# Patient Record
Sex: MALE | Race: WHITE | NOT HISPANIC OR LATINO | Employment: OTHER | URBAN - METROPOLITAN AREA
[De-identification: names, ages, dates, MRNs, and addresses within clinical notes are randomized per-mention and may not be internally consistent; named-entity substitution may affect disease eponyms.]

---

## 2017-03-01 RX ORDER — GLIMEPIRIDE 2 MG/1
2 TABLET ORAL
Qty: 90 TABLET | Refills: 2 | Status: SHIPPED | OUTPATIENT
Start: 2017-03-01 | End: 2017-12-14 | Stop reason: SDUPTHER

## 2017-03-16 DIAGNOSIS — E78.5 HYPERLIPIDEMIA, UNSPECIFIED HYPERLIPIDEMIA TYPE: ICD-10-CM

## 2017-03-16 DIAGNOSIS — Z00.00 HEALTH CARE MAINTENANCE: ICD-10-CM

## 2017-03-16 DIAGNOSIS — E13.8 DIABETES MELLITUS OF OTHER TYPE WITH COMPLICATION, UNSPECIFIED LONG TERM INSULIN USE STATUS: Primary | ICD-10-CM

## 2017-03-22 ENCOUNTER — RESULTS ENCOUNTER (OUTPATIENT)
Dept: FAMILY MEDICINE CLINIC | Facility: CLINIC | Age: 82
End: 2017-03-22

## 2017-03-22 DIAGNOSIS — E13.8 DIABETES MELLITUS OF OTHER TYPE WITH COMPLICATION, UNSPECIFIED LONG TERM INSULIN USE STATUS: ICD-10-CM

## 2017-03-22 DIAGNOSIS — Z00.00 HEALTH CARE MAINTENANCE: ICD-10-CM

## 2017-03-23 LAB
ALBUMIN SERPL-MCNC: 4.5 G/DL (ref 3.5–5.2)
ALBUMIN/CREAT UR: 10.3 MG/G CREAT (ref 0–30)
ALBUMIN/GLOB SERPL: 1.7 G/DL
ALP SERPL-CCNC: 66 U/L (ref 39–117)
ALT SERPL-CCNC: 11 U/L (ref 1–41)
AST SERPL-CCNC: 14 U/L (ref 1–40)
BASOPHILS # BLD AUTO: 0.03 10*3/MM3 (ref 0–0.2)
BASOPHILS NFR BLD AUTO: 0.6 % (ref 0–1.5)
BILIRUB SERPL-MCNC: 0.7 MG/DL (ref 0.1–1.2)
BUN SERPL-MCNC: 27 MG/DL (ref 8–23)
BUN/CREAT SERPL: 21.6 (ref 7–25)
CALCIUM SERPL-MCNC: 9.7 MG/DL (ref 8.6–10.5)
CHLORIDE SERPL-SCNC: 102 MMOL/L (ref 98–107)
CHOLEST SERPL-MCNC: 197 MG/DL (ref 0–200)
CO2 SERPL-SCNC: 27.3 MMOL/L (ref 22–29)
CREAT SERPL-MCNC: 1.25 MG/DL (ref 0.76–1.27)
CREAT UR-MCNC: 173.8 MG/DL
EOSINOPHIL # BLD AUTO: 0.09 10*3/MM3 (ref 0–0.7)
EOSINOPHIL NFR BLD AUTO: 1.7 % (ref 0.3–6.2)
ERYTHROCYTE [DISTWIDTH] IN BLOOD BY AUTOMATED COUNT: 15.9 % (ref 11.5–14.5)
GLOBULIN SER CALC-MCNC: 2.6 GM/DL
GLUCOSE SERPL-MCNC: 189 MG/DL (ref 65–99)
HBA1C MFR BLD: 7.5 % (ref 4.8–5.6)
HCT VFR BLD AUTO: 44.1 % (ref 40.4–52.2)
HDLC SERPL-MCNC: 47 MG/DL (ref 40–60)
HGB BLD-MCNC: 13.8 G/DL (ref 13.7–17.6)
IMM GRANULOCYTES # BLD: 0.02 10*3/MM3 (ref 0–0.03)
IMM GRANULOCYTES NFR BLD: 0.4 % (ref 0–0.5)
LDLC SERPL CALC-MCNC: 135 MG/DL (ref 0–100)
LDLC/HDLC SERPL: 2.87 {RATIO}
LYMPHOCYTES # BLD AUTO: 1.4 10*3/MM3 (ref 0.9–4.8)
LYMPHOCYTES NFR BLD AUTO: 26.8 % (ref 19.6–45.3)
MCH RBC QN AUTO: 31.5 PG (ref 27–32.7)
MCHC RBC AUTO-ENTMCNC: 31.3 G/DL (ref 32.6–36.4)
MCV RBC AUTO: 100.7 FL (ref 79.8–96.2)
MICROALBUMIN UR-MCNC: 17.9 UG/ML
MONOCYTES # BLD AUTO: 0.52 10*3/MM3 (ref 0.2–1.2)
MONOCYTES NFR BLD AUTO: 10 % (ref 5–12)
NEUTROPHILS # BLD AUTO: 3.16 10*3/MM3 (ref 1.9–8.1)
NEUTROPHILS NFR BLD AUTO: 60.5 % (ref 42.7–76)
PLATELET # BLD AUTO: 145 10*3/MM3 (ref 140–500)
POTASSIUM SERPL-SCNC: 5.1 MMOL/L (ref 3.5–5.2)
PROT SERPL-MCNC: 7.1 G/DL (ref 6–8.5)
RBC # BLD AUTO: 4.38 10*6/MM3 (ref 4.6–6)
SODIUM SERPL-SCNC: 144 MMOL/L (ref 136–145)
TRIGL SERPL-MCNC: 76 MG/DL (ref 0–150)
VLDLC SERPL CALC-MCNC: 15.2 MG/DL (ref 5–40)
WBC # BLD AUTO: 5.22 10*3/MM3 (ref 4.5–10.7)

## 2017-03-29 ENCOUNTER — OFFICE VISIT (OUTPATIENT)
Dept: FAMILY MEDICINE CLINIC | Facility: CLINIC | Age: 82
End: 2017-03-29

## 2017-03-29 VITALS
RESPIRATION RATE: 16 BRPM | HEART RATE: 70 BPM | DIASTOLIC BLOOD PRESSURE: 70 MMHG | TEMPERATURE: 98.2 F | OXYGEN SATURATION: 97 % | SYSTOLIC BLOOD PRESSURE: 142 MMHG | BODY MASS INDEX: 27.94 KG/M2 | HEIGHT: 71 IN | WEIGHT: 199.6 LBS

## 2017-03-29 DIAGNOSIS — I10 ESSENTIAL HYPERTENSION: ICD-10-CM

## 2017-03-29 DIAGNOSIS — IMO0001 UNCONTROLLED TYPE 2 DIABETES MELLITUS WITHOUT COMPLICATION, WITH LONG-TERM CURRENT USE OF INSULIN: ICD-10-CM

## 2017-03-29 DIAGNOSIS — E78.00 PURE HYPERCHOLESTEROLEMIA: Primary | ICD-10-CM

## 2017-03-29 DIAGNOSIS — M54.2 NECK PAIN: ICD-10-CM

## 2017-03-29 PROCEDURE — 99214 OFFICE O/P EST MOD 30 MIN: CPT | Performed by: INTERNAL MEDICINE

## 2017-03-29 NOTE — PROGRESS NOTES
"Subjective   Patient ID: Home Wynn is a 85 y.o. male presents with   Chief Complaint   Patient presents with   • Hyperlipidemia     f/u on lab results       HPI - this patient has a history of type 2 diabetes, hypertension, hypercholesterolemia and chronic neck pain.  I reviewed his old MRI of his cervical spine.  He has lots of arthritis.  He complains of no radiation down into his arms or weakness in his hands.  The pain is moderate in intensity and worse with range of motion primarily rotating to the right.  Described as a sharp soreness.        Assessment plan    Type 2 insulin-dependent diabetes mellitus continue Amaryl 2 mg he should not take this if he skips breakfast.  Also will increase his Lantus to 14 units daily.    Neck pain-Voltaren gel when necessary    Hypertension controlled with diet    A. fib he sees cardiology's on amiodarone and aspirin    Hyperlipidemia-controlled with Pravachol.        Allergies   Allergen Reactions   • Other        The following portions of the patient's history were reviewed and updated as appropriate: allergies, current medications, past family history, past medical history, past social history, past surgical history and problem list.      Review of Systems   Constitutional: Negative.    HENT: Negative.    Eyes: Negative.    Respiratory: Negative.    Cardiovascular: Negative.    Gastrointestinal: Negative.    Endocrine: Negative.    Genitourinary: Negative.    Musculoskeletal: Positive for neck pain.   Skin: Negative.    Allergic/Immunologic: Negative.    Neurological: Positive for numbness.   Hematological: Negative.    Psychiatric/Behavioral: Negative.        Objective     Vitals:    03/29/17 1053   BP: 142/70   Pulse: 70   Resp: 16   Temp: 98.2 °F (36.8 °C)   TempSrc: Oral   SpO2: 97%   Weight: 199 lb 9.6 oz (90.5 kg)   Height: 71\" (180.3 cm)         Physical Exam   Constitutional: He is oriented to person, place, and time. He appears well-developed and " well-nourished.   HENT:   Head: Normocephalic and atraumatic.   Eyes: EOM are normal. Pupils are equal, round, and reactive to light.   Cardiovascular: Normal rate, regular rhythm and normal heart sounds.    Pulmonary/Chest: Effort normal.   Neurological: He is alert and oriented to person, place, and time.   Psychiatric: He has a normal mood and affect. His behavior is normal.   Vitals reviewed.        Home was seen today for hyperlipidemia.    Diagnoses and all orders for this visit:    Pure hypercholesterolemia    Essential hypertension    Uncontrolled type 2 diabetes mellitus without complication, with long-term current use of insulin    Neck pain    Other orders  -     diclofenac (VOLTAREN) 1 % gel gel; Apply 4 g topically 4 (Four) Times a Day.        Call or return to clinic prn if these symptoms worsen or fail to improve as anticipated.

## 2017-05-02 ENCOUNTER — TELEPHONE (OUTPATIENT)
Dept: FAMILY MEDICINE CLINIC | Facility: CLINIC | Age: 82
End: 2017-05-02

## 2017-05-02 RX ORDER — PRAVASTATIN SODIUM 40 MG
40 TABLET ORAL 2 TIMES DAILY
Qty: 60 TABLET | Refills: 5 | Status: SHIPPED | OUTPATIENT
Start: 2017-05-02 | End: 2017-05-12 | Stop reason: SDUPTHER

## 2017-05-04 RX ORDER — AMIODARONE HYDROCHLORIDE 200 MG/1
TABLET ORAL
Qty: 90 TABLET | Refills: 0 | Status: SHIPPED | OUTPATIENT
Start: 2017-05-04 | End: 2017-07-11

## 2017-05-12 RX ORDER — PRAVASTATIN SODIUM 40 MG
40 TABLET ORAL DAILY
Qty: 90 TABLET | Refills: 1 | Status: SHIPPED | OUTPATIENT
Start: 2017-05-12 | End: 2018-09-21 | Stop reason: SDUPTHER

## 2017-06-05 ENCOUNTER — TELEPHONE (OUTPATIENT)
Dept: FAMILY MEDICINE CLINIC | Facility: CLINIC | Age: 82
End: 2017-06-05

## 2017-06-05 DIAGNOSIS — M54.2 NECK PAIN: Primary | ICD-10-CM

## 2017-06-05 NOTE — TELEPHONE ENCOUNTER
Pt called and said his neck problem is not getting better. Been going on a year now. Was hoping you would order some kind of MRI or xray. He just cant stand it any longer. thanks

## 2017-06-07 ENCOUNTER — OFFICE VISIT (OUTPATIENT)
Dept: FAMILY MEDICINE CLINIC | Facility: CLINIC | Age: 82
End: 2017-06-07

## 2017-06-07 VITALS
OXYGEN SATURATION: 95 % | SYSTOLIC BLOOD PRESSURE: 125 MMHG | HEIGHT: 71 IN | HEART RATE: 68 BPM | DIASTOLIC BLOOD PRESSURE: 80 MMHG | TEMPERATURE: 98.3 F | BODY MASS INDEX: 27.92 KG/M2 | WEIGHT: 199.4 LBS

## 2017-06-07 DIAGNOSIS — I10 ESSENTIAL HYPERTENSION: ICD-10-CM

## 2017-06-07 DIAGNOSIS — M54.2 NECK PAIN: Primary | ICD-10-CM

## 2017-06-07 PROCEDURE — 99213 OFFICE O/P EST LOW 20 MIN: CPT | Performed by: INTERNAL MEDICINE

## 2017-06-07 NOTE — PROGRESS NOTES
"Subjective   Patient ID: Home Wynn is a 85 y.o. male presents with   Chief Complaint   Patient presents with   • Neck Pain       HPI - This patient is continuing to complain about neck pain.  It's been going on for a year or so but it's not going away.  It's moderate and worse with range of motion he's been to PT and it just made it worse.  We tried some and diclofenac gel and Aleve and that did not help.  The pain does not seem to radiate and he has no symptoms suggestive of myelopathy.    Assessment plan    Neck pain, chronic-MRI of the cervical spine    Hypertension currently controlled without medicine.    Allergies   Allergen Reactions   • Other        The following portions of the patient's history were reviewed and updated as appropriate: allergies, current medications, past family history, past medical history, past social history, past surgical history and problem list.      Review of Systems   Constitutional: Negative.    Respiratory: Negative.    Cardiovascular: Negative.    Musculoskeletal: Positive for arthralgias, myalgias and neck stiffness.   Psychiatric/Behavioral: Negative.        Objective     Vitals:    06/07/17 0752   BP: 125/80   Pulse: 68   Temp: 98.3 °F (36.8 °C)   SpO2: 95%   Weight: 199 lb 6.4 oz (90.4 kg)   Height: 71\" (180.3 cm)         Physical Exam   Constitutional: He appears well-developed and well-nourished.   HENT:   Head: Normocephalic and atraumatic.   Musculoskeletal: Normal range of motion. He exhibits tenderness. He exhibits no deformity.   Tenderness over the spinous process of the cervical spine   Nursing note and vitals reviewed.        Home was seen today for neck pain.    Diagnoses and all orders for this visit:    Neck pain    Essential hypertension        Call or return to clinic prn if these symptoms worsen or fail to improve as anticipated.  "

## 2017-06-09 ENCOUNTER — TELEPHONE (OUTPATIENT)
Dept: FAMILY MEDICINE CLINIC | Facility: CLINIC | Age: 82
End: 2017-06-09

## 2017-06-15 ENCOUNTER — HOSPITAL ENCOUNTER (OUTPATIENT)
Dept: MRI IMAGING | Facility: HOSPITAL | Age: 82
Discharge: HOME | End: 2017-06-15
Admitting: INTERNAL MEDICINE

## 2017-06-15 DIAGNOSIS — M54.2 NECK PAIN: ICD-10-CM

## 2017-06-15 PROCEDURE — 72141 MRI NECK SPINE W/O DYE: CPT

## 2017-06-20 DIAGNOSIS — M54.2 NECK PAIN: Primary | ICD-10-CM

## 2017-06-26 DIAGNOSIS — E78.5 HYPERLIPIDEMIA, UNSPECIFIED HYPERLIPIDEMIA TYPE: ICD-10-CM

## 2017-06-26 DIAGNOSIS — I15.9 SECONDARY HYPERTENSION: ICD-10-CM

## 2017-06-26 DIAGNOSIS — E13.8 DIABETES MELLITUS OF OTHER TYPE WITH COMPLICATION, UNSPECIFIED LONG TERM INSULIN USE STATUS: Primary | ICD-10-CM

## 2017-07-11 ENCOUNTER — OFFICE VISIT (OUTPATIENT)
Dept: PAIN MEDICINE | Facility: CLINIC | Age: 82
End: 2017-07-11

## 2017-07-11 ENCOUNTER — OFFICE VISIT (OUTPATIENT)
Dept: FAMILY MEDICINE CLINIC | Facility: CLINIC | Age: 82
End: 2017-07-11

## 2017-07-11 VITALS
BODY MASS INDEX: 28.55 KG/M2 | TEMPERATURE: 98 F | HEIGHT: 71 IN | WEIGHT: 203.9 LBS | HEART RATE: 63 BPM | RESPIRATION RATE: 16 BRPM | SYSTOLIC BLOOD PRESSURE: 132 MMHG | OXYGEN SATURATION: 95 % | DIASTOLIC BLOOD PRESSURE: 74 MMHG

## 2017-07-11 VITALS
BODY MASS INDEX: 28.5 KG/M2 | SYSTOLIC BLOOD PRESSURE: 165 MMHG | DIASTOLIC BLOOD PRESSURE: 84 MMHG | RESPIRATION RATE: 16 BRPM | WEIGHT: 203.6 LBS | TEMPERATURE: 98 F | HEIGHT: 71 IN | OXYGEN SATURATION: 96 % | HEART RATE: 63 BPM

## 2017-07-11 DIAGNOSIS — L08.9 SKIN INFECTION: Primary | ICD-10-CM

## 2017-07-11 DIAGNOSIS — R60.9 SWELLING: ICD-10-CM

## 2017-07-11 DIAGNOSIS — M54.2 NECK PAIN: Primary | ICD-10-CM

## 2017-07-11 DIAGNOSIS — L03.115 CELLULITIS OF RIGHT LOWER EXTREMITY: ICD-10-CM

## 2017-07-11 DIAGNOSIS — L08.9 SKIN INFECTION: ICD-10-CM

## 2017-07-11 DIAGNOSIS — M48.02 CERVICAL SPINAL STENOSIS: ICD-10-CM

## 2017-07-11 LAB
POC AMPHETAMINES: NEGATIVE
POC BARBITURATES: NEGATIVE
POC BENZODIAZEPHINES: NEGATIVE
POC COCAINE: NEGATIVE
POC METHADONE: NEGATIVE
POC METHAMPHETAMINE SCREEN URINE: NEGATIVE
POC OPIATES: NEGATIVE
POC OXYCODONE: NEGATIVE
POC PHENCYCLIDINE: NEGATIVE
POC PROPOXYPHENE: NEGATIVE
POC THC: NEGATIVE
POC TRICYCLIC ANTIDEPRESSANTS: NEGATIVE

## 2017-07-11 PROCEDURE — 99213 OFFICE O/P EST LOW 20 MIN: CPT | Performed by: INTERNAL MEDICINE

## 2017-07-11 PROCEDURE — 99204 OFFICE O/P NEW MOD 45 MIN: CPT | Performed by: PAIN MEDICINE

## 2017-07-11 PROCEDURE — 80305 DRUG TEST PRSMV DIR OPT OBS: CPT | Performed by: PAIN MEDICINE

## 2017-07-11 RX ORDER — SULFAMETHOXAZOLE AND TRIMETHOPRIM 400; 80 MG/1; MG/1
1 TABLET ORAL 2 TIMES DAILY
Qty: 14 TABLET | Refills: 0 | Status: SHIPPED | OUTPATIENT
Start: 2017-07-11 | End: 2017-09-12 | Stop reason: ALTCHOICE

## 2017-07-11 RX ORDER — AMIODARONE HYDROCHLORIDE 200 MG/1
50 TABLET ORAL DAILY
COMMUNITY
End: 2017-09-22 | Stop reason: SDUPTHER

## 2017-07-11 NOTE — PATIENT INSTRUCTIONS
Review cervical MRI- diagnosis: Cervical spinal stenosis  Treated with cervical epidural steroid   To be done on a Wednesday as an outpatient procedure.   - given age will try steroid injection first before starting or trying any medication.   Has failed conservative therapy with PT/chrio/heat/ice/behavior modification.       Epidural Steroid Injection  An epidural steroid injection is given to relieve pain in your neck, back, or legs that is caused by the irritation or swelling of a nerve root. This procedure involves injecting a steroid and numbing medicine (anesthetic) into the epidural space. The epidural space is the space between the outer covering of your spinal cord and the bones that form your backbone (vertebra).   LET YOUR HEALTH CARE PROVIDER KNOW ABOUT:   · Any allergies you have.  · All medicines you are taking, including vitamins, herbs, eye drops, creams, and over-the-counter medicines such as aspirin.  · Previous problems you or members of your family have had with the use of anesthetics.  · Any blood disorders or blood clotting disorders you have.  · Previous surgeries you have had.  · Medical conditions you have.  RISKS AND COMPLICATIONS  Generally, this is a safe procedure. However, as with any procedure, complications can occur. Possible complications of epidural steroid injection include:  · Headache.  · Bleeding.  · Infection.  · Allergic reaction to the medicines.  · Damage to your nerves.  The response to this procedure depends on the underlying cause of the pain and its duration. People who have long-term (chronic) pain are less likely to benefit from epidural steroids than are those people whose pain comes on strong and suddenly.  BEFORE THE PROCEDURE   · Ask your health care provider about changing or stopping your regular medicines. You may be advised to stop taking blood-thinning medicines a few days before the procedure.  · You may be given medicines to reduce anxiety.  · Arrange for  someone to take you home after the procedure.  PROCEDURE   · You will remain awake during the procedure. You may receive medicine to make you relaxed.  · You will be asked to lie on your stomach.  · The injection site will be cleaned.  · The injection site will be numbed with a medicine (local anesthetic).  · A needle will be injected through your skin into the epidural space.  · Your health care provider will use an X-ray machine to ensure that the steroid is delivered closest to the affected nerve. You may have minimal discomfort at this time.  · Once the needle is in the right position, the local anesthetic and the steroid will be injected into the epidural space.  · The needle will then be removed and a bandage will be applied to the injection site.  AFTER THE PROCEDURE   · You may be monitored for a short time before you go home.  · You may feel weakness or numbness in your arm or leg, which disappears within hours.  · You may be allowed to eat, drink, and take your regular medicine.  · You may have soreness at the site of the injection.     This information is not intended to replace advice given to you by your health care provider. Make sure you discuss any questions you have with your health care provider.     Document Released: 03/26/2009 Document Revised: 08/20/2014 Document Reviewed: 06/06/2014  [employer] Patient Education ©2017 [employer]

## 2017-07-11 NOTE — PROGRESS NOTES
CHIEF COMPLAINT: Neck Pain    Home Wynn is a 85 y.o. male.   He was referred here by Dr. Cai. He presents to the office for evaluation and treatment of Neck Pain    HPI  Neck Pain  Started 9-10 months ago. He states it started and gradually got worse. He has been in physical therapy for it, and he can move it better but still having pain when he turns it from side to side. He has seen a chiropractor twice in the last 9 months and it didn't help. He has tried a tens unit which didn't help, and trying ice and heat doesn't help. He has not seen a neurosurgeon.    The patient states their pain is a 8 on a scale of 1-10.  The patient describes this pain as constant stabbing.  The pain is located in Neck. This painful problem is aggravated by movement and is alleviated by nothing. States not moving his head is the only thing that helps.     Also has complaints of right calf pain and tenderness after bug bite a few days ago. Warm to touch and swollen. Has not tried any medication or seen anyone for it.     Past pain medications:   Tylenol  Advil - no help  Diclofenac - no help    Current pain medications:   Tylenol prn- minimal help    Past therapies:  Physical Therapy: yes- better ROM but pain is the same  Chiropractor: yes - no improvement  Massage Therapy: no  TENS: yes  Neck or back surgery: no  Past pain management: no    Previous Injections: not in neck or back, steroid injection in knee  Effect of Injection (%): 100%  Length of Relief: 2 years +     PEG Assessment   What number best describes your pain on average in the past week? 8  What number best describes how, during the past week, pain has interfered with your enjoyment of life? 8  What number best describes how, during the past week, pain has interfered with your general activity? 8      Current Outpatient Prescriptions:   •  amiodarone (PACERONE) 50 MG, Take 50 mg by mouth Daily., Disp: , Rfl:   •  aspirin 325 MG tablet, Take by mouth daily., Disp: ,  Rfl:   •  glimepiride (AMARYL) 2 MG tablet, Take 1 tablet by mouth Every Morning Before Breakfast., Disp: 90 tablet, Rfl: 2  •  Insulin Glargine (LANTUS SOLOSTAR) 100 UNIT/ML injection pen, Inject 15 Units under the skin every night., Disp: 15 mL, Rfl: 5  •  Insulin Pen Needle 31G X 6 MM misc, 31 mm daily., Disp: 90 each, Rfl: 1  •  Multiple Vitamins-Minerals (PRESERVISION AREDS) capsule, Take by mouth., Disp: , Rfl:   •  OXYGEN-HELIUM IN, Oxygen; Patient Sig: Oxygen he uses 2 LPM at bedtime.; 0; 21-Mar-2014; Active, Disp: , Rfl:   •  pravastatin (PRAVACHOL) 40 MG tablet, Take 1 tablet by mouth Daily., Disp: 90 tablet, Rfl: 1  •  sulfamethoxazole-trimethoprim (BACTRIM) 400-80 MG tablet, Take 1 tablet by mouth 2 (Two) Times a Day., Disp: 14 tablet, Rfl: 0    REVIEW OF PERTINENT MEDICAL DATA  Chart reviewed and summarization of all medical records up to new patient visit performed.  Last note from PCP reviewed. Seen for neck pain. Neck MRI ordered. Failed PT.     IMAGING  6/05/17 MRI cervical spine:  IMPRESSION:  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 Mild to moderate canal stenosis is present at C6-7 and C3-4. Multilevel neuroforaminal compromise is noted. The appearance is similar to the examination of 08/11/2014. There is no evidence of marrow edema to suggest a cervical fracture. There is mild loss of vertical height without marrow edema involving the T3 vertebral body, new versus the prior MRI examination suggesting a mild compression fracture/compression deformity. Further evaluation could be performed with a cervical myelogram as indicated.    I personally reviewed the images of cervical MRI while patient was in the office.  Findings discussed with patient.      PFSH:  The following  "portions of the patient's history were reviewed and updated as appropriate: problem list, past medical history, past surgery history, social history, family history, medications, and allergies    Review of Systems   Constitutional: Positive for activity change and fatigue. Negative for appetite change, chills and fever.   Respiratory: Positive for apnea (wears 02 at night) and shortness of breath. Negative for cough and wheezing.    Cardiovascular: Positive for leg swelling (right calf pt states it swelled more yesterday after he got a bite from being outside). Negative for chest pain.   Gastrointestinal: Negative for constipation, diarrhea, nausea and vomiting.   Genitourinary: Positive for frequency.   Musculoskeletal: Positive for arthralgias and neck pain. Negative for back pain and neck stiffness.   Skin: Negative for rash and wound.   Allergic/Immunologic: Negative for immunocompromised state.   Neurological: Negative for dizziness, weakness, light-headedness, numbness and headaches.   Hematological: Does not bruise/bleed easily.   Psychiatric/Behavioral: Negative for agitation, confusion, hallucinations, sleep disturbance and suicidal ideas. The patient is not nervous/anxious.    All other systems reviewed and are negative.      Vitals:    07/11/17 1020   BP: 165/84   Pulse: 63   Resp: 16   Temp: 98 °F (36.7 °C)   SpO2: 96%   Weight: 203 lb 9.6 oz (92.4 kg)   Height: 71\" (180.3 cm)   PainSc:   8   PainLoc: Neck       Physical Exam   Constitutional: He appears well-developed and well-nourished. No distress.   HENT:   Head: Normocephalic and atraumatic.   Nose: Nose normal.   Mouth/Throat: Oropharynx is clear and moist.   Eyes: Conjunctivae and EOM are normal.   Neck: Normal range of motion. Neck supple.   Cardiovascular: Normal rate, regular rhythm and normal heart sounds.    Pulmonary/Chest: Effort normal and breath sounds normal. No stridor. No respiratory distress.   Abdominal: Soft. Bowel sounds are " normal. He exhibits no distension. There is no tenderness.   Musculoskeletal:        Cervical back: He exhibits decreased range of motion, tenderness and pain. He exhibits no deformity.        Right lower leg: He exhibits tenderness, swelling and edema.        Legs:  Neurological: He is alert. He has normal strength. No cranial nerve deficit or sensory deficit. Gait normal.   Skin: Skin is warm and dry. No rash noted. He is not diaphoretic.   Psychiatric: He has a normal mood and affect. His speech is normal and behavior is normal.   Nursing note and vitals reviewed.    Ortho Exam  Neurologic Exam     Mental Status   Speech: speech is normal     Cranial Nerves   Cranial nerves II through XII intact.     CN III, IV, VI   Extraocular motions are normal.     Motor Exam     Strength   Strength 5/5 throughout.     Gait, Coordination, and Reflexes     Gait  Gait: normal      Lab Results   Component Value Date    POCMETH Negative 07/11/2017    POCAMPHET Negative 07/11/2017    POCBARBITUR Negative 07/11/2017    POCBENZO Negative 07/11/2017    POCCOCAINE Negative 07/11/2017    POCMETHADO Negative 07/11/2017    POCOPIATES Negative 07/11/2017    POCOXYCODO Negative 07/11/2017    POCPHENCYC Negative 07/11/2017    POCPROPOXY Negative 07/11/2017    POCTHC Negative 07/11/2017    POCTRICYC Negative 07/11/2017       Comments: Reviewed POC today      Date of last MALICK reviewed : 07/17/17   Comments: Consistent     Assessment/Plan   Home was seen today for neck pain.    Diagnoses and all orders for this visit:    Neck pain  -     Case Request  -     Urine Drug Screen Confirmation  -     POC Urine Drug Screen, Triage    Cervical spinal stenosis  -     Case Request  -     Urine Drug Screen Confirmation  -     POC Urine Drug Screen, Triage    - Called PCP to discuss being seen for swelling and erythema over posterior aspect of right calf. Looks like cellulitis but it is not my specialty. PCP will see him this morning. Patient to go  see PCP after visit. Will leave treatment plan to PCP.     - Discussed with the patient regarding the etiology of their pain. Informed them that they would likely benefit from a C6/C7 Cervical epidural steroid injection.  The procedure was described in detail and the risks, benefits and alternatives were discussed with the patient (including but not limited to: bleeding, infection, nerve damage, worsening of pain, inability to perform injection, paralysis, seizures, and death) who agreed to proceed.   - given age will try injection first before medication.   - UDS done today, will check on next visit.   - Continue to work with PT/HEP to improve ROM.     Wt Readings from Last 3 Encounters:   07/13/17 200 lb 9.6 oz (91 kg)   07/11/17 203 lb 14.4 oz (92.5 kg)   07/11/17 203 lb 9.6 oz (92.4 kg)     Body mass index is 28.4 kg/(m^2). Patient counseled on the importance of weight loss to help with overall health and pain control. Patient instructed to attempt weight loss.   Plan: Calorie counting increase physical activity, reduce portion size, cut out extra servings and reduce fast food intake    Follow-up in 1 month.    Aura Jacobs MD  Pain Management

## 2017-07-11 NOTE — PROGRESS NOTES
"Subjective   Patient ID: Home Wynn is a 85 y.o. male presents with   Chief Complaint   Patient presents with   • cellulitis possible in right lower leg     since friday night, painful, swollen, has a fever in the leg       HPI - This patient presents today with right lower extremity pain and swelling since Friday night he thinks something may have been him in the garden.  He's had no fever but he's having pain in the right lower extremity.  I went ahead and send him to have a venous ultrasound of the leg done and it was negative for an acute DVT.    Assessment plan    Swelling and right lower extremity skin infection-Bactrim single strength 1 by mouth twice a day.  Patient's to elevate the leg and follow-up with me on thur.        Allergies   Allergen Reactions   • Other        The following portions of the patient's history were reviewed and updated as appropriate: allergies, current medications, past family history, past medical history, past social history, past surgical history and problem list.      Review of Systems   Constitutional: Negative.  Negative for fever.   Respiratory: Negative.    Cardiovascular: Positive for leg swelling.   Musculoskeletal: Positive for arthralgias.   Skin: Positive for rash.       Objective     Vitals:    07/11/17 1137   BP: 132/74   Pulse: 63   Resp: 16   Temp: 98 °F (36.7 °C)   TempSrc: Oral   SpO2: 95%   Weight: 203 lb 14.4 oz (92.5 kg)   Height: 71\" (180.3 cm)         Physical Exam   Constitutional: He appears well-developed and well-nourished.   HENT:   Head: Normocephalic and atraumatic.   Skin: No rash noted. There is erythema.   Mild right lower extremities swelling tenderness palpation no fluctuance no real heat or erythema   Psychiatric: He has a normal mood and affect. His behavior is normal.   Nursing note and vitals reviewed.       she is appreciated in Bactrim  Home was seen today for cellulitis possible in right lower leg.    Diagnoses and all orders for this " visit:    Skin infection    Swelling  -     Duplex Venous Lower Extremity - Right CAR; Future    Other orders  -     sulfamethoxazole-trimethoprim (BACTRIM) 400-80 MG tablet; Take 1 tablet by mouth 2 (Two) Times a Day.        Call or return to clinic prn if these symptoms worsen or fail to improve as anticipated.

## 2017-07-12 ENCOUNTER — OUTSIDE FACILITY SERVICE (OUTPATIENT)
Dept: PAIN MEDICINE | Facility: CLINIC | Age: 82
End: 2017-07-12

## 2017-07-12 ENCOUNTER — DOCUMENTATION (OUTPATIENT)
Dept: PAIN MEDICINE | Facility: CLINIC | Age: 82
End: 2017-07-12

## 2017-07-12 PROCEDURE — 62321 NJX INTERLAMINAR CRV/THRC: CPT | Performed by: PAIN MEDICINE

## 2017-07-12 NOTE — PROGRESS NOTES
Cervical Epidural Steroid Injection @ C6-C7    PREOPERATIVE DIAGNOSIS:   Cervical spinal stenosis, chronic neck pain  POSTOPERATIVE DIAGNOSIS:  Same as preop diagnosis    PROCEDURE:   Cervical Epidural Steroid Injection, Therapeutic Translaminar Injection, with epidurogram, at  C6-C7 level    PRE-PROCEDURE DISCUSSION WITH PATIENT:    Risks and complications were discussed with the patient prior to starting the procedure and informed consent was obtained.  We discussed various topics including but not limited to bleeding, infection, injury, paralysis, nerve injury, dural puncture, coma, death, worsening of clinical picture, lack of pain relief, and postprocedural soreness.    SURGEON:  Aura Jacobs MD     REASON FOR PROCEDURE:    Neck Pain  Started 9-10 months ago. He states it started and gradually got worse. He has been in physical therapy for it, and he can move it better but still having pain when he turns it from side to side. He has seen a chiropractor twice in the last 9 months and it didn't help. He has tried a tens unit which didn't help, and trying ice and heat doesn't help. The patient states their pain is a 8 on a scale of 1-10. The patient describes this pain as constant stabbing. The pain is located in Neck. This painful problem is aggravated by movement and is alleviated by nothing. States not moving his head is the only thing that helps.     SEDATION:  Versed 2mg IV fentanyl 50 mcg  ANESTHETIC:  Lidocaine 1%  STEROID:   Dexamethasone 8mg    DESCRIPTON OF PROCEDURE:    After obtaining informed consent, I.V. was started in the preop area.   The patient was taken to the operating room and placed in the prone position.  EKG, blood pressure, and pulse oximeter were monitored throughout, and sedation was provided as needed by the RN under my guidance. All pressure points were well padded.  The cervicothoracic spine area was prepped with Chloraprep and draped in a sterile fashion.  Under fluoroscopic  guidance, the aforementioned interlaminar space was identified. Skin and subcutaneous tissues were anesthetized with 1% lidocaine in the middle of the space. A 20-gauge Tuohy needle was introduced through the skin and advanced to this interlaminar space and into the epidural space under fluoroscopic guidance and verified with loss-of-resistance technique to air.  After confirming the position of the needle with the fluoroscope with all the views, and after aspiration was confirmed negative for blood and CSF, 1.5 mL of Omnipaque was injected.  After seeing appropriate epidurogram with lateral and PA views, a total of 3 cc solution was injected, consisting of 1cc of local anesthetic as above, with normal saline and injectable steroid as above.     ESTIMATED BLOOD LOSS:  <5 mL  SPECIMENS:  None    COMPLICATIONS:     No complications were noted.    TOLERANCE & DISCHARGE CONDITION:    The patient tolerated the procedure well.  The patient was transported to the recovery area without difficulties.  The patient was discharged to home under the care of family in stable and satisfactory condition.    PLAN OF CARE:  1. The patient was given our standard instruction sheet.  2. The patient will return to clinic for follow-up in approximately 4 weeks.  3. The patient will resume all medications as per the medication reconciliation sheet.

## 2017-07-13 ENCOUNTER — OFFICE VISIT (OUTPATIENT)
Dept: FAMILY MEDICINE CLINIC | Facility: CLINIC | Age: 82
End: 2017-07-13

## 2017-07-13 VITALS
HEIGHT: 71 IN | TEMPERATURE: 98 F | HEART RATE: 78 BPM | WEIGHT: 200.6 LBS | BODY MASS INDEX: 28.08 KG/M2 | RESPIRATION RATE: 16 BRPM | DIASTOLIC BLOOD PRESSURE: 80 MMHG | OXYGEN SATURATION: 95 % | SYSTOLIC BLOOD PRESSURE: 138 MMHG

## 2017-07-13 DIAGNOSIS — L08.9 SKIN INFECTION: Primary | ICD-10-CM

## 2017-07-13 DIAGNOSIS — R60.9 SWELLING: ICD-10-CM

## 2017-07-13 PROCEDURE — 99213 OFFICE O/P EST LOW 20 MIN: CPT | Performed by: INTERNAL MEDICINE

## 2017-07-13 NOTE — PROGRESS NOTES
"Subjective   Patient ID: Home Wynn is a 85 y.o. male presents with   Chief Complaint   Patient presents with   • Follow-up     on right leg swelling       HPI - This patient follows up for right leg swelling and pain.  I saw him the day before yesterday.  We did an ultrasound that showed no DVT.  I started him on Bactrim single strength he's much better.    Assessment plan    Skin infection and swelling.-Continue Bactrim single strength 1 by mouth twice a day for 5 more days.  If not fully resolved patient's to let me know.    Allergies   Allergen Reactions   • Other        The following portions of the patient's history were reviewed and updated as appropriate: allergies, current medications, past family history, past medical history, past social history, past surgical history and problem list.      Review of Systems   Constitutional: Negative.    Respiratory: Negative.    Cardiovascular: Positive for leg swelling.   Musculoskeletal:        Leg pain resolved       Objective     Vitals:    07/13/17 1247   BP: 138/80   Pulse: 78   Resp: 16   Temp: 98 °F (36.7 °C)   TempSrc: Oral   SpO2: 95%   Weight: 200 lb 9.6 oz (91 kg)   Height: 71\" (180.3 cm)         Physical Exam   Constitutional: He is oriented to person, place, and time. He appears well-developed and well-nourished.   Neurological: He is alert and oriented to person, place, and time.   Skin: Skin is warm and dry. No rash noted.   Chronic venostasis rash   Psychiatric: He has a normal mood and affect. His behavior is normal.   Nursing note and vitals reviewed.        Home was seen today for follow-up.    Diagnoses and all orders for this visit:    Skin infection    Swelling        Call or return to clinic prn if these symptoms worsen or fail to improve as anticipated.  "

## 2017-07-19 ENCOUNTER — RESULTS ENCOUNTER (OUTPATIENT)
Dept: FAMILY MEDICINE CLINIC | Facility: CLINIC | Age: 82
End: 2017-07-19

## 2017-07-19 ENCOUNTER — TELEPHONE (OUTPATIENT)
Dept: PAIN MEDICINE | Facility: CLINIC | Age: 82
End: 2017-07-19

## 2017-07-19 DIAGNOSIS — E13.8 DIABETES MELLITUS OF OTHER TYPE WITH COMPLICATION, UNSPECIFIED LONG TERM INSULIN USE STATUS: ICD-10-CM

## 2017-07-19 DIAGNOSIS — I15.9 SECONDARY HYPERTENSION: ICD-10-CM

## 2017-07-19 DIAGNOSIS — E78.5 HYPERLIPIDEMIA, UNSPECIFIED HYPERLIPIDEMIA TYPE: ICD-10-CM

## 2017-07-19 NOTE — TELEPHONE ENCOUNTER
Yes he would likely benefit from a 2nd epidural injection if he received such improvement with the first.  I recommend he wait at least 3 weeks in between injections to space out the steroids. He can repeat on August 2nd if he would like. I will place a case request so he can be scheduled. Please call him and let him know. Thank you.

## 2017-07-19 NOTE — TELEPHONE ENCOUNTER
"Pt came in to office to ask if he will get any more relief from MAURI C6-C7. He had it on 7-12-17. He states he got 50% relief and he had it a week ago. I encouraged him and said I will let you know. He said it still \"catches.\" Not sure if you can order another epidural. He will be going out of town until Sunday. Please advise.   "

## 2017-07-20 LAB
ALBUMIN SERPL-MCNC: 4.3 G/DL (ref 3.5–5.2)
ALBUMIN/CREAT UR: 8 MG/G CREAT (ref 0–30)
ALBUMIN/GLOB SERPL: 1.3 G/DL
ALP SERPL-CCNC: 69 U/L (ref 39–117)
ALT SERPL-CCNC: 9 U/L (ref 1–41)
AST SERPL-CCNC: 12 U/L (ref 1–40)
BASOPHILS # BLD AUTO: 0.02 10*3/MM3 (ref 0–0.2)
BASOPHILS NFR BLD AUTO: 0.4 % (ref 0–1.5)
BILIRUB SERPL-MCNC: 0.6 MG/DL (ref 0.1–1.2)
BUN SERPL-MCNC: 21 MG/DL (ref 8–23)
BUN/CREAT SERPL: 14.4 (ref 7–25)
CALCIUM SERPL-MCNC: 9.9 MG/DL (ref 8.6–10.5)
CHLORIDE SERPL-SCNC: 97 MMOL/L (ref 98–107)
CHOLEST SERPL-MCNC: 184 MG/DL (ref 0–200)
CO2 SERPL-SCNC: 27.1 MMOL/L (ref 22–29)
CREAT SERPL-MCNC: 1.46 MG/DL (ref 0.76–1.27)
CREAT UR-MCNC: 146.9 MG/DL
EOSINOPHIL # BLD AUTO: 0.13 10*3/MM3 (ref 0–0.7)
EOSINOPHIL NFR BLD AUTO: 2.5 % (ref 0.3–6.2)
ERYTHROCYTE [DISTWIDTH] IN BLOOD BY AUTOMATED COUNT: 15.3 % (ref 11.5–14.5)
GLOBULIN SER CALC-MCNC: 3.2 GM/DL
GLUCOSE SERPL-MCNC: 170 MG/DL (ref 65–99)
HBA1C MFR BLD: 7.46 % (ref 4.8–5.6)
HCT VFR BLD AUTO: 47.2 % (ref 40.4–52.2)
HDLC SERPL-MCNC: 46 MG/DL (ref 40–60)
HGB BLD-MCNC: 14.7 G/DL (ref 13.7–17.6)
IMM GRANULOCYTES # BLD: 0.03 10*3/MM3 (ref 0–0.03)
IMM GRANULOCYTES NFR BLD: 0.6 % (ref 0–0.5)
LDLC SERPL CALC-MCNC: 117 MG/DL (ref 0–100)
LDLC/HDLC SERPL: 2.55 {RATIO}
LYMPHOCYTES # BLD AUTO: 1.22 10*3/MM3 (ref 0.9–4.8)
LYMPHOCYTES NFR BLD AUTO: 23.4 % (ref 19.6–45.3)
MCH RBC QN AUTO: 31.5 PG (ref 27–32.7)
MCHC RBC AUTO-ENTMCNC: 31.1 G/DL (ref 32.6–36.4)
MCV RBC AUTO: 101.3 FL (ref 79.8–96.2)
MICROALBUMIN UR-MCNC: 11.8 UG/ML
MONOCYTES # BLD AUTO: 0.68 10*3/MM3 (ref 0.2–1.2)
MONOCYTES NFR BLD AUTO: 13.1 % (ref 5–12)
NEUTROPHILS # BLD AUTO: 3.13 10*3/MM3 (ref 1.9–8.1)
NEUTROPHILS NFR BLD AUTO: 60 % (ref 42.7–76)
PLATELET # BLD AUTO: 176 10*3/MM3 (ref 140–500)
POTASSIUM SERPL-SCNC: 5.4 MMOL/L (ref 3.5–5.2)
PROT SERPL-MCNC: 7.5 G/DL (ref 6–8.5)
RBC # BLD AUTO: 4.66 10*6/MM3 (ref 4.6–6)
SODIUM SERPL-SCNC: 138 MMOL/L (ref 136–145)
TRIGL SERPL-MCNC: 104 MG/DL (ref 0–150)
VLDLC SERPL CALC-MCNC: 20.8 MG/DL (ref 5–40)
WBC # BLD AUTO: 5.21 10*3/MM3 (ref 4.5–10.7)

## 2017-07-21 ENCOUNTER — TELEPHONE (OUTPATIENT)
Dept: PAIN MEDICINE | Facility: CLINIC | Age: 82
End: 2017-07-21

## 2017-07-24 ENCOUNTER — TELEPHONE (OUTPATIENT)
Dept: FAMILY MEDICINE CLINIC | Facility: CLINIC | Age: 82
End: 2017-07-24

## 2017-07-24 NOTE — TELEPHONE ENCOUNTER
What were his test results from Wednesday? He also said you told him he may not have to keep his appt this wednesday?

## 2017-07-24 NOTE — TELEPHONE ENCOUNTER
Please inform the patient that total cholesterol is good at 184 LDL or bad cholesterol is good at 117 kidney function is stable hemoglobin A1c is 7.4 which is just a little high.  Blood count indicates no anemia.  He is correct he does not need to come in for an appointment.

## 2017-08-02 ENCOUNTER — DOCUMENTATION (OUTPATIENT)
Dept: PAIN MEDICINE | Facility: CLINIC | Age: 82
End: 2017-08-02

## 2017-08-02 ENCOUNTER — OUTSIDE FACILITY SERVICE (OUTPATIENT)
Dept: PAIN MEDICINE | Facility: CLINIC | Age: 82
End: 2017-08-02

## 2017-08-02 PROCEDURE — 62321 NJX INTERLAMINAR CRV/THRC: CPT | Performed by: PAIN MEDICINE

## 2017-08-02 NOTE — PROGRESS NOTES
Cervical Epidural Steroid Injection @ C6-C7    PREOPERATIVE DIAGNOSIS:   Chronic neck pain  POSTOPERATIVE DIAGNOSIS:  Same as preop diagnosis    PROCEDURE:   Cervical Epidural Steroid Injection, Therapeutic Translaminar Injection, with epidurogram, at  C6-C7 level    PRE-PROCEDURE DISCUSSION WITH PATIENT:    Risks and complications were discussed with the patient prior to starting the procedure and informed consent was obtained.  We discussed various topics including but not limited to bleeding, infection, injury, paralysis, nerve injury, dural puncture, coma, death, worsening of clinical picture, lack of pain relief, and postprocedural soreness.    SURGEON:  Aura Jacobs MD     REASON FOR PROCEDURE:    Started 9-10 months ago. He states it started and gradually got worse. He has been in physical therapy for it, and he can move it better but still having pain when he turns it from side to side. He has seen a chiropractor twice in the last 9 months and it didn't help. He has tried a tens unit which didn't help, and trying ice and heat doesn't help. He has not seen a neurosurgeon. MAURI x 1 was helpful and presenting today to receive 2nd in series of 2.        SEDATION:  Versed 2mg IV Fentanyl 50 mcg   ANESTHETIC:  Marcaine 0.25%  STEROID:   Dexamethasone 8mg    DESCRIPTON OF PROCEDURE:    After obtaining informed consent, I.V. was started in the preop area.   The patient was taken to the operating room and placed in the prone position.  EKG, blood pressure, and pulse oximeter were monitored throughout, and sedation was provided as needed by the RN under my guidance. All pressure points were well padded.  The cervicothoracic spine area was prepped with Chloraprep and draped in a sterile fashion.  Under fluoroscopic guidance, the aforementioned interlaminar space was identified. Skin and subcutaneous tissues were anesthetized with 1% lidocaine in the middle of the space. A 22-gauge Tuohy needle was introduced through  the skin and advanced to this interlaminar space and into the epidural space under fluoroscopic guidance and verified with loss-of-resistance technique to air.  After confirming the position of the needle with the fluoroscope with all the views, and after aspiration was confirmed negative for blood and CSF, 1.5 mL of Omnipaque was injected.  After seeing appropriate epidurogram with lateral and PA views, a total of 3 cc solution was injected, consisting of 1cc of local anesthetic as above, with normal saline and injectable steroid as above.     ESTIMATED BLOOD LOSS:  <5 mL  SPECIMENS:  None    COMPLICATIONS:     No complications were noted., There was no indication of vascular uptake on live injection of contrast dye. and There was no indication of intrathecal uptake on live injection of contrast dye.    TOLERANCE & DISCHARGE CONDITION:    The patient tolerated the procedure well.  The patient was transported to the recovery area without difficulties.  The patient was discharged to home under the care of family in stable and satisfactory condition.    PLAN OF CARE:  1. The patient was given our standard instruction sheet.  2. The patient will RTC in 1 week.  3. The patient will resume all medications as per the medication reconciliation sheet.

## 2017-08-10 ENCOUNTER — OFFICE VISIT (OUTPATIENT)
Dept: PAIN MEDICINE | Facility: CLINIC | Age: 82
End: 2017-08-10

## 2017-08-10 VITALS
WEIGHT: 200.4 LBS | HEIGHT: 71 IN | HEART RATE: 63 BPM | RESPIRATION RATE: 16 BRPM | DIASTOLIC BLOOD PRESSURE: 84 MMHG | OXYGEN SATURATION: 98 % | SYSTOLIC BLOOD PRESSURE: 165 MMHG | TEMPERATURE: 98.4 F | BODY MASS INDEX: 28.06 KG/M2

## 2017-08-10 DIAGNOSIS — M54.2 NECK PAIN: Primary | ICD-10-CM

## 2017-08-10 PROCEDURE — 99213 OFFICE O/P EST LOW 20 MIN: CPT | Performed by: PAIN MEDICINE

## 2017-08-10 RX ORDER — LIDOCAINE 50 MG/G
1 PATCH TOPICAL EVERY 24 HOURS
Qty: 30 PATCH | Refills: 3 | Status: SHIPPED | OUTPATIENT
Start: 2017-08-10 | End: 2017-09-12 | Stop reason: ALTCHOICE

## 2017-08-10 NOTE — PROGRESS NOTES
"CHIEF COMPLAINT: Neck Pain    HPI  Home Wynn is a 85 y.o. male.  He is here to follow up for Neck Pain  .  Home Wynn is a 85 y.o. male  who presents to the office for follow-up.  He completed a MAURI C6-C7 on 7-12-17 and 8-02-17 performed for management of neck pain. Patient reports 50% relief from the procedure. Really sharp pain that brought him down to his knees is now resolved. Pain is now a dull, sore,ache pain.     Since last visit their pain has improved. He states the sharp pains are gone however the right side of his neck is very sore. He has not tried ice or tylenol. He got some \"salve\" from his PCP and states it is like rubbing water on it.     The patient states their pain is a 4 on a scale of 1-10.  The patient describes this pain as episodic dull and ache.  The pain is located in right trapezius and does notradiate. This painful problem is aggravated by looking to right and is alleviated by injection.    Since last visit cellulitis has resolved with antibiotics. Ultrasound for DVT was negative.     Past pain medications:   Tylenol  Advil - no help  Diclofenac - no help     Current pain medications:   Tylenol prn- minimal help     Past therapies:  Physical Therapy: yes- better ROM but pain is the same  Chiropractor: yes - no improvement  Massage Therapy: no  TENS: yes  Neck or back surgery: no  Past pain management: no    Previous Injection: MAURI X6/C7 x 2 - 7/12/17, 8/2/17  Effect of Injection (%): 50%  Length of Relief: ongoing     PEG Assessment   What number best describes your pain on average in the past week? 4  What number best describes how, during the past week, pain has interfered with your enjoyment of life? 7  What number best describes how, during the past week, pain has interfered with your general activity? 7      Current Outpatient Prescriptions:   •  amiodarone (PACERONE) 50 MG, Take 50 mg by mouth Daily., Disp: , Rfl:   •  aspirin 325 MG tablet, Take by mouth daily., Disp: , " Rfl:   •  glimepiride (AMARYL) 2 MG tablet, Take 1 tablet by mouth Every Morning Before Breakfast., Disp: 90 tablet, Rfl: 2  •  Insulin Glargine (LANTUS SOLOSTAR) 100 UNIT/ML injection pen, Inject 15 Units under the skin every night., Disp: 15 mL, Rfl: 5  •  Insulin Pen Needle 31G X 6 MM misc, 31 mm daily., Disp: 90 each, Rfl: 1  •  Multiple Vitamins-Minerals (PRESERVISION AREDS) capsule, Take by mouth., Disp: , Rfl:   •  OXYGEN-HELIUM IN, Oxygen; Patient Sig: Oxygen he uses 2 LPM at bedtime.; 0; 21-Mar-2014; Active, Disp: , Rfl:   •  pravastatin (PRAVACHOL) 40 MG tablet, Take 1 tablet by mouth Daily., Disp: 90 tablet, Rfl: 1  •  sulfamethoxazole-trimethoprim (BACTRIM) 400-80 MG tablet, Take 1 tablet by mouth 2 (Two) Times a Day., Disp: 14 tablet, Rfl: 0  •  lidocaine (LIDODERM) 5 %, Place 1 patch on the skin Daily. 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 Mild to moderate canal stenosis is present at C6-7 and C3-4. Multilevel neuroforaminal compromise is noted. The appearance is similar to the examination of 08/11/2014. There is no evidence of marrow edema to suggest a cervical fracture. There is mild loss of vertical height without marrow edema involving the T3 vertebral body, new versus the prior MRI examination suggesting a mild compression fracture/compression deformity. Further evaluation could be performed with a cervical myelogram as indicated.    PFSH:  The following portions of the patient's history were reviewed and updated as appropriate: problem list, past medical history, past surgery history, social history, family history, medications, and  "allergies    Review of Systems   Constitutional: Positive for fatigue. Negative for activity change, appetite change, chills and fever.   Respiratory: Positive for apnea (wears 02 at night) and shortness of breath. Negative for cough and wheezing.    Cardiovascular: Negative for chest pain and leg swelling.   Gastrointestinal: Negative for constipation, diarrhea, nausea and vomiting.   Genitourinary: Positive for frequency. Negative for difficulty urinating.   Musculoskeletal: Positive for arthralgias, neck pain and neck stiffness. Negative for back pain.   Skin: Negative for rash and wound.   Allergic/Immunologic: Negative for immunocompromised state.   Neurological: Negative for dizziness, weakness, light-headedness, numbness and headaches.   Hematological: Does not bruise/bleed easily.   Psychiatric/Behavioral: Negative for agitation, confusion, hallucinations, sleep disturbance and suicidal ideas. The patient is not nervous/anxious.    All other systems reviewed and are negative.      Vitals:    08/10/17 1034   BP: 165/84   Pulse: 63   Resp: 16   Temp: 98.4 °F (36.9 °C)   SpO2: 98%   Weight: 200 lb 6.4 oz (90.9 kg)   Height: 71\" (180.3 cm)   PainSc:   4   PainLoc: Neck       Physical Exam   Constitutional: He appears well-developed and well-nourished. No distress.   HENT:   Head: Normocephalic and atraumatic.   Nose: Nose normal.   Mouth/Throat: Oropharynx is clear and moist.   Eyes: Conjunctivae and EOM are normal.   Neck: Normal range of motion. Neck supple.   Pulmonary/Chest: Effort normal. No stridor. No respiratory distress.   Musculoskeletal:        Cervical back: He exhibits decreased range of motion, tenderness and pain. He exhibits no deformity.        Back:         Right lower leg: He exhibits no tenderness, no swelling and no edema.   Neurological: He is alert. He has normal strength. No cranial nerve deficit or sensory deficit. Gait normal.   Skin: Skin is warm and dry. No rash noted. He is not " diaphoretic.   Psychiatric: He has a normal mood and affect. His speech is normal and behavior is normal.   Nursing note and vitals reviewed.    Ortho Exam  Neurologic Exam     Mental Status   Speech: speech is normal     Cranial Nerves     CN III, IV, VI   Extraocular motions are normal.     Motor Exam     Strength   Strength 5/5 throughout.     Gait, Coordination, and Reflexes     Gait  Gait: normal      Lab Results   Component Value Date    POCMETH Negative 07/11/2017    POCAMPHET Negative 07/11/2017    POCBARBITUR Negative 07/11/2017    POCBENZO Negative 07/11/2017    POCCOCAINE Negative 07/11/2017    POCMETHADO Negative 07/11/2017    POCOPIATES Negative 07/11/2017    POCOXYCODO Negative 07/11/2017    POCPHENCYC Negative 07/11/2017    POCPROPOXY Negative 07/11/2017    POCTHC Negative 07/11/2017    POCTRICYC Negative 07/11/2017     Last UDS results reviewed: 08/10/17   Last UDS: 7/11/2017  Comments: Consistent     Date of last MALICK reviewed : 08/10/17   Comments: Consistent     Assessment/Plan   Home was seen today for neck pain.    Diagnoses and all orders for this visit:    Neck pain  -     lidocaine (LIDODERM) 5 %; Place 1 patch on the skin Daily. Remove & Discard patch within 12 hours or as directed by MD    Requested Prescriptions     Signed Prescriptions Disp Refills   • lidocaine (LIDODERM) 5 % 30 patch 3     Sig: Place 1 patch on the skin Daily. Remove & Discard patch within 12 hours or as directed by MD     - Cellulitis has resolved. Antibiotics completed.   - Good results from 2 MAURI - can repeat in future if pain returns or worsens.   - Will try conservative approach to neck soreness first.   - Apply heat to right shoulder.   - START lidocaine patch - place on right shoulder for 12 hours.   - Also try alternating both tylenol and ibuprofen as needed. Both to obtain over the counter.   - Apply TENS unit to right shoulder.   - If no improvement with conservative therapy, can try trigger point  injection in the future.   - New patient UDS was consistent.    - Continue to work with PT/HEP to improve ROM.     Wt Readings from Last 3 Encounters:   08/10/17 200 lb 6.4 oz (90.9 kg)   07/13/17 200 lb 9.6 oz (91 kg)   07/11/17 203 lb 14.4 oz (92.5 kg)     Body mass index is 27.95 kg/(m^2). Patient counseled on the importance of weight loss to help with overall health and pain control. Patient instructed to attempt weight loss.   Plan: Calorie counting  increase physical activity, reduce portion size, cut out extra servings and reduce fast food intake    Follow-up in 1 month.    Aura Jacobs MD  Pain Management

## 2017-08-10 NOTE — PATIENT INSTRUCTIONS
- Will try conservative approach to neck soreness first.   - Apply heat to right shoulder.   - START lidocaine patch - place on right shoulder for 12 hours.   - Also try alternating both tylenol and ibuprofen as needed. Both to obtain over the counter.   - Apply TENS unit to right shoulder.     - If no improvement with conservative therapy, can try trigger point injection in the future.

## 2017-08-28 ENCOUNTER — HOSPITAL ENCOUNTER (EMERGENCY)
Facility: HOSPITAL | Age: 82
Discharge: HOME | End: 2017-08-28
Attending: EMERGENCY MEDICINE | Admitting: EMERGENCY MEDICINE

## 2017-08-28 ENCOUNTER — APPOINTMENT (OUTPATIENT)
Dept: CARDIOLOGY | Facility: HOSPITAL | Age: 82
End: 2017-08-28
Attending: EMERGENCY MEDICINE

## 2017-08-28 VITALS
WEIGHT: 200 LBS | OXYGEN SATURATION: 97 % | HEART RATE: 92 BPM | BODY MASS INDEX: 28 KG/M2 | SYSTOLIC BLOOD PRESSURE: 154 MMHG | RESPIRATION RATE: 18 BRPM | HEIGHT: 71 IN | TEMPERATURE: 97.7 F | DIASTOLIC BLOOD PRESSURE: 94 MMHG

## 2017-08-28 DIAGNOSIS — I48.0 PAROXYSMAL A-FIB (HCC): ICD-10-CM

## 2017-08-28 DIAGNOSIS — R55 SYNCOPE, UNSPECIFIED SYNCOPE TYPE: Primary | ICD-10-CM

## 2017-08-28 LAB
ALBUMIN SERPL-MCNC: 4.4 G/DL (ref 3.5–5.2)
ALBUMIN/GLOB SERPL: 1.2 G/DL
ALP SERPL-CCNC: 76 U/L (ref 39–117)
ALT SERPL W P-5'-P-CCNC: 8 U/L (ref 1–41)
ANION GAP SERPL CALCULATED.3IONS-SCNC: 15.7 MMOL/L
AST SERPL-CCNC: 13 U/L (ref 1–40)
BASOPHILS # BLD AUTO: 0.01 10*3/MM3 (ref 0–0.2)
BASOPHILS NFR BLD AUTO: 0.1 % (ref 0–1.5)
BILIRUB SERPL-MCNC: 0.7 MG/DL (ref 0.1–1.2)
BUN BLD-MCNC: 23 MG/DL (ref 8–23)
BUN/CREAT SERPL: 15.5 (ref 7–25)
CALCIUM SPEC-SCNC: 10.3 MG/DL (ref 8.6–10.5)
CHLORIDE SERPL-SCNC: 98 MMOL/L (ref 98–107)
CO2 SERPL-SCNC: 26.3 MMOL/L (ref 22–29)
CREAT BLD-MCNC: 1.48 MG/DL (ref 0.76–1.27)
DEPRECATED RDW RBC AUTO: 53.5 FL (ref 37–54)
EOSINOPHIL # BLD AUTO: 0.05 10*3/MM3 (ref 0–0.7)
EOSINOPHIL NFR BLD AUTO: 0.7 % (ref 0.3–6.2)
ERYTHROCYTE [DISTWIDTH] IN BLOOD BY AUTOMATED COUNT: 15 % (ref 11.5–14.5)
GFR SERPL CREATININE-BSD FRML MDRD: 45 ML/MIN/1.73
GLOBULIN UR ELPH-MCNC: 3.6 GM/DL
GLUCOSE BLD-MCNC: 237 MG/DL (ref 65–99)
HCT VFR BLD AUTO: 42.9 % (ref 40.4–52.2)
HGB BLD-MCNC: 14.1 G/DL (ref 13.7–17.6)
HOLD SPECIMEN: NORMAL
HOLD SPECIMEN: NORMAL
IMM GRANULOCYTES # BLD: 0.06 10*3/MM3 (ref 0–0.03)
IMM GRANULOCYTES NFR BLD: 0.9 % (ref 0–0.5)
LYMPHOCYTES # BLD AUTO: 0.87 10*3/MM3 (ref 0.9–4.8)
LYMPHOCYTES NFR BLD AUTO: 12.4 % (ref 19.6–45.3)
MAGNESIUM SERPL-MCNC: 2.2 MG/DL (ref 1.6–2.4)
MCH RBC QN AUTO: 32 PG (ref 27–32.7)
MCHC RBC AUTO-ENTMCNC: 32.9 G/DL (ref 32.6–36.4)
MCV RBC AUTO: 97.5 FL (ref 79.8–96.2)
MONOCYTES # BLD AUTO: 0.62 10*3/MM3 (ref 0.2–1.2)
MONOCYTES NFR BLD AUTO: 8.8 % (ref 5–12)
NEUTROPHILS # BLD AUTO: 5.43 10*3/MM3 (ref 1.9–8.1)
NEUTROPHILS NFR BLD AUTO: 77.1 % (ref 42.7–76)
PLATELET # BLD AUTO: 145 10*3/MM3 (ref 140–500)
PMV BLD AUTO: 10.9 FL (ref 6–12)
POTASSIUM BLD-SCNC: 4.5 MMOL/L (ref 3.5–5.2)
PROT SERPL-MCNC: 8 G/DL (ref 6–8.5)
RBC # BLD AUTO: 4.4 10*6/MM3 (ref 4.6–6)
SODIUM BLD-SCNC: 140 MMOL/L (ref 136–145)
TROPONIN T SERPL-MCNC: <0.01 NG/ML (ref 0–0.03)
WBC NRBC COR # BLD: 7.04 10*3/MM3 (ref 4.5–10.7)
WHOLE BLOOD HOLD SPECIMEN: NORMAL
WHOLE BLOOD HOLD SPECIMEN: NORMAL

## 2017-08-28 PROCEDURE — 83735 ASSAY OF MAGNESIUM: CPT | Performed by: EMERGENCY MEDICINE

## 2017-08-28 PROCEDURE — 93010 ELECTROCARDIOGRAM REPORT: CPT | Performed by: INTERNAL MEDICINE

## 2017-08-28 PROCEDURE — 93225 XTRNL ECG REC<48 HRS REC: CPT

## 2017-08-28 PROCEDURE — 85025 COMPLETE CBC W/AUTO DIFF WBC: CPT | Performed by: EMERGENCY MEDICINE

## 2017-08-28 PROCEDURE — 84484 ASSAY OF TROPONIN QUANT: CPT | Performed by: EMERGENCY MEDICINE

## 2017-08-28 PROCEDURE — 93226 XTRNL ECG REC<48 HR SCAN A/R: CPT

## 2017-08-28 PROCEDURE — 80053 COMPREHEN METABOLIC PANEL: CPT | Performed by: EMERGENCY MEDICINE

## 2017-08-28 PROCEDURE — 99284 EMERGENCY DEPT VISIT MOD MDM: CPT

## 2017-08-28 PROCEDURE — 93005 ELECTROCARDIOGRAM TRACING: CPT

## 2017-08-28 RX ORDER — SODIUM CHLORIDE 0.9 % (FLUSH) 0.9 %
10 SYRINGE (ML) INJECTION AS NEEDED
Status: DISCONTINUED | OUTPATIENT
Start: 2017-08-28 | End: 2017-08-28 | Stop reason: HOSPADM

## 2017-08-29 ENCOUNTER — PATIENT OUTREACH (OUTPATIENT)
Dept: CASE MANAGEMENT | Facility: OTHER | Age: 82
End: 2017-08-29

## 2017-08-29 NOTE — OUTREACH NOTE
"Pt. States he is feeling \"fine\" appointment with cardiologist has been made. Contact info gvn., no other questions or concerns voiced at this time.  "

## 2017-08-30 RX ORDER — PEN NEEDLE, DIABETIC 31 G X1/4"
NEEDLE, DISPOSABLE MISCELLANEOUS
Qty: 100 EACH | Refills: 0 | Status: SHIPPED | OUTPATIENT
Start: 2017-08-30 | End: 2017-12-04 | Stop reason: SDUPTHER

## 2017-08-30 RX ORDER — INSULIN GLARGINE 100 [IU]/ML
INJECTION, SOLUTION SUBCUTANEOUS
Qty: 15 ML | Refills: 4 | Status: SHIPPED | OUTPATIENT
Start: 2017-08-30 | End: 2018-08-16 | Stop reason: SDUPTHER

## 2017-08-31 PROCEDURE — 93227 XTRNL ECG REC<48 HR R&I: CPT | Performed by: INTERNAL MEDICINE

## 2017-09-12 ENCOUNTER — OFFICE VISIT (OUTPATIENT)
Dept: CARDIOLOGY | Facility: CLINIC | Age: 82
End: 2017-09-12

## 2017-09-12 VITALS
HEIGHT: 71 IN | DIASTOLIC BLOOD PRESSURE: 88 MMHG | HEART RATE: 61 BPM | WEIGHT: 202 LBS | SYSTOLIC BLOOD PRESSURE: 176 MMHG | BODY MASS INDEX: 28.28 KG/M2

## 2017-09-12 DIAGNOSIS — R94.31 ABNORMAL ELECTROCARDIOGRAM: ICD-10-CM

## 2017-09-12 DIAGNOSIS — R55 SYNCOPE, UNSPECIFIED SYNCOPE TYPE: Primary | ICD-10-CM

## 2017-09-12 PROCEDURE — 99214 OFFICE O/P EST MOD 30 MIN: CPT | Performed by: INTERNAL MEDICINE

## 2017-09-12 PROCEDURE — 93000 ELECTROCARDIOGRAM COMPLETE: CPT | Performed by: INTERNAL MEDICINE

## 2017-09-19 NOTE — PROGRESS NOTES
Subjective:     Encounter Date:09/12/2017      Patient ID: Home Wynn is a 85 y.o. male.    Chief Complaint:  Atrial Fibrillation   Presents for follow-up visit. Symptoms include shortness of breath and syncope. Symptoms are negative for bradycardia, chest pain, dizziness, hypertension, hypotension, pacemaker problem, palpitations, tachycardia and weakness. The symptoms have been stable. Past medical history includes atrial fibrillation.     Syncope  85-year-old gentleman who presents today for evaluation.  Patient passed out about 3 weeks ago.  He doesn't really remember much about it.  He does say he's been having some feelings of something in the middle of his chest lasting for several minutes.  Patient denies edema palpitations but does have lightheadedness and increasing fatigue.  With that he was seen today for further evaluation      Review of Systems   Constitution: Positive for malaise/fatigue. Negative for weakness.   Cardiovascular: Positive for syncope. Negative for chest pain and palpitations.   Respiratory: Positive for shortness of breath.    Musculoskeletal: Positive for myalgias.   Neurological: Negative for dizziness.   All other systems reviewed and are negative.        ECG 12 Lead  Date/Time: 9/12/2017 4:06 PM  Performed by: JUVENAL MARTINEZ  Authorized by: JUVENAL MARTINEZ   Comparison: compared with previous ECG from 9/7/2016  Similar to previous ECG  Rhythm: atrial fibrillation  Clinical impression: abnormal ECG               Objective:     Physical Exam   Constitutional: He is oriented to person, place, and time. He appears well-developed.   HENT:   Head: Normocephalic.   Eyes: Conjunctivae are normal.   Neck: Normal range of motion.   Cardiovascular: Normal rate, regular rhythm and normal heart sounds.    Pulmonary/Chest: Breath sounds normal.   Abdominal: Soft. Bowel sounds are normal.   Musculoskeletal: Normal range of motion. He exhibits no edema.   Neurological: He is alert  and oriented to person, place, and time.   Skin: Skin is warm and dry.   Psychiatric: He has a normal mood and affect. His behavior is normal.   Vitals reviewed.      Lab Review:       Assessment:          Diagnosis Plan   1. Syncope, unspecified syncope type  Adult Transthoracic Echo Complete    Stress Test With Myocardial Perfusion One Day   2. Abnormal electrocardiogram   Stress Test With Myocardial Perfusion One Day          Plan:         1.  Syncope will work patient up with a echo as well as a nuclear perfusion study.  2. Recent holter 8/28/17 with pac's  3.  Will await workup.  If nothing found may need a long-term monitor for 14 days.

## 2017-09-22 NOTE — TELEPHONE ENCOUNTER
Dr. Tripathi in your last note it shows patient as taking Amiodarone 50 mg daily.  I called to confirm with patient what dose he is taking and he replies he takes 1/2 tablet of Amiodarone 200 mg.  Is this OK to refill as Amiodarone 200mg 1/2 tablet daily or does he need 100 mg and take half?  Please advise. / ELVIA

## 2017-09-26 RX ORDER — AMIODARONE HYDROCHLORIDE 200 MG/1
TABLET ORAL
Qty: 90 TABLET | Refills: 0 | Status: SHIPPED | OUTPATIENT
Start: 2017-09-26 | End: 2018-01-08

## 2017-10-03 ENCOUNTER — HOSPITAL ENCOUNTER (OUTPATIENT)
Dept: CARDIOLOGY | Facility: HOSPITAL | Age: 82
Discharge: HOME | End: 2017-10-03
Attending: INTERNAL MEDICINE | Admitting: INTERNAL MEDICINE

## 2017-10-03 ENCOUNTER — HOSPITAL ENCOUNTER (OUTPATIENT)
Dept: CARDIOLOGY | Facility: HOSPITAL | Age: 82
Discharge: HOME | End: 2017-10-03
Attending: INTERNAL MEDICINE

## 2017-10-03 VITALS
BODY MASS INDEX: 28.28 KG/M2 | OXYGEN SATURATION: 99 % | SYSTOLIC BLOOD PRESSURE: 130 MMHG | WEIGHT: 202 LBS | HEART RATE: 78 BPM | HEIGHT: 71 IN | DIASTOLIC BLOOD PRESSURE: 70 MMHG

## 2017-10-03 DIAGNOSIS — I42.8 OTHER CARDIOMYOPATHY (HCC): Primary | ICD-10-CM

## 2017-10-03 DIAGNOSIS — R55 SYNCOPE, UNSPECIFIED SYNCOPE TYPE: ICD-10-CM

## 2017-10-03 DIAGNOSIS — R94.31 ABNORMAL ELECTROCARDIOGRAM: ICD-10-CM

## 2017-10-03 LAB
ASCENDING AORTA: 3.3 CM
BH CV ECHO MEAS - ACS: 2 CM
BH CV ECHO MEAS - AI DEC SLOPE: 228.4 CM/SEC^2
BH CV ECHO MEAS - AI MAX PG: 71.2 MMHG
BH CV ECHO MEAS - AI MAX VEL: 422 CM/SEC
BH CV ECHO MEAS - AI P1/2T: 541.1 MSEC
BH CV ECHO MEAS - AO MAX PG (FULL): 6.3 MMHG
BH CV ECHO MEAS - AO MAX PG: 9.1 MMHG
BH CV ECHO MEAS - AO MEAN PG (FULL): 2.9 MMHG
BH CV ECHO MEAS - AO MEAN PG: 4.6 MMHG
BH CV ECHO MEAS - AO ROOT AREA (BSA CORRECTED): 1.9
BH CV ECHO MEAS - AO ROOT AREA: 12.5 CM^2
BH CV ECHO MEAS - AO ROOT DIAM: 4 CM
BH CV ECHO MEAS - AO V2 MAX: 150.5 CM/SEC
BH CV ECHO MEAS - AO V2 MEAN: 101.1 CM/SEC
BH CV ECHO MEAS - AO V2 VTI: 29.2 CM
BH CV ECHO MEAS - AVA(I,A): 2.5 CM^2
BH CV ECHO MEAS - AVA(I,D): 2.5 CM^2
BH CV ECHO MEAS - AVA(V,A): 2.4 CM^2
BH CV ECHO MEAS - AVA(V,D): 2.4 CM^2
BH CV ECHO MEAS - BSA(HAYCOCK): 2.2 M^2
BH CV ECHO MEAS - BSA: 2.1 M^2
BH CV ECHO MEAS - BZI_BMI: 28.2 KILOGRAMS/M^2
BH CV ECHO MEAS - BZI_METRIC_HEIGHT: 180.3 CM
BH CV ECHO MEAS - BZI_METRIC_WEIGHT: 91.6 KG
BH CV ECHO MEAS - CONTRAST EF (2CH): 56.4 ML/M^2
BH CV ECHO MEAS - CONTRAST EF 4CH: 44.8 ML/M^2
BH CV ECHO MEAS - EDV(MOD-SP2): 163 ML
BH CV ECHO MEAS - EDV(MOD-SP4): 143 ML
BH CV ECHO MEAS - EDV(TEICH): 130 ML
BH CV ECHO MEAS - EF(CUBED): 57.4 %
BH CV ECHO MEAS - EF(MOD-SP2): 56.4 %
BH CV ECHO MEAS - EF(MOD-SP4): 44.8 %
BH CV ECHO MEAS - EF(TEICH): 48.7 %
BH CV ECHO MEAS - ESV(MOD-SP2): 71 ML
BH CV ECHO MEAS - ESV(MOD-SP4): 79 ML
BH CV ECHO MEAS - ESV(TEICH): 66.6 ML
BH CV ECHO MEAS - FS: 24.8 %
BH CV ECHO MEAS - IVS/LVPW: 1
BH CV ECHO MEAS - IVSD: 1.3 CM
BH CV ECHO MEAS - LAT PEAK E' VEL: 12 CM/SEC
BH CV ECHO MEAS - LV DIASTOLIC VOL/BSA (35-75): 67.5 ML/M^2
BH CV ECHO MEAS - LV MASS(C)D: 290.4 GRAMS
BH CV ECHO MEAS - LV MASS(C)DI: 137.1 GRAMS/M^2
BH CV ECHO MEAS - LV MAX PG: 2.7 MMHG
BH CV ECHO MEAS - LV MEAN PG: 1.7 MMHG
BH CV ECHO MEAS - LV SYSTOLIC VOL/BSA (12-30): 37.3 ML/M^2
BH CV ECHO MEAS - LV V1 MAX: 82.5 CM/SEC
BH CV ECHO MEAS - LV V1 MEAN: 63.3 CM/SEC
BH CV ECHO MEAS - LV V1 VTI: 16.9 CM
BH CV ECHO MEAS - LVIDD: 5.2 CM
BH CV ECHO MEAS - LVIDS: 3.9 CM
BH CV ECHO MEAS - LVLD AP2: 8.1 CM
BH CV ECHO MEAS - LVLD AP4: 8.6 CM
BH CV ECHO MEAS - LVLS AP2: 7.6 CM
BH CV ECHO MEAS - LVLS AP4: 7.3 CM
BH CV ECHO MEAS - LVOT AREA (M): 4.5 CM^2
BH CV ECHO MEAS - LVOT AREA: 4.4 CM^2
BH CV ECHO MEAS - LVOT DIAM: 2.4 CM
BH CV ECHO MEAS - LVPWD: 1.3 CM
BH CV ECHO MEAS - MED PEAK E' VEL: 6 CM/SEC
BH CV ECHO MEAS - MR MAX PG: 126.6 MMHG
BH CV ECHO MEAS - MR MAX VEL: 562.7 CM/SEC
BH CV ECHO MEAS - MV DEC SLOPE: 492.9 CM/SEC^2
BH CV ECHO MEAS - MV DEC TIME: 0.16 SEC
BH CV ECHO MEAS - MV E MAX VEL: 79.5 CM/SEC
BH CV ECHO MEAS - MV MAX PG: 4.4 MMHG
BH CV ECHO MEAS - MV MEAN PG: 2.3 MMHG
BH CV ECHO MEAS - MV P1/2T MAX VEL: 80.5 CM/SEC
BH CV ECHO MEAS - MV P1/2T: 47.8 MSEC
BH CV ECHO MEAS - MV V2 MAX: 104.7 CM/SEC
BH CV ECHO MEAS - MV V2 MEAN: 70.5 CM/SEC
BH CV ECHO MEAS - MV V2 VTI: 23.1 CM
BH CV ECHO MEAS - MVA P1/2T LCG: 2.7 CM^2
BH CV ECHO MEAS - MVA(P1/2T): 4.6 CM^2
BH CV ECHO MEAS - MVA(VTI): 3.2 CM^2
BH CV ECHO MEAS - PA ACC TIME: 0.07 SEC
BH CV ECHO MEAS - PA MAX PG (FULL): 0.64 MMHG
BH CV ECHO MEAS - PA MAX PG: 2 MMHG
BH CV ECHO MEAS - PA PR(ACCEL): 45.7 MMHG
BH CV ECHO MEAS - PA V2 MAX: 70.6 CM/SEC
BH CV ECHO MEAS - PULM DIAS VEL: 59.4 CM/SEC
BH CV ECHO MEAS - PULM S/D: 0.54
BH CV ECHO MEAS - PULM SYS VEL: 31.8 CM/SEC
BH CV ECHO MEAS - PVA(V,A): 3.7 CM^2
BH CV ECHO MEAS - PVA(V,D): 3.7 CM^2
BH CV ECHO MEAS - QP/QS: 0.67
BH CV ECHO MEAS - RAP SYSTOLE: 3 MMHG
BH CV ECHO MEAS - RV MAX PG: 1.4 MMHG
BH CV ECHO MEAS - RV MEAN PG: 0.74 MMHG
BH CV ECHO MEAS - RV V1 MAX: 58.2 CM/SEC
BH CV ECHO MEAS - RV V1 MEAN: 40.2 CM/SEC
BH CV ECHO MEAS - RV V1 VTI: 11.2 CM
BH CV ECHO MEAS - RVOT AREA: 4.4 CM^2
BH CV ECHO MEAS - RVOT DIAM: 2.4 CM
BH CV ECHO MEAS - RVSP: 44.7 MMHG
BH CV ECHO MEAS - SI(AO): 172 ML/M^2
BH CV ECHO MEAS - SI(CUBED): 38.3 ML/M^2
BH CV ECHO MEAS - SI(LVOT): 35.1 ML/M^2
BH CV ECHO MEAS - SI(MOD-SP2): 43.4 ML/M^2
BH CV ECHO MEAS - SI(MOD-SP4): 30.2 ML/M^2
BH CV ECHO MEAS - SI(TEICH): 29.9 ML/M^2
BH CV ECHO MEAS - SUP REN AO DIAM: 2.3 CM
BH CV ECHO MEAS - SV(AO): 364.3 ML
BH CV ECHO MEAS - SV(CUBED): 81.2 ML
BH CV ECHO MEAS - SV(LVOT): 74.4 ML
BH CV ECHO MEAS - SV(MOD-SP2): 92 ML
BH CV ECHO MEAS - SV(MOD-SP4): 64 ML
BH CV ECHO MEAS - SV(RVOT): 49.7 ML
BH CV ECHO MEAS - SV(TEICH): 63.4 ML
BH CV ECHO MEAS - TAPSE (>1.6): 1.6 CM2
BH CV ECHO MEAS - TR MAX VEL: 322.9 CM/SEC
BH CV NUCLEAR PRIOR STUDY: 3
BH CV STRESS BP STAGE 1: NORMAL
BH CV STRESS COMMENTS STAGE 1: NORMAL
BH CV STRESS DOSE REGADENOSON STAGE 1: 0.4
BH CV STRESS DURATION MIN STAGE 1: 0
BH CV STRESS DURATION SEC STAGE 1: 15
BH CV STRESS HR STAGE 1: 93
BH CV STRESS PROTOCOL 1: NORMAL
BH CV STRESS RECOVERY BP: NORMAL MMHG
BH CV STRESS RECOVERY HR: 84 BPM
BH CV STRESS STAGE 1: 1
BH CV XLRA - RV BASE: 3.9 CM
BH CV XLRA - TDI S': 10 CM/SEC
E/E' RATIO: 10.5
LEFT ATRIUM VOLUME INDEX: 56 ML/M2
LV EF NUC BP: 44 %
MAXIMAL PREDICTED HEART RATE: 135 BPM
PERCENT MAX PREDICTED HR: 68.89 %
SINUS: 3.5 CM
STJ: 3.1 CM
STRESS BASELINE BP: NORMAL MMHG
STRESS BASELINE HR: 63 BPM
STRESS PERCENT HR: 81 %
STRESS POST EXERCISE DUR SEC: 15 SEC
STRESS POST PEAK BP: NORMAL MMHG
STRESS POST PEAK HR: 93 BPM
STRESS TARGET HR: 115 BPM

## 2017-10-03 PROCEDURE — 93016 CV STRESS TEST SUPVJ ONLY: CPT | Performed by: INTERNAL MEDICINE

## 2017-10-03 PROCEDURE — 93017 CV STRESS TEST TRACING ONLY: CPT

## 2017-10-03 PROCEDURE — 25010000002 REGADENOSON 0.4 MG/5ML SOLUTION: Performed by: INTERNAL MEDICINE

## 2017-10-03 PROCEDURE — 93306 TTE W/DOPPLER COMPLETE: CPT

## 2017-10-03 PROCEDURE — 93306 TTE W/DOPPLER COMPLETE: CPT | Performed by: INTERNAL MEDICINE

## 2017-10-03 PROCEDURE — 78452 HT MUSCLE IMAGE SPECT MULT: CPT | Performed by: INTERNAL MEDICINE

## 2017-10-03 PROCEDURE — A9502 TC99M TETROFOSMIN: HCPCS | Performed by: INTERNAL MEDICINE

## 2017-10-03 PROCEDURE — 93018 CV STRESS TEST I&R ONLY: CPT | Performed by: INTERNAL MEDICINE

## 2017-10-03 PROCEDURE — 78452 HT MUSCLE IMAGE SPECT MULT: CPT

## 2017-10-03 PROCEDURE — 25010000002 PERFLUTREN (DEFINITY) 8.476 MG IN SODIUM CHLORIDE 0.9 % 10 ML INJECTION: Performed by: INTERNAL MEDICINE

## 2017-10-03 PROCEDURE — 0 TECHNETIUM TETROFOSMIN KIT: Performed by: INTERNAL MEDICINE

## 2017-10-03 RX ADMIN — REGADENOSON 0.4 MG: 0.08 INJECTION, SOLUTION INTRAVENOUS at 09:15

## 2017-10-03 RX ADMIN — PERFLUTREN 1.5 ML: 6.52 INJECTION, SUSPENSION INTRAVENOUS at 08:35

## 2017-10-03 RX ADMIN — TETROFOSMIN 1 DOSE: 1.38 INJECTION, POWDER, LYOPHILIZED, FOR SOLUTION INTRAVENOUS at 09:15

## 2017-10-18 ENCOUNTER — TELEPHONE (OUTPATIENT)
Dept: FAMILY MEDICINE CLINIC | Facility: CLINIC | Age: 82
End: 2017-10-18

## 2017-10-18 RX ORDER — FUROSEMIDE 20 MG/1
20 TABLET ORAL DAILY PRN
Qty: 30 TABLET | Refills: 1 | Status: SHIPPED | OUTPATIENT
Start: 2017-10-18 | End: 2018-01-08 | Stop reason: ALTCHOICE

## 2017-10-18 NOTE — TELEPHONE ENCOUNTER
Both legs are swelling now and you sent in some sort of medication to help with this  Last time this happened, he wants to know if you will do this again?

## 2017-10-20 ENCOUNTER — OFFICE VISIT (OUTPATIENT)
Dept: PAIN MEDICINE | Facility: CLINIC | Age: 82
End: 2017-10-20

## 2017-10-20 VITALS
SYSTOLIC BLOOD PRESSURE: 182 MMHG | HEIGHT: 71 IN | BODY MASS INDEX: 29.34 KG/M2 | WEIGHT: 209.6 LBS | TEMPERATURE: 97.3 F | RESPIRATION RATE: 16 BRPM | HEART RATE: 58 BPM | DIASTOLIC BLOOD PRESSURE: 76 MMHG | OXYGEN SATURATION: 95 %

## 2017-10-20 DIAGNOSIS — M79.18 MYOFASCIAL PAIN: ICD-10-CM

## 2017-10-20 DIAGNOSIS — M47.812 CERVICAL SPONDYLOSIS WITHOUT MYELOPATHY: ICD-10-CM

## 2017-10-20 DIAGNOSIS — M54.2 NECK PAIN: Primary | ICD-10-CM

## 2017-10-20 PROCEDURE — 99213 OFFICE O/P EST LOW 20 MIN: CPT | Performed by: PAIN MEDICINE

## 2017-10-20 PROCEDURE — 20553 NJX 1/MLT TRIGGER POINTS 3/>: CPT | Performed by: PAIN MEDICINE

## 2017-10-20 NOTE — PROGRESS NOTES
"CHIEF COMPLAINT: Neck Pain    HPI  Home Wynn is a 85 y.o. male.  He is here to follow up for Neck Pain  underwent two MAURI - resolution of pain and has not returned. Has continued right sided trapezius pain that is worse with neck flexion that has not improved with injection. He states he had two pains. One has resolved but the other has continued without change.     Pt states he has tried lidocaine patches, heat/ice, TENS unit, and OTC pain medicine, but nothing has been helping. He is having pain located in the right side of his neck radiating out a little, \"muscle\" pain. Said it feels like after you sleep with your neck in a crook.     The patient states their pain is a 8 on a scale of 1-10.  The patient describes this pain as episodic dull and ache.  The pain is located in right neck and does radiate  right shoulder. This painful problem is aggravated by physical activity and neck flexion and is alleviated by relaxation.    Past pain medications:   Tylenol  Advil - no help  Diclofenac - no help      Current pain medications:   Tylenol prn- minimal help      Past therapies:  Physical Therapy: yes- better ROM but pain is the same  Chiropractor: yes - no improvement  Massage Therapy: no  TENS: yes  Neck or back surgery: no  Past pain management: no     Previous Injection: MAURI X6/C7 x 2 - 7/12/17, 8/2/17  Effect of Injection (%): 50%  Length of Relief: ongoing     PEG Assessment   What number best describes your pain on average in the past week? 8  What number best describes how, during the past week, pain has interfered with your enjoyment of life? 8  What number best describes how, during the past week, pain has interfered with your general activity? 8      Current Outpatient Prescriptions:   •  amiodarone (PACERONE) 200 MG tablet, Take a half tablet daily, Disp: 90 tablet, Rfl: 0  •  aspirin 325 MG tablet, Take by mouth daily., Disp: , Rfl:   •  furosemide (LASIX) 20 MG tablet, Take 1 tablet by mouth Daily " As Needed (Swelling)., Disp: 30 tablet, Rfl: 1  •  glimepiride (AMARYL) 2 MG tablet, Take 1 tablet by mouth Every Morning Before Breakfast., Disp: 90 tablet, Rfl: 2  •  Insulin Pen Needle (PEN NEEDLES) 31G X 6 MM misc, USE DAILY, Disp: 100 each, Rfl: 0  •  LANTUS SOLOSTAR 100 UNIT/ML injection pen, INJECT 15 UNITS UNDER THE SKIN EVERY NIGHT, Disp: 15 mL, Rfl: 4  •  Multiple Vitamins-Minerals (PRESERVISION AREDS) capsule, Take 2 capsules by mouth Daily., Disp: , Rfl:   •  OXYGEN-HELIUM IN, Oxygen; Patient Sig: Oxygen he uses 2 LPM at bedtime.; 0; 21-Mar-2014; Active, Disp: , Rfl:   •  pravastatin (PRAVACHOL) 40 MG tablet, Take 1 tablet by mouth Daily., Disp: 90 tablet, Rfl: 1    IMAGING  6/05/17 MRI cervical spine:  IMPRESSION:  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 Mild to moderate canal stenosis is present at C6-7 and C3-4. Multilevel neuroforaminal compromise is noted. The appearance is similar to the examination of 08/11/2014. There is no evidence of marrow edema to suggest a cervical fracture. There is mild loss of vertical height without marrow edema involving the T3 vertebral body, new versus the prior MRI examination suggesting a mild compression fracture/compression deformity. Further evaluation could be performed with a cervical myelogram as indicated.    Imaging last reviewed: 10/23/17     [facility]:  The following portions of the patient's history were reviewed and updated as appropriate: problem list, past medical history, past surgery history, social history, family history, medications, and allergies    Review of Systems   Constitutional: Positive for fatigue. Negative for activity change, appetite change, chills and fever.   Respiratory: Positive for apnea (wears 02 at night) and  "shortness of breath. Negative for cough and wheezing.    Cardiovascular: Negative for chest pain and leg swelling.   Gastrointestinal: Negative for constipation, diarrhea, nausea and vomiting.   Genitourinary: Positive for frequency. Negative for difficulty urinating.   Musculoskeletal: Positive for arthralgias, neck pain and neck stiffness. Negative for back pain.   Skin: Negative for rash and wound.   Allergic/Immunologic: Negative for immunocompromised state.   Neurological: Positive for headaches (slight). Negative for dizziness, weakness, light-headedness and numbness.   Hematological: Does not bruise/bleed easily.   Psychiatric/Behavioral: Positive for sleep disturbance. Negative for agitation, confusion, hallucinations and suicidal ideas. The patient is not nervous/anxious.    All other systems reviewed and are negative.      Vitals:    10/20/17 0921   BP: (!) 182/76   Pulse: 58   Resp: 16   Temp: 97.3 °F (36.3 °C)   SpO2: 95%   Weight: 209 lb 9.6 oz (95.1 kg)   Height: 71\" (180.3 cm)   PainSc:   8   PainLoc: Neck       Physical Exam   Constitutional: He appears well-developed and well-nourished. No distress.   HENT:   Head: Normocephalic and atraumatic.   Nose: Nose normal.   Mouth/Throat: Oropharynx is clear and moist.   Eyes: Conjunctivae and EOM are normal.   Neck: Normal range of motion. Neck supple.   Pulmonary/Chest: Effort normal. No stridor. No respiratory distress.   Musculoskeletal:        Cervical back: He exhibits decreased range of motion, tenderness and pain. He exhibits no deformity.        Back:         Right lower leg: He exhibits no tenderness, no swelling and no edema.   Neurological: He is alert. He has normal strength. No cranial nerve deficit or sensory deficit. Gait normal.   Skin: Skin is warm and dry. No rash noted. He is not diaphoretic.   Psychiatric: He has a normal mood and affect. His speech is normal and behavior is normal.   Nursing note and vitals reviewed.    Ortho " Exam  Neurologic Exam     Mental Status   Speech: speech is normal     Cranial Nerves   Cranial nerves II through XII intact.     CN III, IV, VI   Extraocular motions are normal.     Motor Exam     Strength   Strength 5/5 throughout.     Sensory Exam   Light touch normal.     Gait, Coordination, and Reflexes     Gait  Gait: normal      Lab Results   Component Value Date    POCMETH Negative 07/11/2017    POCAMPHET Negative 07/11/2017    POCBARBITUR Negative 07/11/2017    POCBENZO Negative 07/11/2017    POCCOCAINE Negative 07/11/2017    POCMETHADO Negative 07/11/2017    POCOPIATES Negative 07/11/2017    POCOXYCODO Negative 07/11/2017    POCPHENCYC Negative 07/11/2017    POCPROPOXY Negative 07/11/2017    POCTHC Negative 07/11/2017    POCTRICYC Negative 07/11/2017     Last UDS results reviewed: 10/23/17   Last UDS: 7/11/2017  Comments: Consistent     Date of last MALICK reviewed : 10/23/17   Comments: Consistent     Assessment/Plan   Home was seen today for neck pain.    Diagnoses and all orders for this visit:    Neck pain  -     Case Request    Cervical spondylosis without myelopathy  -     Case Request      Requested Prescriptions      No prescriptions requested or ordered in this encounter   - His pain is multi-factorial. Good relief with MAURI. He has continued myofascial pain and cervical spondylosis that was not resolved with the MAURI. Can repeat MAURI in future if that pain returns or worsens. Will perform TPI today to help with myofascial pain. Will perform cervical medial branch block in future to help his cervical spondylosis pain.   - Discussed with the patient regarding the etiology of their pain. Informed them that they would likely benefit from a C3-C6 right cervical medial branch block steroid injection.  The procedure was described in detail and the risks, benefits and alternatives were discussed with the patient (including but not limited to: bleeding, infection, nerve damage, worsening of pain,  "inability to perform injection, paralysis, seizures, and death) who agreed to proceed.   - Will perform two injections approx 1 month apart.    - If patient receives significant improvement with TPI today, he can cancel his CMBB. He will likely need both injections so will schedule him for both. Will perform CMBB in 1 month to spread out dose of steroids.   - TPI into right trapezius, rhomboid splenius capitus today.     Bupivacaine Lot#: [id number] Exp: 3/2019 - 9 ml  Depo Medrol Lot#: [id number] Exp: 5/2018 - 40 mg    TPI  Date/Time: 10/23/2017 4:39 PM  Performed by: LENORE OLSEN  Authorized by: LENORE OLSEN   Consent: Verbal consent obtained. Written consent obtained.  Risks and benefits: risks, benefits and alternatives were discussed  Consent given by: patient  Patient understanding: patient states understanding of the procedure being performed  Patient consent: the patient's understanding of the procedure matches consent given  Patient identity confirmed: verbally with patient  Time out: Immediately prior to procedure a \"time out\" was called to verify the correct patient, procedure, equipment, support staff and site/side marked as required.  Indications: pain relief  Laterality: right    Sedation:  Patient sedated: no  Patient position: sitting  Needle size: 25 G  Location technique: anatomical landmarks  Local Anesthetic: bupivacaine 0.25% without epinephrine  Anesthetic total: 10 mL  Outcome: pain improved  Patient tolerance: Patient tolerated the procedure well with no immediate complications            Wt Readings from Last 3 Encounters:   10/20/17 209 lb 9.6 oz (95.1 kg)   10/03/17 202 lb (91.6 kg)   09/12/17 202 lb (91.6 kg)     Body mass index is 29.23 kg/(m^2). By CDC definitions, the patient is overweight (BMI 25-29.9).   weight gain of 7 lbs over the past  1 month(s)    Intentional?  No Patient counseled on the importance of weight loss to help with overall health and pain control. " Patient instructed to attempt weight loss.   Plan: Calorie counting  increase physical activity perform daily walking. Count steps.     Follow-up in 2 months.    Aura Jacobs MD  Pain Management

## 2017-11-01 ENCOUNTER — OUTSIDE FACILITY SERVICE (OUTPATIENT)
Dept: PAIN MEDICINE | Facility: CLINIC | Age: 82
End: 2017-11-01

## 2017-11-01 ENCOUNTER — DOCUMENTATION (OUTPATIENT)
Dept: PAIN MEDICINE | Facility: CLINIC | Age: 82
End: 2017-11-01

## 2017-11-01 PROCEDURE — 64492 INJ PARAVERT F JNT C/T 3 LEV: CPT | Performed by: PAIN MEDICINE

## 2017-11-01 PROCEDURE — 64491 INJ PARAVERT F JNT C/T 2 LEV: CPT | Performed by: PAIN MEDICINE

## 2017-11-01 PROCEDURE — 64490 INJ PARAVERT F JNT C/T 1 LEV: CPT | Performed by: PAIN MEDICINE

## 2017-11-01 NOTE — PROGRESS NOTES
Right sided C3-6 Cervical Medial Branch Blockade  [facility]    PREOPERATIVE DIAGNOSIS:  Cervical spondylosis without myelopathy   POSTOPERATIVE DIAGNOSIS:  Same as preoperative diagnosis    PROCEDURE:    Cervical Facet Nerve (medial branch) Blocks, with Fluoroscopy:  LEVELS C3, C4, C5, and C6, to block facet joints C3-4, C4-5, and C5-6 on right side  • 05840-68  - Bilateral Cervical Facet blocks, 1st level  • 92979-19  - Bilateral Cervical Facet blocks, 2nd level  • 86443-38  - Bilateral Cervical Facet blocks, 3rd level    PRE-PROCEDURE DISCUSSION WITH PATIENT:    Risks and complications were discussed with the patient prior to starting the procedure and informed consent was obtained.    SURGEON:  Aura Jacobs MD    REASON FOR PROCEDURE:    The patient complains of pain that seems to have a significant axial component Tenderness of the affected facet joints on palpation Tenderness of the affected facet joints on exam under fluoroscopy    SEDATION:  Versed 2mg IV  ANESTHETIC:   Marcaine 0.25%  STEROID:  Dexamethasone 8mg  TOTAL VOLUME OF SOLUTION:  8 mL    DESCRIPTON OF PROCEDURE:  After obtaining informed consent, the patient was placed in the prone position. IV access was obtained.  EKG, blood pressure, and pulse oximeter were monitored and all sedation was administered by an RN under my direct guidance.  The cervical area was prepped with Chloraprep and draped with sterile barrier. Under fluoroscopic guidance the waists of the C3 through C6 vertebra on each side were identified. Skin and subcutaneous tissue was then anesthetized with 1% lidocaine 1mL at each point. Then spinal needles were introduced under fluoroscopic guidance at the waist of these vertebra on one side. After confirming the position of the needle under fluoroscopic PA and lateral views, and confirming negative aspiration of blood and CSF, 0.25 mL of Omnipaque was injected.  Proper spread and lack of vascular uptake was  demonstrated.  A solution was prepared as above, and 1 mL of that solution was injected very slowly each level.   Needles were removed intact from all levels.  Vitals were stable throughout.     ESTIMATED BLOOD LOSS:  minimal  SPECIMENS:  None    COMPLICATIONS:    No complications were noted. and There was no indication of vascular uptake on live injection of contrast dye.    TOLERANCE & DISCHARGE CONDITION:    The patient tolerated the procedure well.  The patient was transported to the recovery area without difficulties.  The patient was discharged to home under the care of family in stable and satisfactory condition.      PLAN OF CARE:  1. The patient was given our standard instruction sheet.  2. We discussed that Cervical Medial Branch Blockade is a diagnostic procedure in consideration for radiofrequency ablation if two diagnostic procedures proved to be positive for significant benefit.  If sustained relief of six to eight weeks is obtained, then an alternative plan could be therapeutic cervical medial branch blocks on an as-needed basis.  3. The patient is asked to keep a pain log hourly for 8 hours postoperatively today.  4. The patient will Repeat injection 4 wks.  5. The patient will resume all medications as per the medication reconciliation sheet.

## 2017-11-14 ENCOUNTER — HOSPITAL ENCOUNTER (OUTPATIENT)
Dept: GENERAL RADIOLOGY | Facility: HOSPITAL | Age: 82
Discharge: HOME | End: 2017-11-14
Admitting: INTERNAL MEDICINE

## 2017-11-14 ENCOUNTER — OFFICE VISIT (OUTPATIENT)
Dept: FAMILY MEDICINE CLINIC | Facility: CLINIC | Age: 82
End: 2017-11-14

## 2017-11-14 VITALS
TEMPERATURE: 98 F | HEART RATE: 92 BPM | BODY MASS INDEX: 27.58 KG/M2 | HEIGHT: 71 IN | OXYGEN SATURATION: 93 % | WEIGHT: 197 LBS | RESPIRATION RATE: 16 BRPM | DIASTOLIC BLOOD PRESSURE: 72 MMHG | SYSTOLIC BLOOD PRESSURE: 124 MMHG

## 2017-11-14 DIAGNOSIS — R68.89 FLU-LIKE SYMPTOMS: ICD-10-CM

## 2017-11-14 DIAGNOSIS — IMO0001 UNCONTROLLED TYPE 2 DIABETES MELLITUS WITHOUT COMPLICATION, WITH LONG-TERM CURRENT USE OF INSULIN: ICD-10-CM

## 2017-11-14 DIAGNOSIS — E78.00 PURE HYPERCHOLESTEROLEMIA: ICD-10-CM

## 2017-11-14 DIAGNOSIS — I10 ESSENTIAL HYPERTENSION: ICD-10-CM

## 2017-11-14 DIAGNOSIS — E11.8 TYPE 2 DIABETES MELLITUS WITH COMPLICATION, UNSPECIFIED LONG TERM INSULIN USE STATUS: Primary | ICD-10-CM

## 2017-11-14 DIAGNOSIS — R05.9 COUGH: Primary | ICD-10-CM

## 2017-11-14 DIAGNOSIS — I15.9 SECONDARY HYPERTENSION: ICD-10-CM

## 2017-11-14 DIAGNOSIS — E78.5 HYPERLIPIDEMIA, UNSPECIFIED HYPERLIPIDEMIA TYPE: ICD-10-CM

## 2017-11-14 DIAGNOSIS — J40 BRONCHITIS: Primary | ICD-10-CM

## 2017-11-14 LAB
EXPIRATION DATE: NORMAL
FLUAV AG NPH QL: NORMAL
FLUBV AG NPH QL: NORMAL
INTERNAL CONTROL: NORMAL
Lab: NORMAL

## 2017-11-14 PROCEDURE — 71020 HC CHEST PA AND LATERAL: CPT

## 2017-11-14 PROCEDURE — 87804 INFLUENZA ASSAY W/OPTIC: CPT | Performed by: INTERNAL MEDICINE

## 2017-11-14 PROCEDURE — 99213 OFFICE O/P EST LOW 20 MIN: CPT | Performed by: INTERNAL MEDICINE

## 2017-11-14 RX ORDER — DOXYCYCLINE HYCLATE 100 MG
100 TABLET ORAL 2 TIMES DAILY
Qty: 14 TABLET | Refills: 0 | Status: SHIPPED | OUTPATIENT
Start: 2017-11-14 | End: 2017-11-21

## 2017-11-14 NOTE — PROGRESS NOTES
"Subjective   Patient ID: Home Wynn is a 85 y.o. male presents with   Chief Complaint   Patient presents with   • URI     can you look at the heart monitor results he had done also       HPI - This patient presents today with cough and congestion going on for about 3 days.  Symptoms are moderate.  Originally had a fever but that's getting better.  He has malaise and productive cough.  Influenza screen was negative    Assessment plan    Bronchitis-chest x-ray doxycycline    Hypertension hyperlipidemia diabetes we'll get routine labs    No Known Allergies    The following portions of the patient's history were reviewed and updated as appropriate: allergies, current medications, past family history, past medical history, past social history, past surgical history and problem list.      Review of Systems   Constitutional: Positive for fatigue. Negative for fever.   HENT: Positive for congestion.    Eyes: Negative.    Respiratory: Positive for cough.    Cardiovascular: Negative.        Objective     Vitals:    11/14/17 1010   BP: 124/72   Pulse: 92   Resp: 16   Temp: 98 °F (36.7 °C)   TempSrc: Oral   SpO2: 93%   Weight: 197 lb (89.4 kg)   Height: 71\" (180.3 cm)         Physical Exam   Constitutional: He appears well-developed and well-nourished. No distress.   HENT:   Head: Normocephalic and atraumatic.   Cardiovascular: Normal rate and regular rhythm.    Pulmonary/Chest: Effort normal. He has wheezes.   Psychiatric: He has a normal mood and affect. His behavior is normal.   Nursing note and vitals reviewed.        Home was seen today for uri.    Diagnoses and all orders for this visit:    Bronchitis  -     Lipid Panel  -     Hemoglobin A1c  -     Microalbumin / Creatinine Urine Ratio - Urine, Clean Catch  -     Lipid Panel  -     CBC & Differential    Pure hypercholesterolemia  -     Lipid Panel  -     Hemoglobin A1c  -     Microalbumin / Creatinine Urine Ratio - Urine, Clean Catch  -     Lipid Panel  -     CBC & " Differential    Essential hypertension  -     Lipid Panel  -     Hemoglobin A1c  -     Microalbumin / Creatinine Urine Ratio - Urine, Clean Catch  -     Lipid Panel  -     CBC & Differential    Uncontrolled type 2 diabetes mellitus without complication, with long-term current use of insulin  -     Lipid Panel  -     Hemoglobin A1c  -     Microalbumin / Creatinine Urine Ratio - Urine, Clean Catch  -     Lipid Panel  -     CBC & Differential    Flu-like symptoms  -     POCT Influenza A/B    Other orders  -     doxycycline (VIBRAMYICN) 100 MG tablet; Take 1 tablet by mouth 2 (Two) Times a Day.  -     HYDROcodone-homatropine (HYCODAN) 5-1.5 MG/5ML syrup; Take 5 mL by mouth Every 6 (Six) Hours As Needed for Cough.        Call or return to clinic prn if these symptoms worsen or fail to improve as anticipated.

## 2017-11-15 ENCOUNTER — TELEPHONE (OUTPATIENT)
Dept: CARDIOLOGY | Facility: CLINIC | Age: 82
End: 2017-11-15

## 2017-11-15 ENCOUNTER — RESULTS ENCOUNTER (OUTPATIENT)
Dept: FAMILY MEDICINE CLINIC | Facility: CLINIC | Age: 82
End: 2017-11-15

## 2017-11-15 DIAGNOSIS — E11.8 TYPE 2 DIABETES MELLITUS WITH COMPLICATION, UNSPECIFIED LONG TERM INSULIN USE STATUS: ICD-10-CM

## 2017-11-15 DIAGNOSIS — I15.9 SECONDARY HYPERTENSION: ICD-10-CM

## 2017-11-15 DIAGNOSIS — E78.5 HYPERLIPIDEMIA, UNSPECIFIED HYPERLIPIDEMIA TYPE: ICD-10-CM

## 2017-11-15 LAB
ALBUMIN/CREAT UR: 107.8 MG/G CREAT (ref 0–30)
BASOPHILS # BLD AUTO: 0.02 10*3/MM3 (ref 0–0.2)
BASOPHILS NFR BLD AUTO: 0.2 % (ref 0–1.5)
CHOLEST SERPL-MCNC: 184 MG/DL (ref 0–200)
CREAT UR-MCNC: 413.5 MG/DL
EOSINOPHIL # BLD AUTO: 0.04 10*3/MM3 (ref 0–0.7)
EOSINOPHIL NFR BLD AUTO: 0.3 % (ref 0.3–6.2)
ERYTHROCYTE [DISTWIDTH] IN BLOOD BY AUTOMATED COUNT: 15.8 % (ref 11.5–14.5)
HBA1C MFR BLD: 8.1 % (ref 4.8–5.6)
HCT VFR BLD AUTO: 42.7 % (ref 40.4–52.2)
HDLC SERPL-MCNC: 35 MG/DL (ref 40–60)
HGB BLD-MCNC: 13.4 G/DL (ref 13.7–17.6)
IMM GRANULOCYTES # BLD: 0.04 10*3/MM3 (ref 0–0.03)
IMM GRANULOCYTES NFR BLD: 0.3 % (ref 0–0.5)
LDLC SERPL CALC-MCNC: 127 MG/DL (ref 0–100)
LYMPHOCYTES # BLD AUTO: 0.87 10*3/MM3 (ref 0.9–4.8)
LYMPHOCYTES NFR BLD AUTO: 7.2 % (ref 19.6–45.3)
MCH RBC QN AUTO: 31.7 PG (ref 27–32.7)
MCHC RBC AUTO-ENTMCNC: 31.4 G/DL (ref 32.6–36.4)
MCV RBC AUTO: 100.9 FL (ref 79.8–96.2)
MICROALBUMIN UR-MCNC: 445.9 UG/ML
MONOCYTES # BLD AUTO: 0.56 10*3/MM3 (ref 0.2–1.2)
MONOCYTES NFR BLD AUTO: 4.6 % (ref 5–12)
NEUTROPHILS # BLD AUTO: 10.55 10*3/MM3 (ref 1.9–8.1)
NEUTROPHILS NFR BLD AUTO: 87.4 % (ref 42.7–76)
PLATELET # BLD AUTO: 165 10*3/MM3 (ref 140–500)
RBC # BLD AUTO: 4.23 10*6/MM3 (ref 4.6–6)
TRIGL SERPL-MCNC: 112 MG/DL (ref 0–150)
VLDLC SERPL CALC-MCNC: 22.4 MG/DL (ref 5–40)
WBC # BLD AUTO: 12.08 10*3/MM3 (ref 4.5–10.7)

## 2017-11-17 NOTE — TELEPHONE ENCOUNTER
Pt is calling back to see if you could call him about the zio patch results from 10/19/17? Please Advise    Thanks Cookie

## 2017-11-21 ENCOUNTER — OFFICE VISIT (OUTPATIENT)
Dept: FAMILY MEDICINE CLINIC | Facility: CLINIC | Age: 82
End: 2017-11-21

## 2017-11-21 VITALS
SYSTOLIC BLOOD PRESSURE: 124 MMHG | HEIGHT: 71 IN | RESPIRATION RATE: 16 BRPM | OXYGEN SATURATION: 95 % | TEMPERATURE: 98.2 F | HEART RATE: 84 BPM | WEIGHT: 200 LBS | BODY MASS INDEX: 28 KG/M2 | DIASTOLIC BLOOD PRESSURE: 62 MMHG

## 2017-11-21 DIAGNOSIS — IMO0001 UNCONTROLLED TYPE 2 DIABETES MELLITUS WITHOUT COMPLICATION, WITH LONG-TERM CURRENT USE OF INSULIN: ICD-10-CM

## 2017-11-21 DIAGNOSIS — I10 ESSENTIAL HYPERTENSION: ICD-10-CM

## 2017-11-21 DIAGNOSIS — I48.20 CHRONIC ATRIAL FIBRILLATION (HCC): ICD-10-CM

## 2017-11-21 DIAGNOSIS — E78.00 PURE HYPERCHOLESTEROLEMIA: ICD-10-CM

## 2017-11-21 DIAGNOSIS — J40 BRONCHITIS: Primary | ICD-10-CM

## 2017-11-21 PROCEDURE — 99214 OFFICE O/P EST MOD 30 MIN: CPT | Performed by: INTERNAL MEDICINE

## 2017-11-21 RX ORDER — DOXYCYCLINE HYCLATE 100 MG
100 TABLET ORAL 2 TIMES DAILY
Qty: 10 TABLET | Refills: 0 | Status: SHIPPED | OUTPATIENT
Start: 2017-11-21 | End: 2017-12-19

## 2017-11-21 NOTE — PROGRESS NOTES
"Subjective   Patient ID: Home Wynn is a 86 y.o. male presents with   Chief Complaint   Patient presents with   • Follow-up     on pneumonia       HPI - Just saw this patient recently for pneumonia treated with doxycycline and he feels like he's essentially well.  He still has a little residual cough however.  We reviewed his labs and his A1c's gone up a bit he has no episodes of hypoglycemia but admits to not ever checking his sugar.  Blood pressure is under control.  He's been seeing the heart doctor recently for atrial fibrillation.  He has an appointment to follow back up with him in 6 weeks.    Assessment plan    Bronchitis and pneumonia-continue doxycycline for 5 more days    Uncontrolled type 2 diabetes with insulin-I want patient to do twice a day fingerstick log and get that back to me he agreed.    Hypertension and A. fib currently controlled    Hyperlipidemia continue pravastatin 40.    No Known Allergies    The following portions of the patient's history were reviewed and updated as appropriate: allergies, current medications, past family history, past medical history, past social history, past surgical history and problem list.      Review of Systems   Constitutional: Negative.    HENT: Negative.    Eyes: Negative.    Respiratory: Positive for cough. Negative for shortness of breath.    Cardiovascular: Negative.    Gastrointestinal: Negative.    Endocrine: Negative.    Genitourinary: Negative.    Musculoskeletal: Negative.    Skin: Negative.    Allergic/Immunologic: Negative.    Neurological: Negative.    Hematological: Negative.    Psychiatric/Behavioral: Negative.        Objective     Vitals:    11/21/17 1035   BP: 124/62   Pulse: 84   Resp: 16   Temp: 98.2 °F (36.8 °C)   TempSrc: Oral   SpO2: 95%   Weight: 200 lb (90.7 kg)   Height: 71\" (180.3 cm)         Physical Exam   Constitutional: He is oriented to person, place, and time. He appears well-developed and well-nourished.   HENT:   Head: " Normocephalic and atraumatic.   Cardiovascular: Normal rate.    Ir ir   Pulmonary/Chest: Effort normal and breath sounds normal.   Neurological: He is alert and oriented to person, place, and time.   Psychiatric: He has a normal mood and affect. His behavior is normal.   Nursing note and vitals reviewed.        Home was seen today for follow-up.    Diagnoses and all orders for this visit:    Bronchitis    Chronic atrial fibrillation    Pure hypercholesterolemia    Essential hypertension    Uncontrolled type 2 diabetes mellitus without complication, with long-term current use of insulin    Other orders  -     doxycycline (VIBRAMYICN) 100 MG tablet; Take 1 tablet by mouth 2 (Two) Times a Day.        Call or return to clinic prn if these symptoms worsen or fail to improve as anticipated.

## 2017-11-22 ENCOUNTER — DOCUMENTATION (OUTPATIENT)
Dept: PAIN MEDICINE | Facility: CLINIC | Age: 82
End: 2017-11-22

## 2017-11-22 ENCOUNTER — OUTSIDE FACILITY SERVICE (OUTPATIENT)
Dept: PAIN MEDICINE | Facility: CLINIC | Age: 82
End: 2017-11-22

## 2017-11-22 PROCEDURE — 64491 INJ PARAVERT F JNT C/T 2 LEV: CPT | Performed by: PAIN MEDICINE

## 2017-11-22 PROCEDURE — 64490 INJ PARAVERT F JNT C/T 1 LEV: CPT | Performed by: PAIN MEDICINE

## 2017-11-22 PROCEDURE — 64492 INJ PARAVERT F JNT C/T 3 LEV: CPT | Performed by: PAIN MEDICINE

## 2017-11-22 NOTE — PROGRESS NOTES
RIGHT C3-6 Cervical Medial Branch Blockade  [facility]    PREOPERATIVE DIAGNOSIS:  Cervical spondylosis without myelopathy   POSTOPERATIVE DIAGNOSIS:  Same as preoperative diagnosis    PROCEDURE:    Cervical Facet Nerve (medial branch) Blocks, with Fluoroscopy:  LEVELS C3, C4, C5, and C6, to block facet joints C3-4, C4-5, and C5-6 on the RIGHT  • 13574 - Cervical Facet block, 1st level  • 32430 - Cervical Facet block, 2nd level  • 26468 - Cervical Facet block, 3rd level    PRE-PROCEDURE DISCUSSION WITH PATIENT:    Risks and complications were discussed with the patient prior to starting the procedure and informed consent was obtained.    SURGEON:  Aura Jacobs MD    REASON FOR PROCEDURE:    The patient complains of pain that seems to have a significant axial component Previous diagnostic positivity of a Cervical Medial Branch Blockade at the same levels Tenderness of the affected facet joints on palpation Increased neck pain on range of motion exams Pain on extension of the cervical spine    SEDATION:  Versed 2mg IV  ANESTHETIC:   Marcaine 0.25%  STEROID:  Dexamethasone 8mg  TOTAL VOLUME OF SOLUTION:  4 mL    DESCRIPTON OF PROCEDURE:  After obtaining informed consent, the patient was placed in the prone position. IV access was obtained.  EKG, blood pressure, and pulse oximeter were monitored and all sedation was administered by an RN under my direct guidance.  The cervical area was prepped with Chloraprep and draped with sterile barrier. Under fluoroscopic guidance the waists of the C3 through C6 vertebra on the affected side were identified. Skin and subcutaneous tissue was then anesthetized with 1% lidocaine 1mL at each point. Then spinal needles were introduced under fluoroscopic guidance at the waist of these vertebra on this side. Confirmed the position of the needle under fluoroscopic PA and lateral views.  A solution was prepared as above, and 1 mL of that solution was injected very  slowly each level.    Needles were removed intact from all levels.  Vitals were stable throughout.     ESTIMATED BLOOD LOSS:  minimal  SPECIMENS:  None    COMPLICATIONS:    No complications were noted.    TOLERANCE & DISCHARGE CONDITION:    The patient tolerated the procedure well.  The patient was transported to the recovery area without difficulties.  The patient was discharged to home under the care of family in stable and satisfactory condition.      PLAN OF CARE:  1. The patient was given our standard instruction sheet.  2. We discussed that Cervical Medial Branch Blockade is a diagnostic procedure in consideration for radiofrequency ablation if two diagnostic procedures proved to be positive for significant benefit.  If sustained relief of six to eight weeks is obtained, then an alternative plan could be therapeutic cervical medial branch blocks on an as-needed basis.  3. The patient is asked to keep a pain log hourly for 8 hours postoperatively today.  4. The patient will Return to clinic 2-3 wks.  5. The patient will resume all medications as per the medication reconciliation sheet.

## 2017-12-04 RX ORDER — PEN NEEDLE, DIABETIC 31 G X1/4"
NEEDLE, DISPOSABLE MISCELLANEOUS
Qty: 100 EACH | Refills: 2 | Status: SHIPPED | OUTPATIENT
Start: 2017-12-04 | End: 2018-10-15 | Stop reason: SDUPTHER

## 2017-12-07 ENCOUNTER — OFFICE VISIT (OUTPATIENT)
Dept: FAMILY MEDICINE CLINIC | Facility: CLINIC | Age: 82
End: 2017-12-07

## 2017-12-07 ENCOUNTER — OFFICE VISIT (OUTPATIENT)
Dept: PAIN MEDICINE | Facility: CLINIC | Age: 82
End: 2017-12-07

## 2017-12-07 VITALS
DIASTOLIC BLOOD PRESSURE: 75 MMHG | SYSTOLIC BLOOD PRESSURE: 153 MMHG | OXYGEN SATURATION: 95 % | HEART RATE: 87 BPM | TEMPERATURE: 98.1 F | BODY MASS INDEX: 27.61 KG/M2 | WEIGHT: 197.2 LBS | HEIGHT: 71 IN | RESPIRATION RATE: 18 BRPM

## 2017-12-07 VITALS
SYSTOLIC BLOOD PRESSURE: 130 MMHG | TEMPERATURE: 98.5 F | RESPIRATION RATE: 16 BRPM | OXYGEN SATURATION: 95 % | HEART RATE: 79 BPM | BODY MASS INDEX: 35.34 KG/M2 | DIASTOLIC BLOOD PRESSURE: 64 MMHG | WEIGHT: 180 LBS | HEIGHT: 60 IN

## 2017-12-07 DIAGNOSIS — R05.9 COUGH: ICD-10-CM

## 2017-12-07 DIAGNOSIS — M54.2 NECK PAIN: Primary | ICD-10-CM

## 2017-12-07 DIAGNOSIS — M48.02 CERVICAL SPINAL STENOSIS: ICD-10-CM

## 2017-12-07 DIAGNOSIS — R52 BODY ACHES: ICD-10-CM

## 2017-12-07 DIAGNOSIS — M47.812 CERVICAL SPONDYLOSIS WITHOUT MYELOPATHY: ICD-10-CM

## 2017-12-07 DIAGNOSIS — J01.00 ACUTE NON-RECURRENT MAXILLARY SINUSITIS: Primary | ICD-10-CM

## 2017-12-07 PROCEDURE — 99213 OFFICE O/P EST LOW 20 MIN: CPT | Performed by: PAIN MEDICINE

## 2017-12-07 PROCEDURE — 99213 OFFICE O/P EST LOW 20 MIN: CPT | Performed by: INTERNAL MEDICINE

## 2017-12-07 PROCEDURE — 87804 INFLUENZA ASSAY W/OPTIC: CPT | Performed by: INTERNAL MEDICINE

## 2017-12-07 RX ORDER — AMOXICILLIN AND CLAVULANATE POTASSIUM 500; 125 MG/1; MG/1
1 TABLET, FILM COATED ORAL 3 TIMES DAILY
Qty: 21 TABLET | Refills: 0 | Status: SHIPPED | OUTPATIENT
Start: 2017-12-07 | End: 2017-12-19

## 2017-12-07 NOTE — PROGRESS NOTES
CHIEF COMPLAINT: Neck Pain    HPI  Home Wynn is a 86 y.o. male.  He is here to follow up for Neck Pain    Home Wynn is a 86 y.o. male  who presents to the office for follow-up.  He completed a Right sided C3-6 Cervical Medial Branch Blockade on 11/1/17 and 11/22/17 performed for management of neck pain. Patient reports no relief from the procedure.   Since last visit their pain has remain unchanged.     The patient states their pain is a 3 on a scale of 1-10.   The patient describes this pain as episodic dull and ache.  The pain is located in right neck and does radiate  right shoulder. This painful problem is aggravated by physical activity and neck flexion and is alleviated by relaxation.  MAURI improved neck pain. Resolved. Has not returned. Continued soreness right side. Can tolerate pain. Can live with pain.     Past pain medications:   Tylenol  Advil - no help  Diclofenac - no help  lidoderm patch - minimal help      Current pain medications:   Tylenol prn- minimal help      Past therapies:  Physical Therapy: yes- better ROM but pain is the same  Chiropractor: yes - no improvement  Massage Therapy: no  TENS: yes  Neck or back surgery: no  Past pain management: no      Previous Injection: MAURI X6/C7 x 2 - 7/12/17, 8/2/17  Effect of Injection (%): 100%  Length of Relief: ongoing     Previous Injection: Right sided C3-6 Cervical Medial Branch Blockade on 11/1/17  Effect of Injection (%): 50%  Length of Relief: 2-3 days    Previous Injection: Right sided C3-6 Cervical Medial Branch Blockade on 11/22/17  Effect of Injection (%): minimal relief/unsure     Previous Injection: TPI into right trapezius, rhomboid splenius capitus - 10/20/2017  Effect of Injection (%): minimal relief    PEG Assessment   What number best describes your pain on average in the past week? 4  What number best describes how, during the past week, pain has interfered with your enjoyment of life? 2  What number best describes how,  during the past week, pain has interfered with your general activity? 2      Current Outpatient Prescriptions:   •  amiodarone (PACERONE) 200 MG tablet, Take a half tablet daily, Disp: 90 tablet, Rfl: 0  •  aspirin 325 MG tablet, Take by mouth daily., Disp: , Rfl:   •  doxycycline (VIBRAMYICN) 100 MG tablet, Take 1 tablet by mouth 2 (Two) Times a Day., Disp: 10 tablet, Rfl: 0  •  furosemide (LASIX) 20 MG tablet, Take 1 tablet by mouth Daily As Needed (Swelling)., Disp: 30 tablet, Rfl: 1  •  glimepiride (AMARYL) 2 MG tablet, Take 1 tablet by mouth Every Morning Before Breakfast., Disp: 90 tablet, Rfl: 2  •  Insulin Pen Needle (PEN NEEDLES) 31G X 6 MM misc, USE DAILY AS DIRECTED, Disp: 100 each, Rfl: 2  •  LANTUS SOLOSTAR 100 UNIT/ML injection pen, INJECT 15 UNITS UNDER THE SKIN EVERY NIGHT, Disp: 15 mL, Rfl: 4  •  Multiple Vitamins-Minerals (PRESERVISION AREDS) capsule, Take 2 capsules by mouth Daily., Disp: , Rfl:   •  OXYGEN-HELIUM IN, Oxygen; Patient Sig: Oxygen he uses 2 LPM at bedtime.; 0; 21-Mar-2014; Active, Disp: , Rfl:   •  pravastatin (PRAVACHOL) 40 MG tablet, Take 1 tablet by mouth Daily., Disp: 90 tablet, Rfl: 1    IMAGING  6/05/17 MRI cervical spine:  IMPRESSION:  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 Mild to moderate canal stenosis is present at C6-7 and C3-4. Multilevel neuroforaminal compromise is noted. The appearance is similar to the examination of 08/11/2014. There is no evidence of marrow edema to suggest a cervical fracture. There is mild loss of vertical height without marrow edema involving the T3 vertebral body, new versus the prior MRI examination suggesting a mild compression fracture/compression deformity. Further evaluation could be performed with a cervical  "myelogram as indicated.  Imaging last reviewed: 12/07/17      PFSH:  The following portions of the patient's history were reviewed and updated as appropriate: problem list, past medical history, past surgery history, social history, family history, medications, and allergies    Review of Systems   Constitutional: Positive for fatigue.   HENT: Positive for congestion.    Eyes: Negative for visual disturbance.   Respiratory: Positive for cough and shortness of breath. Negative for wheezing.    Cardiovascular: Negative.    Gastrointestinal: Negative for constipation and diarrhea.   Genitourinary: Negative for difficulty urinating.   Musculoskeletal: Positive for neck pain.   Neurological: Positive for weakness and numbness (big toe on right foot).   Psychiatric/Behavioral: Positive for sleep disturbance. Negative for suicidal ideas. The patient is not nervous/anxious.        Vitals:    12/07/17 1054   BP: 153/75   Pulse: 87   Resp: 18   Temp: 98.1 °F (36.7 °C)   SpO2: 95%   Weight: 89.4 kg (197 lb 3.2 oz)   Height: 180.3 cm (71\")   PainSc:   3   PainLoc: Neck       Physical Exam   Constitutional: He appears well-developed and well-nourished. No distress.   HENT:   Head: Normocephalic and atraumatic.   Nose: Nose normal.   Mouth/Throat: Oropharynx is clear and moist.   Eyes: Conjunctivae and EOM are normal.   Neck: Normal range of motion. Neck supple.   Pulmonary/Chest: Effort normal. No stridor. No respiratory distress.   Musculoskeletal:        Cervical back: He exhibits decreased range of motion, tenderness and pain. He exhibits no deformity.        Back:         Right lower leg: He exhibits no tenderness, no swelling and no edema.   Neurological: He is alert. He has normal strength. No cranial nerve deficit or sensory deficit.   Skin: Skin is warm and dry. No rash noted. He is not diaphoretic.   Psychiatric: He has a normal mood and affect. His speech is normal and behavior is normal.   Nursing note and vitals " reviewed.    Ortho Exam  Neurologic Exam     Mental Status   Speech: speech is normal     Cranial Nerves     CN III, IV, VI   Extraocular motions are normal.     Motor Exam     Strength   Strength 5/5 throughout.       Lab Results   Component Value Date    POCMETH Negative 07/11/2017    POCAMPHET Negative 07/11/2017    POCBARBITUR Negative 07/11/2017    POCBENZO Negative 07/11/2017    POCCOCAINE Negative 07/11/2017    POCMETHADO Negative 07/11/2017    POCOPIATES Negative 07/11/2017    POCOXYCODO Negative 07/11/2017    POCPHENCYC Negative 07/11/2017    POCPROPOXY Negative 07/11/2017    POCTHC Negative 07/11/2017    POCTRICYC Negative 07/11/2017     Last UDS results reviewed: 12/07/17   Last UDS: 7/11/2017  Comments: Consistent     Date of last MALICK reviewed : 12/07/17   Comments: Consistent     Assessment/Plan   Home was seen today for neck pain.    Diagnoses and all orders for this visit:    Neck pain    Cervical spondylosis without myelopathy    Cervical spinal stenosis    Requested Prescriptions      No prescriptions requested or ordered in this encounter     - chronic posterior/midline neck pain has resolved with MAURI. Can repeat in future if pain returns or worsens.   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 Mixed results with [facility]. Good results with first, not with second? Will not precede with RFA at this time as he states his pain is manageable. Can perform if pain worsens.   - Compound cream ordered today with Ketamine 10%. Ordered today.  Alternatives ok if his insurance does not cover.    - Unsure if TPI into right trapezius, rhomboid splenius capitus- helped, will not repeat at this time.   - continue to take OTC medication prn pain. Has tried patches and NSAIDS without relief. Will try cream as he does not want to take any medication chronically.   - Continue home exercise program. Be careful driving with limited ROM.     Wt Readings from Last 3 Encounters:   12/07/17 89.4 kg (197 lb  3.2 oz)   11/21/17 90.7 kg (200 lb)   11/14/17 89.4 kg (197 lb)     Body mass index is 27.5 kg/(m^2). Patient counseled on the importance of weight loss to help with overall health and pain control. Patient instructed to attempt weight loss.   Plan: Calorie counting  reduce portion size, cut out extra servings and reduce fast food intake    Follow-up as needed for pain     Aura Jacobs MD  Pain Management

## 2017-12-07 NOTE — PROGRESS NOTES
"Subjective   Patient ID: Home Wynn is a 86 y.o. male presents with   Chief Complaint   Patient presents with   • possible flu/ pneumonia       HPI - This patient presents today with sinus congestion him yellow nasal drainage for 2 days he feels ill he has no cough or shortness of breath.  He says his wife is sick.  We did a flu screen and it was negative    Assessment plan    Acute sinusitis-Augmentin 875 by mouth twice a day ×7 days.  If not getting better he's to let us know.  He voiced understanding.    No Known Allergies    The following portions of the patient's history were reviewed and updated as appropriate: allergies, current medications, past family history, past medical history, past social history, past surgical history and problem list.      Review of Systems   Constitutional: Positive for fatigue and fever.   HENT: Positive for congestion, rhinorrhea, sinus pain and sinus pressure.    Eyes: Negative.    Respiratory: Negative.    Cardiovascular: Negative.        Objective     Vitals:    12/07/17 1145   BP: 130/64   Pulse: 79   Resp: 16   Temp: 98.5 °F (36.9 °C)   TempSrc: Oral   SpO2: 95%   Weight: 81.6 kg (180 lb)   Height: 71 cm (27.95\")         Physical Exam   Constitutional: He appears well-developed and well-nourished. No distress.   HENT:   Head: Normocephalic and atraumatic.   Mouth/Throat: Oropharynx is clear and moist.   Sinus congestion   Eyes: EOM are normal. Pupils are equal, round, and reactive to light.   Cardiovascular: Normal rate, regular rhythm and normal heart sounds.    Pulmonary/Chest: Effort normal and breath sounds normal.   Psychiatric: He has a normal mood and affect. His behavior is normal.   Nursing note and vitals reviewed.        Home was seen today for possible flu/ pneumonia.    Diagnoses and all orders for this visit:    Acute non-recurrent maxillary sinusitis    Cough  -     POCT Influenza A/B    Body aches  -     POCT Influenza A/B    Other orders  -     " amoxicillin-clavulanate (AUGMENTIN) 500-125 MG per tablet; Take 1 tablet by mouth 3 (Three) Times a Day.        Call or return to clinic prn if these symptoms worsen or fail to improve as anticipated.

## 2017-12-12 ENCOUNTER — OFFICE VISIT (OUTPATIENT)
Dept: FAMILY MEDICINE CLINIC | Facility: CLINIC | Age: 82
End: 2017-12-12

## 2017-12-12 VITALS
BODY MASS INDEX: 25.2 KG/M2 | TEMPERATURE: 97.5 F | HEIGHT: 71 IN | WEIGHT: 180 LBS | HEART RATE: 68 BPM | DIASTOLIC BLOOD PRESSURE: 80 MMHG | OXYGEN SATURATION: 98 % | SYSTOLIC BLOOD PRESSURE: 132 MMHG

## 2017-12-12 DIAGNOSIS — R05.9 COUGH: Primary | ICD-10-CM

## 2017-12-12 DIAGNOSIS — R93.89 ABNORMAL CHEST X-RAY: ICD-10-CM

## 2017-12-12 PROCEDURE — 71020 XR CHEST 2 VW: CPT | Performed by: NURSE PRACTITIONER

## 2017-12-12 PROCEDURE — 99214 OFFICE O/P EST MOD 30 MIN: CPT | Performed by: NURSE PRACTITIONER

## 2017-12-12 NOTE — PROGRESS NOTES
Pleasant gentleman here today complains of increased sinus drainage congestion dripping sinuses, frequent cough, increased at night  Much as his symptoms are sinus related, no fever no increased shortness of breath or chest pain  At nighttime he does have increased cough and difficult time sleeping    Saw doctor last week  Said uri    Good during day  Worse at night    Nose blowing all night  Coughing spells   makes nervous    antibiotic

## 2017-12-12 NOTE — PROGRESS NOTES
Subjective   Home Wynn is a 86 y.o. male.     HPI Comments: Pleasant gentleman here today complains of increased sinus drainage congestion dripping sinuses, frequent cough, increased at night  Much as his symptoms are sinus related, no fever no increased shortness of breath or chest pain  At nighttime he does have increased cough and difficult time sleeping  Symptoms are good during the day moderate at night    His chest x-ray was suspect last visit, 11/14/2017  He has seen Dr. Cai, for cough congestion he finished 10 days of doxycycline, presently finishing up with Augmentin  Possible infiltrate or opacity left lung the heart border as well as less dense right side opacity or infiltrate    Nose blowing all night  Coughing spells   makes nervous      Nonsmoker  Never smoked  No history of malignancy  He's had no night sweats  No unexplained weight loss         The following portions of the patient's history were reviewed and updated as appropriate: allergies, current medications, past medical history, past social history, past surgical history and problem list.    Review of Systems   Constitutional: Negative for fatigue, fever and unexpected weight change.   HENT: Positive for rhinorrhea, sinus pressure and sneezing.    Respiratory: Positive for cough. Negative for shortness of breath.    Cardiovascular: Negative for chest pain, palpitations and leg swelling.   Genitourinary: Negative.    Musculoskeletal: Negative.    Neurological: Negative.    Psychiatric/Behavioral: The patient is nervous/anxious.        Objective   Physical Exam   Constitutional: He is oriented to person, place, and time. He appears well-developed and well-nourished.   Pleasant nontoxic appears well other than his occasional cough and rhinitis   HENT:   Head: Normocephalic.   Mouth/Throat: Oropharynx is clear and moist.   TMs are dull Canal clear  Turbinates copious clear rhinitis 3-4+ no purulence  No sinus tenderness     Eyes:  Conjunctivae are normal. Pupils are equal, round, and reactive to light. No scleral icterus.   Neck: Neck supple. No JVD present. No thyromegaly present.   Cardiovascular: Normal rate, regular rhythm and normal heart sounds.  Exam reveals no gallop and no friction rub.    No murmur heard.  Pulmonary/Chest: Effort normal. No stridor. No respiratory distress. He has no wheezes. He has rales.   Few inspiratory rales scattered slightly increased on the right,    Abdominal: Soft. Bowel sounds are normal. He exhibits no distension. There is no tenderness.   No hepatosplenomegaly, no ascites,   Musculoskeletal: He exhibits no edema or tenderness.   Lymphadenopathy:     He has no cervical adenopathy.   Neurological: He is alert and oriented to person, place, and time. He has normal reflexes.   Skin: Skin is warm and dry. No rash noted. No erythema.   Psychiatric: He has a normal mood and affect. His behavior is normal. Judgment and thought content normal.   Vitals reviewed.      Assessment/Plan   Home was seen today for uri.    Diagnoses and all orders for this visit:    Cough  -     XR Chest 2 View    Abnormal chest x-ray  Comments:  Infiltrate or opacities left heart border, right lung 12/12/2017                  Chest x-ray  opacity or possible infiltrate left heart border left middle lung, less dense opacity infiltrate right lung which is less notable than previous chest x-ray last month  Ovary pending    These may be resolving infiltrates  However, we may need a chest CT  for further evaluation   overreading is pending will be back tomorrow  Outpatient schedule follow-up with Dr. Cai next week  I don't think he's declining  Ask he feels pretty good during the day, it just is aggravating cough  And excessive postnasal drip is bothering him  He is presently without shortness of breath or fever  He will finish his Augmentin    He understands any fever chest pain shortness of breath any worsening emergency room  Keep  appointment next week  He will call tomorrow for x-ray results    If he needs a chest CT, consider initial CT without contrast, as he has renal insufficiency  Otherwise he will need adequate hydration pre-and post    He is a pleasant gentleman  He understands will follow instructions    James Epley NP

## 2017-12-13 ENCOUNTER — TELEPHONE (OUTPATIENT)
Dept: FAMILY MEDICINE CLINIC | Facility: CLINIC | Age: 82
End: 2017-12-13

## 2017-12-13 NOTE — TELEPHONE ENCOUNTER
I went to call pt and noticed the xray is now back. Please review and I will call him with your recommendations. thanks

## 2017-12-13 NOTE — TELEPHONE ENCOUNTER
Discussed with patient x-ray results  He's had some modest improvement in the 2 areas of concern  With some interstitial markings  Radiologist recommends a CT of the chest    Patient has a follow-up with Dr. Cia next week  He's doing okay today finishes his Augmentin today    Keep his appointment Dr. Cai  He has no worsening presently not short of breath presently and without fever  I think it's okay to wait a week for follow-up and see how he is feeling  And then Dr. Cai can decide on his CT of the chest      Patient call me for any problems until Dr. Cai gets back    Any fever shortness of breath worsening symptoms emergency room    James Epley NP

## 2017-12-13 NOTE — TELEPHONE ENCOUNTER
Patient called and was inquiring about the result of his x-ray that was done on Tuesday. Please call patient with that information. Thanks, Monika

## 2017-12-13 NOTE — TELEPHONE ENCOUNTER
Please tell patient  Thanks for calling back today  Over read is not back yet  If he will call me tomorrow please  Thank you

## 2017-12-14 RX ORDER — GLIMEPIRIDE 2 MG/1
TABLET ORAL
Qty: 90 TABLET | Refills: 1 | Status: SHIPPED | OUTPATIENT
Start: 2017-12-14 | End: 2018-06-19 | Stop reason: SDUPTHER

## 2017-12-19 ENCOUNTER — OFFICE VISIT (OUTPATIENT)
Dept: FAMILY MEDICINE CLINIC | Facility: CLINIC | Age: 82
End: 2017-12-19

## 2017-12-19 VITALS
HEART RATE: 86 BPM | BODY MASS INDEX: 36.52 KG/M2 | SYSTOLIC BLOOD PRESSURE: 124 MMHG | DIASTOLIC BLOOD PRESSURE: 68 MMHG | OXYGEN SATURATION: 96 % | HEIGHT: 60 IN | WEIGHT: 186 LBS | RESPIRATION RATE: 16 BRPM | TEMPERATURE: 97.9 F

## 2017-12-19 DIAGNOSIS — M54.2 NECK PAIN: Primary | ICD-10-CM

## 2017-12-19 DIAGNOSIS — J18.9 PNEUMONIA DUE TO INFECTIOUS ORGANISM, UNSPECIFIED LATERALITY, UNSPECIFIED PART OF LUNG: ICD-10-CM

## 2017-12-19 PROCEDURE — 99213 OFFICE O/P EST LOW 20 MIN: CPT | Performed by: INTERNAL MEDICINE

## 2017-12-19 NOTE — PROGRESS NOTES
"Subjective   Patient ID: Home Wynn is a 86 y.o. male presents with   Chief Complaint   Patient presents with   • Cough     f/u       HPI - This patient presents today with follow-up for likely pneumonia he's been treated with antibiotics and overall is better but he still has a residual cough.  He has a significant smoking history in the past.  The report for the last chest x-ray recommended a CT scan.  Patient also complains of chronic neck pain the pain in his hand and arm have improved since he had steroid injections but the pain in the trapezius on the right has not improved.    Assessment plan    Pneumonia and residual cough sample of breo we will get a CT scan of the chest    Neck pain-appointment with physical therapy for dry needling    No Known Allergies    The following portions of the patient's history were reviewed and updated as appropriate: allergies, current medications, past family history, past medical history, past social history, past surgical history and problem list.      Review of Systems   Constitutional: Negative.    Respiratory: Positive for cough. Negative for shortness of breath and wheezing.    Cardiovascular: Negative.    Musculoskeletal: Positive for neck pain.       Objective     Vitals:    12/19/17 1315   BP: 124/68   Pulse: 86   Resp: 16   Temp: 97.9 °F (36.6 °C)   TempSrc: Oral   SpO2: 96%   Weight: 84.4 kg (186 lb)   Height: 71 cm (27.95\")         Physical Exam   Constitutional: He appears well-developed and well-nourished.   HENT:   Head: Normocephalic and atraumatic.   Cardiovascular: Normal rate, regular rhythm and normal heart sounds.    Pulmonary/Chest: Effort normal and breath sounds normal.   Psychiatric: He has a normal mood and affect. His behavior is normal.   Nursing note and vitals reviewed.        Home was seen today for cough.    Diagnoses and all orders for this visit:    Neck pain  -     Ambulatory Referral to Physical Therapy Evaluate and treat  -     CT " Chest Without Contrast; Future    Pneumonia due to infectious organism, unspecified laterality, unspecified part of lung        Call or return to clinic prn if these symptoms worsen or fail to improve as anticipated.

## 2017-12-27 ENCOUNTER — HOSPITAL ENCOUNTER (OUTPATIENT)
Dept: CT IMAGING | Facility: HOSPITAL | Age: 82
Discharge: HOME | End: 2017-12-27
Admitting: INTERNAL MEDICINE

## 2017-12-27 DIAGNOSIS — M54.2 NECK PAIN: ICD-10-CM

## 2017-12-27 PROCEDURE — 71250 CT THORAX DX C-: CPT

## 2017-12-28 ENCOUNTER — OFFICE VISIT (OUTPATIENT)
Dept: FAMILY MEDICINE CLINIC | Facility: CLINIC | Age: 82
End: 2017-12-28

## 2017-12-28 VITALS
HEIGHT: 60 IN | WEIGHT: 197 LBS | OXYGEN SATURATION: 98 % | SYSTOLIC BLOOD PRESSURE: 120 MMHG | HEART RATE: 84 BPM | BODY MASS INDEX: 38.68 KG/M2 | TEMPERATURE: 98.4 F | DIASTOLIC BLOOD PRESSURE: 80 MMHG

## 2017-12-28 DIAGNOSIS — Z79.899 MEDICATION MANAGEMENT: ICD-10-CM

## 2017-12-28 DIAGNOSIS — J18.9 COMMUNITY ACQUIRED PNEUMONIA, UNSPECIFIED LATERALITY: Primary | ICD-10-CM

## 2017-12-28 PROCEDURE — 99214 OFFICE O/P EST MOD 30 MIN: CPT | Performed by: NURSE PRACTITIONER

## 2017-12-28 RX ORDER — CEFDINIR 300 MG/1
300 CAPSULE ORAL
Qty: 14 CAPSULE | Refills: 0 | Status: SHIPPED | OUTPATIENT
Start: 2017-12-28 | End: 2018-01-05

## 2017-12-28 RX ORDER — LEVOFLOXACIN 750 MG/1
750 TABLET ORAL
Qty: 7 TABLET | Refills: 0 | Status: CANCELLED | OUTPATIENT
Start: 2017-12-28

## 2017-12-28 RX ORDER — AZITHROMYCIN 250 MG/1
TABLET, FILM COATED ORAL
Qty: 6 TABLET | Refills: 0 | Status: SHIPPED | OUTPATIENT
Start: 2017-12-28 | End: 2018-01-05

## 2017-12-28 NOTE — PROGRESS NOTES
Subjective   Home Wynn is a 86 y.o. male presents with six week history of viral infection. States this then progressed into a sinus infection and then pneumonia. Was treated with antibiotics, completed and symptoms have not improved. Using an inhaler, given a sample and using as prescribed.    Pneumonia   He complains of chest tightness, cough, difficulty breathing (with exertion), frequent throat clearing, hoarse voice, shortness of breath, sputum production (unable to expectorate) and wheezing (occasionally). There is no hemoptysis. This is a new problem. The current episode started more than 1 month ago. The problem occurs constantly. The problem has been unchanged. The cough is non-productive. Associated symptoms include appetite change (decreased), chest pain (tightness), dyspnea on exertion, malaise/fatigue, nasal congestion, postnasal drip and rhinorrhea. Pertinent negatives include no ear congestion, ear pain, fever, headaches, heartburn, myalgias, orthopnea, PND, sneezing, sore throat, sweats, trouble swallowing or weight loss. His symptoms are aggravated by nothing. His symptoms are alleviated by nothing. He reports minimal improvement on treatment. His past medical history is significant for bronchitis and pneumonia.        The following portions of the patient's history were reviewed and updated as appropriate: allergies, current medications, past family history, past medical history, past social history, past surgical history and problem list.    Review of Systems   Constitutional: Positive for appetite change (decreased) and malaise/fatigue. Negative for fever and weight loss.   HENT: Positive for hoarse voice, postnasal drip and rhinorrhea. Negative for ear pain, sneezing, sore throat and trouble swallowing.    Eyes: Negative.    Respiratory: Positive for cough, sputum production (unable to expectorate), shortness of breath and wheezing (occasionally). Negative for hemoptysis.    Cardiovascular:  Positive for chest pain (tightness) and dyspnea on exertion. Negative for PND.   Gastrointestinal: Negative.  Negative for heartburn.   Endocrine: Negative.    Genitourinary: Negative.    Musculoskeletal: Negative.  Negative for myalgias.   Skin: Negative.    Allergic/Immunologic: Negative.    Neurological: Negative.  Negative for headaches.   Hematological: Negative.    Psychiatric/Behavioral: Negative.        Objective   Physical Exam   Constitutional: He is oriented to person, place, and time. He appears well-developed and well-nourished.   HENT:   Head: Normocephalic and atraumatic.   Right Ear: Tympanic membrane, external ear and ear canal normal.   Left Ear: Tympanic membrane, external ear and ear canal normal.   Nose: Mucosal edema present. Right sinus exhibits no maxillary sinus tenderness and no frontal sinus tenderness. Left sinus exhibits no maxillary sinus tenderness and no frontal sinus tenderness.   Mouth/Throat: Uvula is midline, oropharynx is clear and moist and mucous membranes are normal. No oropharyngeal exudate. No tonsillar exudate.   Eyes: Conjunctivae are normal. Pupils are equal, round, and reactive to light.   Neck: Neck supple. No thyromegaly present.   Cardiovascular: Normal rate, regular rhythm and normal heart sounds.  Exam reveals no gallop and no friction rub.    No murmur heard.  Pulmonary/Chest: Effort normal. No respiratory distress. He has decreased breath sounds. He has no wheezes. He has rales in the left upper field, the left middle field and the left lower field.   Lymphadenopathy:     He has no cervical adenopathy.   Neurological: He is alert and oriented to person, place, and time.   Skin: Skin is warm and dry.   Psychiatric: He has a normal mood and affect.   Vitals reviewed.      Assessment/Plan   Home was seen today for pneumonia and cough.    Diagnoses and all orders for this visit:    Community acquired pneumonia, unspecified laterality  -     CBC w AUTO  Differential  -     Comprehensive metabolic panel    Medication management  -     CBC w AUTO Differential  -     Comprehensive metabolic panel    Other orders  -     Cancel: levoFLOXacin (LEVAQUIN) 750 MG tablet; Take 1 tablet by mouth Every Other Day.  -     azithromycin (ZITHROMAX Z-SULEMAN) 250 MG tablet; Take 2 tablets the first day, then 1 tablet daily for 4 days.  -     cefdinir (OMNICEF) 300 MG capsule; Take 1 capsule by mouth 2 (Two) Times a Day.

## 2017-12-28 NOTE — PATIENT INSTRUCTIONS
Start zithromycin as prescribed. Start cefdinir. Follow label directions. Increase fluids. Increase rest. For worsening symptoms, please go to ER as discussed. Follow up with PCP in one week.

## 2017-12-29 LAB
ALBUMIN SERPL-MCNC: 3.8 G/DL (ref 3.5–5.2)
ALBUMIN/GLOB SERPL: 1.2 G/DL
ALP SERPL-CCNC: 78 U/L (ref 39–117)
ALT SERPL-CCNC: 13 U/L (ref 1–41)
AST SERPL-CCNC: 13 U/L (ref 1–40)
BASOPHILS # BLD AUTO: 0.02 10*3/MM3 (ref 0–0.2)
BASOPHILS NFR BLD AUTO: 0.2 % (ref 0–1.5)
BILIRUB SERPL-MCNC: 0.3 MG/DL (ref 0.1–1.2)
BUN SERPL-MCNC: 22 MG/DL (ref 8–23)
BUN/CREAT SERPL: 18 (ref 7–25)
CALCIUM SERPL-MCNC: 9.5 MG/DL (ref 8.6–10.5)
CHLORIDE SERPL-SCNC: 102 MMOL/L (ref 98–107)
CO2 SERPL-SCNC: 30.9 MMOL/L (ref 22–29)
CREAT SERPL-MCNC: 1.22 MG/DL (ref 0.76–1.27)
EOSINOPHIL # BLD AUTO: 0.12 10*3/MM3 (ref 0–0.7)
EOSINOPHIL NFR BLD AUTO: 1.1 % (ref 0.3–6.2)
ERYTHROCYTE [DISTWIDTH] IN BLOOD BY AUTOMATED COUNT: 16 % (ref 11.5–14.5)
GLOBULIN SER CALC-MCNC: 3.3 GM/DL
GLUCOSE SERPL-MCNC: 185 MG/DL (ref 65–99)
HCT VFR BLD AUTO: 38.5 % (ref 40.4–52.2)
HGB BLD-MCNC: 12 G/DL (ref 13.7–17.6)
IMM GRANULOCYTES # BLD: 0.05 10*3/MM3 (ref 0–0.03)
IMM GRANULOCYTES NFR BLD: 0.5 % (ref 0–0.5)
LYMPHOCYTES # BLD AUTO: 1.22 10*3/MM3 (ref 0.9–4.8)
LYMPHOCYTES NFR BLD AUTO: 11.7 % (ref 19.6–45.3)
MCH RBC QN AUTO: 31 PG (ref 27–32.7)
MCHC RBC AUTO-ENTMCNC: 31.2 G/DL (ref 32.6–36.4)
MCV RBC AUTO: 99.5 FL (ref 79.8–96.2)
MONOCYTES # BLD AUTO: 0.72 10*3/MM3 (ref 0.2–1.2)
MONOCYTES NFR BLD AUTO: 6.9 % (ref 5–12)
NEUTROPHILS # BLD AUTO: 8.33 10*3/MM3 (ref 1.9–8.1)
NEUTROPHILS NFR BLD AUTO: 79.6 % (ref 42.7–76)
PLATELET # BLD AUTO: 221 10*3/MM3 (ref 140–500)
POTASSIUM SERPL-SCNC: 4.7 MMOL/L (ref 3.5–5.2)
PROT SERPL-MCNC: 7.1 G/DL (ref 6–8.5)
RBC # BLD AUTO: 3.87 10*6/MM3 (ref 4.6–6)
SODIUM SERPL-SCNC: 145 MMOL/L (ref 136–145)
WBC # BLD AUTO: 10.46 10*3/MM3 (ref 4.5–10.7)

## 2018-01-04 ENCOUNTER — TREATMENT (OUTPATIENT)
Dept: PHYSICAL THERAPY | Facility: CLINIC | Age: 83
End: 2018-01-04

## 2018-01-04 DIAGNOSIS — M54.2 PAIN, NECK: Primary | ICD-10-CM

## 2018-01-04 PROCEDURE — 97161 PT EVAL LOW COMPLEX 20 MIN: CPT | Performed by: PHYSICAL THERAPIST

## 2018-01-04 PROCEDURE — G8981 BODY POS CURRENT STATUS: HCPCS | Performed by: PHYSICAL THERAPIST

## 2018-01-04 PROCEDURE — 97140 MANUAL THERAPY 1/> REGIONS: CPT | Performed by: PHYSICAL THERAPIST

## 2018-01-04 PROCEDURE — G8982 BODY POS GOAL STATUS: HCPCS | Performed by: PHYSICAL THERAPIST

## 2018-01-04 NOTE — PROGRESS NOTES
Physical Therapy Initial Evaluation and Plan of Care    Patient: Home Wynn   : 1931  Diagnosis/ICD-10 Code:  Pain, neck [M54.2]  Referring practitioner: Trevor Cai MD  Date of Initial Visit: 2018  Today's Date: 2018    Subjective Evaluation    History of Present Illness  Onset date: 1 1/2 years ago.  Mechanism of injury: Pt reports insidious onset right sided neck pain of 1 1/2 years duration, has been to the chiropractor, pain management and to physical therapy. Pain makes it difficult to sleep, difficult to turn head. Denies numbness/tingling or trauma.  Occasional headaches.      Patient Occupation: Retired Quality of life: good    Pain  Current pain ratin  At best pain ratin  At worst pain rating: 3  Location: right UT/c-spine  Quality: sharp and dull ache  Relieving factors: change in position, ice and heat (TENS unit)  Aggravating factors: sleeping  Progression: no change    Hand dominance: right    Treatments  Previous treatment: physical therapy, injection treatment and chiropractic  Patient Goals  Patient goals for therapy: decreased pain             Objective     Special Questions      Additional Special Questions  No red flags noted      Static Posture     Head  Forward.    Shoulders  Rounded.    Scapulae  Left protracted and right protracted.    Postural Observations  Seated posture: fair  Standing posture: fair        Palpation     Right   No palpable tenderness to the deltoid, lower trapezius, middle trapezius, rhomboids, scalenes and sternocleidomastoid.   Hypertonic in the levator scapulae and upper trapezius. Tenderness of the levator scapulae and upper trapezius.     Tenderness   Cervical Spine   No tenderness in the facet joint, left transverse process and right transverse process.     Left Shoulder   No tenderness in the clavicle.     Right Shoulder  No tenderness in the clavicle.     Neurological Testing     Sensation   Cervical/Thoracic   Left   Intact: light  touch    Right   Intact: light touch    Active Range of Motion   Cervical/Thoracic Spine   Cervical    Flexion: 30 degrees   Extension: 45 degrees   Left lateral flexion: 15 degrees   Right lateral flexion: 18 degrees   Left rotation: 35 degrees   Right rotation: 50 degrees     Strength/Myotome Testing   Cervical Spine     Left   Neck lateral flexion (C3): 3-    Right   Neck lateral flexion (C3): 3-    Left Shoulder     Planes of Motion   Abduction: 4+   External rotation at 0°: 4     Isolated Muscles   Lower trapezius: 3-   Middle trapezius: 3-   Serratus anterior: 3-     Right Shoulder     Planes of Motion   Abduction: 4+   External rotation at 0°: 4     Isolated Muscles   Lower trapezius: 3-   Middle trapezius: 3-   Serratus anterior: 3-     Left Wrist/Hand   Wrist extension: 4+  Wrist flexion: 4+    Right Wrist/Hand   Wrist extension: 4+  Wrist flexion: 4+    Tests   Cervical     Left   Negative cervical distraction and Spurling's sign.     Right   Negative cervical distraction and Spurling's sign.     Left Shoulder   Negative active compression (Ashton) and Adson maneuver.     Right Shoulder   Negative active compression (Ashton) and Adson maneuver.     Additional Tests Details  (-) vertebral artery test         Assessment & Plan     Assessment  Impairments: abnormal muscle tone, abnormal or restricted ROM, activity intolerance, lacks appropriate home exercise program and pain with function  Assessment details: Pt presents with signs and symptoms consistent with OA, but primary pain seems to be muscular in origin at this time.  Patient will benefit from skilled PT services in order to address muscle tone/pain so that he can better tolerate daily activities     Prognosis: good  Functional Limitations: sleeping and uncomfortable because of pain  Goals  Plan Goals: Short Term Goals: 2-4 weeks  Patient will:  1. Be independent with initial HEP  2. Be instructed in posture and body mechanics  3. Demonstrate  decreased soft tissue restriction in involved musculature to allow for improvement in ROM.    Long Term Goals: 4-6 weeks  Patient will:  1. Report pain of </= 1/10 with all daily activities  2. Demonstrate improved Bilateral UE/scapular MMT of >/= 4+/5 to allow for performance of ADL's/household management/recreational activities without increased symptoms.  3. Demo cervical AROM rotation 70 degrees and lateral flexion 35 degrees bilaterally.  4. Perceived disability </=20% as measured by Neck Disability Index    Plan  Therapy options: will be seen for skilled physical therapy services  Planned modality interventions: cryotherapy, TENS, thermotherapy (hydrocollator packs) and ultrasound  Planned therapy interventions: home exercise program, manual therapy, neuromuscular re-education, soft tissue mobilization, spinal/joint mobilization, strengthening, stretching, functional ROM exercises and flexibility  Frequency: 1x week  Duration in weeks: 8  Treatment plan discussed with: patient        Manual Therapy:    24     mins  70154;  Therapeutic Exercise:    0     mins  70647;     Neuromuscular Gwen:    0    mins  32138;    Therapeutic Activity:     0     mins  19386;     Gait Trainin     mins  72172;     Ultrasound:     0     mins  86024;    Electrical Stimulation:    0     mins  68003 ( );    Timed Treatment:   24   mins   Total Treatment:     50   mins    PT SIGNATURE: Meli Del Castillo PT, DPT          Physical Therapist                               [place] License #[id number]    DATE TREATMENT INITIATED: 2017    Initial Certification  Certification Period: 2018  I certify that the therapy services are furnished while this patient is under my care.  The services outlined above are required by this patient, and will be reviewed every 90 days.     PHYSICIAN: Trevor Cai MD      DATE:     Please sign and return via fax to [phone].. Thank you, [facility].

## 2018-01-05 ENCOUNTER — OFFICE VISIT (OUTPATIENT)
Dept: FAMILY MEDICINE CLINIC | Facility: CLINIC | Age: 83
End: 2018-01-05

## 2018-01-05 VITALS
WEIGHT: 197.2 LBS | BODY MASS INDEX: 27.61 KG/M2 | HEIGHT: 71 IN | SYSTOLIC BLOOD PRESSURE: 126 MMHG | RESPIRATION RATE: 16 BRPM | HEART RATE: 84 BPM | DIASTOLIC BLOOD PRESSURE: 74 MMHG | OXYGEN SATURATION: 95 % | TEMPERATURE: 98.3 F

## 2018-01-05 DIAGNOSIS — J18.9 PNEUMONIA DUE TO INFECTIOUS ORGANISM, UNSPECIFIED LATERALITY, UNSPECIFIED PART OF LUNG: Primary | ICD-10-CM

## 2018-01-05 PROCEDURE — 99213 OFFICE O/P EST LOW 20 MIN: CPT | Performed by: INTERNAL MEDICINE

## 2018-01-05 NOTE — PROGRESS NOTES
"Subjective   Patient ID: Home Wynn is a 86 y.o. male presents with   Chief Complaint   Patient presents with   • Follow-up     on pneumonia       HPI - This patient presents today for follow-up of pneumonia community-acquired.  He was treated with antibiotic therapy and has improved he feels much better.  We did a CAT scan and it showed subtle infiltrates.    Assessment plan    Pneumonia-resolved.  Recommended patient stay way from folks and may have the flu follow up in April.    No Known Allergies    The following portions of the patient's history were reviewed and updated as appropriate: allergies, current medications, past family history, past medical history, past social history, past surgical history and problem list.      Review of Systems   Constitutional: Negative.    Respiratory: Negative.    Cardiovascular: Negative.    Psychiatric/Behavioral: Negative.        Objective     Vitals:    01/05/18 1239   BP: 126/74   Pulse: 84   Resp: 16   Temp: 98.3 °F (36.8 °C)   TempSrc: Oral   SpO2: 95%   Weight: 89.4 kg (197 lb 3.2 oz)   Height: 180.3 cm (71\")         Physical Exam   Constitutional: He is oriented to person, place, and time. He appears well-developed and well-nourished.   HENT:   Head: Normocephalic and atraumatic.   Cardiovascular: Normal rate, regular rhythm and normal heart sounds.    Pulmonary/Chest: Effort normal and breath sounds normal.   Neurological: He is alert and oriented to person, place, and time.   Psychiatric: He has a normal mood and affect. His behavior is normal.   Nursing note and vitals reviewed.        Home was seen today for follow-up.    Diagnoses and all orders for this visit:    Pneumonia due to infectious organism, unspecified laterality, unspecified part of lung        Call or return to clinic prn if these symptoms worsen or fail to improve as anticipated.  "

## 2018-01-08 ENCOUNTER — OFFICE VISIT (OUTPATIENT)
Dept: CARDIOLOGY | Facility: CLINIC | Age: 83
End: 2018-01-08

## 2018-01-08 VITALS
SYSTOLIC BLOOD PRESSURE: 128 MMHG | HEART RATE: 73 BPM | BODY MASS INDEX: 27.44 KG/M2 | HEIGHT: 71 IN | WEIGHT: 196 LBS | OXYGEN SATURATION: 96 % | DIASTOLIC BLOOD PRESSURE: 80 MMHG

## 2018-01-08 DIAGNOSIS — I48.20 CHRONIC ATRIAL FIBRILLATION (HCC): Primary | ICD-10-CM

## 2018-01-08 PROCEDURE — 93000 ELECTROCARDIOGRAM COMPLETE: CPT | Performed by: INTERNAL MEDICINE

## 2018-01-08 PROCEDURE — 99215 OFFICE O/P EST HI 40 MIN: CPT | Performed by: INTERNAL MEDICINE

## 2018-01-08 RX ORDER — METOPROLOL SUCCINATE 25 MG/1
12.5 TABLET, EXTENDED RELEASE ORAL DAILY
Qty: 30 TABLET | Refills: 11 | Status: SHIPPED | OUTPATIENT
Start: 2018-01-08 | End: 2019-03-05 | Stop reason: SDUPTHER

## 2018-01-10 ENCOUNTER — TREATMENT (OUTPATIENT)
Dept: PHYSICAL THERAPY | Facility: CLINIC | Age: 83
End: 2018-01-10

## 2018-01-10 DIAGNOSIS — M54.2 PAIN, NECK: Primary | ICD-10-CM

## 2018-01-10 DIAGNOSIS — M47.812 OSTEOARTHRITIS OF CERVICAL SPINE, UNSPECIFIED SPINAL OSTEOARTHRITIS COMPLICATION STATUS: ICD-10-CM

## 2018-01-10 PROCEDURE — DRYNDL PR CUSTOM DRY NEEDLING SELF PAY: Performed by: PHYSICAL THERAPIST

## 2018-01-10 NOTE — PROGRESS NOTES
Physical Therapy Daily Progress Note  2 treatments  Subjective     Home Wynn reports: Here today for dry needling treatment.         Objective   See Exercise, Manual, and Modality Logs for complete treatment.     Soft tissue was assessed at R upper trap. PT noted point tenderness as well as palpable trigger points within the muslce tissue. On this date patient stated that they would like to undergo a dry needling procedure for the soft tissue dysfunction. Patient was educated on the procedure for dry needling and consent waver was signed. Patient was informed of the risks, possible adverse effects, along with the benefits of TDN.     Assessment/Plan  All treatment was tolerated well.   Progress per Plan of Care           Manual Therapy:         mins  85589;  Therapeutic Exercise:         mins  28963;     Neuromuscular Gwen:        mins  43842;    Therapeutic Activity:          mins  92007;     Gait Training:           mins  53948;     Ultrasound:          mins  84940;    Electrical Stimulation:         mins  08710 ( );  Dry Needling     15     mins self-pay    Timed Treatment:   15   mins   Total Treatment:     15   mins    Felipe Shahid PT  Physical Therapist  [place] License # [id number]

## 2018-01-12 NOTE — PROGRESS NOTES
Subjective:     Encounter Date:01/08/2018      Patient ID: Home Wynn is a 86 y.o. male.    Chief Complaint:  Atrial Fibrillation   Presents for follow-up visit. Symptoms include shortness of breath and syncope. Symptoms are negative for bradycardia, chest pain, dizziness, hypertension, hypotension, pacemaker problem, palpitations, tachycardia and weakness. The symptoms have been stable. Past medical history includes atrial fibrillation.       86-year-old gentleman who presents today for reevaluation.  Patient had a syncopal episode and more a long-term monitor.  It showed a minimum heart rate of 36 and a maximum heart rate of 143.  Patient presents today for reevaluation.  He still gets short of breath when going up and down stairs as well as having significant fatigue.    Review of Systems   Constitution: Negative for weakness.   Cardiovascular: Positive for syncope. Negative for chest pain and palpitations.   Respiratory: Positive for shortness of breath.    Neurological: Negative for dizziness.   All other systems reviewed and are negative.        ECG 12 Lead  Date/Time: 1/8/2018 2:26 PM  Performed by: JUVENAL MARTINEZ  Authorized by: JUVENAL MARTINEZ   Previous ECG: no previous ECG available  Rhythm: atrial fibrillation  Clinical impression: abnormal ECG               Objective:     Physical Exam   Constitutional: He is oriented to person, place, and time. He appears well-developed.   HENT:   Head: Normocephalic.   Eyes: Conjunctivae are normal.   Neck: Normal range of motion.   Cardiovascular: Normal rate and normal heart sounds.  An irregularly irregular rhythm present.   Pulmonary/Chest: Breath sounds normal.   Abdominal: Soft. Bowel sounds are normal.   Musculoskeletal: Normal range of motion. He exhibits no edema.   Neurological: He is alert and oriented to person, place, and time.   Skin: Skin is warm and dry.   Psychiatric: He has a normal mood and affect. His behavior is normal.   Vitals  reviewed.      Lab Review:       Assessment:          Diagnosis Plan   1. Chronic atrial fibrillation            Plan:       1.  Chronic atrial fibrillation.  We are pretty extensive discussion about her options.  Clearly the amiodarone is no longer working I want to discontinue it.  I placed him on Toprol 12.5 mg a day for weight control.  This may not be a high enough dose somewhat to follow-up with him relatively quickly in about 2 weeks.  I also placed him on Xarelto 20 mg a day his kidney function was normal.  After we get him on a dose of beta blocker and his heart rate is stable I'm when a repeat his monitor to see what his heart rates are ranging.  If he has any further syncope or near syncope episodes he will need a pacemaker for tachybradycardia syndrome.        Atrial Fibrillation and Atrial Flutter  Assessment  • The patient has paroxysmal atrial fibrillation  • This is non-valvular in etiology  • The patient's CHADS2-VASc score is 2  • A HCE4NF0-RDDc score of 2 or more is considered a high risk for a thromboembolic event  • Warfarin not prescribed for medical reasons  • Rivaroxaban prescribed    Plan  • Continue in atrial fibrillation with rate control  • Continue rivaroxaban for antithrombotic therapy, bleeding issues discussed  • Add beta blocker for rate control  Total time of visit was 45 minutes.  This included reviewing his long-term monitor explaining the risks and benefits of anticoagulation and approach of stopping his medications.

## 2018-01-17 ENCOUNTER — TREATMENT (OUTPATIENT)
Dept: PHYSICAL THERAPY | Facility: CLINIC | Age: 83
End: 2018-01-17

## 2018-01-17 DIAGNOSIS — M54.2 PAIN, NECK: Primary | ICD-10-CM

## 2018-01-17 DIAGNOSIS — M47.812 OSTEOARTHRITIS OF CERVICAL SPINE, UNSPECIFIED SPINAL OSTEOARTHRITIS COMPLICATION STATUS: ICD-10-CM

## 2018-01-17 PROCEDURE — 97140 MANUAL THERAPY 1/> REGIONS: CPT | Performed by: PHYSICAL THERAPIST

## 2018-01-17 PROCEDURE — DRYNDL PR CUSTOM DRY NEEDLING SELF PAY: Performed by: PHYSICAL THERAPIST

## 2018-01-17 NOTE — PROGRESS NOTES
Physical Therapy Daily Progress Note  3 treatments  Subjective     Home Wynn reports: here today for dry needling treatment.        Objective   See Exercise, Manual, and Modality Logs for complete treatment.     Soft tissue was assessed at R upper [facility]. PT noted point tenderness as well as palpable trigger points within the muslce tissue. On this date patient stated that they would like to undergo a dry needling procedure for the soft tissue dysfunction. Patient was educated on the procedure for dry needling and consent waver was signed. Patient was informed of the risks, possible adverse effects, along with the benefits of TDN.       Assessment/Plan  Patient tolerated all treatment well. LTR was seen in [facility].   Progress per Plan of Care           Manual Therapy:         mins  81531;  Therapeutic Exercise:         mins  49021;     Neuromuscular Gwen:        mins  46930;    Therapeutic Activity:          mins  74852;     Gait Training:           mins  01358;     Ultrasound:          mins  23586;    Electrical Stimulation:         mins  66772 ( );  Dry Needling     15     mins self-pay    Timed Treatment:      mins   Total Treatment:     15   mins    Felipe Shahid PT  Physical Therapist  [place] License # [id number]

## 2018-01-17 NOTE — PROGRESS NOTES
Physical Therapy Daily Progress Note    Visit #4    Subjective     Home Wynn reports: no relief after last session/dry needling.  Does not want spinal mobilization on his neck, as he did not tolerate it well during a previous course of PT Rx.      Objective   See Exercise, Manual, and Modality Logs for complete treatment.   Please see documentation completed by Felipe Shahid, PT, DPT for dry needling treatment performed today    Assessment/Plan  Tolerated well, improved muscle tone after Rx, but still with ROM limitation.    Progress per Plan of Care           Manual Therapy:    23     mins  60264;  Therapeutic Exercise:    0     mins  06481;     Neuromuscular Gwen:    0    mins  39492;    Therapeutic Activity:     0     mins  96090;     Gait Trainin     mins  25601;     Ultrasound:     0     mins  96293;    Electrical Stimulation:    0     mins  88096 ( );    Timed Treatment:   23   mins   Total Treatment:     38   mins    Meli Del Castillo, PT, DPT  Physical Therapist  [place] License #[id number]

## 2018-01-22 ENCOUNTER — OFFICE VISIT (OUTPATIENT)
Dept: CARDIOLOGY | Facility: CLINIC | Age: 83
End: 2018-01-22

## 2018-01-22 VITALS
HEART RATE: 73 BPM | WEIGHT: 194 LBS | HEIGHT: 71 IN | SYSTOLIC BLOOD PRESSURE: 110 MMHG | BODY MASS INDEX: 27.16 KG/M2 | DIASTOLIC BLOOD PRESSURE: 70 MMHG

## 2018-01-22 DIAGNOSIS — I48.20 CHRONIC ATRIAL FIBRILLATION (HCC): Primary | ICD-10-CM

## 2018-01-22 PROCEDURE — 93000 ELECTROCARDIOGRAM COMPLETE: CPT | Performed by: NURSE PRACTITIONER

## 2018-01-22 PROCEDURE — 99213 OFFICE O/P EST LOW 20 MIN: CPT | Performed by: NURSE PRACTITIONER

## 2018-01-22 NOTE — PROGRESS NOTES
Date of Office Visit: 2018  Encounter Provider: RIVAS Rivera  Place of Service: [facility]  Patient Name: Home Wynn  :1931    Chief Complaint   Patient presents with   • Atrial Fibrillation   :     HPI: Home Wynn is a 86 y.o. male comes in today for Follow-up or his atrial fibrillation.  He is a patient of Dr. Tripathi.  He has a history of atrial fibrillation.    2017, he had a syncopal episode.  He wore a long-term monitor showing a minimal heart rate of 36 and a maximum heart rate of 143.  His amiodarone was discontinued on 2018.  Dr. Tripathi placed him on Toprol 12.5 mg a day for rate control of his atrial fibrillation.  His concerns that this dose would not be high enough.  He also put him on Xarelto 20 mg daily.    Today, he comes in for follow-up.  Since being on the Toprol he has felt well.  He does have fatigue and shortness of breath but this is chronic.  He denies any syncope or syncopal episodes or presyncope since the episode in September.  He is tolerating Xarelto well.  Denies chest pain or chest pressure.    Past Medical History:   Diagnosis Date   • Abnormal electrocardiogram    • Acute renal failure    • Atrial fibrillation    • Chronic kidney disease     renal failure   • Deep vein thrombosis     acute   • Diabetes mellitus    • Ecchymosis    • Generalized weakness    • History of cholecystectomy    • Hyperlipidemia    • Hypertension    • Insulin dependent diabetes mellitus    • Malaise and fatigue    • Multiple falls    • Myalgia    • JESSICA (obstructive sleep apnea)    • Osteoarthritis of knee    • PAF (paroxysmal atrial fibrillation)    • Peripheral neuropathy    • Rib fracture    • Right leg weakness    • Sleep apnea    • SOB (shortness of breath)    • Syncope    • Type 2 diabetes mellitus    • Vallecular mass    • Vertigo        Past Surgical History:   Procedure Laterality Date   • CATARACT EXTRACTION  "    • CHOLECYSTECTOMY  2010   • COLONOSCOPY  04/13/2011   • EYE SURGERY      cataract surg   • HERNIA REPAIR     • PROSTATE SURGERY     • TONSILLECTOMY     • UVULECTOMY             Review of Systems   Constitution: Positive for malaise/fatigue.   Cardiovascular: Positive for dyspnea on exertion.   Neurological: Positive for excessive daytime sleepiness.     All other systems reviewed and are negative    No Known Allergies    All aspects of family and social history reviewed.          Objective:     Vitals:    01/22/18 1112   BP: 110/70   BP Location: Left arm   Pulse: 73   Weight: 88 kg (194 lb)   Height: 180.3 cm (71\")     Body mass index is 27.06 kg/(m^2).    PHYSICAL EXAM:  Physical Exam   Constitutional: He is oriented to person, place, and time. He appears well-developed.   HENT:   Head: Normocephalic.   Eyes: Conjunctivae are normal.   Neck: Normal range of motion.   Cardiovascular: Normal rate and normal heart sounds.  An irregularly irregular rhythm present.   Pulmonary/Chest: Breath sounds normal.   Abdominal: Soft. Bowel sounds are normal.   Musculoskeletal: Normal range of motion. He exhibits no edema.   Neurological: He is alert and oriented to person, place, and time.   Skin: Skin is warm and dry.   Psychiatric: He has a normal mood and affect. His behavior is normal.   Vitals reviewed.        ECG 12 Lead  Date/Time: 1/22/2018 3:29 PM  Performed by: JUD CAIN  Authorized by: JUD CAIN   Comparison: compared with previous ECG from 1/8/2018  Similar to previous ECG  Rhythm: atrial fibrillation  Rate: normal  BPM: 73  QRS axis: normal  Other findings: LVH  Clinical impression: abnormal ECG  Comments: Indication: Atrial fibrillation                  Assessment:       Diagnosis Plan   1. Chronic atrial fibrillation  Holter Monitor - 48 Hour        Orders Placed This Encounter   Procedures   • Holter Monitor - 48 Hour     Standing Status:   Future     Number of Occurrences:   1     Standing " Expiration Date:   1/22/2019     Order Specific Question:   Reason for exam?     Answer:   Palpitations     Order Specific Question:   Reason for exam?     Answer:   AFib     Order Specific Question:   AFib specification?     Answer:   Persistent       Current Outpatient Prescriptions   Medication Sig Dispense Refill   • glimepiride (AMARYL) 2 MG tablet TAKE ONE TABLET BY MOUTH EVERY MORNING BEFORE BREAKFAST 90 tablet 1   • Insulin Pen Needle (PEN NEEDLES) 31G X 6 MM misc USE DAILY AS DIRECTED 100 each 2   • metoprolol succinate XL (TOPROL-XL) 25 MG 24 hr tablet Take 0.5 tablets by mouth Daily. 30 tablet 11   • Multiple Vitamins-Minerals (PRESERVISION AREDS) capsule Take 2 capsules by mouth Daily.     • OXYGEN-HELIUM IN Oxygen; Patient Sig: Oxygen he uses 2 LPM at bedtime.; 0; 21-Mar-2014; Active     • pravastatin (PRAVACHOL) 40 MG tablet Take 1 tablet by mouth Daily. 90 tablet 1   • rivaroxaban (XARELTO) 20 MG tablet Take 1 tablet by mouth Daily. 30 tablet 11   • LANTUS SOLOSTAR 100 UNIT/ML injection pen INJECT 15 UNITS UNDER THE SKIN EVERY NIGHT (Patient taking differently: INJECT 13 UNITS UNDER THE SKIN EVERY NIGHT) 15 mL 4     No current facility-administered medications for this visit.             Plan:       1. Atrial Fibrillation and Atrial Flutter  Assessment  • The patient has paroxysmal atrial fibrillation  • This is non-valvular in etiology  • The patient's CHADS2-VASc score is 2  • A [id number] score of 2 or more is considered a high risk for a thromboembolic event  • Warfarin not prescribed for medical reasons  • Rivaroxaban prescribed    Plan  • Continue in atrial fibrillation with rate control  • Continue rivaroxaban for antithrombotic therapy, bleeding issues discussed  • Continue beta blocker for rate control    Atrial fibrillation rate controlled today.  Will place 48 hour Holter monitor for evaluation of rate.  No episodes of syncope.     Follow up in office to be determined after holter  monitor    As always, it has been a pleasure to participate in this patient's care.      Sincerely,      RIVAS Rivera

## 2018-02-20 ENCOUNTER — TELEPHONE (OUTPATIENT)
Dept: PAIN MEDICINE | Facility: CLINIC | Age: 83
End: 2018-02-20

## 2018-02-20 NOTE — TELEPHONE ENCOUNTER
Patient called and LM stating that the injections he received are not working and wanted to know what the next plan is. He was last seen 12/7/2017, does he need to schedule an appointment

## 2018-02-21 ENCOUNTER — OFFICE VISIT (OUTPATIENT)
Dept: CARDIOLOGY | Facility: CLINIC | Age: 83
End: 2018-02-21

## 2018-02-21 VITALS
HEIGHT: 71 IN | BODY MASS INDEX: 27.86 KG/M2 | HEART RATE: 74 BPM | DIASTOLIC BLOOD PRESSURE: 80 MMHG | WEIGHT: 199 LBS | SYSTOLIC BLOOD PRESSURE: 130 MMHG

## 2018-02-21 DIAGNOSIS — I10 ESSENTIAL HYPERTENSION: ICD-10-CM

## 2018-02-21 DIAGNOSIS — I48.20 CHRONIC ATRIAL FIBRILLATION (HCC): Primary | ICD-10-CM

## 2018-02-21 PROCEDURE — 99213 OFFICE O/P EST LOW 20 MIN: CPT | Performed by: INTERNAL MEDICINE

## 2018-02-21 PROCEDURE — 93000 ELECTROCARDIOGRAM COMPLETE: CPT | Performed by: INTERNAL MEDICINE

## 2018-02-22 ENCOUNTER — TELEPHONE (OUTPATIENT)
Dept: PAIN MEDICINE | Facility: CLINIC | Age: 83
End: 2018-02-22

## 2018-02-22 NOTE — TELEPHONE ENCOUNTER
Pt called and stated the salve for his neck you ordered has not been helping his neck pain and the injections did not help. He wants to know what the next step or option is. Does he need an appointment to come discuss this?      ______    I called the patient back and left a message relaying that we want him to make an apt to see Dr. Jacobs in the office to discuss further options.

## 2018-02-22 NOTE — TELEPHONE ENCOUNTER
Yes, he should schedule an apt to discuss. At his last visit, he stated his neck pain was very tolerable from the injections. Maybe the did not last and need to be repeated? Will discuss at the apt.

## 2018-02-23 NOTE — TELEPHONE ENCOUNTER
Spoke to patient and he states he will check his calender and call back to schedule an appointment

## 2018-03-06 NOTE — PROGRESS NOTES
Subjective:     Encounter Date:01/08/2018      Patient ID: Home Wynn is a 86 y.o. male.    Chief Complaint:  Atrial Fibrillation   Presents for follow-up visit. Symptoms include shortness of breath and syncope. Symptoms are negative for bradycardia, chest pain, dizziness, hypertension, hypotension, pacemaker problem, palpitations, tachycardia and weakness. The symptoms have been stable. Past medical history includes atrial fibrillation.       86-year-old gentleman who presents today for reevaluation.  Patient had a syncopal episode and more a long-term monitor.  It showed a minimum heart rate of 36 and a maximum heart rate of 143.  Patient presents today for reevaluation.  He still gets short of breath when going up and down stairs as well as having significant fatigue.Since that seen the patient last symptoms really have not significantly improved.    Review of Systems   Constitution: Positive for malaise/fatigue. Negative for weakness.   Cardiovascular: Positive for syncope. Negative for chest pain and palpitations.   Respiratory: Positive for shortness of breath.    Neurological: Negative for dizziness.   All other systems reviewed and are negative.        ECG 12 Lead  Date/Time: 2/21/2018 1:18 PM  Performed by: JUVENAL MARTINEZ  Authorized by: JUVENAL MARTINEZ   Comparison: compared with previous ECG from 1/22/2018  Rhythm: atrial fibrillation  Conduction: complete LBBB               Objective:     Physical Exam   Constitutional: He is oriented to person, place, and time. He appears well-developed.   HENT:   Head: Normocephalic.   Eyes: Conjunctivae are normal.   Neck: Normal range of motion.   Cardiovascular: Normal rate and normal heart sounds.  An irregularly irregular rhythm present.   Pulmonary/Chest: He has decreased breath sounds.   Abdominal: Soft. Bowel sounds are normal.   Musculoskeletal: Normal range of motion. He exhibits no edema.   Neurological: He is alert and oriented to person,  place, and time.   Skin: Skin is warm and dry.   Psychiatric: He has a normal mood and affect. His behavior is normal.   Vitals reviewed.      Lab Review:       Assessment:          Diagnosis Plan   1. Chronic atrial fibrillation     2. Essential hypertension            Plan:       1.  Chronic atrial fibrillation.  We are pretty extensive discussion about her options.  Clearly the amiodarone is no longer working I want to discontinue it.  I placed him on Toprol 12.5 mg a day for rate control.  He presents today for reevaluation doing actually about the same.  His heart rate is doing well his blood pressure is also good.  At this point I am going to continue the same encouraged him to exercise and see what evolves.  He is tolerating his anticoagulation as well as his beta blocker.  2.  Hypertension stable  3.  We'll follow him clinically have him come back in 3-6 months.  Sooner of course if he deteriorates.        Atrial Fibrillation and Atrial Flutter  Assessment  • The patient has paroxysmal atrial fibrillation  • This is non-valvular in etiology  • The patient's CHADS2-VASc score is 2  • A [id number] score of 2 or more is considered a high risk for a thromboembolic event  • Warfarin not prescribed for medical reasons  • Rivaroxaban prescribed    Plan  • Continue in atrial fibrillation with rate control  • Continue rivaroxaban for antithrombotic therapy, bleeding issues discussed  • Add beta blocker for rate control  Total time of visit was 45 minutes.  This included reviewing his long-term monitor explaining the risks and benefits of anticoagulation and approach of stopping his medications.

## 2018-04-10 DIAGNOSIS — E11.9 CONTROLLED TYPE 2 DIABETES MELLITUS WITHOUT COMPLICATION, UNSPECIFIED LONG TERM INSULIN USE STATUS: ICD-10-CM

## 2018-04-10 DIAGNOSIS — Z00.00 ANNUAL PHYSICAL EXAM: Primary | ICD-10-CM

## 2018-04-18 LAB
ALBUMIN SERPL-MCNC: 4.2 G/DL (ref 3.5–5.2)
ALBUMIN/GLOB SERPL: 1.4 G/DL
ALP SERPL-CCNC: 60 U/L (ref 39–117)
ALT SERPL-CCNC: 8 U/L (ref 1–41)
AST SERPL-CCNC: 12 U/L (ref 1–40)
BASOPHILS # BLD AUTO: 0.02 10*3/MM3 (ref 0–0.2)
BASOPHILS NFR BLD AUTO: 0.4 % (ref 0–1.5)
BILIRUB SERPL-MCNC: 0.6 MG/DL (ref 0.1–1.2)
BUN SERPL-MCNC: 21 MG/DL (ref 8–23)
BUN/CREAT SERPL: 16.7 (ref 7–25)
CALCIUM SERPL-MCNC: 9.2 MG/DL (ref 8.6–10.5)
CHLORIDE SERPL-SCNC: 100 MMOL/L (ref 98–107)
CHOLEST SERPL-MCNC: 174 MG/DL (ref 0–200)
CO2 SERPL-SCNC: 28.8 MMOL/L (ref 22–29)
CREAT SERPL-MCNC: 1.26 MG/DL (ref 0.76–1.27)
EOSINOPHIL # BLD AUTO: 0.06 10*3/MM3 (ref 0–0.7)
EOSINOPHIL NFR BLD AUTO: 1.2 % (ref 0.3–6.2)
ERYTHROCYTE [DISTWIDTH] IN BLOOD BY AUTOMATED COUNT: 15.7 % (ref 11.5–14.5)
GFR SERPLBLD CREATININE-BSD FMLA CKD-EPI: 54 ML/MIN/1.73
GFR SERPLBLD CREATININE-BSD FMLA CKD-EPI: 66 ML/MIN/1.73
GLOBULIN SER CALC-MCNC: 2.9 GM/DL
GLUCOSE SERPL-MCNC: 191 MG/DL (ref 65–99)
HBA1C MFR BLD: 8.8 % (ref 4.8–5.6)
HCT VFR BLD AUTO: 44.9 % (ref 40.4–52.2)
HDLC SERPL-MCNC: 37 MG/DL (ref 40–60)
HGB BLD-MCNC: 14.1 G/DL (ref 13.7–17.6)
IMM GRANULOCYTES # BLD: 0.02 10*3/MM3 (ref 0–0.03)
IMM GRANULOCYTES NFR BLD: 0.4 % (ref 0–0.5)
LDLC SERPL CALC-MCNC: 125 MG/DL (ref 0–100)
LDLC/HDLC SERPL: 3.37 {RATIO}
LYMPHOCYTES # BLD AUTO: 1.21 10*3/MM3 (ref 0.9–4.8)
LYMPHOCYTES NFR BLD AUTO: 24.7 % (ref 19.6–45.3)
MCH RBC QN AUTO: 31.1 PG (ref 27–32.7)
MCHC RBC AUTO-ENTMCNC: 31.4 G/DL (ref 32.6–36.4)
MCV RBC AUTO: 98.9 FL (ref 79.8–96.2)
MONOCYTES # BLD AUTO: 0.5 10*3/MM3 (ref 0.2–1.2)
MONOCYTES NFR BLD AUTO: 10.2 % (ref 5–12)
NEUTROPHILS # BLD AUTO: 3.08 10*3/MM3 (ref 1.9–8.1)
NEUTROPHILS NFR BLD AUTO: 63.1 % (ref 42.7–76)
PLATELET # BLD AUTO: 161 10*3/MM3 (ref 140–500)
POTASSIUM SERPL-SCNC: 4.7 MMOL/L (ref 3.5–5.2)
PROT SERPL-MCNC: 7.1 G/DL (ref 6–8.5)
RBC # BLD AUTO: 4.54 10*6/MM3 (ref 4.6–6)
SODIUM SERPL-SCNC: 139 MMOL/L (ref 136–145)
TRIGL SERPL-MCNC: 62 MG/DL (ref 0–150)
VLDLC SERPL CALC-MCNC: 12.4 MG/DL (ref 5–40)
WBC # BLD AUTO: 4.89 10*3/MM3 (ref 4.5–10.7)

## 2018-04-25 ENCOUNTER — OFFICE VISIT (OUTPATIENT)
Dept: FAMILY MEDICINE CLINIC | Facility: CLINIC | Age: 83
End: 2018-04-25

## 2018-04-25 VITALS
BODY MASS INDEX: 28.67 KG/M2 | HEIGHT: 71 IN | DIASTOLIC BLOOD PRESSURE: 74 MMHG | TEMPERATURE: 97.7 F | WEIGHT: 204.8 LBS | OXYGEN SATURATION: 94 % | HEART RATE: 76 BPM | SYSTOLIC BLOOD PRESSURE: 128 MMHG

## 2018-04-25 DIAGNOSIS — M47.812 CERVICAL SPONDYLOSIS WITHOUT MYELOPATHY: Primary | ICD-10-CM

## 2018-04-25 PROCEDURE — 99213 OFFICE O/P EST LOW 20 MIN: CPT | Performed by: INTERNAL MEDICINE

## 2018-04-25 NOTE — PROGRESS NOTES
"Subjective   Patient ID: Home Wynn is a 86 y.o. male presents with   Chief Complaint   Patient presents with   • Neck Pain       HPI - this patient has chronic neck pain.This is been going on for a few years we did an MRI back last May shows cervical spinal stenosis without myelopathy.  He's been to pain management injections nothing works.    Assessment plan    Cervical spinal stenosis-appointment with neurosurgery for further evaluation    No Known Allergies    The following portions of the patient's history were reviewed and updated as appropriate: allergies, current medications, past family history, past medical history, past social history, past surgical history and problem list.      Review of Systems   Constitutional: Negative.    Respiratory: Negative.    Cardiovascular: Negative.    Musculoskeletal: Positive for neck pain.       Objective     Vitals:    04/25/18 0904   BP: 128/74   BP Location: Left arm   Patient Position: Sitting   Cuff Size: Adult   Pulse: 76   Temp: 97.7 °F (36.5 °C)   TempSrc: Oral   SpO2: 94%   Weight: 92.9 kg (204 lb 12.8 oz)   Height: 180.3 cm (70.98\")         Physical Exam   Constitutional: He is oriented to person, place, and time. He appears well-developed and well-nourished.   Cardiovascular: Normal rate, regular rhythm and normal heart sounds.    Pulmonary/Chest: Effort normal and breath sounds normal.   Neurological: He is alert and oriented to person, place, and time.   Nursing note and vitals reviewed.        Home was seen today for neck pain.    Diagnoses and all orders for this visit:    Cervical spondylosis without myelopathy  -     Ambulatory Referral to Neurosurgery        Call or return to clinic prn if these symptoms worsen or fail to improve as anticipated.  "

## 2018-05-29 ENCOUNTER — OFFICE VISIT (OUTPATIENT)
Dept: NEUROSURGERY | Facility: CLINIC | Age: 83
End: 2018-05-29

## 2018-05-29 VITALS
BODY MASS INDEX: 28.39 KG/M2 | HEART RATE: 65 BPM | HEIGHT: 71 IN | WEIGHT: 202.8 LBS | DIASTOLIC BLOOD PRESSURE: 74 MMHG | SYSTOLIC BLOOD PRESSURE: 156 MMHG

## 2018-05-29 DIAGNOSIS — M47.812 CERVICAL SPONDYLOSIS WITHOUT MYELOPATHY: Primary | ICD-10-CM

## 2018-05-29 PROCEDURE — 99203 OFFICE O/P NEW LOW 30 MIN: CPT | Performed by: PHYSICIAN ASSISTANT

## 2018-05-29 NOTE — PROGRESS NOTES
Subjective   Patient ID: Home Wynn is a 86 y.o. male is being seen for consultation today at the request of Trevor Cai MD  For neck pain. He denies any recent cause or injury.  He states the stiffness over the past 2 years.  He has tried PT, dry needling chiropractor, massages, trigger point injections and uses tens unit with short relief.  He denies any arm pain n/t or weakness.  Mr. Wynn is not taking any pain medication at this time.     Neck Pain    This is a recurrent problem. The current episode started more than 1 year ago (2 years ). The problem occurs constantly. The problem has been gradually worsening. The pain is associated with nothing. The quality of the pain is described as stabbing. The pain is at a severity of 5/10. The pain is moderate. The symptoms are aggravated by bending. Stiffness is present all day. Pertinent negatives include no headaches or weakness. He has tried heat, home exercises, ice and chiropractic manipulation (PT, trigger point injections, dry needling, tens unit ) for the symptoms. The treatment provided mild relief.       The following portions of the patient's history were reviewed and updated as appropriate: allergies, current medications, past family history, past medical history, past social history, past surgical history and problem list.    Review of Systems   Constitutional: Positive for activity change.   HENT: Positive for drooling and sneezing.    Respiratory: Positive for shortness of breath.    Endocrine: Positive for polyuria.   Musculoskeletal: Positive for neck pain and neck stiffness.   Neurological: Negative for weakness and headaches.   Psychiatric/Behavioral: Positive for sleep disturbance.   All other systems reviewed and are negative.      Objective   Physical Exam   Constitutional: He is oriented to person, place, and time. He appears well-developed and well-nourished.   HENT:   Head: Normocephalic and atraumatic.   Right Ear: External ear normal.    Left Ear: External ear normal.   Eyes: Conjunctivae and EOM are normal. Pupils are equal, round, and reactive to light. Right eye exhibits no discharge. Left eye exhibits no discharge.   Neck: Neck supple. No tracheal deviation present.   Decreased cervical ROM   Pulmonary/Chest: Effort normal. No stridor. No respiratory distress.   Abdominal: Soft.   Musculoskeletal: Normal range of motion. He exhibits no edema, tenderness or deformity.   Neurological: He is alert and oriented to person, place, and time. He has normal strength and normal reflexes. He displays no atrophy, no tremor and normal reflexes. No cranial nerve deficit or sensory deficit. He exhibits normal muscle tone. He displays a negative Romberg sign. He displays no seizure activity. Coordination and gait normal.   No long tract signs   Skin: Skin is warm and dry.   Psychiatric: He has a normal mood and affect. His behavior is normal. Judgment and thought content normal.   Nursing note and vitals reviewed.    Neurologic Exam     Mental Status   Oriented to person, place, and time.     Cranial Nerves     CN III, IV, VI   Pupils are equal, round, and reactive to light.  Extraocular motions are normal.     Motor Exam     Strength   Strength 5/5 throughout.       Assessment/Plan   Independent Review of Radiographic Studies:    I did review the cervical MRI from Nanette 15, 2017.  It does show moderate stenosis at C3 C4 C4 C5 C5 C6 and C6-C7.  There is a mild loss of height at T3 but no edema.  Medical Decision Making:    Mr. Wynn was referred to us by Dr. Cai for chronic neck pain that is been present for several years.  The pain is localized to the neck itself.  It does not radiate down the arms nor is it associated with any numbness or tingling or weakness or gait issues or incontinence.  He has had physical therapy as well as chiropractic care and injections with some relief.  The injections did offer moderate relief but only for a short period of  time.  He is established with Dr. Jacobs and according to her note she did discuss the possibility of radiofrequency ablation as well.    As a deep soreness/stiffness.  It worsens with any prolonged activity and improves with rest.  He denies any night pain and is able to sleep without difficulty.    I did review the MRI with him.  He does have some moderate stenosis but no radiculopathy or weakness and no indication for any surgery.  I recommended that he continue to work with Dr. Jacobs but we would be more than happy to evaluate him in the future if he does experience some radicular pain or weakness or other concerns.  Home was seen today for neck pain.    Diagnoses and all orders for this visit:    Cervical spondylosis without myelopathy      Return if symptoms worsen or fail to improve.

## 2018-06-15 ENCOUNTER — OFFICE VISIT (OUTPATIENT)
Dept: PAIN MEDICINE | Facility: CLINIC | Age: 83
End: 2018-06-15

## 2018-06-15 VITALS
TEMPERATURE: 96.7 F | HEART RATE: 64 BPM | HEIGHT: 71 IN | RESPIRATION RATE: 16 BRPM | SYSTOLIC BLOOD PRESSURE: 160 MMHG | OXYGEN SATURATION: 92 % | BODY MASS INDEX: 28.28 KG/M2 | DIASTOLIC BLOOD PRESSURE: 80 MMHG | WEIGHT: 202 LBS

## 2018-06-15 DIAGNOSIS — M47.812 CERVICAL SPONDYLOSIS WITHOUT MYELOPATHY: ICD-10-CM

## 2018-06-15 DIAGNOSIS — M48.02 CERVICAL SPINAL STENOSIS: ICD-10-CM

## 2018-06-15 DIAGNOSIS — M54.2 NECK PAIN: Primary | ICD-10-CM

## 2018-06-15 PROCEDURE — 99214 OFFICE O/P EST MOD 30 MIN: CPT | Performed by: PAIN MEDICINE

## 2018-06-15 RX ORDER — TRAMADOL HYDROCHLORIDE 50 MG/1
50-100 TABLET ORAL EVERY 6 HOURS PRN
Qty: 60 TABLET | Refills: 1 | Status: ON HOLD | OUTPATIENT
Start: 2018-06-15 | End: 2018-12-27

## 2018-06-15 NOTE — PATIENT INSTRUCTIONS
"You have already performed the medial branch blockade. Next you would be performing the radiofrequency ablation (RF) therapy. Information below:    Education about Medial Branch Blockade and RF Therapy:     This medial branch blockade (MBB) suggested is intended for diagnostic purposes, with the intent of offering the patient Radiofrequency thermal rhizotomy (RF) if the MBB is diagnostically effective. The diagnostic blockade is necessary to determine the likelihood that RF therapy could be efficacious in providing long term relief to the patient.     Medial branches are sensory nerve branches that connect to a facet joint and transmit sensations & pain signals from that joint. Facet is a term for the type of joints found in the spine. Medial branches are the nerves that go to a facet, and therefore are also sometimes called \"facet joint nerves\" (FJNs).      In a medial branch blockade procedure, xray fluoroscopy is used to verify the locations of the outside of the joint lines which are being targeted. Under xray guidance, needles are placed to these areas. Contrast dye is injected to confirm proper placement, with dye flowing over the joint area, and to ensure that the dye does not flow into unintended areas such as a vein. When this is confirmed, local anesthetic is injected to block the medial branch at that joint level.      If MBBs are diagnostically successful in blocking pain, then the patient is most likely a great candidate for Radiofrequency of those facet joint nerves. In the RF procedure, needles are placed to the joint lines in the same fashion, and after testing, the needle tips are heated to thermally treat the nerves, blocking the nerves by in essence damaging the nerves with the heat treatment.       Medically, a successful RF procedure should provide a patient with 50% pain relief or more for at least 6 months. Clinical experience suggests that successful patients receive relief more in the range " of 12 months on average. We also discussed that a fortunate minority of patients receive therapeutic success from the MBB, and may not require RF ablation. If a patient receives more than 8 weeks of relief from MBB, then occasional repeat MBB for therapeutic purposes is a very reasonable alternative therapy. This course of therapy is consistent with our LCDs according to our CMS  in the area, and therefore other insurance providers should follow accordingly. We will monitor our patients to screen for these therapeutic responders and will offer RF therapy only when necessary.         We discussed that MBB & RF are not without risks. Guidelines regarding anticoagulant use & neuraxial procedures will be respected. Patients that are ill or otherwise may be at risk for sepsis will not have their spines accessed by neuraxial injections of any type. This patient will not be offered these therapies if there is an increased risk. We discussed that there is a risk of postprocedural pain and also a risk of worsening of clinical picture with these procedures as with any neuraxial procedure.     Radiofrequency Lesioning  Radiofrequency lesioning is a procedure that is performed to relieve pain. The procedure is often used for back, neck, or arm pain. Radiofrequency lesioning involves the use of a machine that creates radio waves to make heat. During the procedure, the heat is applied to the nerve that carries the pain signal. The heat damages the nerve and interferes with the pain signal. Pain relief usually starts about 2 weeks after the procedure and lasts for 6 months to 1 year.  Tell a health care provider about:  · Any allergies you have.  · All medicines you are taking, including vitamins, herbs, eye drops, creams, and over-the-counter medicines.  · Any problems you or family members have had with anesthetic medicines.  · Any blood disorders you have.  · Any surgeries you have had.  · Any medical conditions  you have.  · Whether you are pregnant or may be pregnant.  What are the risks?  Generally, this is a safe procedure. However, problems may occur, including:  · Pain or soreness at the injection site.  · Infection at the injection site.  · Damage to nerves or blood vessels.    What happens before the procedure?  · Ask your health care provider about:  ? Changing or stopping your regular medicines. This is especially important if you are taking diabetes medicines or blood thinners.  ? Taking medicines such as aspirin and ibuprofen. These medicines can thin your blood. Do not take these medicines before your procedure if your health care provider instructs you not to.  · Follow instructions from your health care provider about eating or drinking restrictions.  · Plan to have someone take you home after the procedure.  · If you go home right after the procedure, plan to have someone with you for 24 hours.  What happens during the procedure?  · You will be given one or more of the following:  ? A medicine to help you relax (sedative).  ? A medicine to numb the area (local anesthetic).  · You will be awake during the procedure. You will need to be able to talk with the health care provider during the procedure.  · With the help of a type of X-ray (fluoroscopy), the health care provider will insert a radiofrequency needle into the area to be treated.  · Next, a wire that carries the radio waves (electrode) will be put through the radiofrequency needle. An electrical pulse will be sent through the electrode to verify the correct nerve. You will feel a tingling sensation, and you may have muscle twitching.  · Then, the tissue that is around the needle tip will be heated by an electric current that is passed using the radiofrequency machine. This will numb the nerves.  · A bandage (dressing) will be put on the insertion area after the procedure is done.  The procedure may vary among health care providers and hospitals.  What  happens after the procedure?  · Your blood pressure, heart rate, breathing rate, and blood oxygen level will be monitored often until the medicines you were given have worn off.  · Return to your normal activities as directed by your health care provider.  This information is not intended to replace advice given to you by your health care provider. Make sure you discuss any questions you have with your health care provider.  Document Released: 08/15/2012 Document Revised: 05/25/2017 Document Reviewed: 01/25/2016  [employer] Patient Education © 2018 [employer]

## 2018-06-15 NOTE — PROGRESS NOTES
CHIEF COMPLAINT: Neck Pain    HPI  Home Wynn is a 86 y.o. male.  He is here to follow up for Neck Pain    Home Wynn is a 86 y.o. male  who presents to the office for follow-up.   Since last visit their pain has worsened. Pt states neck pain hasn't gotten better. He has seen neurosurgeon since he has been here-Moriah HATHAWAY who stated no surgical options. He wants to discuss any other options.   The patient states their pain is a 7 on a scale of 1-10.  The patient describes this pain as constant dull and ache with intermittent sharp pains.  The pain is located in right neck and does not radiate. This painful problem is aggravated by turning head and is alleviated by nothing.    Past pain medications:   Tylenol  Advil - no help  Diclofenac - no help      Current pain medications:   Tylenol prn- minimal help      Past therapies:  Physical Therapy: yes- better ROM but pain is the same  Chiropractor: yes - no improvement  Massage Therapy: no  TENS: yes  Neck or back surgery: no  Past pain management: no     Previous Injection: Right C3-6 CMBB on 11/1/17 - 11/22/2017  Effect of Injection (%): 50%  Length of Relief: 2-3 days     Previous Injection: TPI into right trapezius, rhomboid splenius capitus - 10/20/2017  Effect of Injection (%): minimal relief    PEG Assessment   What number best describes your pain on average in the past week? 7  What number best describes how, during the past week, pain has interfered with your enjoyment of life? 7  What number best describes how, during the past week, pain has interfered with your general activity? 7      Current Outpatient Prescriptions:   •  glimepiride (AMARYL) 2 MG tablet, TAKE ONE TABLET BY MOUTH EVERY MORNING BEFORE BREAKFAST, Disp: 90 tablet, Rfl: 1  •  Insulin Pen Needle (PEN NEEDLES) 31G X 6 MM misc, USE DAILY AS DIRECTED, Disp: 100 each, Rfl: 2  •  LANTUS SOLOSTAR 100 UNIT/ML injection pen, INJECT 15 UNITS UNDER THE SKIN EVERY NIGHT (Patient taking  differently: INJECT 13 UNITS UNDER THE SKIN EVERY NIGHT), Disp: 15 mL, Rfl: 4  •  metoprolol succinate XL (TOPROL-XL) 25 MG 24 hr tablet, Take 0.5 tablets by mouth Daily., Disp: 30 tablet, Rfl: 11  •  Multiple Vitamins-Minerals (PRESERVISION AREDS) capsule, Take 2 capsules by mouth Daily., Disp: , Rfl:   •  OXYGEN-HELIUM IN, Oxygen; Patient Sig: Oxygen he uses 2 LPM at bedtime.; 0; 21-Mar-2014; Active, Disp: , Rfl:   •  pravastatin (PRAVACHOL) 40 MG tablet, Take 1 tablet by mouth Daily., Disp: 90 tablet, Rfl: 1  •  rivaroxaban (XARELTO) 20 MG tablet, Take 1 tablet by mouth Daily., Disp: 30 tablet, Rfl: 11  •  traMADol (ULTRAM) 50 MG tablet, Take 1-2 tablets by mouth Every 6 (Six) Hours As Needed for Moderate Pain ., Disp: 60 tablet, Rfl: 1    IMAGING  6/05/17 MRI cervical spine:  IMPRESSION:  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 Mild to moderate canal stenosis is present at C6-7 and C3-4. Multilevel neuroforaminal compromise is noted. The appearance is similar to the examination of 08/11/2014. There is no evidence of marrow edema to suggest a cervical fracture. There is mild loss of vertical height without marrow edema involving the T3 vertebral body, new versus the prior MRI examination suggesting a mild compression fracture/compression deformity. Further evaluation could be performed with a cervical myelogram as indicated.    Imaging last reviewed: 06/15/18     PFSH:  The following portions of the patient's history were reviewed and updated as appropriate: problem list, past medical history, past surgery history, social history, family history, medications, and allergies    Review of Systems   Constitutional: Positive for fatigue. Negative for chills and fever.   HENT: Negative for  "congestion.    Eyes: Negative for visual disturbance.   Respiratory: Negative for cough, shortness of breath and wheezing.    Cardiovascular: Negative.    Gastrointestinal: Negative for constipation and diarrhea.   Genitourinary: Negative for difficulty urinating.   Musculoskeletal: Positive for neck pain. Negative for back pain.   Neurological: Positive for weakness and numbness (big toe on right foot). Negative for dizziness, light-headedness and headaches.   Psychiatric/Behavioral: Positive for sleep disturbance. Negative for suicidal ideas. The patient is not nervous/anxious.    All other systems reviewed and are negative.      Vitals:    06/15/18 0939   BP: 160/80   Pulse: 64   Resp: 16   Temp: 96.7 °F (35.9 °C)   SpO2: 92%   Weight: 91.6 kg (202 lb)   Height: 180.3 cm (70.98\")   PainSc:   7   PainLoc: Neck       Physical Exam   Constitutional: He appears well-developed and well-nourished. No distress.   HENT:   Head: Normocephalic and atraumatic.   Nose: Nose normal.   Mouth/Throat: Oropharynx is clear and moist.   Eyes: Conjunctivae and EOM are normal.   Neck: Normal range of motion. Neck supple.   Pulmonary/Chest: Effort normal. No stridor. No respiratory distress.   Musculoskeletal:        Cervical back: He exhibits decreased range of motion (limited by pain), tenderness and pain. He exhibits no deformity.        Back:         Right lower leg: He exhibits no tenderness, no swelling and no edema.   Neurological: He is alert. He has normal strength. No cranial nerve deficit or sensory deficit.   Skin: Skin is warm and dry. No rash noted. He is not diaphoretic.   Psychiatric: He has a normal mood and affect. His speech is normal and behavior is normal.   Nursing note and vitals reviewed.    Ortho Exam  Neurologic Exam     Mental Status   Speech: speech is normal     Cranial Nerves     CN III, IV, VI   Extraocular motions are normal.     Motor Exam     Strength   Strength 5/5 throughout.       Lab Results "   Component Value Date    POCMETH Negative 07/11/2017    POCAMPHET Negative 07/11/2017    POCBARBITUR Negative 07/11/2017    POCBENZO Negative 07/11/2017    POCCOCAINE Negative 07/11/2017    POCMETHADO Negative 07/11/2017    POCOPIATES Negative 07/11/2017    POCOXYCODO Negative 07/11/2017    POCPHENCYC Negative 07/11/2017    POCPROPOXY Negative 07/11/2017    POCTHC Negative 07/11/2017    POCTRICYC Negative 07/11/2017     Last UDS results reviewed: 06/15/18   Last UDS: 7/11/2017  Comments: Consistent     Date of last MALICK reviewed : 06/15/18   Comments: Sera Bhat was seen today for neck pain.    Diagnoses and all orders for this visit:    Neck pain    Cervical spinal stenosis    Cervical spondylosis without myelopathy    Other orders  -     traMADol (ULTRAM) 50 MG tablet; Take 1-2 tablets by mouth Every 6 (Six) Hours As Needed for Moderate Pain .      Requested Prescriptions     Signed Prescriptions Disp Refills   • traMADol (ULTRAM) 50 MG tablet 60 tablet 1     Sig: Take 1-2 tablets by mouth Every 6 (Six) Hours As Needed for Moderate Pain .     - will place referral for rheumatologist - patient has request of who he wants to see, he will call back with  Name and I will place.   - He does not think any steroid injection has helped with his neck pain. I explained I feel the injections may have helped him but only temporarily and his pain has returned. Not sustained pain relief. He does not want to repeat any at this time.   - RFA of CMBB would only help as much as CMBB helped him. He thinks this was minimal so the RFA would not give any more relief, just longer lasting. He will consider. Handouts given.  - no surgical options for neck.   - will manage with medications. Start tramadol 50 mg at 1 tablet q 6 hrs and can increase to 2 tablets q 6 hrs prn pain. Start with lowest dose and work up. Discussed with the patient regarding long-term side effects of opioids including but not limited to  dependence, addiction, sedation, respiratory depression, opioid induced hormonal suppression, hyperalgesia, and elevated risk of myocardial infarction.   - He is going to try can come back if he wants to continue.   - This was a 25 minute face to face visit with 15 minutes spent counseling on medication, injections, MBB vs RFA, referrals and treatment plan.      Wt Readings from Last 3 Encounters:   06/15/18 91.6 kg (202 lb)   05/29/18 92 kg (202 lb 12.8 oz)   04/25/18 92.9 kg (204 lb 12.8 oz)     Body mass index is 28.19 kg/m². Patient counseled on the importance of weight loss to help with overall health and pain control. Patient instructed to attempt weight loss.   Plan: Calorie counting  increase physical activity and reduce screen time    Follow-up as needed for pain     Aura Jacobs MD  Pain Management     MALICK REPORT  As part of the patient's treatment plan, I am prescribing controlled substances. The patient has been made aware of appropriate use of such medications, including potential risk of somnolence, limited ability to drive and/or work safely, and the potential for dependence or overdose. It has also bee made clear that these medications are for use by this patient only, without concomitant use of alcohol or other substances unless prescribed.   The patient is aware that inappropriate use will results in cessation of prescribing such medications.  MALICK report has been reviewed and scanned into the patient's chart.  History and physical exam exhibit continued safe and appropriate use of controlled substances.

## 2018-06-19 RX ORDER — GLIMEPIRIDE 2 MG/1
2 TABLET ORAL
Qty: 90 TABLET | Refills: 0 | Status: SHIPPED | OUTPATIENT
Start: 2018-06-19 | End: 2018-09-20 | Stop reason: SDUPTHER

## 2018-06-25 DIAGNOSIS — R52 BODY ACHES: Primary | ICD-10-CM

## 2018-06-25 DIAGNOSIS — M17.0 PRIMARY OSTEOARTHRITIS OF BOTH KNEES: ICD-10-CM

## 2018-06-25 DIAGNOSIS — M19.90 ARTHRITIS: ICD-10-CM

## 2018-08-15 ENCOUNTER — OFFICE VISIT (OUTPATIENT)
Dept: FAMILY MEDICINE CLINIC | Facility: CLINIC | Age: 83
End: 2018-08-15

## 2018-08-15 VITALS
HEART RATE: 90 BPM | WEIGHT: 204.7 LBS | TEMPERATURE: 98 F | BODY MASS INDEX: 28.66 KG/M2 | OXYGEN SATURATION: 95 % | SYSTOLIC BLOOD PRESSURE: 140 MMHG | HEIGHT: 71 IN | DIASTOLIC BLOOD PRESSURE: 80 MMHG

## 2018-08-15 DIAGNOSIS — M48.02 CERVICAL SPINAL STENOSIS: ICD-10-CM

## 2018-08-15 DIAGNOSIS — Z79.4 TYPE 2 DIABETES MELLITUS WITH HYPERGLYCEMIA, WITH LONG-TERM CURRENT USE OF INSULIN (HCC): Primary | ICD-10-CM

## 2018-08-15 DIAGNOSIS — I10 ESSENTIAL HYPERTENSION: ICD-10-CM

## 2018-08-15 DIAGNOSIS — E11.65 TYPE 2 DIABETES MELLITUS WITH HYPERGLYCEMIA, WITH LONG-TERM CURRENT USE OF INSULIN (HCC): Primary | ICD-10-CM

## 2018-08-15 PROCEDURE — 99214 OFFICE O/P EST MOD 30 MIN: CPT | Performed by: NURSE PRACTITIONER

## 2018-08-15 NOTE — PROGRESS NOTES
Subjective   Home Wynn is a 86 y.o. male presents to [facility] care. Previously followed by Dr. Cai.   Diabetes, taking lantus 13 units at bedtime, blood sugar is typically < 200. Eating well and tolerating diet. Neuropathy reported in right great toe. Tolerating medication.     Chronic neck pain, has been evaluated by neurosurgery, PT, chiropractor and pain management. Has received multiple injections. Tried topical compounds etc. Nothing is relieving his pain, has discussed radioablation with pain management and is considering that as an option.     Uses oxygen at home, at night. Doing well. Denies shortness of breath, cough or wheezing.    Diabetes   He presents for his follow-up diabetic visit. He has type 2 diabetes mellitus. There are no hypoglycemic associated symptoms. Pertinent negatives for hypoglycemia include no headaches or sweats. Associated symptoms include foot paresthesias. Pertinent negatives for diabetes include no blurred vision and no chest pain. There are no hypoglycemic complications. Symptoms are stable. Diabetic complications include peripheral neuropathy. Risk factors for coronary artery disease include diabetes mellitus, male sex, hypertension and dyslipidemia. Current diabetic treatment includes insulin injections and oral agent (monotherapy). He is compliant with treatment most of the time. There is no change in his home blood glucose trend.   Hypertension   This is a chronic problem. The current episode started more than 1 year ago. The problem is unchanged. The problem is controlled. Associated symptoms include neck pain (has f/u neurosurgery, PT, chiropractor, pain management). Pertinent negatives include no anxiety, blurred vision, chest pain, headaches, malaise/fatigue, orthopnea, palpitations, peripheral edema, PND, shortness of breath or sweats. There are no associated agents to hypertension. Risk factors for coronary artery disease include male gender, dyslipidemia and  diabetes mellitus. Current antihypertension treatment includes beta blockers. The current treatment provides moderate improvement. There are no compliance problems.         The following portions of the patient's history were reviewed and updated as appropriate: allergies, current medications, past family history, past medical history, past social history, past surgical history and problem list.    Review of Systems   Constitutional: Negative.  Negative for malaise/fatigue.   HENT: Negative.    Eyes: Negative.  Negative for blurred vision.   Respiratory: Negative.  Negative for shortness of breath.    Cardiovascular: Negative.  Negative for chest pain, palpitations, orthopnea and PND.   Gastrointestinal: Negative.    Endocrine: Negative.    Genitourinary: Negative.    Musculoskeletal: Positive for arthralgias, back pain (resolved) and neck pain (has f/u neurosurgery, PT, chiropractor, pain management).   Allergic/Immunologic: Negative.    Neurological: Negative.  Negative for headaches.   Hematological: Negative.    Psychiatric/Behavioral: Negative.        Objective   Physical Exam   Constitutional: He is oriented to person, place, and time. He appears well-developed and well-nourished.   HENT:   Head: Normocephalic and atraumatic.   Right Ear: Tympanic membrane and ear canal normal.   Left Ear: Tympanic membrane and ear canal normal.   Nose: Nose normal.   Mouth/Throat: Uvula is midline, oropharynx is clear and moist and mucous membranes are normal.   Neck: Decreased range of motion present.   Cardiovascular: Normal rate, regular rhythm and normal heart sounds.  Exam reveals no gallop and no friction rub.    No murmur heard.  Pulmonary/Chest: Effort normal. No respiratory distress. He has decreased breath sounds. He has no wheezes. He has no rhonchi. He has no rales.   Neurological: He is alert and oriented to person, place, and time.   Skin: Skin is warm and dry.   Psychiatric: He has a normal mood and affect.        Assessment/Plan   Home was seen today for hypertension.    Diagnoses and all orders for this visit:    Type 2 diabetes mellitus with hyperglycemia, with long-term current use of insulin (CMS/[facility])  -     Hemoglobin A1c  -     Basic metabolic panel    Essential hypertension    Cervical spinal stenosis    follow up with pain management, continue icy hot or biofreeze as needed.  Continue current treatment for diabetes and hypertension, will check labs today.  Follow up with Dr. Mahoney in six months.  Follow up sooner as needed

## 2018-08-15 NOTE — PATIENT INSTRUCTIONS
Repeat A1C and BMP today.  Continue current medications.  Follow up with pain management for further treatment of neck pain.  Follow up with Dr. Mahoney as scheduled.

## 2018-08-16 LAB
BUN SERPL-MCNC: 18 MG/DL (ref 8–23)
BUN/CREAT SERPL: 14.3 (ref 7–25)
CALCIUM SERPL-MCNC: 9.1 MG/DL (ref 8.6–10.5)
CHLORIDE SERPL-SCNC: 98 MMOL/L (ref 98–107)
CO2 SERPL-SCNC: 29.4 MMOL/L (ref 22–29)
CREAT SERPL-MCNC: 1.26 MG/DL (ref 0.76–1.27)
GLUCOSE SERPL-MCNC: 277 MG/DL (ref 65–99)
HBA1C MFR BLD: 9.6 % (ref 4.8–5.6)
POTASSIUM SERPL-SCNC: 4.8 MMOL/L (ref 3.5–5.2)
SODIUM SERPL-SCNC: 140 MMOL/L (ref 136–145)

## 2018-09-20 RX ORDER — GLIMEPIRIDE 2 MG/1
TABLET ORAL
Qty: 90 TABLET | Refills: 0 | Status: SHIPPED | OUTPATIENT
Start: 2018-09-20 | End: 2018-12-07 | Stop reason: SDUPTHER

## 2018-09-21 RX ORDER — PRAVASTATIN SODIUM 40 MG
40 TABLET ORAL DAILY
Qty: 90 TABLET | Refills: 1 | Status: SHIPPED | OUTPATIENT
Start: 2018-09-21 | End: 2018-09-26 | Stop reason: SDUPTHER

## 2018-09-26 RX ORDER — PRAVASTATIN SODIUM 40 MG
40 TABLET ORAL DAILY
Qty: 90 TABLET | Refills: 1 | Status: SHIPPED | OUTPATIENT
Start: 2018-09-26 | End: 2019-02-27 | Stop reason: ALTCHOICE

## 2018-10-15 RX ORDER — PEN NEEDLE, DIABETIC 31 G X1/4"
NEEDLE, DISPOSABLE MISCELLANEOUS
Qty: 100 EACH | Refills: 2 | Status: SHIPPED | OUTPATIENT
Start: 2018-10-15 | End: 2019-08-15 | Stop reason: SDUPTHER

## 2018-10-30 ENCOUNTER — TELEPHONE (OUTPATIENT)
Dept: CARDIOLOGY | Facility: CLINIC | Age: 83
End: 2018-10-30

## 2018-10-30 NOTE — TELEPHONE ENCOUNTER
Pt called and said he has spoke to you in regards to his Rivaroxaban and how much it costs. After he called [facility] called and said they just need a little more info about pt's medication. So it sound like you are working on a PA for this. [facility] said you can call them at [phone].....Lydia

## 2018-12-10 RX ORDER — GLIMEPIRIDE 2 MG/1
TABLET ORAL
Qty: 90 TABLET | Refills: 1 | Status: SHIPPED | OUTPATIENT
Start: 2018-12-10 | End: 2019-02-04

## 2018-12-26 ENCOUNTER — APPOINTMENT (OUTPATIENT)
Dept: GENERAL RADIOLOGY | Facility: HOSPITAL | Age: 83
End: 2018-12-26

## 2018-12-26 ENCOUNTER — HOSPITAL ENCOUNTER (INPATIENT)
Facility: HOSPITAL | Age: 83
LOS: 3 days | Discharge: HOME | End: 2018-12-30
Attending: EMERGENCY MEDICINE | Admitting: INTERNAL MEDICINE

## 2018-12-26 DIAGNOSIS — K85.90 ACUTE PANCREATITIS, UNSPECIFIED COMPLICATION STATUS, UNSPECIFIED PANCREATITIS TYPE: Primary | ICD-10-CM

## 2018-12-26 DIAGNOSIS — G47.34 SLEEP RELATED HYPOXIA: ICD-10-CM

## 2018-12-26 LAB
ALBUMIN SERPL-MCNC: 3.6 G/DL (ref 3.5–5.2)
ALBUMIN/GLOB SERPL: 1.2 G/DL
ALP SERPL-CCNC: 190 U/L (ref 39–117)
ALT SERPL W P-5'-P-CCNC: 213 U/L (ref 1–41)
ANION GAP SERPL CALCULATED.3IONS-SCNC: 10.7 MMOL/L
AST SERPL-CCNC: 397 U/L (ref 1–40)
BACTERIA UR QL AUTO: ABNORMAL /HPF
BASOPHILS # BLD AUTO: 0.01 10*3/MM3 (ref 0–0.2)
BASOPHILS NFR BLD AUTO: 0.1 % (ref 0–1.5)
BILIRUB SERPL-MCNC: 2.8 MG/DL (ref 0.1–1.2)
BILIRUB UR QL STRIP: NEGATIVE
BUN BLD-MCNC: 20 MG/DL (ref 8–23)
BUN/CREAT SERPL: 15.5 (ref 7–25)
CALCIUM SPEC-SCNC: 9.4 MG/DL (ref 8.6–10.5)
CHLORIDE SERPL-SCNC: 101 MMOL/L (ref 98–107)
CLARITY UR: CLEAR
CO2 SERPL-SCNC: 26.3 MMOL/L (ref 22–29)
COLOR UR: ABNORMAL
CREAT BLD-MCNC: 1.29 MG/DL (ref 0.76–1.27)
D-LACTATE SERPL-SCNC: 1.8 MMOL/L (ref 0.5–2)
DEPRECATED RDW RBC AUTO: 55.3 FL (ref 37–54)
EOSINOPHIL # BLD AUTO: 0.02 10*3/MM3 (ref 0–0.7)
EOSINOPHIL NFR BLD AUTO: 0.2 % (ref 0.3–6.2)
ERYTHROCYTE [DISTWIDTH] IN BLOOD BY AUTOMATED COUNT: 15.3 % (ref 11.5–14.5)
GFR SERPL CREATININE-BSD FRML MDRD: 53 ML/MIN/1.73
GLOBULIN UR ELPH-MCNC: 3.1 GM/DL
GLUCOSE BLD-MCNC: 268 MG/DL (ref 65–99)
GLUCOSE UR STRIP-MCNC: ABNORMAL MG/DL
HCT VFR BLD AUTO: 42.9 % (ref 40.4–52.2)
HGB BLD-MCNC: 13.5 G/DL (ref 13.7–17.6)
HGB UR QL STRIP.AUTO: ABNORMAL
HYALINE CASTS UR QL AUTO: ABNORMAL /LPF
IMM GRANULOCYTES # BLD AUTO: 0.03 10*3/MM3 (ref 0–0.03)
IMM GRANULOCYTES NFR BLD AUTO: 0.3 % (ref 0–0.5)
KETONES UR QL STRIP: ABNORMAL
LEUKOCYTE ESTERASE UR QL STRIP.AUTO: NEGATIVE
LYMPHOCYTES # BLD AUTO: 0.34 10*3/MM3 (ref 0.9–4.8)
LYMPHOCYTES NFR BLD AUTO: 3.3 % (ref 19.6–45.3)
MCH RBC QN AUTO: 30.9 PG (ref 27–32.7)
MCHC RBC AUTO-ENTMCNC: 31.5 G/DL (ref 32.6–36.4)
MCV RBC AUTO: 98.2 FL (ref 79.8–96.2)
MONOCYTES # BLD AUTO: 0.57 10*3/MM3 (ref 0.2–1.2)
MONOCYTES NFR BLD AUTO: 5.5 % (ref 5–12)
NEUTROPHILS # BLD AUTO: 9.48 10*3/MM3 (ref 1.9–8.1)
NEUTROPHILS NFR BLD AUTO: 90.6 % (ref 42.7–76)
NITRITE UR QL STRIP: NEGATIVE
NT-PROBNP SERPL-MCNC: 4174 PG/ML (ref 0–1800)
PH UR STRIP.AUTO: 6 [PH] (ref 5–8)
PLATELET # BLD AUTO: 138 10*3/MM3 (ref 140–500)
PMV BLD AUTO: 10.8 FL (ref 6–12)
POTASSIUM BLD-SCNC: 4.5 MMOL/L (ref 3.5–5.2)
PROT SERPL-MCNC: 6.7 G/DL (ref 6–8.5)
PROT UR QL STRIP: ABNORMAL
RBC # BLD AUTO: 4.37 10*6/MM3 (ref 4.6–6)
RBC # UR: ABNORMAL /HPF
REF LAB TEST METHOD: ABNORMAL
SODIUM BLD-SCNC: 138 MMOL/L (ref 136–145)
SP GR UR STRIP: >=1.03 (ref 1–1.03)
SQUAMOUS #/AREA URNS HPF: ABNORMAL /HPF
TROPONIN T SERPL-MCNC: <0.01 NG/ML (ref 0–0.03)
UROBILINOGEN UR QL STRIP: ABNORMAL
WBC NRBC COR # BLD: 10.45 10*3/MM3 (ref 4.5–10.7)
WBC UR QL AUTO: ABNORMAL /HPF

## 2018-12-26 PROCEDURE — 83605 ASSAY OF LACTIC ACID: CPT | Performed by: NURSE PRACTITIONER

## 2018-12-26 PROCEDURE — 81001 URINALYSIS AUTO W/SCOPE: CPT | Performed by: NURSE PRACTITIONER

## 2018-12-26 PROCEDURE — 80053 COMPREHEN METABOLIC PANEL: CPT | Performed by: NURSE PRACTITIONER

## 2018-12-26 PROCEDURE — 84484 ASSAY OF TROPONIN QUANT: CPT | Performed by: NURSE PRACTITIONER

## 2018-12-26 PROCEDURE — 85025 COMPLETE CBC W/AUTO DIFF WBC: CPT | Performed by: NURSE PRACTITIONER

## 2018-12-26 PROCEDURE — 94640 AIRWAY INHALATION TREATMENT: CPT

## 2018-12-26 PROCEDURE — 94799 UNLISTED PULMONARY SVC/PX: CPT

## 2018-12-26 PROCEDURE — 99285 EMERGENCY DEPT VISIT HI MDM: CPT

## 2018-12-26 PROCEDURE — 83690 ASSAY OF LIPASE: CPT | Performed by: NURSE PRACTITIONER

## 2018-12-26 PROCEDURE — 93005 ELECTROCARDIOGRAM TRACING: CPT | Performed by: NURSE PRACTITIONER

## 2018-12-26 PROCEDURE — 87040 BLOOD CULTURE FOR BACTERIA: CPT | Performed by: NURSE PRACTITIONER

## 2018-12-26 PROCEDURE — 71046 X-RAY EXAM CHEST 2 VIEWS: CPT

## 2018-12-26 PROCEDURE — 93010 ELECTROCARDIOGRAM REPORT: CPT | Performed by: INTERNAL MEDICINE

## 2018-12-26 PROCEDURE — 83880 ASSAY OF NATRIURETIC PEPTIDE: CPT | Performed by: NURSE PRACTITIONER

## 2018-12-26 RX ORDER — SODIUM CHLORIDE 0.9 % (FLUSH) 0.9 %
10 SYRINGE (ML) INJECTION AS NEEDED
Status: DISCONTINUED | OUTPATIENT
Start: 2018-12-26 | End: 2018-12-30 | Stop reason: HOSPADM

## 2018-12-26 RX ORDER — ALBUTEROL SULFATE 2.5 MG/3ML
2.5 SOLUTION RESPIRATORY (INHALATION)
Status: COMPLETED | OUTPATIENT
Start: 2018-12-26 | End: 2018-12-26

## 2018-12-26 RX ADMIN — ALBUTEROL SULFATE 2.5 MG: 2.5 SOLUTION RESPIRATORY (INHALATION) at 22:19

## 2018-12-26 RX ADMIN — ALBUTEROL SULFATE 2.5 MG: 2.5 SOLUTION RESPIRATORY (INHALATION) at 22:21

## 2018-12-26 RX ADMIN — SODIUM CHLORIDE, POTASSIUM CHLORIDE, SODIUM LACTATE AND CALCIUM CHLORIDE 500 ML: 600; 310; 30; 20 INJECTION, SOLUTION INTRAVENOUS at 23:04

## 2018-12-27 ENCOUNTER — APPOINTMENT (OUTPATIENT)
Dept: CT IMAGING | Facility: HOSPITAL | Age: 83
End: 2018-12-27

## 2018-12-27 ENCOUNTER — APPOINTMENT (OUTPATIENT)
Dept: ULTRASOUND IMAGING | Facility: HOSPITAL | Age: 83
End: 2018-12-27
Attending: INTERNAL MEDICINE

## 2018-12-27 PROBLEM — K85.90 ACUTE PANCREATITIS: Status: ACTIVE | Noted: 2018-12-27

## 2018-12-27 LAB
ALBUMIN SERPL-MCNC: 3.1 G/DL (ref 3.5–5.2)
ALBUMIN SERPL-MCNC: 3.5 G/DL (ref 3.5–5.2)
ALBUMIN/GLOB SERPL: 1.1 G/DL
ALBUMIN/GLOB SERPL: 1.1 G/DL
ALP SERPL-CCNC: 144 U/L (ref 39–117)
ALP SERPL-CCNC: 174 U/L (ref 39–117)
ALT SERPL W P-5'-P-CCNC: 146 U/L (ref 1–41)
ALT SERPL W P-5'-P-CCNC: 172 U/L (ref 1–41)
ANION GAP SERPL CALCULATED.3IONS-SCNC: 10 MMOL/L
ANION GAP SERPL CALCULATED.3IONS-SCNC: 6.8 MMOL/L
AST SERPL-CCNC: 132 U/L (ref 1–40)
AST SERPL-CCNC: 165 U/L (ref 1–40)
B PARAPERT DNA SPEC QL NAA+PROBE: NOT DETECTED
B PERT DNA SPEC QL NAA+PROBE: NOT DETECTED
BASOPHILS # BLD AUTO: 0.01 10*3/MM3 (ref 0–0.2)
BASOPHILS NFR BLD AUTO: 0.1 % (ref 0–1.5)
BILIRUB SERPL-MCNC: 1.1 MG/DL (ref 0.1–1.2)
BILIRUB SERPL-MCNC: 1.4 MG/DL (ref 0.1–1.2)
BUN BLD-MCNC: 14 MG/DL (ref 8–23)
BUN BLD-MCNC: 14 MG/DL (ref 8–23)
BUN/CREAT SERPL: 12 (ref 7–25)
BUN/CREAT SERPL: 13.2 (ref 7–25)
C PNEUM DNA NPH QL NAA+NON-PROBE: NOT DETECTED
CALCIUM SPEC-SCNC: 8.9 MG/DL (ref 8.6–10.5)
CALCIUM SPEC-SCNC: 9.4 MG/DL (ref 8.6–10.5)
CHLORIDE SERPL-SCNC: 101 MMOL/L (ref 98–107)
CHLORIDE SERPL-SCNC: 104 MMOL/L (ref 98–107)
CO2 SERPL-SCNC: 27 MMOL/L (ref 22–29)
CO2 SERPL-SCNC: 28.2 MMOL/L (ref 22–29)
CREAT BLD-MCNC: 1.06 MG/DL (ref 0.76–1.27)
CREAT BLD-MCNC: 1.17 MG/DL (ref 0.76–1.27)
DEPRECATED RDW RBC AUTO: 58.1 FL (ref 37–54)
EOSINOPHIL # BLD AUTO: 0.02 10*3/MM3 (ref 0–0.7)
EOSINOPHIL NFR BLD AUTO: 0.2 % (ref 0.3–6.2)
ERYTHROCYTE [DISTWIDTH] IN BLOOD BY AUTOMATED COUNT: 15.9 % (ref 11.5–14.5)
FLUAV H1 2009 PAND RNA NPH QL NAA+PROBE: NOT DETECTED
FLUAV H1 HA GENE NPH QL NAA+PROBE: NOT DETECTED
FLUAV H3 RNA NPH QL NAA+PROBE: NOT DETECTED
FLUAV SUBTYP SPEC NAA+PROBE: NOT DETECTED
FLUBV RNA ISLT QL NAA+PROBE: NOT DETECTED
GFR SERPL CREATININE-BSD FRML MDRD: 59 ML/MIN/1.73
GFR SERPL CREATININE-BSD FRML MDRD: 66 ML/MIN/1.73
GLOBULIN UR ELPH-MCNC: 2.8 GM/DL
GLOBULIN UR ELPH-MCNC: 3.3 GM/DL
GLUCOSE BLD-MCNC: 168 MG/DL (ref 65–99)
GLUCOSE BLD-MCNC: 181 MG/DL (ref 65–99)
GLUCOSE BLDC GLUCOMTR-MCNC: 104 MG/DL (ref 70–130)
GLUCOSE BLDC GLUCOMTR-MCNC: 129 MG/DL (ref 70–130)
GLUCOSE BLDC GLUCOMTR-MCNC: 166 MG/DL (ref 70–130)
GLUCOSE BLDC GLUCOMTR-MCNC: 176 MG/DL (ref 70–130)
HADV DNA SPEC NAA+PROBE: NOT DETECTED
HCOV 229E RNA SPEC QL NAA+PROBE: NOT DETECTED
HCOV HKU1 RNA SPEC QL NAA+PROBE: NOT DETECTED
HCOV NL63 RNA SPEC QL NAA+PROBE: NOT DETECTED
HCOV OC43 RNA SPEC QL NAA+PROBE: NOT DETECTED
HCT VFR BLD AUTO: 42.1 % (ref 40.4–52.2)
HGB BLD-MCNC: 12.9 G/DL (ref 13.7–17.6)
HMPV RNA NPH QL NAA+NON-PROBE: NOT DETECTED
HPIV1 RNA SPEC QL NAA+PROBE: NOT DETECTED
HPIV2 RNA SPEC QL NAA+PROBE: NOT DETECTED
HPIV3 RNA NPH QL NAA+PROBE: NOT DETECTED
HPIV4 P GENE NPH QL NAA+PROBE: NOT DETECTED
IMM GRANULOCYTES # BLD AUTO: 0.03 10*3/MM3 (ref 0–0.03)
IMM GRANULOCYTES NFR BLD AUTO: 0.2 % (ref 0–0.5)
LIPASE SERPL-CCNC: >600 U/L (ref 13–60)
LIPASE SERPL-CCNC: >600 U/L (ref 13–60)
LYMPHOCYTES # BLD AUTO: 1.22 10*3/MM3 (ref 0.9–4.8)
LYMPHOCYTES NFR BLD AUTO: 10.1 % (ref 19.6–45.3)
M PNEUMO IGG SER IA-ACNC: NOT DETECTED
MCH RBC QN AUTO: 30.7 PG (ref 27–32.7)
MCHC RBC AUTO-ENTMCNC: 30.6 G/DL (ref 32.6–36.4)
MCV RBC AUTO: 100.2 FL (ref 79.8–96.2)
MONOCYTES # BLD AUTO: 0.79 10*3/MM3 (ref 0.2–1.2)
MONOCYTES NFR BLD AUTO: 6.5 % (ref 5–12)
NEUTROPHILS # BLD AUTO: 10.03 10*3/MM3 (ref 1.9–8.1)
NEUTROPHILS NFR BLD AUTO: 82.9 % (ref 42.7–76)
PLATELET # BLD AUTO: 133 10*3/MM3 (ref 140–500)
PMV BLD AUTO: 11.3 FL (ref 6–12)
POTASSIUM BLD-SCNC: 4.5 MMOL/L (ref 3.5–5.2)
POTASSIUM BLD-SCNC: 4.5 MMOL/L (ref 3.5–5.2)
PROT SERPL-MCNC: 5.9 G/DL (ref 6–8.5)
PROT SERPL-MCNC: 6.8 G/DL (ref 6–8.5)
RBC # BLD AUTO: 4.2 10*6/MM3 (ref 4.6–6)
RHINOVIRUS RNA SPEC NAA+PROBE: NOT DETECTED
RSV RNA NPH QL NAA+NON-PROBE: NOT DETECTED
SODIUM BLD-SCNC: 138 MMOL/L (ref 136–145)
SODIUM BLD-SCNC: 139 MMOL/L (ref 136–145)
WBC NRBC COR # BLD: 12.1 10*3/MM3 (ref 4.5–10.7)

## 2018-12-27 PROCEDURE — 82962 GLUCOSE BLOOD TEST: CPT

## 2018-12-27 PROCEDURE — 80053 COMPREHEN METABOLIC PANEL: CPT | Performed by: INTERNAL MEDICINE

## 2018-12-27 PROCEDURE — 87633 RESP VIRUS 12-25 TARGETS: CPT | Performed by: HOSPITALIST

## 2018-12-27 PROCEDURE — 36415 COLL VENOUS BLD VENIPUNCTURE: CPT | Performed by: INTERNAL MEDICINE

## 2018-12-27 PROCEDURE — 83690 ASSAY OF LIPASE: CPT | Performed by: INTERNAL MEDICINE

## 2018-12-27 PROCEDURE — 87581 M.PNEUMON DNA AMP PROBE: CPT | Performed by: HOSPITALIST

## 2018-12-27 PROCEDURE — 99222 1ST HOSP IP/OBS MODERATE 55: CPT | Performed by: INTERNAL MEDICINE

## 2018-12-27 PROCEDURE — 87486 CHLMYD PNEUM DNA AMP PROBE: CPT | Performed by: HOSPITALIST

## 2018-12-27 PROCEDURE — 25010000002 IOPAMIDOL 61 % SOLUTION: Performed by: EMERGENCY MEDICINE

## 2018-12-27 PROCEDURE — 87798 DETECT AGENT NOS DNA AMP: CPT | Performed by: HOSPITALIST

## 2018-12-27 PROCEDURE — 76705 ECHO EXAM OF ABDOMEN: CPT

## 2018-12-27 PROCEDURE — 74177 CT ABD & PELVIS W/CONTRAST: CPT

## 2018-12-27 PROCEDURE — 85025 COMPLETE CBC W/AUTO DIFF WBC: CPT | Performed by: INTERNAL MEDICINE

## 2018-12-27 RX ORDER — NICOTINE POLACRILEX 4 MG
15 LOZENGE BUCCAL
Status: DISCONTINUED | OUTPATIENT
Start: 2018-12-27 | End: 2018-12-30 | Stop reason: HOSPADM

## 2018-12-27 RX ORDER — ONDANSETRON 4 MG/1
4 TABLET, ORALLY DISINTEGRATING ORAL EVERY 6 HOURS PRN
Status: DISCONTINUED | OUTPATIENT
Start: 2018-12-27 | End: 2018-12-30 | Stop reason: HOSPADM

## 2018-12-27 RX ORDER — METOPROLOL SUCCINATE 25 MG/1
12.5 TABLET, EXTENDED RELEASE ORAL DAILY
Status: DISCONTINUED | OUTPATIENT
Start: 2018-12-27 | End: 2018-12-30 | Stop reason: HOSPADM

## 2018-12-27 RX ORDER — NALOXONE HCL 0.4 MG/ML
0.4 VIAL (ML) INJECTION
Status: DISCONTINUED | OUTPATIENT
Start: 2018-12-27 | End: 2018-12-30 | Stop reason: HOSPADM

## 2018-12-27 RX ORDER — HYDROMORPHONE HYDROCHLORIDE 1 MG/ML
0.25 INJECTION, SOLUTION INTRAMUSCULAR; INTRAVENOUS; SUBCUTANEOUS
Status: DISCONTINUED | OUTPATIENT
Start: 2018-12-27 | End: 2018-12-27

## 2018-12-27 RX ORDER — TRAMADOL HYDROCHLORIDE 50 MG/1
50 TABLET ORAL EVERY 6 HOURS PRN
Status: DISCONTINUED | OUTPATIENT
Start: 2018-12-27 | End: 2018-12-30 | Stop reason: HOSPADM

## 2018-12-27 RX ORDER — HYDROMORPHONE HYDROCHLORIDE 1 MG/ML
0.5 INJECTION, SOLUTION INTRAMUSCULAR; INTRAVENOUS; SUBCUTANEOUS
Status: DISCONTINUED | OUTPATIENT
Start: 2018-12-27 | End: 2018-12-30 | Stop reason: HOSPADM

## 2018-12-27 RX ORDER — ONDANSETRON 2 MG/ML
4 INJECTION INTRAMUSCULAR; INTRAVENOUS EVERY 6 HOURS PRN
Status: DISCONTINUED | OUTPATIENT
Start: 2018-12-27 | End: 2018-12-30 | Stop reason: HOSPADM

## 2018-12-27 RX ORDER — SODIUM CHLORIDE 9 MG/ML
75 INJECTION, SOLUTION INTRAVENOUS CONTINUOUS
Status: DISCONTINUED | OUTPATIENT
Start: 2018-12-27 | End: 2018-12-28

## 2018-12-27 RX ORDER — ONDANSETRON 4 MG/1
4 TABLET, FILM COATED ORAL EVERY 6 HOURS PRN
Status: DISCONTINUED | OUTPATIENT
Start: 2018-12-27 | End: 2018-12-30 | Stop reason: HOSPADM

## 2018-12-27 RX ORDER — DOCUSATE SODIUM 100 MG/1
100 CAPSULE, LIQUID FILLED ORAL 2 TIMES DAILY PRN
Status: DISCONTINUED | OUTPATIENT
Start: 2018-12-27 | End: 2018-12-30 | Stop reason: HOSPADM

## 2018-12-27 RX ORDER — SODIUM CHLORIDE 0.9 % (FLUSH) 0.9 %
3-10 SYRINGE (ML) INJECTION AS NEEDED
Status: DISCONTINUED | OUTPATIENT
Start: 2018-12-27 | End: 2018-12-30 | Stop reason: HOSPADM

## 2018-12-27 RX ORDER — SODIUM CHLORIDE 0.9 % (FLUSH) 0.9 %
3 SYRINGE (ML) INJECTION EVERY 12 HOURS SCHEDULED
Status: DISCONTINUED | OUTPATIENT
Start: 2018-12-27 | End: 2018-12-30 | Stop reason: HOSPADM

## 2018-12-27 RX ORDER — DEXTROSE MONOHYDRATE 25 G/50ML
25 INJECTION, SOLUTION INTRAVENOUS
Status: DISCONTINUED | OUTPATIENT
Start: 2018-12-27 | End: 2018-12-30 | Stop reason: HOSPADM

## 2018-12-27 RX ADMIN — SODIUM CHLORIDE 125 ML/HR: 9 INJECTION, SOLUTION INTRAVENOUS at 06:53

## 2018-12-27 RX ADMIN — SODIUM CHLORIDE 125 ML/HR: 9 INJECTION, SOLUTION INTRAVENOUS at 15:03

## 2018-12-27 RX ADMIN — SODIUM CHLORIDE, PRESERVATIVE FREE 3 ML: 5 INJECTION INTRAVENOUS at 10:03

## 2018-12-27 RX ADMIN — METOPROLOL SUCCINATE 12.5 MG: 25 TABLET, FILM COATED, EXTENDED RELEASE ORAL at 17:38

## 2018-12-27 RX ADMIN — IOPAMIDOL 85 ML: 612 INJECTION, SOLUTION INTRAVENOUS at 00:40

## 2018-12-27 RX ADMIN — SODIUM CHLORIDE, PRESERVATIVE FREE 3 ML: 5 INJECTION INTRAVENOUS at 21:28

## 2018-12-27 RX ADMIN — RIVAROXABAN 20 MG: 20 TABLET, FILM COATED ORAL at 17:38

## 2018-12-28 ENCOUNTER — APPOINTMENT (OUTPATIENT)
Dept: MRI IMAGING | Facility: HOSPITAL | Age: 83
End: 2018-12-28

## 2018-12-28 ENCOUNTER — APPOINTMENT (OUTPATIENT)
Dept: CT IMAGING | Facility: HOSPITAL | Age: 83
End: 2018-12-28

## 2018-12-28 LAB
ALBUMIN SERPL-MCNC: 2.9 G/DL (ref 3.5–5.2)
ALBUMIN/GLOB SERPL: 0.9 G/DL
ALP SERPL-CCNC: 127 U/L (ref 39–117)
ALT SERPL W P-5'-P-CCNC: 107 U/L (ref 1–41)
AMYLASE SERPL-CCNC: 220 U/L (ref 28–100)
ANION GAP SERPL CALCULATED.3IONS-SCNC: 8.7 MMOL/L
AST SERPL-CCNC: 74 U/L (ref 1–40)
BASOPHILS # BLD AUTO: 0.01 10*3/MM3 (ref 0–0.2)
BASOPHILS NFR BLD AUTO: 0.2 % (ref 0–1.5)
BILIRUB SERPL-MCNC: 0.7 MG/DL (ref 0.1–1.2)
BUN BLD-MCNC: 13 MG/DL (ref 8–23)
BUN/CREAT SERPL: 12.6 (ref 7–25)
CALCIUM SPEC-SCNC: 9.1 MG/DL (ref 8.6–10.5)
CHLORIDE SERPL-SCNC: 106 MMOL/L (ref 98–107)
CHOLEST SERPL-MCNC: 120 MG/DL (ref 0–200)
CO2 SERPL-SCNC: 26.3 MMOL/L (ref 22–29)
CREAT BLD-MCNC: 1.03 MG/DL (ref 0.76–1.27)
DEPRECATED RDW RBC AUTO: 56.5 FL (ref 37–54)
EOSINOPHIL # BLD AUTO: 0.08 10*3/MM3 (ref 0–0.7)
EOSINOPHIL NFR BLD AUTO: 1.5 % (ref 0.3–6.2)
ERYTHROCYTE [DISTWIDTH] IN BLOOD BY AUTOMATED COUNT: 16.1 % (ref 11.5–14.5)
GFR SERPL CREATININE-BSD FRML MDRD: 68 ML/MIN/1.73
GLOBULIN UR ELPH-MCNC: 3.1 GM/DL
GLUCOSE BLD-MCNC: 98 MG/DL (ref 65–99)
GLUCOSE BLDC GLUCOMTR-MCNC: 113 MG/DL (ref 70–130)
GLUCOSE BLDC GLUCOMTR-MCNC: 147 MG/DL (ref 70–130)
GLUCOSE BLDC GLUCOMTR-MCNC: 147 MG/DL (ref 70–130)
GLUCOSE BLDC GLUCOMTR-MCNC: 92 MG/DL (ref 70–130)
HCT VFR BLD AUTO: 36.8 % (ref 40.4–52.2)
HDLC SERPL-MCNC: 31 MG/DL (ref 40–60)
HGB BLD-MCNC: 11.6 G/DL (ref 13.7–17.6)
IMM GRANULOCYTES # BLD AUTO: 0.03 10*3/MM3 (ref 0–0.03)
IMM GRANULOCYTES NFR BLD AUTO: 0.6 % (ref 0–0.5)
LDLC SERPL CALC-MCNC: 70 MG/DL (ref 0–100)
LDLC/HDLC SERPL: 2.25 {RATIO}
LIPASE SERPL-CCNC: 445 U/L (ref 13–60)
LYMPHOCYTES # BLD AUTO: 1.16 10*3/MM3 (ref 0.9–4.8)
LYMPHOCYTES NFR BLD AUTO: 22 % (ref 19.6–45.3)
MCH RBC QN AUTO: 30.2 PG (ref 27–32.7)
MCHC RBC AUTO-ENTMCNC: 31.5 G/DL (ref 32.6–36.4)
MCV RBC AUTO: 95.8 FL (ref 79.8–96.2)
MONOCYTES # BLD AUTO: 0.77 10*3/MM3 (ref 0.2–1.2)
MONOCYTES NFR BLD AUTO: 14.6 % (ref 5–12)
NEUTROPHILS # BLD AUTO: 3.26 10*3/MM3 (ref 1.9–8.1)
NEUTROPHILS NFR BLD AUTO: 61.7 % (ref 42.7–76)
NT-PROBNP SERPL-MCNC: 4465 PG/ML (ref 0–1800)
PLATELET # BLD AUTO: 113 10*3/MM3 (ref 140–500)
PMV BLD AUTO: 10.9 FL (ref 6–12)
POTASSIUM BLD-SCNC: 4.2 MMOL/L (ref 3.5–5.2)
PROT SERPL-MCNC: 6 G/DL (ref 6–8.5)
RBC # BLD AUTO: 3.84 10*6/MM3 (ref 4.6–6)
SODIUM BLD-SCNC: 141 MMOL/L (ref 136–145)
TRIGL SERPL-MCNC: 97 MG/DL (ref 0–150)
VLDLC SERPL-MCNC: 19.4 MG/DL (ref 5–40)
WBC NRBC COR # BLD: 5.28 10*3/MM3 (ref 4.5–10.7)

## 2018-12-28 PROCEDURE — A9577 INJ MULTIHANCE: HCPCS | Performed by: INTERNAL MEDICINE

## 2018-12-28 PROCEDURE — 83690 ASSAY OF LIPASE: CPT | Performed by: INTERNAL MEDICINE

## 2018-12-28 PROCEDURE — 83880 ASSAY OF NATRIURETIC PEPTIDE: CPT | Performed by: INTERNAL MEDICINE

## 2018-12-28 PROCEDURE — 99232 SBSQ HOSP IP/OBS MODERATE 35: CPT | Performed by: INTERNAL MEDICINE

## 2018-12-28 PROCEDURE — 74183 MRI ABD W/O CNTR FLWD CNTR: CPT

## 2018-12-28 PROCEDURE — 71250 CT THORAX DX C-: CPT

## 2018-12-28 PROCEDURE — 80061 LIPID PANEL: CPT | Performed by: INTERNAL MEDICINE

## 2018-12-28 PROCEDURE — 82150 ASSAY OF AMYLASE: CPT | Performed by: INTERNAL MEDICINE

## 2018-12-28 PROCEDURE — 0 GADOBENATE DIMEGLUMINE 529 MG/ML SOLUTION: Performed by: INTERNAL MEDICINE

## 2018-12-28 PROCEDURE — 80053 COMPREHEN METABOLIC PANEL: CPT | Performed by: INTERNAL MEDICINE

## 2018-12-28 PROCEDURE — 36415 COLL VENOUS BLD VENIPUNCTURE: CPT | Performed by: INTERNAL MEDICINE

## 2018-12-28 PROCEDURE — 85025 COMPLETE CBC W/AUTO DIFF WBC: CPT | Performed by: INTERNAL MEDICINE

## 2018-12-28 PROCEDURE — 82962 GLUCOSE BLOOD TEST: CPT

## 2018-12-28 RX ORDER — SODIUM CHLORIDE 9 MG/ML
75 INJECTION, SOLUTION INTRAVENOUS CONTINUOUS
Status: ACTIVE | OUTPATIENT
Start: 2018-12-28 | End: 2018-12-29

## 2018-12-28 RX ADMIN — SODIUM CHLORIDE 75 ML/HR: 9 INJECTION, SOLUTION INTRAVENOUS at 20:18

## 2018-12-28 RX ADMIN — SODIUM CHLORIDE, PRESERVATIVE FREE 3 ML: 5 INJECTION INTRAVENOUS at 09:18

## 2018-12-28 RX ADMIN — GADOBENATE DIMEGLUMINE 18 ML: 529 INJECTION, SOLUTION INTRAVENOUS at 13:31

## 2018-12-28 RX ADMIN — SODIUM CHLORIDE 125 ML/HR: 9 INJECTION, SOLUTION INTRAVENOUS at 07:15

## 2018-12-28 RX ADMIN — RIVAROXABAN 20 MG: 20 TABLET, FILM COATED ORAL at 17:02

## 2018-12-28 RX ADMIN — DOCUSATE SODIUM 100 MG: 100 CAPSULE, LIQUID FILLED ORAL at 14:32

## 2018-12-29 LAB
ALBUMIN SERPL-MCNC: 3 G/DL (ref 3.5–5.2)
ALBUMIN/GLOB SERPL: 0.9 G/DL
ALP SERPL-CCNC: 129 U/L (ref 39–117)
ALT SERPL W P-5'-P-CCNC: 82 U/L (ref 1–41)
ANION GAP SERPL CALCULATED.3IONS-SCNC: 11 MMOL/L
AST SERPL-CCNC: 42 U/L (ref 1–40)
BILIRUB SERPL-MCNC: 0.8 MG/DL (ref 0.1–1.2)
BUN BLD-MCNC: 13 MG/DL (ref 8–23)
BUN/CREAT SERPL: 13.4 (ref 7–25)
CALCIUM SPEC-SCNC: 9.1 MG/DL (ref 8.6–10.5)
CHLORIDE SERPL-SCNC: 102 MMOL/L (ref 98–107)
CO2 SERPL-SCNC: 26 MMOL/L (ref 22–29)
CREAT BLD-MCNC: 0.97 MG/DL (ref 0.76–1.27)
DEPRECATED RDW RBC AUTO: 56.4 FL (ref 37–54)
ERYTHROCYTE [DISTWIDTH] IN BLOOD BY AUTOMATED COUNT: 15.3 % (ref 11.5–14.5)
GFR SERPL CREATININE-BSD FRML MDRD: 73 ML/MIN/1.73
GLOBULIN UR ELPH-MCNC: 3.5 GM/DL
GLUCOSE BLD-MCNC: 149 MG/DL (ref 65–99)
GLUCOSE BLDC GLUCOMTR-MCNC: 147 MG/DL (ref 70–130)
GLUCOSE BLDC GLUCOMTR-MCNC: 152 MG/DL (ref 70–130)
GLUCOSE BLDC GLUCOMTR-MCNC: 163 MG/DL (ref 70–130)
GLUCOSE BLDC GLUCOMTR-MCNC: 186 MG/DL (ref 70–130)
HAV IGM SERPL QL IA: NORMAL
HBV CORE IGM SERPL QL IA: NORMAL
HBV SURFACE AG SERPL QL IA: NORMAL
HCT VFR BLD AUTO: 38.6 % (ref 40.4–52.2)
HCV AB SER DONR QL: NORMAL
HGB BLD-MCNC: 11.8 G/DL (ref 13.7–17.6)
MCH RBC QN AUTO: 30.7 PG (ref 27–32.7)
MCHC RBC AUTO-ENTMCNC: 30.6 G/DL (ref 32.6–36.4)
MCV RBC AUTO: 100.5 FL (ref 79.8–96.2)
PLATELET # BLD AUTO: 123 10*3/MM3 (ref 140–500)
PMV BLD AUTO: 10.9 FL (ref 6–12)
POTASSIUM BLD-SCNC: 4.3 MMOL/L (ref 3.5–5.2)
PROT SERPL-MCNC: 6.5 G/DL (ref 6–8.5)
RBC # BLD AUTO: 3.84 10*6/MM3 (ref 4.6–6)
SODIUM BLD-SCNC: 139 MMOL/L (ref 136–145)
WBC NRBC COR # BLD: 4.83 10*3/MM3 (ref 4.5–10.7)

## 2018-12-29 PROCEDURE — 80053 COMPREHEN METABOLIC PANEL: CPT | Performed by: INTERNAL MEDICINE

## 2018-12-29 PROCEDURE — 80074 ACUTE HEPATITIS PANEL: CPT | Performed by: INTERNAL MEDICINE

## 2018-12-29 PROCEDURE — 82962 GLUCOSE BLOOD TEST: CPT

## 2018-12-29 PROCEDURE — 99232 SBSQ HOSP IP/OBS MODERATE 35: CPT | Performed by: INTERNAL MEDICINE

## 2018-12-29 PROCEDURE — 63710000001 INSULIN LISPRO (HUMAN) PER 5 UNITS: Performed by: INTERNAL MEDICINE

## 2018-12-29 PROCEDURE — 85027 COMPLETE CBC AUTOMATED: CPT | Performed by: INTERNAL MEDICINE

## 2018-12-29 RX ORDER — BENZONATATE 100 MG/1
100 CAPSULE ORAL 3 TIMES DAILY PRN
Status: DISCONTINUED | OUTPATIENT
Start: 2018-12-29 | End: 2018-12-30 | Stop reason: HOSPADM

## 2018-12-29 RX ORDER — PANTOPRAZOLE SODIUM 40 MG/1
40 TABLET, DELAYED RELEASE ORAL
Status: DISCONTINUED | OUTPATIENT
Start: 2018-12-30 | End: 2018-12-30 | Stop reason: HOSPADM

## 2018-12-29 RX ORDER — FLUTICASONE PROPIONATE 50 MCG
2 SPRAY, SUSPENSION (ML) NASAL DAILY
Status: DISCONTINUED | OUTPATIENT
Start: 2018-12-30 | End: 2018-12-30 | Stop reason: HOSPADM

## 2018-12-29 RX ADMIN — METOPROLOL SUCCINATE 12.5 MG: 25 TABLET, FILM COATED, EXTENDED RELEASE ORAL at 09:01

## 2018-12-29 RX ADMIN — INSULIN LISPRO 3 UNITS: 100 INJECTION, SOLUTION INTRAVENOUS; SUBCUTANEOUS at 12:01

## 2018-12-29 RX ADMIN — INSULIN LISPRO 3 UNITS: 100 INJECTION, SOLUTION INTRAVENOUS; SUBCUTANEOUS at 20:47

## 2018-12-29 RX ADMIN — INSULIN LISPRO 3 UNITS: 100 INJECTION, SOLUTION INTRAVENOUS; SUBCUTANEOUS at 18:02

## 2018-12-29 RX ADMIN — BENZONATATE 100 MG: 100 CAPSULE ORAL at 12:01

## 2018-12-29 RX ADMIN — SODIUM CHLORIDE 75 ML/HR: 9 INJECTION, SOLUTION INTRAVENOUS at 12:01

## 2018-12-29 RX ADMIN — SODIUM CHLORIDE, PRESERVATIVE FREE 3 ML: 5 INJECTION INTRAVENOUS at 09:02

## 2018-12-29 RX ADMIN — RIVAROXABAN 20 MG: 20 TABLET, FILM COATED ORAL at 18:02

## 2018-12-29 RX ADMIN — SODIUM CHLORIDE, PRESERVATIVE FREE 3 ML: 5 INJECTION INTRAVENOUS at 20:47

## 2018-12-30 VITALS
DIASTOLIC BLOOD PRESSURE: 64 MMHG | RESPIRATION RATE: 18 BRPM | BODY MASS INDEX: 27.55 KG/M2 | SYSTOLIC BLOOD PRESSURE: 119 MMHG | HEIGHT: 71 IN | HEART RATE: 75 BPM | WEIGHT: 196.8 LBS | TEMPERATURE: 98 F | OXYGEN SATURATION: 95 %

## 2018-12-30 LAB
ALBUMIN SERPL-MCNC: 3.2 G/DL (ref 3.5–5.2)
ALBUMIN/GLOB SERPL: 0.8 G/DL
ALP SERPL-CCNC: 125 U/L (ref 39–117)
ALT SERPL W P-5'-P-CCNC: 63 U/L (ref 1–41)
ANION GAP SERPL CALCULATED.3IONS-SCNC: 13.9 MMOL/L
AST SERPL-CCNC: 26 U/L (ref 1–40)
BILIRUB SERPL-MCNC: 1 MG/DL (ref 0.1–1.2)
BUN BLD-MCNC: 8 MG/DL (ref 8–23)
BUN/CREAT SERPL: 8.6 (ref 7–25)
CALCIUM SPEC-SCNC: 9.4 MG/DL (ref 8.6–10.5)
CHLORIDE SERPL-SCNC: 102 MMOL/L (ref 98–107)
CO2 SERPL-SCNC: 25.1 MMOL/L (ref 22–29)
CREAT BLD-MCNC: 0.93 MG/DL (ref 0.76–1.27)
DEPRECATED RDW RBC AUTO: 53 FL (ref 37–54)
ERYTHROCYTE [DISTWIDTH] IN BLOOD BY AUTOMATED COUNT: 15.3 % (ref 11.5–14.5)
GFR SERPL CREATININE-BSD FRML MDRD: 77 ML/MIN/1.73
GLOBULIN UR ELPH-MCNC: 3.8 GM/DL
GLUCOSE BLD-MCNC: 163 MG/DL (ref 65–99)
GLUCOSE BLDC GLUCOMTR-MCNC: 157 MG/DL (ref 70–130)
GLUCOSE BLDC GLUCOMTR-MCNC: 230 MG/DL (ref 70–130)
HCT VFR BLD AUTO: 39.3 % (ref 40.4–52.2)
HGB BLD-MCNC: 12.7 G/DL (ref 13.7–17.6)
MCH RBC QN AUTO: 30.6 PG (ref 27–32.7)
MCHC RBC AUTO-ENTMCNC: 32.3 G/DL (ref 32.6–36.4)
MCV RBC AUTO: 94.7 FL (ref 79.8–96.2)
PLATELET # BLD AUTO: 146 10*3/MM3 (ref 140–500)
PMV BLD AUTO: 11.8 FL (ref 6–12)
POTASSIUM BLD-SCNC: 3.5 MMOL/L (ref 3.5–5.2)
PROT SERPL-MCNC: 7 G/DL (ref 6–8.5)
RBC # BLD AUTO: 4.15 10*6/MM3 (ref 4.6–6)
SODIUM BLD-SCNC: 141 MMOL/L (ref 136–145)
WBC NRBC COR # BLD: 5.27 10*3/MM3 (ref 4.5–10.7)

## 2018-12-30 PROCEDURE — 85027 COMPLETE CBC AUTOMATED: CPT | Performed by: INTERNAL MEDICINE

## 2018-12-30 PROCEDURE — 82962 GLUCOSE BLOOD TEST: CPT

## 2018-12-30 PROCEDURE — 80053 COMPREHEN METABOLIC PANEL: CPT | Performed by: INTERNAL MEDICINE

## 2018-12-30 PROCEDURE — 63710000001 INSULIN LISPRO (HUMAN) PER 5 UNITS: Performed by: INTERNAL MEDICINE

## 2018-12-30 RX ORDER — PANTOPRAZOLE SODIUM 40 MG/1
40 TABLET, DELAYED RELEASE ORAL DAILY
Qty: 30 TABLET | Refills: 0 | Status: SHIPPED | OUTPATIENT
Start: 2018-12-30 | End: 2019-02-04

## 2018-12-30 RX ORDER — FLUTICASONE PROPIONATE 50 MCG
2 SPRAY, SUSPENSION (ML) NASAL DAILY
Qty: 1 BOTTLE | Refills: 0 | Status: SHIPPED | OUTPATIENT
Start: 2018-12-31 | End: 2019-02-27 | Stop reason: ALTCHOICE

## 2018-12-30 RX ADMIN — SODIUM CHLORIDE, PRESERVATIVE FREE 3 ML: 5 INJECTION INTRAVENOUS at 08:09

## 2018-12-30 RX ADMIN — INSULIN LISPRO 5 UNITS: 100 INJECTION, SOLUTION INTRAVENOUS; SUBCUTANEOUS at 11:35

## 2018-12-30 RX ADMIN — PANTOPRAZOLE SODIUM 40 MG: 40 TABLET, DELAYED RELEASE ORAL at 06:00

## 2018-12-30 RX ADMIN — METOPROLOL SUCCINATE 12.5 MG: 25 TABLET, FILM COATED, EXTENDED RELEASE ORAL at 08:08

## 2018-12-30 RX ADMIN — FLUTICASONE PROPIONATE 2 SPRAY: 50 SPRAY, METERED NASAL at 08:09

## 2018-12-30 RX ADMIN — INSULIN LISPRO 3 UNITS: 100 INJECTION, SOLUTION INTRAVENOUS; SUBCUTANEOUS at 08:08

## 2018-12-30 RX ADMIN — BENZONATATE 100 MG: 100 CAPSULE ORAL at 08:08

## 2018-12-31 ENCOUNTER — READMISSION MANAGEMENT (OUTPATIENT)
Dept: CALL CENTER | Facility: HOSPITAL | Age: 83
End: 2018-12-31

## 2018-12-31 LAB
BACTERIA SPEC AEROBE CULT: NORMAL
BACTERIA SPEC AEROBE CULT: NORMAL

## 2018-12-31 NOTE — OUTREACH NOTE
Prep Survey      Responses   Facility patient discharged from?  [facility]   Is patient eligible?  Yes   Discharge diagnosis  acute pancreatitis   Does the patient have one of the following disease processes/diagnoses(primary or secondary)?  Other   Does the patient have Home health ordered?  No   Is there a DME ordered?  Yes   What DME was ordered?  [employer] for night zo2   Prep survey completed?  Yes          Ana Sharma RN

## 2019-01-02 ENCOUNTER — READMISSION MANAGEMENT (OUTPATIENT)
Dept: CALL CENTER | Facility: HOSPITAL | Age: 84
End: 2019-01-02

## 2019-01-02 NOTE — PROGRESS NOTES
Case Management Discharge Note    Final Note: Pt was dc'd home with o2 from [place]    Destination      No service has been selected for the patient.      Durable Medical Equipment      No service has been selected for the patient.      Dialysis/Infusion      No service has been selected for the patient.      Home Medical Care      No service has been selected for the patient.      Community Resources      No service has been selected for the patient.        Other: Other    Final Discharge Disposition Code: 01 - home or self-care

## 2019-01-02 NOTE — OUTREACH NOTE
Medical Week 1 Survey      Responses   Facility patient discharged from?  [facility]   Does the patient have one of the following disease processes/diagnoses(primary or secondary)?  Other   Is there a successful TCM telephone encounter documented?  No   Week 1 attempt successful?  Yes   Call start time  1554   Call end time  1600   Discharge diagnosis  acute pancreatitis   Is patient permission given to speak with other caregiver?  Yes   List who call center can speak with  wife, Marisol Ramos reviewed with patient/caregiver?  Yes   Is the patient having any side effects they believe may be caused by any medication additions or changes?  No   Does the patient have all medications ordered at discharge?  Yes   Is the patient taking all medications as directed (includes completed medication regime)?  Yes   Does the patient have a primary care provider?   Yes   Does the patient have an appointment with their PCP within 7 days of discharge?  Greater than 7 days   What is preventing the patient from scheduling follow up appointments within 7 days of discharge?  Earlier appointment not available   Nursing Interventions  Verified appointment date/time/provider   Has the patient kept scheduled appointments due by today?  N/A   Has home health visited the patient within 72 hours of discharge?  N/A   What DME was ordered?  O2 at [facility]   Has all DME been delivered?  Yes   Psychosocial issues?  No   Did the patient receive a copy of their discharge instructions?  Yes   Nursing interventions  Reviewed instructions with patient   What is the patient's perception of their health status since discharge?  Improving   Is the patient/caregiver able to teach back signs and symptoms related to disease process for when to call PCP?  Yes   Is the patient/caregiver able to teach back signs and symptoms related to disease process for when to call 911?  Yes   Is the patient/caregiver able to teach back the hierarchy of who to call/visit for  symptoms/problems? PCP, Specialist, Home health nurse, Urgent Care, ED, 911  Yes   Additional teach back comments  Non smoker   Week 1 call completed?  Yes   Wrap up additional comments  Patient says no one swept or mopped the floor while he was there.  His bed linens were not changed during hospital stay. The trash was emptied about 4am each morning.          Humera Rey RN

## 2019-01-09 ENCOUNTER — READMISSION MANAGEMENT (OUTPATIENT)
Dept: CALL CENTER | Facility: HOSPITAL | Age: 84
End: 2019-01-09

## 2019-01-09 NOTE — OUTREACH NOTE
Medical Week 2 Survey      Responses   Facility patient discharged from?  [place]   Does the patient have one of the following disease processes/diagnoses(primary or secondary)?  Other   Week 2 attempt successful?  No   Unsuccessful attempts  Attempt 1          Isabel Collado RN

## 2019-01-10 ENCOUNTER — READMISSION MANAGEMENT (OUTPATIENT)
Dept: CALL CENTER | Facility: HOSPITAL | Age: 84
End: 2019-01-10

## 2019-01-10 NOTE — OUTREACH NOTE
Medical Week 2 Survey      Responses   Facility patient discharged from?  [facility]   Does the patient have one of the following disease processes/diagnoses(primary or secondary)?  Other   Week 2 attempt successful?  No   Unsuccessful attempts  Attempt 2          Abi Koch RN

## 2019-01-14 ENCOUNTER — READMISSION MANAGEMENT (OUTPATIENT)
Dept: CALL CENTER | Facility: HOSPITAL | Age: 84
End: 2019-01-14

## 2019-01-14 NOTE — OUTREACH NOTE
Medical Week 3 Survey      Responses   Facility patient discharged from?  [place]   Does the patient have one of the following disease processes/diagnoses(primary or secondary)?  Other   Week 3 attempt successful?  Yes   Call start time  1356   Call end time  1400   Meds reviewed with patient/caregiver?  Yes   Is the patient taking all medications as directed (includes completed medication regime)?  Yes   Has the patient kept scheduled appointments due by today?  Yes   Psychosocial issues?  No   What is the patient's perception of their health status since discharge?  Improving   Week 3 Call Completed?  Yes   Revoked  No further contact(revokes)-requires comment   Graduated/Revoked comments  Health improving, taking medications as prescribed          Olga Miller RN

## 2019-01-18 ENCOUNTER — TELEPHONE (OUTPATIENT)
Dept: GASTROENTEROLOGY | Facility: CLINIC | Age: 84
End: 2019-01-18

## 2019-01-18 NOTE — TELEPHONE ENCOUNTER
Pt's wife called and reports that he was dc'd home on 12/30 .  She states the last week he has not been eating properly.  She states he has a fever of 101.9.  He has the same symptoms as before.  Pt states her  does not feel well.  He is reports he is having mid abd pain of a 5 on the pain scale.  Advised pt to go to ER but they prefer to wait for now.  Advised pt's wife would send message to Dr Marks and in the meantime advised her to have him stay on a liquid diet and if symptoms worsen to go to ER.  Pt's wife verb understanding.

## 2019-01-21 NOTE — TELEPHONE ENCOUNTER
Given that the patient is 86 yo, has abdominal pain and fever, he should go to the ER pronto !! allison

## 2019-01-22 NOTE — TELEPHONE ENCOUNTER
"Call to pt.  Advise per Dr Marks that with abd pain and fever, go to ER pronto.    Pt verb understanding.  States now \"feeling fine\".  No further pain, fever.      Advise to contact office if any future problems.  Verb understanding.    Update to DR Marks.  "

## 2019-02-04 ENCOUNTER — OFFICE VISIT (OUTPATIENT)
Dept: FAMILY MEDICINE CLINIC | Facility: CLINIC | Age: 84
End: 2019-02-04

## 2019-02-04 VITALS
HEART RATE: 80 BPM | SYSTOLIC BLOOD PRESSURE: 144 MMHG | BODY MASS INDEX: 27.44 KG/M2 | WEIGHT: 196 LBS | TEMPERATURE: 98.3 F | OXYGEN SATURATION: 99 % | DIASTOLIC BLOOD PRESSURE: 68 MMHG | RESPIRATION RATE: 18 BRPM | HEIGHT: 71 IN

## 2019-02-04 DIAGNOSIS — I48.20 CHRONIC ATRIAL FIBRILLATION (HCC): ICD-10-CM

## 2019-02-04 DIAGNOSIS — E78.00 PURE HYPERCHOLESTEROLEMIA: ICD-10-CM

## 2019-02-04 DIAGNOSIS — I10 ESSENTIAL HYPERTENSION: ICD-10-CM

## 2019-02-04 DIAGNOSIS — E11.65 UNCONTROLLED TYPE 2 DIABETES MELLITUS WITH HYPERGLYCEMIA (HCC): Primary | ICD-10-CM

## 2019-02-04 DIAGNOSIS — D64.9 ANEMIA, UNSPECIFIED TYPE: ICD-10-CM

## 2019-02-04 PROCEDURE — 99214 OFFICE O/P EST MOD 30 MIN: CPT | Performed by: FAMILY MEDICINE

## 2019-02-04 RX ORDER — BACLOFEN 10 MG/1
10 TABLET ORAL DAILY
Qty: 30 TABLET | Refills: 1 | Status: SHIPPED | OUTPATIENT
Start: 2019-02-04 | End: 2019-02-27 | Stop reason: ALTCHOICE

## 2019-02-05 LAB
ALBUMIN SERPL-MCNC: 4.2 G/DL (ref 3.5–5.2)
ALBUMIN/GLOB SERPL: 1.5 G/DL
ALP SERPL-CCNC: 72 U/L (ref 39–117)
ALT SERPL-CCNC: 10 U/L (ref 1–41)
AST SERPL-CCNC: 13 U/L (ref 1–40)
BASOPHILS # BLD AUTO: 0.01 10*3/MM3 (ref 0–0.2)
BASOPHILS NFR BLD AUTO: 0.2 % (ref 0–1.5)
BILIRUB SERPL-MCNC: 0.6 MG/DL (ref 0.1–1.2)
BUN SERPL-MCNC: 21 MG/DL (ref 8–23)
BUN/CREAT SERPL: 15.6 (ref 7–25)
CALCIUM SERPL-MCNC: 9.9 MG/DL (ref 8.6–10.5)
CHLORIDE SERPL-SCNC: 101 MMOL/L (ref 98–107)
CO2 SERPL-SCNC: 27.7 MMOL/L (ref 22–29)
CREAT SERPL-MCNC: 1.35 MG/DL (ref 0.76–1.27)
EOSINOPHIL # BLD AUTO: 0.06 10*3/MM3 (ref 0–0.7)
EOSINOPHIL NFR BLD AUTO: 0.9 % (ref 0.3–6.2)
ERYTHROCYTE [DISTWIDTH] IN BLOOD BY AUTOMATED COUNT: 15.2 % (ref 11.5–14.5)
GLOBULIN SER CALC-MCNC: 2.8 GM/DL
GLUCOSE SERPL-MCNC: 136 MG/DL (ref 65–99)
HBA1C MFR BLD: 7.6 % (ref 4.8–5.6)
HCT VFR BLD AUTO: 43.4 % (ref 40.4–52.2)
HGB BLD-MCNC: 13.6 G/DL (ref 13.7–17.6)
IMM GRANULOCYTES # BLD AUTO: 0.03 10*3/MM3 (ref 0–0.03)
IMM GRANULOCYTES NFR BLD AUTO: 0.5 % (ref 0–0.5)
LYMPHOCYTES # BLD AUTO: 1.38 10*3/MM3 (ref 0.9–4.8)
LYMPHOCYTES NFR BLD AUTO: 21.8 % (ref 19.6–45.3)
MCH RBC QN AUTO: 31 PG (ref 27–32.7)
MCHC RBC AUTO-ENTMCNC: 31.3 G/DL (ref 32.6–36.4)
MCV RBC AUTO: 98.9 FL (ref 79.8–96.2)
MONOCYTES # BLD AUTO: 0.56 10*3/MM3 (ref 0.2–1.2)
MONOCYTES NFR BLD AUTO: 8.8 % (ref 5–12)
NEUTROPHILS # BLD AUTO: 4.32 10*3/MM3 (ref 1.9–8.1)
NEUTROPHILS NFR BLD AUTO: 68.3 % (ref 42.7–76)
PLATELET # BLD AUTO: 192 10*3/MM3 (ref 140–500)
POTASSIUM SERPL-SCNC: 4.8 MMOL/L (ref 3.5–5.2)
PROT SERPL-MCNC: 7 G/DL (ref 6–8.5)
RBC # BLD AUTO: 4.39 10*6/MM3 (ref 4.6–6)
SODIUM SERPL-SCNC: 142 MMOL/L (ref 136–145)
WBC # BLD AUTO: 6.33 10*3/MM3 (ref 4.5–10.7)

## 2019-02-06 ENCOUNTER — OFFICE VISIT (OUTPATIENT)
Dept: GASTROENTEROLOGY | Facility: CLINIC | Age: 84
End: 2019-02-06

## 2019-02-06 VITALS
HEIGHT: 71 IN | BODY MASS INDEX: 27.22 KG/M2 | TEMPERATURE: 98.1 F | WEIGHT: 194.4 LBS | SYSTOLIC BLOOD PRESSURE: 138 MMHG | DIASTOLIC BLOOD PRESSURE: 80 MMHG

## 2019-02-06 DIAGNOSIS — K85.80 OTHER ACUTE PANCREATITIS WITHOUT INFECTION OR NECROSIS: Primary | ICD-10-CM

## 2019-02-06 DIAGNOSIS — R79.89 ELEVATED SERUM CREATININE: Primary | ICD-10-CM

## 2019-02-06 PROCEDURE — 99214 OFFICE O/P EST MOD 30 MIN: CPT | Performed by: NURSE PRACTITIONER

## 2019-02-06 RX ORDER — RIVAROXABAN 20 MG/1
TABLET, FILM COATED ORAL
Qty: 90 TABLET | Refills: 0 | Status: SHIPPED | OUTPATIENT
Start: 2019-02-06 | End: 2019-05-30 | Stop reason: SDUPTHER

## 2019-02-06 RX ORDER — PANTOPRAZOLE SODIUM 40 MG/1
40 TABLET, DELAYED RELEASE ORAL DAILY
COMMUNITY
End: 2019-02-27 | Stop reason: ALTCHOICE

## 2019-02-06 NOTE — PROGRESS NOTES
Chief Complaint   Patient presents with   • Pancreatitis-hospital follow up       Home Wynn is a  87 y.o. male here for a hospital follow up visit for acute pancreatitis.     HPI  86 yo m presents today accompanied by his wife for hospital follow up visit for acute pancreatitis. He is a patient of Dr. Rosales. He was seen by our service at [facility] on 12/26/18. He had CT scan abd/pelvis done that showed:      IMPRESSION:     1. Enlargement and heterogeneity of the pancreatic head and neck,  favored to represent acute pancreatitis. I think there is a secondary  involvement of the proximal duodenum, which appears thick-walled. No  peripancreatic collections are seen. The patient's main portal vein and  splenic vein both appear patent. I do not see convincing evidence of  gastric outlet obstruction.  He also had a LIPASE >600. He was started on gut rest, IVFs and antibiotics.     He also had liver US done that showed:    IMPRESSION:  1. Status post cholecystectomy.  2. Right renal cyst.  3. No abnormalities demonstrated in the liver.    Then he had MRCP done that showed:      IMPRESSION:  Suboptimal study due to artifact from patient breathing  motion. There is no evidence of choledocholithiasis or bile duct  stricture.           He admits since being home he did have a few days where he started having some issues again but admits he thinks that's because he tried to eat some high fat foods like pizza and ice cream. Since then he has been sticking to a low fat/low carb diet with the assistance of his wife and he reports feeling fine now. He denies any dysphagia, reflux, abd pain, N&V, diarrhea, constipation, rectal bleeding or melena. He admits his appetite is better and his weight is stable. He does have hx Type 2 diabetes and takes insulin. His pancreatitis workup was negative. Lipid panel is WNL and hepatitis panel was negative. Patient does not drink ETOH or smoke tobacco. No previous  hx pancreatitis in the past. It was told to the patient in the hospital that the pancreatitis was most likely caused from his diabetes and uncontrolled blood sugars. Most recent HgbA1c was 7.4.        Past Medical History:   Diagnosis Date   • Abnormal electrocardiogram    • Acute renal failure (CMS/HCC)    • Atrial fibrillation (CMS/HCC)    • Chronic kidney disease     renal failure   • Deep vein thrombosis (CMS/HCC)     acute   • Diabetes mellitus (CMS/HCC)    • Ecchymosis    • Generalized weakness    • History of cholecystectomy    • Hyperlipidemia    • Hypertension    • Insulin dependent diabetes mellitus (CMS/HCC)    • Malaise and fatigue    • Multiple falls    • Myalgia    • JESSICA (obstructive sleep apnea)    • Osteoarthritis of knee    • PAF (paroxysmal atrial fibrillation) (CMS/HCC)    • Pancreatitis    • Peripheral neuropathy    • Rib fracture    • Right leg weakness    • Sleep apnea    • SOB (shortness of breath)    • Syncope    • Type 2 diabetes mellitus (CMS/HCC)    • Vallecular mass    • Vertigo        Past Surgical History:   Procedure Laterality Date   • CATARACT EXTRACTION     • CHOLECYSTECTOMY  2010   • COLONOSCOPY  04/13/2011   • EYE SURGERY      cataract surg   • HERNIA REPAIR     • PROSTATE SURGERY     • TONSILLECTOMY     • UVULECTOMY         Scheduled Meds:    Continuous Infusions:  No current facility-administered medications for this visit.     PRN Meds:.    No Known Allergies    Social History     Socioeconomic History   • Marital status:      Spouse name: Not on file   • Number of children: 2   • Years of education: 12   • Highest education level: Not on file   Social Needs   • Financial resource strain: Not on file   • Food insecurity - worry: Not on file   • Food insecurity - inability: Not on file   • Transportation needs - medical: Not on file   • Transportation needs - non-medical: Not on file   Occupational History   • Occupation: RETIRED   Tobacco Use   • Smoking status: Never  Smoker   • Smokeless tobacco: Never Used   • Tobacco comment: caffine use 2 cups daily   Substance and Sexual Activity   • Alcohol use: No   • Drug use: No   • Sexual activity: Defer   Other Topics Concern   • Not on file   Social History Narrative    LIVES WITH WIFE       Family History   Problem Relation Age of Onset   • Cancer Mother    • Stroke Father    • Cancer Sister    • Heart disease Sister        Review of Systems   Constitutional: Negative for appetite change, chills, diaphoresis, fatigue, fever and unexpected weight change.   HENT: Negative for nosebleeds, postnasal drip, sore throat, trouble swallowing and voice change.    Respiratory: Negative for cough, choking, chest tightness, shortness of breath and wheezing.    Cardiovascular: Negative for chest pain.   Gastrointestinal: Negative for abdominal distention, abdominal pain, anal bleeding, blood in stool, constipation, diarrhea, nausea, rectal pain and vomiting.   Endocrine: Negative for polydipsia, polyphagia and polyuria.   Musculoskeletal: Negative for gait problem.   Skin: Negative for rash and wound.   Allergic/Immunologic: Negative for food allergies.   Neurological: Negative for dizziness, speech difficulty and light-headedness.   Psychiatric/Behavioral: Negative for confusion, self-injury, sleep disturbance and suicidal ideas.       Vitals:    02/06/19 1427   BP: 138/80   Temp: 98.1 °F (36.7 °C)       Physical Exam   Constitutional: He is oriented to person, place, and time. He appears well-developed and well-nourished. He does not appear ill. No distress.   HENT:   Head: Normocephalic.   Eyes: Pupils are equal, round, and reactive to light.   Cardiovascular: Normal rate, regular rhythm and normal heart sounds.   Pulmonary/Chest: Effort normal and breath sounds normal.   Abdominal: Soft. Bowel sounds are normal. He exhibits no distension and no mass. There is no hepatosplenomegaly. There is no tenderness. There is no rebound and no guarding.  No hernia.   Musculoskeletal: Normal range of motion.   Neurological: He is alert and oriented to person, place, and time.   Skin: Skin is warm and dry.   Psychiatric: He has a normal mood and affect. His speech is normal and behavior is normal. Judgment normal.       No images are attached to the encounter.    Assessment & plan     1. Other acute pancreatitis without infection or necrosis  - Lipase  - Amylase    I reviewed hospital records with the patient today. He seems to be improving nicely. We had a long discussion about pancreatitis and how he needs to continue a low fat/low carb diet. He needs to continue to work on his HgbA1c with goal of less than 7.0. Continue Protonix daily for now. Labs done 2 days ago at PCP were WNL. Will check lipase and amylase today since those were not checked 2 days ago. Follow up with Dr. Rosales in 3 months. Call office with any issues.

## 2019-02-07 ENCOUNTER — TELEPHONE (OUTPATIENT)
Dept: GASTROENTEROLOGY | Facility: CLINIC | Age: 84
End: 2019-02-07

## 2019-02-07 DIAGNOSIS — E11.65 UNCONTROLLED TYPE 2 DIABETES MELLITUS WITH HYPERGLYCEMIA (HCC): Primary | ICD-10-CM

## 2019-02-07 DIAGNOSIS — L84 CALLUS: ICD-10-CM

## 2019-02-07 LAB
AMYLASE SERPL-CCNC: 76 U/L (ref 28–100)
LIPASE SERPL-CCNC: 54 U/L (ref 13–60)

## 2019-02-07 NOTE — TELEPHONE ENCOUNTER
Called pt and advised per Miesha NP that his amylase and lipase are both back in normal range.  Which is great. Pt verb understanding.

## 2019-02-07 NOTE — TELEPHONE ENCOUNTER
----- Message from RIVAS Abdullahi sent at 2/7/2019 10:19 AM EST -----  Please call patient and let him know the amylase and lipase are both back in NORMAL RANGE! Which is great!

## 2019-02-13 DIAGNOSIS — R79.89 ELEVATED SERUM CREATININE: ICD-10-CM

## 2019-02-15 LAB
BUN SERPL-MCNC: 23 MG/DL (ref 8–27)
BUN/CREAT SERPL: 19 (ref 10–24)
CALCIUM SERPL-MCNC: 9.8 MG/DL (ref 8.6–10.2)
CHLORIDE SERPL-SCNC: 100 MMOL/L (ref 96–106)
CO2 SERPL-SCNC: 25 MMOL/L (ref 20–29)
CREAT SERPL-MCNC: 1.19 MG/DL (ref 0.76–1.27)
GLUCOSE SERPL-MCNC: 252 MG/DL (ref 65–99)
POTASSIUM SERPL-SCNC: 4.7 MMOL/L (ref 3.5–5.2)
SODIUM SERPL-SCNC: 141 MMOL/L (ref 134–144)

## 2019-02-27 ENCOUNTER — OFFICE VISIT (OUTPATIENT)
Dept: CARDIOLOGY | Facility: CLINIC | Age: 84
End: 2019-02-27

## 2019-02-27 VITALS
HEIGHT: 71 IN | HEART RATE: 69 BPM | WEIGHT: 198 LBS | DIASTOLIC BLOOD PRESSURE: 84 MMHG | SYSTOLIC BLOOD PRESSURE: 140 MMHG | BODY MASS INDEX: 27.72 KG/M2

## 2019-02-27 DIAGNOSIS — I48.20 CHRONIC ATRIAL FIBRILLATION (HCC): Primary | ICD-10-CM

## 2019-02-27 DIAGNOSIS — I10 ESSENTIAL HYPERTENSION: ICD-10-CM

## 2019-02-27 PROCEDURE — 93000 ELECTROCARDIOGRAM COMPLETE: CPT | Performed by: INTERNAL MEDICINE

## 2019-02-27 PROCEDURE — 99213 OFFICE O/P EST LOW 20 MIN: CPT | Performed by: INTERNAL MEDICINE

## 2019-02-27 RX ORDER — PRAVASTATIN SODIUM 40 MG
40 TABLET ORAL DAILY
COMMUNITY
End: 2019-08-28

## 2019-02-27 NOTE — PROGRESS NOTES
Subjective:     Encounter Date:02/27/2019      Patient ID: Home Wynn is a 87 y.o. male.    Chief Complaint:  Atrial Fibrillation   Presents for follow-up visit. Symptoms include shortness of breath and syncope. Symptoms are negative for bradycardia, chest pain, dizziness, hypertension, hypotension, pacemaker problem, palpitations, tachycardia and weakness. The symptoms have been stable. Past medical history includes atrial fibrillation.   Hypertension   This is a chronic problem. The problem is controlled. Associated symptoms include shortness of breath. Pertinent negatives include no chest pain or palpitations.       87-year-old gentleman who presents today for reevaluation.  Patient has had pancreatitis since I saw him last.  Overall he has been doing well is recovered from that.  From a cardiovascular standpoint he denies any types of chest pain shortness of breath palpitations lightheadedness or swelling.    Review of Systems   Constitution: Negative for weakness.   Cardiovascular: Positive for syncope. Negative for chest pain and palpitations.   Respiratory: Positive for shortness of breath.    Neurological: Negative for dizziness.   All other systems reviewed and are negative.        ECG 12 Lead  Date/Time: 2/27/2019 4:09 PM  Performed by: Aaron Tripathi MD  Authorized by: Aaron Tripathi MD   Comparison: compared with previous ECG from 12/26/2018  Similar to previous ECG  Rhythm: atrial fibrillation  ST Depression: V4, V5 and V6    Clinical impression: abnormal EKG               Objective:     Physical Exam   Constitutional: He is oriented to person, place, and time. He appears well-developed.   HENT:   Head: Normocephalic.   Eyes: Conjunctivae are normal.   Neck: Normal range of motion.   Cardiovascular: Normal rate and normal heart sounds. An irregularly irregular rhythm present.   Pulmonary/Chest: Breath sounds normal.   Abdominal: Soft. Bowel sounds are normal.   Musculoskeletal: Normal  range of motion. He exhibits no edema.   Neurological: He is alert and oriented to person, place, and time.   Skin: Skin is warm and dry.   Psychiatric: He has a normal mood and affect. His behavior is normal.   Vitals reviewed.      Lab Review:       Assessment:          Diagnosis Plan   1. Chronic atrial fibrillation (CMS/HCC)     2. Essential hypertension            Plan:       1.  Chronic atrial fibrillation stable  2.  Hypertension blood pressure is good  3.  Patient had acute pancreatitis after [place] he is doing well.  3.  Continue the same follow-up in 1 year    Atrial Fibrillation and Atrial Flutter  Assessment  • The patient has paroxysmal atrial fibrillation  • This is non-valvular in etiology  • The patient's CHADS2-VASc score is 2  • A [id number] score of 2 or more is considered a high risk for a thromboembolic event  • Warfarin not prescribed for medical reasons  • Rivaroxaban prescribed    Plan  • Continue in atrial fibrillation with rate control  • Continue rivaroxaban for antithrombotic therapy, bleeding issues discussed  • Add beta blocker for rate control

## 2019-03-05 RX ORDER — METOPROLOL SUCCINATE 25 MG/1
TABLET, EXTENDED RELEASE ORAL
Qty: 30 TABLET | Refills: 10 | Status: SHIPPED | OUTPATIENT
Start: 2019-03-05 | End: 2019-11-27

## 2019-05-15 ENCOUNTER — OFFICE VISIT (OUTPATIENT)
Dept: GASTROENTEROLOGY | Facility: CLINIC | Age: 84
End: 2019-05-15

## 2019-05-15 VITALS
HEIGHT: 71 IN | BODY MASS INDEX: 26.91 KG/M2 | DIASTOLIC BLOOD PRESSURE: 76 MMHG | SYSTOLIC BLOOD PRESSURE: 118 MMHG | TEMPERATURE: 97.4 F | WEIGHT: 192.2 LBS

## 2019-05-15 DIAGNOSIS — R93.5 ABNORMAL CT OF THE ABDOMEN: Primary | ICD-10-CM

## 2019-05-15 DIAGNOSIS — Z87.19 HISTORY OF PANCREATITIS: ICD-10-CM

## 2019-05-15 PROCEDURE — 99213 OFFICE O/P EST LOW 20 MIN: CPT | Performed by: INTERNAL MEDICINE

## 2019-05-15 NOTE — PROGRESS NOTES
Chief Complaint   Patient presents with   • Follow-up     6 month follow up    • Pancreatitis       Home Wynn is a  87 y.o. male here for a follow up visit for acute pancreatitis. He was seen by our service at [facility] on 12/26/18. He had CT scan abd/pelvis done that showed:        IMPRESSION:     1. Enlargement and heterogeneity of the pancreatic head and neck,  favored to represent acute pancreatitis. I think there is a secondary  involvement of the proximal duodenum, which appears thick-walled. No  peripancreatic collections are seen. The patient's main portal vein and  splenic vein both appear patent. I do not see convincing evidence of  gastric outlet obstruction.    He also had a LIPASE >600. He was started on gut rest, IVFs and antibiotics.      He also had liver US done that showed:     IMPRESSION:  1. Status post cholecystectomy.  2. Right renal cyst.  3. No abnormalities demonstrated in the liver.     Then he had MRCP done that showed:        IMPRESSION:  Suboptimal study due to artifact from patient breathing  motion. There is no evidence of choledocholithiasis or bile duct  stricture.      Patient does not drink ETOH or smoke tobacco. No previous hx pancreatitis in the past. It was told to the patient in the hospital that the pancreatitis was most likely caused from his diabetes and uncontrolled blood sugars. Most recent HgbA1c was 7.4.  He also had an MRCP while in the hospital that showed no evidence of choledocholithiasis.    He has been doing well since his follow-up in February.  He denies any abdominal pain, nausea or vomiting.  His bowels been moving well and he has not seen any blood in his stool.  His weight is been stable and his appetite is been good.  He has been watching his diet due to his diabetes.   .   HPI  Past Medical History:   Diagnosis Date   • Abnormal electrocardiogram    • Acute renal failure (CMS/HCC)    • Atrial fibrillation (CMS/HCC)    • Chronic  kidney disease     renal failure   • Deep vein thrombosis (CMS/HCC)     acute   • Diabetes mellitus (CMS/HCC)    • Ecchymosis    • Generalized weakness    • History of cholecystectomy    • Hyperlipidemia    • Hypertension    • Insulin dependent diabetes mellitus (CMS/HCC)    • Malaise and fatigue    • Multiple falls    • Myalgia    • JESSICA (obstructive sleep apnea)    • Osteoarthritis of knee    • PAF (paroxysmal atrial fibrillation) (CMS/HCC)    • Pancreatitis    • Peripheral neuropathy    • Rib fracture    • Right leg weakness    • Sleep apnea    • SOB (shortness of breath)    • Syncope    • Type 2 diabetes mellitus (CMS/HCC)    • Vallecular mass    • Vertigo      Past Surgical History:   Procedure Laterality Date   • CATARACT EXTRACTION     • CHOLECYSTECTOMY  2010   • COLONOSCOPY  04/13/2011   • EYE SURGERY      cataract surg   • HERNIA REPAIR     • PROSTATE SURGERY     • TONSILLECTOMY     • UVULECTOMY         Current Outpatient Medications:   •  Insulin Glargine (LANTUS SOLOSTAR) 100 UNIT/ML injection pen, Inject 18 Units under the skin into the appropriate area as directed Daily. (Patient taking differently: Inject 18 Units under the skin into the appropriate area as directed Every Night.), Disp: 18 mL, Rfl: 4  •  metoprolol succinate XL (TOPROL-XL) 25 MG 24 hr tablet, TAKE ONE-HALF TABLET BY MOUTH DAILY, Disp: 30 tablet, Rfl: 10  •  Multiple Vitamins-Minerals (PRESERVISION AREDS) capsule, Take 2 capsules by mouth Daily., Disp: , Rfl:   •  OXYGEN-HELIUM IN, Oxygen; Patient Sig: Oxygen he uses 2 LPM at bedtime.; 0; 21-Mar-2014; Active, Disp: , Rfl:   •  pravastatin (PRAVACHOL) 40 MG tablet, Take 40 mg by mouth Daily., Disp: , Rfl:   •  Insulin Pen Needle (PEN NEEDLES) 31G X 6 MM misc, Check daily, Disp: 100 each, Rfl: 2  •  XARELTO 20 MG tablet, TAKE ONE TABLET BY MOUTH DAILY, Disp: 90 tablet, Rfl: 0  PRN Meds:.  No Known Allergies  Social History     Socioeconomic History   • Marital status:      Spouse  name: Not on file   • Number of children: 2   • Years of education: 12   • Highest education level: Not on file   Occupational History   • Occupation: RETIRED   Tobacco Use   • Smoking status: Never Smoker   • Smokeless tobacco: Never Used   • Tobacco comment: caffine use 2 cups daily   Substance and Sexual Activity   • Alcohol use: No   • Drug use: No   • Sexual activity: Defer   Social History Narrative    LIVES WITH WIFE     Family History   Problem Relation Age of Onset   • Cancer Mother    • Stroke Father    • Cancer Sister    • Heart disease Sister      Review of Systems   Constitutional: Negative for appetite change and unexpected weight change.   Gastrointestinal: Negative for abdominal pain, blood in stool, nausea and vomiting.   All other systems reviewed and are negative.    Vitals:    05/15/19 1323   BP: 118/76   Temp: 97.4 °F (36.3 °C)         05/15/19  1323   Weight: 87.2 kg (192 lb 3.2 oz)     Physical Exam   Constitutional: He appears well-developed and well-nourished.   HENT:   Head: Normocephalic and atraumatic.   Eyes: No scleral icterus.   Abdominal: Soft. He exhibits no distension. There is no tenderness.   Neurological: He is alert.   Skin: Skin is warm and dry.   Psychiatric: He has a normal mood and affect.     No images are attached to the encounter.  Diagnoses and all orders for this visit:    Abnormal CT of the abdomen  -     CT Abdomen Pelvis With Contrast; Future    History of pancreatitis    Assessment  He is done quite well and has had no recurrence of his symptoms.  Etiology of his pancreatitis is unclear.  Given abnormal appearance of his pancreas on CT is done while hospitalized in December, would recommend repeat imaging to ensure complete resolution of the previously seen changes and to rule out any underlying mass. If the CT looks good, he can follow-up with me as needed.

## 2019-05-30 RX ORDER — RIVAROXABAN 20 MG/1
TABLET, FILM COATED ORAL
Qty: 90 TABLET | Refills: 0 | Status: SHIPPED | OUTPATIENT
Start: 2019-05-30 | End: 2019-10-28 | Stop reason: SDUPTHER

## 2019-06-05 ENCOUNTER — OFFICE VISIT (OUTPATIENT)
Dept: FAMILY MEDICINE CLINIC | Facility: CLINIC | Age: 84
End: 2019-06-05

## 2019-06-05 ENCOUNTER — HOSPITAL ENCOUNTER (OUTPATIENT)
Dept: CT IMAGING | Facility: HOSPITAL | Age: 84
Discharge: HOME | End: 2019-06-05
Admitting: INTERNAL MEDICINE

## 2019-06-05 VITALS
RESPIRATION RATE: 15 BRPM | HEIGHT: 71 IN | TEMPERATURE: 98.7 F | WEIGHT: 193.2 LBS | OXYGEN SATURATION: 96 % | HEART RATE: 55 BPM | BODY MASS INDEX: 27.05 KG/M2 | SYSTOLIC BLOOD PRESSURE: 144 MMHG | DIASTOLIC BLOOD PRESSURE: 78 MMHG

## 2019-06-05 DIAGNOSIS — E11.9 TYPE 2 DIABETES MELLITUS WITHOUT COMPLICATION, WITH LONG-TERM CURRENT USE OF INSULIN (HCC): Primary | ICD-10-CM

## 2019-06-05 DIAGNOSIS — E78.00 PURE HYPERCHOLESTEROLEMIA: ICD-10-CM

## 2019-06-05 DIAGNOSIS — Z79.4 TYPE 2 DIABETES MELLITUS WITHOUT COMPLICATION, WITH LONG-TERM CURRENT USE OF INSULIN (HCC): Primary | ICD-10-CM

## 2019-06-05 DIAGNOSIS — I10 ESSENTIAL HYPERTENSION: ICD-10-CM

## 2019-06-05 DIAGNOSIS — R93.5 ABNORMAL CT OF THE ABDOMEN: ICD-10-CM

## 2019-06-05 PROBLEM — R52 BODY ACHES: Status: RESOLVED | Noted: 2017-12-07 | Resolved: 2019-06-05

## 2019-06-05 PROBLEM — L03.115 CELLULITIS OF RIGHT LOWER EXTREMITY: Status: RESOLVED | Noted: 2017-07-11 | Resolved: 2019-06-05

## 2019-06-05 PROBLEM — R93.89 ABNORMAL CHEST X-RAY: Status: RESOLVED | Noted: 2017-12-12 | Resolved: 2019-06-05

## 2019-06-05 PROBLEM — R05.9 COUGH: Status: RESOLVED | Noted: 2017-12-07 | Resolved: 2019-06-05

## 2019-06-05 PROBLEM — R68.89 FLU-LIKE SYMPTOMS: Status: RESOLVED | Noted: 2017-11-14 | Resolved: 2019-06-05

## 2019-06-05 PROBLEM — J18.9 PNEUMONIA DUE TO INFECTIOUS ORGANISM: Status: RESOLVED | Noted: 2017-12-19 | Resolved: 2019-06-05

## 2019-06-05 PROBLEM — J01.00 ACUTE NON-RECURRENT MAXILLARY SINUSITIS: Status: RESOLVED | Noted: 2017-12-07 | Resolved: 2019-06-05

## 2019-06-05 PROBLEM — K85.90 ACUTE PANCREATITIS: Status: RESOLVED | Noted: 2018-12-27 | Resolved: 2019-06-05

## 2019-06-05 PROCEDURE — 25010000002 IOPAMIDOL 61 % SOLUTION: Performed by: INTERNAL MEDICINE

## 2019-06-05 PROCEDURE — 74177 CT ABD & PELVIS W/CONTRAST: CPT

## 2019-06-05 PROCEDURE — 99214 OFFICE O/P EST MOD 30 MIN: CPT | Performed by: FAMILY MEDICINE

## 2019-06-05 PROCEDURE — 82565 ASSAY OF CREATININE: CPT

## 2019-06-05 PROCEDURE — 0 DIATRIZOATE MEGLUMINE & SODIUM PER 1 ML: Performed by: INTERNAL MEDICINE

## 2019-06-05 RX ADMIN — DIATRIZOATE MEGLUMINE AND DIATRIZOATE SODIUM 30 ML: 660; 100 LIQUID ORAL; RECTAL at 09:30

## 2019-06-05 RX ADMIN — IOPAMIDOL 85 ML: 612 INJECTION, SOLUTION INTRAVENOUS at 10:30

## 2019-06-05 NOTE — PROGRESS NOTES
SUBJECTIVE:  Chief Complaint   Patient presents with   • Diabetes     4 mth follow , foot screen current   • Consult     concerned about cholesterol        87 y.o. male for follow up of diabetes. Diabetic Review of Systems - medication compliance: compliant all of the time, diabetic diet compliance: compliant most of the time, home glucose monitoring: is performed sporadically, last eye exam approximately two weeks ago.  Other symptoms and concerns: no polyuria, polydipsia or neuropathy but his right big toe. HLD he is taking pravastatin. Last lipid panel was in 12/2018 and revealed low HDL at 31, the LDL is 70.     Current Outpatient Medications   Medication Sig Dispense Refill   • Insulin Glargine (LANTUS SOLOSTAR) 100 UNIT/ML injection pen Inject 18 Units under the skin into the appropriate area as directed Daily. (Patient taking differently: Inject 18 Units under the skin into the appropriate area as directed Every Night.) 18 mL 4   • Insulin Pen Needle (PEN NEEDLES) 31G X 6 MM misc Check daily 100 each 2   • metoprolol succinate XL (TOPROL-XL) 25 MG 24 hr tablet TAKE ONE-HALF TABLET BY MOUTH DAILY 30 tablet 10   • Multiple Vitamins-Minerals (PRESERVISION AREDS) capsule Take 2 capsules by mouth Daily.     • OXYGEN-HELIUM IN Oxygen; Patient Sig: Oxygen he uses 2 LPM at bedtime.; 0; 21-Mar-2014; Active     • pravastatin (PRAVACHOL) 40 MG tablet Take 40 mg by mouth Daily.     • XARELTO 20 MG tablet TAKE ONE TABLET BY MOUTH DAILY 90 tablet 0     No current facility-administered medications for this visit.        OBJECTIVE:  History of Present Illness   Continues to have neck pain.    Feels like he is not hearing as well out of his r ear as his l ear.     Review of Systems   Respiratory: Positive for cough.    Endocrine: Negative.    Neurological: Positive for weakness.       Vitals:    06/05/19 1154   BP: 144/78   BP Location: Left arm   Patient Position: Sitting   Cuff Size: Adult   Pulse: 55   Resp: 15   Temp:  "98.7 °F (37.1 °C)   TempSrc: Oral   SpO2: 96%   Weight: 87.6 kg (193 lb 3.2 oz)   Height: 180.3 cm (71\")       Objective   Physical Exam   Constitutional: He is oriented to person, place, and time. He appears well-nourished. No distress.   Eyes: Conjunctivae are normal. Right eye exhibits no discharge. Left eye exhibits no discharge. No scleral icterus.   Cardiovascular: Normal rate, normal heart sounds and intact distal pulses. Exam reveals no gallop and no friction rub.   No murmur heard.  Irregularly irregular.   Pulmonary/Chest: Effort normal and breath sounds normal. No respiratory distress. He has no wheezes.   Musculoskeletal: He exhibits no edema.   Neurological: He is alert and oriented to person, place, and time.   Psychiatric: He has a normal mood and affect. His behavior is normal.   Vitals reviewed.      Assessment/Plan   Home was seen today for diabetes and consult.    Diagnoses and all orders for this visit:    Type 2 diabetes mellitus without complication, with long-term current use of insulin (CMS/[facility])  -     Hemoglobin A1c  -     Basic Metabolic Panel    Essential hypertension  -     Basic Metabolic Panel    Pure hypercholesterolemia    labs today. Discussed his HLD he should maintain current dose. LDL well controlled. Exercise and fish oil for increasing HDL.              "

## 2019-06-06 ENCOUNTER — TELEPHONE (OUTPATIENT)
Dept: GASTROENTEROLOGY | Facility: CLINIC | Age: 84
End: 2019-06-06

## 2019-06-06 LAB
BUN SERPL-MCNC: 17 MG/DL (ref 8–23)
BUN/CREAT SERPL: 16.3 (ref 7–25)
CALCIUM SERPL-MCNC: 9.8 MG/DL (ref 8.6–10.5)
CHLORIDE SERPL-SCNC: 97 MMOL/L (ref 98–107)
CO2 SERPL-SCNC: 29.7 MMOL/L (ref 22–29)
CREAT BLDA-MCNC: 1.1 MG/DL (ref 0.6–1.3)
CREAT SERPL-MCNC: 1.04 MG/DL (ref 0.76–1.27)
GLUCOSE SERPL-MCNC: 349 MG/DL (ref 65–99)
HBA1C MFR BLD: 9.3 % (ref 4.8–5.6)
POTASSIUM SERPL-SCNC: 4.8 MMOL/L (ref 3.5–5.2)
SODIUM SERPL-SCNC: 138 MMOL/L (ref 136–145)

## 2019-06-06 NOTE — TELEPHONE ENCOUNTER
Previously noted inflammation in the pancreas has resolved.  There are no concerning pancreatic changes at this point.  Remaining CT scan is unremarkable.  He can follow-up in our office as needed

## 2019-06-07 NOTE — TELEPHONE ENCOUNTER
Call to pt.  Advise per DR Rosales that previously noted inflammation in the pancreas has resolved.  There are no concerning pancreatic changes at this point.   Remaining CT scan is unremarkable.  Can f/u in office as needed.  Verb understanding.

## 2019-08-16 RX ORDER — PEN NEEDLE, DIABETIC 31 G X1/4"
NEEDLE, DISPOSABLE MISCELLANEOUS
Qty: 100 EACH | Refills: 1 | Status: SHIPPED | OUTPATIENT
Start: 2019-08-16 | End: 2020-02-26

## 2019-08-28 ENCOUNTER — OFFICE VISIT (OUTPATIENT)
Dept: CARDIOLOGY | Facility: CLINIC | Age: 84
End: 2019-08-28

## 2019-08-28 VITALS
HEIGHT: 71 IN | SYSTOLIC BLOOD PRESSURE: 140 MMHG | BODY MASS INDEX: 26.18 KG/M2 | HEART RATE: 76 BPM | OXYGEN SATURATION: 98 % | WEIGHT: 187 LBS | DIASTOLIC BLOOD PRESSURE: 78 MMHG

## 2019-08-28 DIAGNOSIS — Z01.810 PRE-OPERATIVE CARDIOVASCULAR EXAMINATION: ICD-10-CM

## 2019-08-28 DIAGNOSIS — I48.20 CHRONIC ATRIAL FIBRILLATION (HCC): Primary | ICD-10-CM

## 2019-08-28 DIAGNOSIS — I10 ESSENTIAL HYPERTENSION: ICD-10-CM

## 2019-08-28 PROCEDURE — 99214 OFFICE O/P EST MOD 30 MIN: CPT | Performed by: NURSE PRACTITIONER

## 2019-08-28 PROCEDURE — 93000 ELECTROCARDIOGRAM COMPLETE: CPT | Performed by: NURSE PRACTITIONER

## 2019-08-28 NOTE — PROGRESS NOTES
"      [facility] CARDIOLOGY  [address]  [facility] [address]  Phone: [phone]      Patient Name: Home Wynn  :[id number]  Age: 87 y.o.  Primary Cardiologist: Aaron Tripathi MD  Encounter Provider:  RIVAS Perez      Chief Complaint:   Chief Complaint   Patient presents with   • Follow-up     6 month         HPI  Home Wynn is a 87 y.o. male who presents today for semiannual follow-up.     Pt has a  history significant for paroxysmal atrial fibrillation, hypertension, hyperlipidemia, sleep apnea.    Patient is scheduled for a urological surgery with Dr. Nj on 19.  He is requesting a pre-operative risk assessment exam.  He denies any episodes of chest pain, shortness of breath, palpitations, lightheadedness, swelling or fatigue.  He has not bleeding complications with Xarelto.      The following portions of the patient's history were reviewed and updated as appropriate: allergies, current medications, past family history, past medical history, past social history, past surgical history and problem list.      Review of Systems   Constitution: Negative for malaise/fatigue.   Cardiovascular: Negative for chest pain and leg swelling.   Respiratory: Negative for shortness of breath.    Genitourinary: Positive for hesitancy.   Neurological: Negative for light-headedness.   All other systems reviewed and are negative.      OBJECTIVE:     Vitals:    19 1101   BP: 140/78   BP Location: Right arm   Patient Position: Sitting   Pulse: 76   SpO2: 98%   Weight: 84.8 kg (187 lb)   Height: 180.3 cm (71\")     Body mass index is 26.08 kg/m².    Physical Exam   Constitutional: He is oriented to person, place, and time. Vital signs are normal. He appears well-developed and well-nourished.   Eyes: Conjunctivae are normal.   Neck: Carotid bruit is not present.   Cardiovascular: Normal rate, regular rhythm and normal heart sounds.   No murmur " heard.  Pulmonary/Chest: Effort normal and breath sounds normal.   Abdominal: Normal appearance.   Musculoskeletal: Normal range of motion.   No pedal edema   Neurological: He is alert and oriented to person, place, and time. GCS eye subscore is 4. GCS verbal subscore is 5. GCS motor subscore is 6.   Skin: Skin is warm and dry.   Psychiatric: He has a normal mood and affect. His speech is normal and behavior is normal. Judgment and thought content normal. Cognition and memory are normal.         ECG 12 Lead  Date/Time: 8/28/2019 11:36 AM  Performed by: Cheri Castrejon APRN  Authorized by: Cheri Castrejon APRN   Comparison: compared with previous ECG from 2/27/2019  Rhythm: atrial fibrillation  Rate: normal  BPM: 78  QRS axis: normal    Clinical impression: abnormal EKG            Cardiac Procedures:  1. 24-hour Holter 8/31/2017.  Sinus rhythm with frequent atrial ectopic beats accounting for 12.6% of overall beats.  No sustained V. tach or SVT.  No AV block or pauses greater than 2 seconds.  2. Echocardiogram 10/3/2017.  EF 44%.  Global hypokinesis.  Mild LVH.  Left atrial volume is severely increased.  RA E.  Mild calcification of aortic valve.  Mild aortic valve regurgitation.  Mild to moderate mitral valve regurgitation.  3. Myocardial perfusion stress test 10/3/2017.  Normal myocardial perfusion study without evidence of ischemia.  Enlarged RV size.  Enlarged LV cavity size.  Global hypokinesis.  EF 44%.  Impressions consistent with low risk study.  4. ZIO monitor 11/7/2017.  Slowest heart rate 36, maximum heart rate 143.  Aberrantly conducted rhythm.  5. 48-hour Holter 1/25/2018.  Basic rhythm is atrial fibrillation.          ASSESSMENT:      Diagnosis Plan   1. Chronic atrial fibrillation (CMS/HCC)     2. Essential hypertension     3. Pre-operative cardiovascular examination           PLAN OF CARE:     1. Chronic atrial fibrillation.  Patient remains in atrial fibrillation.  His rate is controlled with  metoprolol succinate.  He is anticoagulated with Xarelto and not having any bleeding complications.  Denies any chest pain, shortness of breath.  2. HTN.  Blood pressure controlled in office today.  Continue current regimen.  Pre operative cardiovascular exam.  Patient has a scheduled urological procedure on 9/13/2019.  Scheduled at an outpatient facility.  Patient had echocardiogram in 2017 which revealed mild aortic valve regurgitation and mild to moderate mitral valve regurgitation with an EF 44%.  He denies any chest pain or shortness of breath.  ECG today unchanged from prior.  Patient will need to hold Xarelto 3 days prior to procedure. From a cardiovascular standpoint, the patient is at low  risk of cardiac event during this surgery.  No further cardiac testing is required prior to surgery.  If there are any problems during surgery, please do not hesitate to contact my office.  Follow-up with Dr. Tripathi in 6 months.  Sooner for problems or complications.      Atrial Fibrillation and Atrial Flutter  Assessment  • The patient has persistent atrial fibrillation  • The patient's CHADS2-VASc score is 4  • A [id number] score of 2 or more is considered a high risk for a thromboembolic event  • Rivaroxaban prescribed    Plan  • Continue in atrial fibrillation with rate control  • Continue rivaroxaban for antithrombotic therapy, bleeding issues discussed  • Continue beta blocker for rhythm control  • Continue beta blocker for rate control           Discharge Medications           Accurate as of 8/28/19 11:18 AM. If you have any questions, ask your nurse or doctor.               Changes to Medications      Instructions Start Date   Insulin Glargine 100 UNIT/ML injection pen  Commonly known as:  LANTUS SOLOSTAR  What changed:  when to take this   18 Units, Subcutaneous, Daily         Continue These Medications      Instructions Start Date   metoprolol succinate XL 25 MG 24 hr tablet  Commonly known as:  TOPROL-XL    TAKE ONE-HALF TABLET BY MOUTH DAILY      OXYGEN-HELIUM IN   Oxygen; Patient Sig: Oxygen he uses 2 LPM at bedtime.; 0; 21-Mar-2014; Active      Pen Needles 31G X 6 MM misc   USE FOR INJECTION DAILY      PRESERVISION AREDS capsule   2 capsules, Oral, Daily      XARELTO 20 MG tablet  Generic drug:  rivaroxaban   TAKE ONE TABLET BY MOUTH DAILY         Stop These Medications    pravastatin 40 MG tablet  Commonly known as:  PRAVACHOL  Stopped by:  RIVAS Perez            Thank you for allowing me to participate in the care of your patient,         RIVAS Perez  [facility]  08/28/19  11:18 AM    **Dragon Disclaimer:**  Much of this encounter note is an electronic transcription/translation of spoken language to printed text. The electronic translation of spoken language may permit erroneous, or at times, nonsensical words or phrases to be inadvertently transcribed. Although I have reviewed the note for such errors, some may still exist.

## 2019-09-10 ENCOUNTER — OFFICE VISIT (OUTPATIENT)
Dept: FAMILY MEDICINE CLINIC | Facility: CLINIC | Age: 84
End: 2019-09-10

## 2019-09-10 VITALS
RESPIRATION RATE: 14 BRPM | BODY MASS INDEX: 26.54 KG/M2 | SYSTOLIC BLOOD PRESSURE: 128 MMHG | HEIGHT: 71 IN | DIASTOLIC BLOOD PRESSURE: 78 MMHG | OXYGEN SATURATION: 99 % | HEART RATE: 80 BPM | WEIGHT: 189.6 LBS | TEMPERATURE: 97.9 F

## 2019-09-10 DIAGNOSIS — E11.9 TYPE 2 DIABETES MELLITUS WITHOUT COMPLICATION, WITH LONG-TERM CURRENT USE OF INSULIN (HCC): Primary | ICD-10-CM

## 2019-09-10 DIAGNOSIS — K86.1 IDIOPATHIC CHRONIC PANCREATITIS (HCC): ICD-10-CM

## 2019-09-10 DIAGNOSIS — Z79.4 TYPE 2 DIABETES MELLITUS WITHOUT COMPLICATION, WITH LONG-TERM CURRENT USE OF INSULIN (HCC): Primary | ICD-10-CM

## 2019-09-10 DIAGNOSIS — M25.561 CHRONIC PAIN OF RIGHT KNEE: ICD-10-CM

## 2019-09-10 DIAGNOSIS — R10.13 EPIGASTRIC PAIN: ICD-10-CM

## 2019-09-10 DIAGNOSIS — G89.29 CHRONIC PAIN OF RIGHT KNEE: ICD-10-CM

## 2019-09-10 LAB — HBA1C MFR BLD: 8 % (ref 4.8–5.6)

## 2019-09-10 PROCEDURE — 99214 OFFICE O/P EST MOD 30 MIN: CPT | Performed by: FAMILY MEDICINE

## 2019-09-10 PROCEDURE — 90653 IIV ADJUVANT VACCINE IM: CPT | Performed by: FAMILY MEDICINE

## 2019-09-10 PROCEDURE — G0008 ADMIN INFLUENZA VIRUS VAC: HCPCS | Performed by: FAMILY MEDICINE

## 2019-09-10 NOTE — PROGRESS NOTES
SUBJECTIVE:  Chief Complaint   Patient presents with   • Diabetes     3 mth follow    • Abdominal Pain     intermitent pain, abd hard at time epigastric area and across        87 y.o. male for follow up of diabetes.  Stomach attacks, pain in the center of the upper abdomen. He says pain is severe, belly gets hard, he has had 5 of them. He had vomiting with two of them. No association with meals. Last occurrence was about 3 weeks ago.  Started happening a couple of months ago.   First time it happened he was in bed at 10 pm, he had a lot of vomiting.   Feels fine after he vomits.  If he does not throw up he feels better in 2-3 hours.   He was just seated or lying down when the episodes start.     Right knee is weak, little bit of pain, comes and goes.     DM  He is working on losing weight.   He feels his sugars are all over the place.   He brought log and sugars range from .  Said he exercised with yard work and this morning his BG was 149.   He is trying to avoid starches and carbs.   Drinks maybe one soft drink every two weeks. Drinks tea unsweetened.   Wondering if he should get the shingles vaccine.     He has procedure planned for end of the week with Dr. Nj because he is not emptying his bladder. He is having trouble with urination, weak stream, urinary frequency.     Current Outpatient Medications   Medication Sig Dispense Refill   • Insulin Glargine (LANTUS SOLOSTAR) 100 UNIT/ML injection pen Inject 18 Units under the skin into the appropriate area as directed Daily. (Patient taking differently: Inject 18 Units under the skin into the appropriate area as directed Every Night.) 18 mL 4   • Insulin Pen Needle (PEN NEEDLES) 31G X 6 MM misc USE FOR INJECTION DAILY 100 each 1   • metoprolol succinate XL (TOPROL-XL) 25 MG 24 hr tablet TAKE ONE-HALF TABLET BY MOUTH DAILY 30 tablet 10   • Multiple Vitamins-Minerals (PRESERVISION AREDS) capsule Take 2 capsules by mouth Daily.     • OXYGEN-HELIUM IN Oxygen;  "Patient Sig: Oxygen he uses 2 LPM at bedtime.; 0; 21-Mar-2014; Active     • XARELTO 20 MG tablet TAKE ONE TABLET BY MOUTH DAILY 90 tablet 0     No current facility-administered medications for this visit.        OBJECTIVE:  History of Present Illness       Review of Systems   Gastrointestinal: Positive for abdominal pain.   Endocrine: Negative for polydipsia and polyuria.   Neurological: Negative.        Vitals:    09/10/19 0953   BP: 128/78   BP Location: Left arm   Patient Position: Sitting   Cuff Size: Adult   Pulse: 80   Resp: 14   Temp: 97.9 °F (36.6 °C)   TempSrc: Oral   SpO2: 99%   Weight: 86 kg (189 lb 9.6 oz)   Height: 180.3 cm (71\")       Objective   Physical Exam   Constitutional: He is oriented to person, place, and time. He appears well-nourished. No distress.   Eyes: Conjunctivae are normal. Right eye exhibits no discharge. Left eye exhibits no discharge. No scleral icterus.   Cardiovascular: Normal rate, regular rhythm, normal heart sounds and intact distal pulses. Exam reveals no gallop and no friction rub.   No murmur heard.  Pulmonary/Chest: Effort normal and breath sounds normal. No respiratory distress. He has no wheezes.   Musculoskeletal: He exhibits no edema.   Neurological: He is alert and oriented to person, place, and time.   Psychiatric: He has a normal mood and affect. His behavior is normal.   Vitals reviewed.      Assessment/Plan   Home was seen today for diabetes and abdominal pain.    Diagnoses and all orders for this visit:    Type 2 diabetes mellitus without complication, with long-term current use of insulin (CMS/[facility])  -     Hemoglobin A1c    Epigastric pain    Idiopathic chronic pancreatitis (CMS/[facility])    Chronic pain of right knee    Other orders  -     Fluad Quad >65 years      He had some recent labs with urology we have prat of those results and will request the other. Get A1c today.   He is going to keep a food journal to track food and good sugar levels also keep track of " his exercise. He would like this bg to be more predictable.   I told him no change to insulin. He should keep taking it.     Abdominal pain is likely his pancreatitis. Severe epigastric pain sometimes with vomiting. Self limited. He had CT ab pelvis back in 6/2019 I reviewed report. journalling of food should help id attacks and potential triggers. He voiced understanding.     Knee pain, not bad enough. He is going to start riding stationary bike which I encouraged him today. Has done well with injection of left knee and will return if he wants injection in the right.

## 2019-09-25 ENCOUNTER — OFFICE VISIT (OUTPATIENT)
Dept: GASTROENTEROLOGY | Facility: CLINIC | Age: 84
End: 2019-09-25

## 2019-09-25 VITALS
TEMPERATURE: 97.6 F | HEIGHT: 71 IN | SYSTOLIC BLOOD PRESSURE: 128 MMHG | WEIGHT: 186.2 LBS | BODY MASS INDEX: 26.07 KG/M2 | DIASTOLIC BLOOD PRESSURE: 90 MMHG

## 2019-09-25 DIAGNOSIS — R10.10 PAIN OF UPPER ABDOMEN: Primary | ICD-10-CM

## 2019-09-25 DIAGNOSIS — Z87.19 HISTORY OF ACUTE PANCREATITIS: ICD-10-CM

## 2019-09-25 DIAGNOSIS — Z86.39 HISTORY OF DIABETES MELLITUS, TYPE II: ICD-10-CM

## 2019-09-25 DIAGNOSIS — R11.2 NAUSEA AND VOMITING, INTRACTABILITY OF VOMITING NOT SPECIFIED, UNSPECIFIED VOMITING TYPE: ICD-10-CM

## 2019-09-25 LAB
ALBUMIN SERPL-MCNC: 4.1 G/DL (ref 3.5–5.2)
ALBUMIN/GLOB SERPL: 1.3 G/DL
ALP SERPL-CCNC: 106 U/L (ref 39–117)
ALT SERPL-CCNC: 15 U/L (ref 1–41)
AMYLASE SERPL-CCNC: 56 U/L (ref 28–100)
AST SERPL-CCNC: 17 U/L (ref 1–40)
BASOPHILS # BLD AUTO: (no result) 10*3/UL
BASOPHILS # BLD MANUAL: 0.08 10*3/MM3 (ref 0–0.2)
BASOPHILS NFR BLD MANUAL: 2 % (ref 0–1.5)
BILIRUB SERPL-MCNC: 0.8 MG/DL (ref 0.2–1.2)
BUN SERPL-MCNC: 27 MG/DL (ref 8–23)
BUN/CREAT SERPL: 20 (ref 7–25)
CALCIUM SERPL-MCNC: 9.4 MG/DL (ref 8.6–10.5)
CHLORIDE SERPL-SCNC: 103 MMOL/L (ref 98–107)
CO2 SERPL-SCNC: 28.1 MMOL/L (ref 22–29)
CREAT SERPL-MCNC: 1.35 MG/DL (ref 0.76–1.27)
DIFFERENTIAL COMMENT: ABNORMAL
EOSINOPHIL # BLD AUTO: (no result) 10*3/UL
EOSINOPHIL # BLD MANUAL: 0.04 10*3/MM3 (ref 0–0.4)
EOSINOPHIL NFR BLD AUTO: (no result) %
EOSINOPHIL NFR BLD MANUAL: 1 % (ref 0.3–6.2)
ERYTHROCYTE [DISTWIDTH] IN BLOOD BY AUTOMATED COUNT: 14.7 % (ref 12.3–15.4)
GLOBULIN SER CALC-MCNC: 3.1 GM/DL
GLUCOSE SERPL-MCNC: 170 MG/DL (ref 65–99)
HCT VFR BLD AUTO: 38.2 % (ref 37.5–51)
HGB BLD-MCNC: 13 G/DL (ref 13–17.7)
LIPASE SERPL-CCNC: 27 U/L (ref 13–60)
LYMPHOCYTES # BLD AUTO: (no result) 10*3/UL
LYMPHOCYTES # BLD MANUAL: 0.73 10*3/MM3 (ref 0.7–3.1)
LYMPHOCYTES NFR BLD AUTO: (no result) %
LYMPHOCYTES NFR BLD MANUAL: 18.4 % (ref 19.6–45.3)
MCH RBC QN AUTO: 31.9 PG (ref 26.6–33)
MCHC RBC AUTO-ENTMCNC: 34 G/DL (ref 31.5–35.7)
MCV RBC AUTO: 93.9 FL (ref 79–97)
MONOCYTES # BLD MANUAL: 0.28 10*3/MM3 (ref 0.1–0.9)
MONOCYTES NFR BLD AUTO: (no result) %
MONOCYTES NFR BLD MANUAL: 7.1 % (ref 5–12)
NEUTROPHILS # BLD MANUAL: 2.82 10*3/MM3 (ref 1.7–7)
NEUTROPHILS NFR BLD AUTO: (no result) %
NEUTROPHILS NFR BLD MANUAL: 71.4 % (ref 42.7–76)
PLATELET # BLD AUTO: 137 10*3/MM3 (ref 140–450)
PLATELET BLD QL SMEAR: ABNORMAL
POTASSIUM SERPL-SCNC: 4.8 MMOL/L (ref 3.5–5.2)
PROT SERPL-MCNC: 7.2 G/DL (ref 6–8.5)
RBC # BLD AUTO: 4.07 10*6/MM3 (ref 4.14–5.8)
RBC MORPH BLD: ABNORMAL
SODIUM SERPL-SCNC: 143 MMOL/L (ref 136–145)
WBC # BLD AUTO: 3.95 10*3/MM3 (ref 3.4–10.8)

## 2019-09-25 PROCEDURE — 99214 OFFICE O/P EST MOD 30 MIN: CPT | Performed by: NURSE PRACTITIONER

## 2019-09-25 NOTE — PROGRESS NOTES
Chief Complaint   Patient presents with   • Abdominal Pain   • Vomiting       Home Wynn is a  87 y.o. male here for abdominal pain with nausea and vomiting.    HPI  87-year-old established male presents today for episodes of upper abdominal pain with nausea and vomiting.  He is a patient of Dr. Rosales.  He was last seen in the office on 5/15/2019.  He does have a history of diabetes type 2 as well as acute pancreatitis.  He reports that he has had 6 attacks over the last 3 months where he will eat normally and then several hours later have upper abdominal pain and feel like he needs to throw up.  At this point he will either vomit up food or have dry heaves.  He admits the moment that he vomits or has a dry heaves the abdominal pain and pressure is completely gone.  He is not sure what particular foods are causing this.  He did have some eggs and mullins and toast for breakfast that morning and when the attacks and then had some cheese and crackers later at lunch and then had an episode right after lunch so he is not sure what is causing this.  Has been he has been trying to eat a lot less greasy, fried or spicy foods.  Otherwise he tells me he is feeling great.  His A1c has gone up a little bit to 8 but he has seen his doctor and they have changed his insulin dosing.  He denies any other GI symptoms today.  He denies any dysphagia, diarrhea, constipation, rectal bleeding or melena.  He denies any reflux.  He admits his appetite is normally good and his weight has been stable.  His repeat CT scan after his last episode of pancreatitis was able to show the pancreatitis had completely resolved.  His episodes of pancreatitis have been most likely linked to his uncontrolled diabetes.  All other work-up was negative.  Not drink any alcohol or smoke any tobacco.    Past Medical History:   Diagnosis Date   • Abnormal chest x-ray 12/12/2017    Infiltrate or opacities left heart border, right lung 12/12/2017   • Abnormal  electrocardiogram    • Acute pancreatitis 12/27/2018   • Acute renal failure (CMS/HCC)    • Atrial fibrillation (CMS/[facility])    • Cellulitis of right lower extremity 7/11/2017   • Chronic kidney disease     renal failure   • Deep vein thrombosis (CMS/HCC)     acute   • Diabetes mellitus (CMS/HCC)    • Ecchymosis    • Generalized weakness    • History of cholecystectomy    • Hyperlipidemia    • Hypertension    • Insulin dependent diabetes mellitus (CMS/[facility])    • Malaise and fatigue    • Multiple falls    • Myalgia    • JESSICA (obstructive sleep apnea)    • Osteoarthritis of knee    • PAF (paroxysmal atrial fibrillation) (CMS/[facility])    • Pancreatitis    • Peripheral neuropathy    • Rib fracture    • Right leg weakness    • Sleep apnea    • SOB (shortness of breath)    • Syncope    • Type 2 diabetes mellitus (CMS/[facility])    • Vallecular mass    • Vertigo        Past Surgical History:   Procedure Laterality Date   • CATARACT EXTRACTION     • CHOLECYSTECTOMY  2010   • COLONOSCOPY  04/13/2011   • EYE SURGERY      cataract surg   • HERNIA REPAIR     • PROSTATE SURGERY     • TONSILLECTOMY     • UVULECTOMY         Scheduled Meds:    Continuous Infusions:  No current facility-administered medications for this visit.     PRN Meds:.    No Known Allergies    Social History     Socioeconomic History   • Marital status:      Spouse name: Not on file   • Number of children: 2   • Years of education: 12   • Highest education level: Not on file   Occupational History   • Occupation: RETIRED   Tobacco Use   • Smoking status: Never Smoker   • Smokeless tobacco: Never Used   • Tobacco comment: caffine use 2 cups daily   Substance and Sexual Activity   • Alcohol use: No   • Drug use: No   • Sexual activity: Defer   Social History Narrative    LIVES WITH WIFE       Family History   Problem Relation Age of Onset   • Cancer Mother    • Stroke Father    • Cancer Sister    • Heart disease Sister        Review of Systems   Constitutional: Negative  for appetite change, chills, diaphoresis, fatigue, fever and unexpected weight change.   HENT: Negative for nosebleeds, postnasal drip, sore throat, trouble swallowing and voice change.    Respiratory: Negative for cough, choking, chest tightness, shortness of breath and wheezing.    Cardiovascular: Negative for chest pain, palpitations and leg swelling.   Gastrointestinal: Negative for abdominal distention, abdominal pain, anal bleeding, blood in stool, constipation, diarrhea, nausea, rectal pain and vomiting.   Endocrine: Negative for polydipsia, polyphagia and polyuria.   Musculoskeletal: Negative for gait problem.   Skin: Negative for rash and wound.   Allergic/Immunologic: Negative for food allergies.   Neurological: Negative for dizziness, speech difficulty and light-headedness.   Psychiatric/Behavioral: Negative for confusion, self-injury, sleep disturbance and suicidal ideas.       Vitals:    09/25/19 1112   BP: 128/90   Temp: 97.6 °F (36.4 °C)       Physical Exam   Constitutional: He is oriented to person, place, and time. He appears well-developed and well-nourished. He does not appear ill. No distress.   HENT:   Head: Normocephalic.   Eyes: Pupils are equal, round, and reactive to light.   Cardiovascular: Normal rate, regular rhythm and normal heart sounds.   Pulmonary/Chest: Effort normal and breath sounds normal.   Abdominal: Soft. Bowel sounds are normal. He exhibits no distension and no mass. There is no hepatosplenomegaly. There is no tenderness. There is no rebound and no guarding. No hernia.   Musculoskeletal: Normal range of motion.   Neurological: He is alert and oriented to person, place, and time.   Skin: Skin is warm and dry.   Psychiatric: He has a normal mood and affect. His speech is normal and behavior is normal. Judgment normal.       No images are attached to the encounter.    Assessment and plan     1. Pain of upper abdomen  - CBC & Differential  - Comprehensive Metabolic Panel  -  Amylase  - Lipase    2. Nausea and vomiting, intractability of vomiting not specified, unspecified vomiting type  - CBC & Differential  - Comprehensive Metabolic Panel  - Amylase  - Lipase    3. History of acute pancreatitis  - CBC & Differential  - Comprehensive Metabolic Panel  - Amylase  - Lipase    4. History of diabetes mellitus, type II      Not sure what is causing these episodes he is having.  Could be definitely related to the fatty foods he still is choosing to eat.  Will go ahead and check some labs today.  Today's physical exam was completely negative.  Patient to call the office with any issues.  We will have the patient follow-up with me in 3 weeks.  Advised the patient if these episodes get worse or become more frequent and he is to call the office immediately or go to the [facility] ER for immediate evaluation.

## 2019-09-27 ENCOUNTER — TELEPHONE (OUTPATIENT)
Dept: GASTROENTEROLOGY | Facility: CLINIC | Age: 84
End: 2019-09-27

## 2019-09-27 NOTE — TELEPHONE ENCOUNTER
----- Message from RIVAS Abdullahi sent at 9/27/2019  9:53 AM EDT -----  Please call the patient with lab results.  White blood cell count and hemoglobin are normal.  Platelets are little on the lower side.  Creatinine is a little elevated.  He needs to be drinking more water.  Otherwise labs look okay.  How is he doing today?

## 2019-09-27 NOTE — TELEPHONE ENCOUNTER
Call to pt.  Advise per M Eliazar that WBC and Hgb are normal.  Platelets are a little on the lower side.  Cr a little elevated.  Needs to be drinking more water. Otherwise looks ok.    Verb understanding. States feeling well today - update to M Garrison.

## 2019-10-07 ENCOUNTER — OFFICE VISIT (OUTPATIENT)
Dept: FAMILY MEDICINE CLINIC | Facility: CLINIC | Age: 84
End: 2019-10-07

## 2019-10-07 VITALS
TEMPERATURE: 98.4 F | RESPIRATION RATE: 20 BRPM | OXYGEN SATURATION: 96 % | WEIGHT: 184 LBS | HEIGHT: 71 IN | DIASTOLIC BLOOD PRESSURE: 80 MMHG | SYSTOLIC BLOOD PRESSURE: 132 MMHG | BODY MASS INDEX: 25.76 KG/M2 | HEART RATE: 72 BPM

## 2019-10-07 DIAGNOSIS — R04.0 EPISTAXIS: Primary | ICD-10-CM

## 2019-10-07 DIAGNOSIS — R05.9 COUGH IN ADULT: ICD-10-CM

## 2019-10-07 PROCEDURE — 99213 OFFICE O/P EST LOW 20 MIN: CPT | Performed by: NURSE PRACTITIONER

## 2019-10-07 RX ORDER — AZITHROMYCIN 250 MG/1
TABLET, FILM COATED ORAL
Qty: 6 TABLET | Refills: 0 | Status: SHIPPED | OUTPATIENT
Start: 2019-10-07 | End: 2019-10-16

## 2019-10-07 NOTE — PROGRESS NOTES
Subjective   Home Wynn is a 87 y.o. male.     Chief Complaint   Patient presents with   • Cough      HPI  New patient to me.  Here for cough and congestion that has been going on for almost 10 days.  Does not seem to be worsening, but is not getting over it.  He also has bloody nose in left nare off and on x 4 days.  It will stop for awhile but then start bleeding again when he blows his nose.  He has been blowing his nose a lot and then it starts to bleed, he is able to stop the bleeding.  Has been using nasal saline to improve the congestion but it is not really helpful.  Has not had nose bleeds like this in past. Taking nyquil prn especially at night to help control cough.     On blood thinners for A fib, reports rate is controlled.     Social History     Tobacco Use   • Smoking status: Never Smoker   • Smokeless tobacco: Never Used   • Tobacco comment: caffine use 2 cups daily   Substance Use Topics   • Alcohol use: No   • Drug use: No       The following portions of the patient's history were reviewed and updated as appropriate: allergies, current medications, past family history, past medical history, past social history, past surgical history and problem list.    Review of Systems   Constitutional: Positive for appetite change (decreased slightly x 1 week), chills and fatigue. Negative for fever.   HENT: Positive for congestion, nosebleeds, postnasal drip, rhinorrhea and sore throat (x1 day last week, improved today). Negative for ear discharge, ear pain and trouble swallowing.    Eyes: Negative for discharge and itching.   Respiratory: Positive for cough. Negative for chest tightness, shortness of breath and wheezing.    Cardiovascular: Negative for chest pain, palpitations and leg swelling.   Gastrointestinal: Negative for abdominal pain, diarrhea, nausea and vomiting.   Musculoskeletal: Negative for arthralgias and myalgias.   Skin: Negative for rash and wound.   Allergic/Immunologic: Positive for  "environmental allergies.   Neurological: Positive for headaches (mild HA). Negative for dizziness and weakness.   Hematological: Does not bruise/bleed easily.       Objective   Blood pressure 132/80, pulse 72, temperature 98.4 °F (36.9 °C), temperature source Oral, resp. rate 20, height 180.3 cm (71\"), weight 83.5 kg (184 lb), SpO2 96 %.    Physical Exam   Constitutional: He is oriented to person, place, and time. He appears well-developed and well-nourished. No distress.   HENT:   Head: Normocephalic and atraumatic.   Right Ear: Tympanic membrane, external ear and ear canal normal.   Left Ear: Tympanic membrane, external ear and ear canal normal.   Nose: Mucosal edema and rhinorrhea present. No sinus tenderness or nasal deformity. Epistaxis is observed. Right sinus exhibits no maxillary sinus tenderness and no frontal sinus tenderness. Left sinus exhibits no maxillary sinus tenderness and no frontal sinus tenderness.   Mouth/Throat: Posterior oropharyngeal erythema present.   Left nare oozing some blood, pt frequently dabbing at it is, sticking kleenex in and out of it several times   Eyes: Conjunctivae are normal. Right eye exhibits no discharge. Left eye exhibits no discharge.   Neck: Neck supple.   Cardiovascular: Normal rate, S1 normal, S2 normal and intact distal pulses. An irregularly irregular rhythm present.   Pulmonary/Chest: Effort normal.   Mildly decreased BS right LL, no wheeze   Abdominal: Soft. Bowel sounds are normal. There is no tenderness.   Musculoskeletal: He exhibits no deformity.   Gait smooth and steady   Lymphadenopathy:     He has no cervical adenopathy.   Neurological: He is alert and oriented to person, place, and time.   Skin: Skin is warm and dry. He is not diaphoretic.   Psychiatric: He has a normal mood and affect.   Nursing note and vitals reviewed.      Assessment   Problem List Items Addressed This Visit     None      Visit Diagnoses     Epistaxis    -  Primary    Cough in adult    "     Relevant Medications    azithromycin (ZITHROMAX Z-SULEMAN) 250 MG tablet           Procedures           Impression and Plan:  Azithromycin for cough, will treat for presumptive CAP due to age and ongoing sx which are not resolving. He has some OTC nyquil/dayquil he has been taking for cough which he can continue. For nose bleed:   Afrin nasal spray for no more than 3 days for congestion and nose bleed.  Apply vaseline to nose after afrin and avoid picking at it or blowing it until bleeding resolved.  Should continue to improve.  If not he is to let us know. We discussed s/s requiring emergent tx.         Health Maintenance Due   Topic Date Due   • URINE MICROALBUMIN  11/14/2018   • MEDICARE ANNUAL WELLNESS  12/19/2018              EMR Dragon/Transcription disclaimer:   Much of this encounter note is an electronic transcription/translation of spoken language to printed text. The electronic translation of spoken language may permit erroneous, or at times, nonsensical words or phrases to be inadvertently transcribed; Although I have reviewed the note for such errors, some may still exist.      Nosebleed, Adult  A nosebleed is when blood comes out of the nose. Nosebleeds are common. Usually, they are not a sign of a serious condition.  Nosebleeds can happen if a small blood vessel in your nose starts to bleed or if the lining of your nose (mucous membrane) cracks. They are commonly caused by:  · Allergies.  · Colds.  · Picking your nose.  · Blowing your nose too hard.  · An injury from sticking an object into your nose or getting hit in the nose.  · Dry or cold air.  Less common causes of nosebleeds include:  · Toxic fumes.  · Something abnormal in the nose or in the air-filled spaces in the bones of the face (sinuses).  · Growths in the nose, such as polyps.  · Medicines or conditions that cause blood to clot slowly.  · Certain illnesses or procedures that irritate or dry out the nasal passages.  Follow these  instructions at home:  When you have a nosebleed:    · Sit down and tilt your head slightly forward.  · Use a clean towel or tissue to pinch your nostrils under the bony part of your nose. After 10 minutes, let go of your nose and see if bleeding starts again. Do not release pressure before that time. If there is still bleeding, repeat the pinching and holding for 10 minutes until the bleeding stops.  · Do not place tissues or gauze in the nose to stop bleeding.  · Avoid lying down and avoid tilting your head backward. That may make blood collect in the throat and cause gagging or coughing.  · Use a nasal spray decongestant to help with a nosebleed as told by your health care provider.  · Do not use petroleum jelly or mineral oil in your nose. It can drip into your lungs.  After a nosebleed:  · Avoid blowing your nose or sniffing for a number of hours.  · Avoid straining, lifting, or bending at the waist for several days. You may resume other normal activities as you are able.  · Use saline spray or a humidifier as told by your health care provider.  · Aspirin and blood thinners make bleeding more likely. If you are prescribed these medicines and you suffer from nosebleeds:  ? Ask your health care provider if you should stop taking the medicines or if you should adjust the dose.  ? Do not stop taking medicines that your health care provider has recommended unless told by your health care provider.  · If your nosebleed was caused by dry mucous membranes, use over-the-counter saline nasal spray or gel. This will keep the mucous membranes moist and allow them to heal. If you must use a lubricant:  ? Choose one that is water-soluble.  ? Use only as much as you need and use it only as often as needed.  ? Do not lie down until several hours after you use it.  Contact a health care provider if:  · You have a fever.  · You get nosebleeds often or more often than usual.  · You bruise very easily.  · You have a nosebleed from  having something stuck in your nose.  · You have bleeding in your mouth.  · You vomit or cough up brown material.  · You have a nosebleed after you start a new medicine.  Get help right away if:  · You have a nosebleed after a fall or a head injury.  · Your nosebleed does not go away after 20 minutes.  · You feel dizzy or weak.  · You have unusual bleeding from other parts of your body.  · You have unusual bruising on other parts of your body.  · You become sweaty.  · You vomit blood.  This information is not intended to replace advice given to you by your health care provider. Make sure you discuss any questions you have with your health care provider.  Document Released: 09/27/2006 Document Revised: 04/24/2018 Document Reviewed: 07/04/2017  [employer] Interactive Patient Education © 2019 [employer]      Cough, Adult    A cough helps to clear your throat and lungs. A cough may last only 2-3 weeks (acute), or it may last longer than 8 weeks (chronic). Many different things can cause a cough. A cough may be a sign of an illness or another medical condition.  Follow these instructions at home:  · Pay attention to any changes in your cough.  · Take medicines only as told by your doctor.  ? If you were prescribed an antibiotic medicine, take it as told by your doctor. Do not stop taking it even if you start to feel better.  ? Talk with your doctor before you try using a cough medicine.  · Drink enough fluid to keep your pee (urine) clear or pale yellow.  · If the air is dry, use a cold steam vaporizer or humidifier in your home.  · Stay away from things that make you cough at work or at home.  · If your cough is worse at night, try using extra pillows to raise your head up higher while you sleep.  · Do not smoke, and try not to be around smoke. If you need help quitting, ask your doctor.  · Do not have caffeine.  · Do not drink alcohol.  · Rest as needed.  Contact a doctor if:  · You have new problems  (symptoms).  · You cough up yellow fluid (pus).  · Your cough does not get better after 2-3 weeks, or your cough gets worse.  · Medicine does not help your cough and you are not sleeping well.  · You have pain that gets worse or pain that is not helped with medicine.  · You have a fever.  · You are losing weight and you do not know why.  · You have night sweats.  Get help right away if:  · You cough up blood.  · You have trouble breathing.  · Your heartbeat is very fast.  This information is not intended to replace advice given to you by your health care provider. Make sure you discuss any questions you have with your health care provider.  Document Released: 08/30/2012 Document Revised: 05/25/2017 Document Reviewed: 02/24/2016  [employer] Interactive Patient Education © 2019 [employer]

## 2019-10-16 ENCOUNTER — OFFICE VISIT (OUTPATIENT)
Dept: GASTROENTEROLOGY | Facility: CLINIC | Age: 84
End: 2019-10-16

## 2019-10-16 VITALS
WEIGHT: 186.8 LBS | SYSTOLIC BLOOD PRESSURE: 114 MMHG | DIASTOLIC BLOOD PRESSURE: 60 MMHG | TEMPERATURE: 98 F | BODY MASS INDEX: 26.15 KG/M2 | HEIGHT: 71 IN

## 2019-10-16 DIAGNOSIS — R10.30 LOWER ABDOMINAL PAIN: Primary | ICD-10-CM

## 2019-10-16 DIAGNOSIS — Z87.19 HISTORY OF ACUTE PANCREATITIS: ICD-10-CM

## 2019-10-16 DIAGNOSIS — R11.2 NAUSEA AND VOMITING, INTRACTABILITY OF VOMITING NOT SPECIFIED, UNSPECIFIED VOMITING TYPE: ICD-10-CM

## 2019-10-16 LAB
ALBUMIN SERPL-MCNC: 3.9 G/DL (ref 3.5–5.2)
ALBUMIN/GLOB SERPL: 1.3 G/DL
ALP SERPL-CCNC: 78 U/L (ref 39–117)
ALT SERPL-CCNC: 9 U/L (ref 1–41)
AMYLASE SERPL-CCNC: 58 U/L (ref 28–100)
AST SERPL-CCNC: 13 U/L (ref 1–40)
BASOPHILS # BLD AUTO: 0.02 10*3/MM3 (ref 0–0.2)
BASOPHILS NFR BLD AUTO: 0.4 % (ref 0–1.5)
BILIRUB SERPL-MCNC: 0.8 MG/DL (ref 0.2–1.2)
BUN SERPL-MCNC: 22 MG/DL (ref 8–23)
BUN/CREAT SERPL: 15.5 (ref 7–25)
CALCIUM SERPL-MCNC: 9.4 MG/DL (ref 8.6–10.5)
CHLORIDE SERPL-SCNC: 103 MMOL/L (ref 98–107)
CO2 SERPL-SCNC: 30.5 MMOL/L (ref 22–29)
CREAT SERPL-MCNC: 1.42 MG/DL (ref 0.76–1.27)
EOSINOPHIL # BLD AUTO: 0.02 10*3/MM3 (ref 0–0.4)
EOSINOPHIL NFR BLD AUTO: 0.4 % (ref 0.3–6.2)
ERYTHROCYTE [DISTWIDTH] IN BLOOD BY AUTOMATED COUNT: 14 % (ref 12.3–15.4)
GLOBULIN SER CALC-MCNC: 3 GM/DL
GLUCOSE SERPL-MCNC: 173 MG/DL (ref 65–99)
HCT VFR BLD AUTO: 35.4 % (ref 37.5–51)
HGB BLD-MCNC: 11.6 G/DL (ref 13–17.7)
IMM GRANULOCYTES # BLD AUTO: 0.03 10*3/MM3 (ref 0–0.05)
IMM GRANULOCYTES NFR BLD AUTO: 0.6 % (ref 0–0.5)
LIPASE SERPL-CCNC: 32 U/L (ref 13–60)
LYMPHOCYTES # BLD AUTO: 0.96 10*3/MM3 (ref 0.7–3.1)
LYMPHOCYTES NFR BLD AUTO: 18.5 % (ref 19.6–45.3)
MCH RBC QN AUTO: 30.4 PG (ref 26.6–33)
MCHC RBC AUTO-ENTMCNC: 32.8 G/DL (ref 31.5–35.7)
MCV RBC AUTO: 92.9 FL (ref 79–97)
MONOCYTES # BLD AUTO: 0.58 10*3/MM3 (ref 0.1–0.9)
MONOCYTES NFR BLD AUTO: 11.2 % (ref 5–12)
NEUTROPHILS # BLD AUTO: 3.57 10*3/MM3 (ref 1.7–7)
NEUTROPHILS NFR BLD AUTO: 68.9 % (ref 42.7–76)
NRBC BLD AUTO-RTO: 0 /100 WBC (ref 0–0.2)
PLATELET # BLD AUTO: 157 10*3/MM3 (ref 140–450)
POTASSIUM SERPL-SCNC: 4.7 MMOL/L (ref 3.5–5.2)
PROT SERPL-MCNC: 6.9 G/DL (ref 6–8.5)
RBC # BLD AUTO: 3.81 10*6/MM3 (ref 4.14–5.8)
SODIUM SERPL-SCNC: 141 MMOL/L (ref 136–145)
WBC # BLD AUTO: 5.18 10*3/MM3 (ref 3.4–10.8)

## 2019-10-16 PROCEDURE — 99214 OFFICE O/P EST MOD 30 MIN: CPT | Performed by: NURSE PRACTITIONER

## 2019-10-16 NOTE — PROGRESS NOTES
Chief Complaint   Patient presents with   • Abdominal Pain       Home Wynn is a  87 y.o. male here for a follow up visit for abdominal pain.    HPI  87-year-old male presents today for follow-up visit for chronic abdominal pain with nausea and vomiting.  He is a patient of Dr. Rosales.  He was last seen in the office on 9/25/2019.  At that time he was having issues with chronic upper abdominal pain with nausea and vomiting and gas and bloating.  Today he presents with worsening lower abdominal pain and distention.  He tells me his upper abdominal pain is much better as well as his nausea and vomiting have also resolved.  He tells me he just does not feel good and is not sure what is going on.  He is worried that he has pancreatic cancer.  He does have a history of acute pancreatitis.  He had labs done last month that were normal except for some slightly lower platelets as well as slightly elevated creatinine.  He admits his appetite is okay and his weight is stable.  He denies any dysphagia, nausea and vomiting, reflux, diarrhea or constipation.  He denies any rectal bleeding or melena.  He just tells me this does not feel good and has not felt good for a long time.  He is worried something is really wrong.  He also has a history of type 2 diabetes and takes insulin.  He tells me his blood sugar has been stable.  His last screening colonoscopy was in 2011.    Past Medical History:   Diagnosis Date   • Abnormal chest x-ray 12/12/2017    Infiltrate or opacities left heart border, right lung 12/12/2017   • Abnormal electrocardiogram    • Acute pancreatitis 12/27/2018   • Acute renal failure (CMS/HCC)    • Atrial fibrillation (CMS/HCC)    • Cellulitis of right lower extremity 7/11/2017   • Chronic kidney disease     renal failure   • Deep vein thrombosis (CMS/HCC)     acute   • Diabetes mellitus (CMS/HCC)    • Ecchymosis    • Generalized weakness    • History of cholecystectomy    • Hyperlipidemia    • Hypertension     • Insulin dependent diabetes mellitus (CMS/HCC)    • Malaise and fatigue    • Multiple falls    • Myalgia    • JESSICA (obstructive sleep apnea)    • Osteoarthritis of knee    • PAF (paroxysmal atrial fibrillation) (CMS/HCC)    • Pancreatitis    • Peripheral neuropathy    • Rib fracture    • Right leg weakness    • Sleep apnea    • SOB (shortness of breath)    • Syncope    • Type 2 diabetes mellitus (CMS/HCC)    • Vallecular mass    • Vertigo        Past Surgical History:   Procedure Laterality Date   • CATARACT EXTRACTION     • CHOLECYSTECTOMY  2010   • COLONOSCOPY  04/13/2011   • EYE SURGERY      cataract surg   • HERNIA REPAIR     • PROSTATE SURGERY     • TONSILLECTOMY     • UVULECTOMY         Scheduled Meds:    Continuous Infusions:  No current facility-administered medications for this visit.     PRN Meds:.    No Known Allergies    Social History     Socioeconomic History   • Marital status:      Spouse name: Not on file   • Number of children: 2   • Years of education: 12   • Highest education level: Not on file   Occupational History   • Occupation: RETIRED   Tobacco Use   • Smoking status: Never Smoker   • Smokeless tobacco: Never Used   • Tobacco comment: caffine use 2 cups daily   Substance and Sexual Activity   • Alcohol use: No   • Drug use: No   • Sexual activity: Defer   Social History Narrative    LIVES WITH WIFE       Family History   Problem Relation Age of Onset   • Cancer Mother    • Stroke Father    • Cancer Sister    • Heart disease Sister        Review of Systems   Constitutional: Positive for fatigue. Negative for appetite change, chills, diaphoresis, fever and unexpected weight change.   HENT: Negative for nosebleeds, postnasal drip, sore throat, trouble swallowing and voice change.    Respiratory: Negative for cough, choking, chest tightness, shortness of breath and wheezing.    Cardiovascular: Negative for chest pain, palpitations and leg swelling.   Gastrointestinal: Positive for  abdominal distention and abdominal pain. Negative for anal bleeding, blood in stool, constipation, diarrhea, nausea, rectal pain and vomiting.   Endocrine: Negative for polydipsia, polyphagia and polyuria.   Musculoskeletal: Negative for gait problem.   Skin: Negative for rash and wound.   Allergic/Immunologic: Negative for food allergies.   Neurological: Negative for dizziness, speech difficulty and light-headedness.   Psychiatric/Behavioral: Negative for confusion, self-injury, sleep disturbance and suicidal ideas.       Vitals:    10/16/19 1034   BP: 114/60   Temp: 98 °F (36.7 °C)       Physical Exam   Constitutional: He is oriented to person, place, and time. He appears well-developed and well-nourished. He does not appear ill. No distress.   HENT:   Head: Normocephalic.   Eyes: Pupils are equal, round, and reactive to light.   Cardiovascular: Normal rate, regular rhythm and normal heart sounds.   Pulmonary/Chest: Effort normal and breath sounds normal.   Abdominal: Soft. Bowel sounds are normal. He exhibits distension. He exhibits no ascites and no mass. There is no hepatosplenomegaly. There is tenderness. There is no rebound and no guarding. No hernia.       Musculoskeletal: Normal range of motion.   Neurological: He is alert and oriented to person, place, and time.   Skin: Skin is warm and dry.   Psychiatric: He has a normal mood and affect. His speech is normal and behavior is normal. Judgment normal.       No images are attached to the encounter.    Assessment and plan     1. Lower abdominal pain  - CBC & Differential  - Comprehensive Metabolic Panel  - Amylase  - Lipase  - CT Abdomen Pelvis With Contrast; Future    2. History of acute pancreatitis  - CBC & Differential  - Comprehensive Metabolic Panel  - Amylase  - Lipase  - CT Abdomen Pelvis With Contrast; Future    3. Nausea and vomiting, intractability of vomiting not specified, unspecified vomiting type  - CBC & Differential  - Comprehensive Metabolic  Panel  - Amylase  - Lipase  - CT Abdomen Pelvis With Contrast; Future    I reviewed his most recent labs with him today.  Those labs were pretty unremarkable except for some slightly lower platelets and mildly elevated creatinine.  Upper abdominal pain and nausea and vomiting have since resolved.  However now he is having worsening daily lower abdominal pain with bloating and distention.  He is diffusely tender on exam today.  Bowels are moving well per his report.  With his history of pancreatitis and can go ahead and repeat some labs and check a CT scan of the abdomen and pelvis.  Patient is agreeable to the plan.  Continue bland diet for now.  Advised the patient if his symptoms were to get worse for him to go to the [facility] ER for immediate evaluation.  Patient to call back early next week with an update.  Patient to follow-up with me in 2 to 3 weeks.

## 2019-10-17 ENCOUNTER — TELEPHONE (OUTPATIENT)
Dept: GASTROENTEROLOGY | Facility: CLINIC | Age: 84
End: 2019-10-17

## 2019-10-17 NOTE — TELEPHONE ENCOUNTER
----- Message from RIVAS Abdullahi sent at 10/17/2019  8:58 AM EDT -----  These call the patient and see if he is having any issues with melena or rectal bleeding?  I see where he is been having nosebleeds.  He is on blood thinners.  I am concerned because his hemoglobin has dropped from 13-3 weeks ago to 11.6 yesterday.  I would have him do 3 Hemoccult stool cards while we are waiting for him to do the CT scan of his abdomen and pelvis.  He also appears not to be drinking enough water with his creatinine slightly elevated and is GFR is low.  All other labs look okay.

## 2019-10-17 NOTE — TELEPHONE ENCOUNTER
Call to spouse, Marisol (emergency contact).  Advise per M Garrison that note having nosebleeds - on blood thinners.  Concerned because hgb has dropped from 13 3 wks ago to 11.6 yesterday.  Do 3 hemoccult cards while waiting for CT.  Also appears not be drinking enough water with cr slightly elevated and GFR is low.  All other labs look ok.    Advise that hemoccult kit at .  Verb understanding.

## 2019-10-21 ENCOUNTER — TELEPHONE (OUTPATIENT)
Dept: GASTROENTEROLOGY | Facility: CLINIC | Age: 84
End: 2019-10-21

## 2019-10-21 ENCOUNTER — TELEPHONE (OUTPATIENT)
Dept: FAMILY MEDICINE CLINIC | Facility: CLINIC | Age: 84
End: 2019-10-21

## 2019-10-21 PROCEDURE — 82270 OCCULT BLOOD FECES: CPT | Performed by: INTERNAL MEDICINE

## 2019-10-21 NOTE — TELEPHONE ENCOUNTER
Patient called stating that [facility] called him. They need Dr Mahoney to call them and let them know if he still needs his oxygen or not. Their phone number is [phone] ext. [phone]. Thank you.

## 2019-10-21 NOTE — TELEPHONE ENCOUNTER
Contact this patient and he states still uses Oxygen at bedtime every night. Contacted number that was provided and left detailed VM. Will follow up on Tues.

## 2019-10-29 RX ORDER — RIVAROXABAN 20 MG/1
TABLET, FILM COATED ORAL
Qty: 90 TABLET | Refills: 0 | Status: SHIPPED | OUTPATIENT
Start: 2019-10-29 | End: 2019-11-27

## 2019-10-31 ENCOUNTER — HOSPITAL ENCOUNTER (OUTPATIENT)
Dept: CT IMAGING | Facility: HOSPITAL | Age: 84
Discharge: HOME | End: 2019-10-31
Admitting: NURSE PRACTITIONER

## 2019-10-31 DIAGNOSIS — R10.30 LOWER ABDOMINAL PAIN: ICD-10-CM

## 2019-10-31 DIAGNOSIS — R11.2 NAUSEA AND VOMITING, INTRACTABILITY OF VOMITING NOT SPECIFIED, UNSPECIFIED VOMITING TYPE: ICD-10-CM

## 2019-10-31 DIAGNOSIS — Z87.19 HISTORY OF ACUTE PANCREATITIS: ICD-10-CM

## 2019-10-31 LAB — CREAT BLDA-MCNC: 1.4 MG/DL (ref 0.6–1.3)

## 2019-10-31 PROCEDURE — 25010000002 IOPAMIDOL 61 % SOLUTION: Performed by: NURSE PRACTITIONER

## 2019-10-31 PROCEDURE — 82565 ASSAY OF CREATININE: CPT

## 2019-10-31 PROCEDURE — 74177 CT ABD & PELVIS W/CONTRAST: CPT

## 2019-10-31 RX ADMIN — IOPAMIDOL 85 ML: 612 INJECTION, SOLUTION INTRAVENOUS at 11:27

## 2019-11-04 ENCOUNTER — TELEPHONE (OUTPATIENT)
Dept: GASTROENTEROLOGY | Facility: CLINIC | Age: 84
End: 2019-11-04

## 2019-11-04 NOTE — TELEPHONE ENCOUNTER
----- Message from RIVAS Abdullahi sent at 11/1/2019  4:46 PM EDT -----  Impression    1. There is new mild left hydroureteronephrosis with an abrupt  transition in caliber within the pelvis adjacent to the bifurcation of  the left common iliac artery. There are no renal or ureteral stones. The  appearance may be secondary to a ureteral stricture or a nonradiopaque  stone. Follow-up is recommended.  2. Passive hepatic congestion is suspected. There is a new tiny amount  of free fluid within the abdomen and pelvis as well as a new tiny right  pleural effusion.    Please call the patient and let him know that there is no acute abdominal process going on however looks like he has a blockage in his left ureter and this needs to be addressed.  I would have him follow-up with either his urologist or his primary care ASAP.  If he is feeling really bad he might need to go to the ER since this is the beginning of the weekend for immediate evaluation.  I will also send this copy of the CT scan to Dr. Rosales for her to further review and give recommendations.  How is the patient doing right now?

## 2019-11-04 NOTE — TELEPHONE ENCOUNTER
Call to pt.  Advise per JJ Ji and Dr Rosales that no acute abd process going on, however, looks like has a blockage in L ureter and this needs to be addressed.  F/u with either Urologist or PCP ASAP.  If feeling bad, might need to go to ER for immediate eval.      Verb understanding.  States Urologist is Dr Ceasar Nj.  Advise pt make appt with him as soon as hanging up from this call.  [facility] will do so.      CT faxed via epic to Dr Ceasar Nj.      Update to JJ Ji - appt at this office on 11/7.

## 2019-11-04 NOTE — TELEPHONE ENCOUNTER
Agree with Omaira - needs to see urologist regarding hydronephrosis and abnormal CT findings.    ? Passive hepatic congestion based on CT however recent labs do not indicate elevated transaminases. Would not rec further w/u based on this finding

## 2019-11-07 ENCOUNTER — OFFICE VISIT (OUTPATIENT)
Dept: GASTROENTEROLOGY | Facility: CLINIC | Age: 84
End: 2019-11-07

## 2019-11-07 VITALS
HEIGHT: 71 IN | SYSTOLIC BLOOD PRESSURE: 128 MMHG | WEIGHT: 187.2 LBS | BODY MASS INDEX: 26.21 KG/M2 | DIASTOLIC BLOOD PRESSURE: 64 MMHG | TEMPERATURE: 98.2 F

## 2019-11-07 DIAGNOSIS — N18.9 CHRONIC KIDNEY DISEASE, UNSPECIFIED CKD STAGE: ICD-10-CM

## 2019-11-07 DIAGNOSIS — Z87.19 HISTORY OF ACUTE PANCREATITIS: ICD-10-CM

## 2019-11-07 DIAGNOSIS — R10.30 LOWER ABDOMINAL PAIN: Primary | ICD-10-CM

## 2019-11-07 DIAGNOSIS — R11.2 NON-INTRACTABLE VOMITING WITH NAUSEA, UNSPECIFIED VOMITING TYPE: ICD-10-CM

## 2019-11-07 PROCEDURE — 99213 OFFICE O/P EST LOW 20 MIN: CPT | Performed by: NURSE PRACTITIONER

## 2019-11-07 NOTE — PROGRESS NOTES
Chief Complaint   Patient presents with   • Abdominal Pain   • Vomiting     HPI    Home Wynn is a  87 y.o. male here for a follow up visit for abdominal pain.  This is an established patient of Dr. Rosales's, new to me.  Seen in the office last month at which time he complained of worsening lower abdominal pain.  CT abdomen pelvis dated 10/31/2019 which I reviewed as follows:    IMPRESSION:  1. There is new mild left hydroureteronephrosis with an abrupt  transition in caliber within the pelvis adjacent to the bifurcation of  the left common iliac artery. There are no renal or ureteral stones. The  appearance may be secondary to a ureteral stricture or a nonradiopaque  stone. Follow-up is recommended.  2. Passive hepatic congestion is suspected. There is a new tiny amount  of free fluid within the abdomen and pelvis as well as a new tiny right  pleural effusion.    He was instructed to follow-up with his urologist regarding non-GI findings.    Today he still complains of lower abdominal discomfort mild in nature comes and goes.  He said increasing urinary frequency.  He is increasing daily water intake.  He has a follow-up with his urologist next week.    Bowels are moving daily but does have occasional hard stools for which he takes as needed stool softeners.  Denies rectal bleeding, diarrhea.    He had one episode of vomiting since his last office visit possibly prompted by excessive coughing.  He reports having intermittent cough for the past 6 months.  Denies acid reflux or heartburn.  No dysphagia.  Cough possibly related to allergies, per patient.  Appetite is good.  His weight is stable.    He recently had a nosebleed for an entire week.  Symptoms have subsided.  He continues on Xarelto.    Patient had a normal colonoscopy with Dr. Wilson in 2011.    Past Medical History:   Diagnosis Date   • Abnormal chest x-ray 12/12/2017    Infiltrate or opacities left heart border, right lung 12/12/2017   • Abnormal  electrocardiogram    • Acute pancreatitis 12/27/2018   • Acute renal failure (CMS/HCC)    • Atrial fibrillation (CMS/HCC)    • Cellulitis of right lower extremity 7/11/2017   • Chronic kidney disease     renal failure   • Deep vein thrombosis (CMS/HCC)     acute   • Diabetes mellitus (CMS/HCC)    • Ecchymosis    • Generalized weakness    • History of cholecystectomy    • Hyperlipidemia    • Hypertension    • Insulin dependent diabetes mellitus (CMS/[facility])    • Malaise and fatigue    • Multiple falls    • Myalgia    • JESSICA (obstructive sleep apnea)    • Osteoarthritis of knee    • PAF (paroxysmal atrial fibrillation) (CMS/[facility])    • Pancreatitis    • Peripheral neuropathy    • Rib fracture    • Right leg weakness    • Sleep apnea    • SOB (shortness of breath)    • Syncope    • Type 2 diabetes mellitus (CMS/[facility])    • Vallecular mass    • Vertigo        Past Surgical History:   Procedure Laterality Date   • CATARACT EXTRACTION     • CHOLECYSTECTOMY  2010   • COLONOSCOPY  04/13/2011   • EYE SURGERY      cataract surg   • HERNIA REPAIR     • PROSTATE SURGERY     • TONSILLECTOMY     • UVULECTOMY         Scheduled Meds:  Outpatient Encounter Medications as of 11/7/2019   Medication Sig Dispense Refill   • Insulin Glargine (LANTUS SOLOSTAR) 100 UNIT/ML injection pen Inject 18 Units under the skin into the appropriate area as directed Daily. (Patient taking differently: Inject 18 Units under the skin into the appropriate area as directed Every Night.) 18 mL 4   • Insulin Pen Needle (PEN NEEDLES) 31G X 6 MM misc USE FOR INJECTION DAILY 100 each 1   • metoprolol succinate XL (TOPROL-XL) 25 MG 24 hr tablet TAKE ONE-HALF TABLET BY MOUTH DAILY 30 tablet 10   • Multiple Vitamins-Minerals (PRESERVISION AREDS) capsule Take 2 capsules by mouth Daily.     • OXYGEN-HELIUM IN Oxygen; Patient Sig: Oxygen he uses 2 LPM at bedtime.; 0; 21-Mar-2014; Active     • XARELTO 20 MG tablet TAKE ONE TABLET BY MOUTH DAILY 90 tablet 0     No  facility-administered encounter medications on file as of 11/7/2019.        Continuous Infusions:  No current facility-administered medications for this visit.     PRN Meds:.    No Known Allergies    Social History     Socioeconomic History   • Marital status:      Spouse name: Not on file   • Number of children: 2   • Years of education: 12   • Highest education level: Not on file   Occupational History   • Occupation: RETIRED   Tobacco Use   • Smoking status: Never Smoker   • Smokeless tobacco: Never Used   • Tobacco comment: caffine use 2 cups daily   Substance and Sexual Activity   • Alcohol use: No   • Drug use: No   • Sexual activity: Defer   Social History Narrative    LIVES WITH WIFE       Family History   Problem Relation Age of Onset   • Cancer Mother    • Stroke Father    • Cancer Sister    • Heart disease Sister        Review of Systems   Constitutional: Negative for activity change, appetite change, fatigue, fever and unexpected weight change.   HENT: Negative for trouble swallowing.    Respiratory: Negative for apnea, cough, choking, chest tightness, shortness of breath and wheezing.    Cardiovascular: Negative for chest pain, palpitations and leg swelling.   Gastrointestinal: Positive for abdominal pain and vomiting. Negative for abdominal distention, anal bleeding, blood in stool, constipation, diarrhea, nausea and rectal pain.       Vitals:    11/07/19 1441   BP: 128/64   Temp: 98.2 °F (36.8 °C)       Physical Exam   Constitutional: He is oriented to person, place, and time. He appears well-developed and well-nourished.   Eyes: Pupils are equal, round, and reactive to light.   Cardiovascular: Normal rate, regular rhythm and normal heart sounds.   Pulmonary/Chest: Effort normal and breath sounds normal. No respiratory distress. He has no wheezes.   Abdominal: Soft. Bowel sounds are normal. He exhibits no distension and no mass. There is no tenderness. There is no guarding. No hernia.    Musculoskeletal: Normal range of motion.   Neurological: He is alert and oriented to person, place, and time.   Skin: Skin is warm and dry. Capillary refill takes less than 2 seconds.   Psychiatric: He has a normal mood and affect. His behavior is normal.       No images are attached to the encounter.    Home was seen today for abdominal pain and vomiting.    Diagnoses and all orders for this visit:    Lower abdominal pain  -     CBC & Differential  -     Comprehensive Metabolic Panel  -     Iron and TIBC  -     Vitamin B12 and Folate    Non-intractable vomiting with nausea, unspecified vomiting type    History of acute pancreatitis    Impression:    Pleasant 87-year-old male with history of diabetes and pancreatitis seen today in follow-up for lower abdominal pain.  I reviewed recent CT abdomen and pelvis with the patient and he has a follow-up with his urologist next week regarding non-GI findings.  At this point recommend we reevaluate labs today with CBC, CMP, iron studies, rule out vitamin deficiencies.  He continues to have episodes of vomiting unclear etiology.  Possibly related to excessive coughing but he denies overt symptoms of reflux.  I did offer a trial of H2 blocker but he declines.  I want him to keep his follow-up with his urologist next week.  Return to our clinic in 4 weeks.

## 2019-11-08 LAB
ALBUMIN SERPL-MCNC: 4.2 G/DL (ref 3.5–5.2)
ALBUMIN/GLOB SERPL: 1.3 G/DL
ALP SERPL-CCNC: 65 U/L (ref 39–117)
ALT SERPL-CCNC: 6 U/L (ref 1–41)
AST SERPL-CCNC: 12 U/L (ref 1–40)
BASOPHILS # BLD AUTO: 0.02 10*3/MM3 (ref 0–0.2)
BASOPHILS NFR BLD AUTO: 0.5 % (ref 0–1.5)
BILIRUB SERPL-MCNC: 0.6 MG/DL (ref 0.2–1.2)
BUN SERPL-MCNC: 24 MG/DL (ref 8–23)
BUN/CREAT SERPL: 14.5 (ref 7–25)
CALCIUM SERPL-MCNC: 9.5 MG/DL (ref 8.6–10.5)
CHLORIDE SERPL-SCNC: 105 MMOL/L (ref 98–107)
CO2 SERPL-SCNC: 28.8 MMOL/L (ref 22–29)
CREAT SERPL-MCNC: 1.66 MG/DL (ref 0.76–1.27)
EOSINOPHIL # BLD AUTO: 0.03 10*3/MM3 (ref 0–0.4)
EOSINOPHIL NFR BLD AUTO: 0.7 % (ref 0.3–6.2)
ERYTHROCYTE [DISTWIDTH] IN BLOOD BY AUTOMATED COUNT: 13.8 % (ref 12.3–15.4)
FOLATE SERPL-MCNC: 11.4 NG/ML (ref 4.78–24.2)
GLOBULIN SER CALC-MCNC: 3.2 GM/DL
GLUCOSE SERPL-MCNC: 119 MG/DL (ref 65–99)
HCT VFR BLD AUTO: 37 % (ref 37.5–51)
HGB BLD-MCNC: 12.2 G/DL (ref 13–17.7)
IMM GRANULOCYTES # BLD AUTO: 0.03 10*3/MM3 (ref 0–0.05)
IMM GRANULOCYTES NFR BLD AUTO: 0.7 % (ref 0–0.5)
IRON SATN MFR SERPL: 13 % (ref 20–50)
IRON SERPL-MCNC: 41 MCG/DL (ref 59–158)
LYMPHOCYTES # BLD AUTO: 0.91 10*3/MM3 (ref 0.7–3.1)
LYMPHOCYTES NFR BLD AUTO: 21.1 % (ref 19.6–45.3)
MCH RBC QN AUTO: 30.5 PG (ref 26.6–33)
MCHC RBC AUTO-ENTMCNC: 33 G/DL (ref 31.5–35.7)
MCV RBC AUTO: 92.5 FL (ref 79–97)
MONOCYTES # BLD AUTO: 0.53 10*3/MM3 (ref 0.1–0.9)
MONOCYTES NFR BLD AUTO: 12.3 % (ref 5–12)
NEUTROPHILS # BLD AUTO: 2.79 10*3/MM3 (ref 1.7–7)
NEUTROPHILS NFR BLD AUTO: 64.7 % (ref 42.7–76)
NRBC BLD AUTO-RTO: 0 /100 WBC (ref 0–0.2)
PLATELET # BLD AUTO: 148 10*3/MM3 (ref 140–450)
POTASSIUM SERPL-SCNC: 4.5 MMOL/L (ref 3.5–5.2)
PROT SERPL-MCNC: 7.4 G/DL (ref 6–8.5)
RBC # BLD AUTO: 4 10*6/MM3 (ref 4.14–5.8)
SODIUM SERPL-SCNC: 145 MMOL/L (ref 136–145)
TIBC SERPL-MCNC: 310 MCG/DL
UIBC SERPL-MCNC: 269 MCG/DL (ref 112–346)
VIT B12 SERPL-MCNC: 819 PG/ML (ref 211–946)
WBC # BLD AUTO: 4.31 10*3/MM3 (ref 3.4–10.8)

## 2019-11-11 NOTE — PROGRESS NOTES
Notify Home that lab work shows improving anemia.  He is iron deficient and I recommend he start Ferrous Sulfate 325mg PO once daily.     His kidney function is elevated and I want him to call his PCP about this today.

## 2019-11-12 ENCOUNTER — TELEPHONE (OUTPATIENT)
Dept: GASTROENTEROLOGY | Facility: CLINIC | Age: 84
End: 2019-11-12

## 2019-11-12 RX ORDER — FERROUS SULFATE 325(65) MG
325 TABLET ORAL
Qty: 30 TABLET | Refills: 6 | Status: SHIPPED | OUTPATIENT
Start: 2019-11-12 | End: 2020-02-04

## 2019-11-12 NOTE — TELEPHONE ENCOUNTER
----- Message from RIVAS Whitt sent at 11/11/2019  9:57 AM EST -----  Notify Home that lab work shows improving anemia.  He is iron deficient and I recommend he start Ferrous Sulfate 325mg PO once daily.     His kidney function is elevated and I want him to call his PCP about this today.

## 2019-11-12 NOTE — TELEPHONE ENCOUNTER
Patient called, advised as per Daphnie's note. He verb understanding. Patient states he saw his PCP yesterday and they discussed his issues he is having with his kidneys at that time.

## 2019-11-15 ENCOUNTER — OFFICE VISIT (OUTPATIENT)
Dept: FAMILY MEDICINE CLINIC | Facility: CLINIC | Age: 84
End: 2019-11-15

## 2019-11-15 VITALS
SYSTOLIC BLOOD PRESSURE: 124 MMHG | TEMPERATURE: 97.6 F | BODY MASS INDEX: 26.8 KG/M2 | OXYGEN SATURATION: 96 % | HEIGHT: 71 IN | DIASTOLIC BLOOD PRESSURE: 88 MMHG | RESPIRATION RATE: 15 BRPM | WEIGHT: 191.4 LBS | HEART RATE: 69 BPM

## 2019-11-15 DIAGNOSIS — G47.00 INSOMNIA, UNSPECIFIED TYPE: ICD-10-CM

## 2019-11-15 DIAGNOSIS — R79.89 ELEVATED SERUM CREATININE: Primary | ICD-10-CM

## 2019-11-15 DIAGNOSIS — G47.34 NOCTURNAL HYPOXIA: ICD-10-CM

## 2019-11-15 PROCEDURE — 99214 OFFICE O/P EST MOD 30 MIN: CPT | Performed by: FAMILY MEDICINE

## 2019-11-15 NOTE — PROGRESS NOTES
"Chief Complaint   Patient presents with   • Shortness of Breath     Occurs during sleep, needs documentation for O2 order       Subjective   Home Wynn is a 87 y.o. male.     History of Present Illness   Worsening Cr  He drinks more water than he had before. He quit drinking tea. He is drinking about three large glasses 18 oz thinks he has been doing this for one month. He is supposed to be having a procedure with urology related to \"blockage\" from his \"left kidney\" and he has not heard when it is scheduled. He has called the urologist a couple of times. He was trying to contact the NP he saw with Dr. Nj and she is out of the office.     He is having trouble with sleep. Sometimes gets straight to sleep and other nights he stairs at the ceiling. He is not going and doing as much as he had, he has been feeling a little more tired and not like doing as much in the last month or so.     He wears oxygen overnight since 12/2018. He had studying in the hospital. There he was noted for have significant number of desats and for greater than 30 seconds. He had previously been recommended for oxygen and used for a while then self discontinued. When admitted in the hospital 12/2018 and had the concerns and study recommended to go back on the oxygen and he has been using routinely at night when in bed.     The following portions of the patient's history were reviewed and updated as appropriate: allergies, current medications, past medical history, past social history and problem list.    Review of Systems   Respiratory: Positive for shortness of breath.         During sleep    Cardiovascular: Negative.    Neurological: Negative.        /88 (BP Location: Left arm, Patient Position: Sitting, Cuff Size: Adult)   Pulse 69   Temp 97.6 °F (36.4 °C) (Oral)   Resp 15   Ht 180.3 cm (71\")   Wt 86.8 kg (191 lb 6.4 oz)   SpO2 96%   BMI 26.69 kg/m²       Objective   Physical Exam   Constitutional: He is oriented to " person, place, and time. He appears well-nourished. No distress.   Eyes: Conjunctivae are normal. No scleral icterus.   Pulmonary/Chest: Effort normal. No respiratory distress.   Neurological: He is alert and oriented to person, place, and time.   Psychiatric: He has a normal mood and affect. His behavior is normal.   Vitals reviewed.      Assessment/Plan   Home was seen today for shortness of breath.    Diagnoses and all orders for this visit:    Elevated serum creatinine    Insomnia, unspecified type    Nocturnal hypoxia      I have called and left a message at [facility] with my concerns for Mr. Wynn.  said she would get the message to Dr. Nj, that he was working today and that I would hear something today.     For the insomnia I recommended activity during the day and melatonin at night. Gave him information.     Nocturnal hypoxia improved with oxygen therapy overnight. He is using nasal canula. Required for multiple reasons including his a fib, hypertension.

## 2019-11-15 NOTE — PATIENT INSTRUCTIONS
Melatonin take 1-3 mg nightly about 2-3 hours before your desired bedtime. When you get sleepy get in the bed.     Iron therapy. Ferrous sulfate 325 or 324 mg. Take one pill daily. Take for three months and we will recheck your levels.

## 2019-11-22 ENCOUNTER — APPOINTMENT (OUTPATIENT)
Dept: PREADMISSION TESTING | Facility: HOSPITAL | Age: 84
End: 2019-11-22

## 2019-11-27 ENCOUNTER — APPOINTMENT (OUTPATIENT)
Dept: PREADMISSION TESTING | Facility: HOSPITAL | Age: 84
End: 2019-11-27

## 2019-11-27 VITALS
TEMPERATURE: 97.6 F | HEIGHT: 71 IN | SYSTOLIC BLOOD PRESSURE: 154 MMHG | OXYGEN SATURATION: 96 % | HEART RATE: 74 BPM | BODY MASS INDEX: 26.74 KG/M2 | RESPIRATION RATE: 20 BRPM | DIASTOLIC BLOOD PRESSURE: 83 MMHG | WEIGHT: 191 LBS

## 2019-11-27 LAB
ANION GAP SERPL CALCULATED.3IONS-SCNC: 10.5 MMOL/L (ref 5–15)
BUN BLD-MCNC: 22 MG/DL (ref 8–23)
BUN/CREAT SERPL: 15.1 (ref 7–25)
CALCIUM SPEC-SCNC: 9.2 MG/DL (ref 8.6–10.5)
CHLORIDE SERPL-SCNC: 104 MMOL/L (ref 98–107)
CO2 SERPL-SCNC: 26.5 MMOL/L (ref 22–29)
CREAT BLD-MCNC: 1.46 MG/DL (ref 0.76–1.27)
DEPRECATED RDW RBC AUTO: 48.3 FL (ref 37–54)
ERYTHROCYTE [DISTWIDTH] IN BLOOD BY AUTOMATED COUNT: 14.1 % (ref 12.3–15.4)
GFR SERPL CREATININE-BSD FRML MDRD: 46 ML/MIN/1.73
GLUCOSE BLD-MCNC: 205 MG/DL (ref 65–99)
HCT VFR BLD AUTO: 37.4 % (ref 37.5–51)
HGB BLD-MCNC: 12.1 G/DL (ref 13–17.7)
MCH RBC QN AUTO: 30.7 PG (ref 26.6–33)
MCHC RBC AUTO-ENTMCNC: 32.4 G/DL (ref 31.5–35.7)
MCV RBC AUTO: 94.9 FL (ref 79–97)
PLATELET # BLD AUTO: 132 10*3/MM3 (ref 140–450)
PMV BLD AUTO: 12.5 FL (ref 6–12)
POTASSIUM BLD-SCNC: 4.4 MMOL/L (ref 3.5–5.2)
RBC # BLD AUTO: 3.94 10*6/MM3 (ref 4.14–5.8)
SODIUM BLD-SCNC: 141 MMOL/L (ref 136–145)
WBC NRBC COR # BLD: 4.6 10*3/MM3 (ref 3.4–10.8)

## 2019-11-27 PROCEDURE — 85027 COMPLETE CBC AUTOMATED: CPT | Performed by: UROLOGY

## 2019-11-27 PROCEDURE — 36415 COLL VENOUS BLD VENIPUNCTURE: CPT

## 2019-11-27 PROCEDURE — 80048 BASIC METABOLIC PNL TOTAL CA: CPT | Performed by: UROLOGY

## 2019-11-27 RX ORDER — METOPROLOL SUCCINATE 25 MG/1
12.5 TABLET, EXTENDED RELEASE ORAL NIGHTLY
COMMUNITY
End: 2020-05-04

## 2019-12-02 NOTE — H&P
*   2019 - Anahi Steele (Signed-Off)      Patient: ALEM BELLAMY (Male) Date: 2019 at 10:30AM     : 1931 (87) PCP: MILO Frances) MD (FAX: [phone])     Account: [id number] Referring: NICOLE KILGORE M.D. (FAX: [phone])         Subjective    Chief Complaint  BPH / ELEVATED PSA  History of Present Illness  s/p meatotomy no longer using dilator   Hydroureternephrosis: CT (2019 @ [facility]) - new mild left hydroureteronephrosis with an abrupt transition in caliber within the pelvis adjacent to the bifurcation of the left common iliac artery. no renal or ureteral stones. may be seconarty to a ureteral stricture or a nonradiopaque stone.   pt was at [facility] for abdominal pain still having abdominal pain. pt states having increase in frequency. states this been ongoing for a while. nocturia x2-4. no dysuria. no gross hematuria  no prior hx of stones. no hx of smoking. I reviewed information above on 2019.   BPH: ALEM is a 87 year old, White, Male who presents today with symptoms consistent with BPH. The patient reports no family history of prostate cancer. Nocturia x 1-2. having urgency and some UI. takes a long time to empty out. having some weaker flow a little burning. no gorss hematuria sx slowly getting worse over the last several months looks like opening is getting smaller. I reviewed information above on 10/14/2019.   Elevated PSA: The elevated PSA was found by routine physical exam. The patient has the following prior PSAs:   : 0.36 ng/mL  The patient notes neg bx may 2005. on obs no psas dt age I reviewed information above on 10/14/2019.   Erectile Dysfunction: The patient notes stable erectile dysfunction and complains of poor rigidity and poor duration. on observation for ed I reviewed information above on 10/14/2019.  History  Past Medical History: Erection Problems; Heart Disease Flu vaccine Pneumococcal vaccine   Surgical History: tonsil and adnoids (1942);  blood clots (&); TUNA (); gallbladder (); A-FIB (); throat and nose (); hernia (); colonoscopy (); UTI ()   Hospitalization History: pancreatitis (2018); SAME AS SURGERIES   Social History: Current smoking status: Never smoker. Alcohol consumption status: never. ALEM denies the use of recreational drugs. The patient is sexually active. Does patient have an advanced care plan? Yes - Plan not documented. Does patient have power of  or surrogate decision maker? Yes Son- Gonzalo Wynn.   Family Medical History:   Brother has diabetes.   Brother is alive (DIABETES).   Complete PFSH reviewed from 10/14/2019, and there are no changes.  Other Treating Clinicians:   Primary Care Physician: Dr. Yodit Mahoney  Review of Systems   Constitutional: No Fever.   Immunologic: No Latex Allergy.   Neurological: No Decreased Sensation.   Gastrointestinal: No Constipation.   Respiratory: No Shortness of Breath, Current tobacco non-user.   Ear/Nose/Throat/Mouth: No Hearing Loss.   Hematologic: No Easy Bruising.   Musculoskeletal: No new bone pain.   Cardiovascular: No Chest Pain.   Genitourinary: Positive Leaking Urine or Wetting himself.   Pain: Patient reports no pain.   BMI: BMI is normal.   Current Meds: Documented.  Allergies   No Known Drug Allergies  Current Medications   Lantus subcutaneous solution - SOLUTION SUBCUTANEOUS   metoprolol whitney-hydrochlorothiaz   PreserVision AREDS   Xarelto    Objective    Vitals - 10:21 AM   Height: 5 ft 11 in   Weight: 180 lbs   BMI: 25.10   BSA: 2.02   Blood Pressure:   Sittin/63 mmHg - Left Upper Extremity; 85 Beats/Min  Urinalysis Results   Procedure: Automated urinalysis with microscopy   Urinalysis:   Color: Yellow   Clarity: Clear   Glucose: Negative   Bilirubin: Negative   Ketone: Negative   Specific Gravity: 1.015   Blood: moderate   pH: 5.5   Protein: Negative   Urobilinogens: 0.2 mg/100mL   Nitrates: Negative   Leukocytes: Trace  Urine Micro:    RBC: 0-2/ HPF   WBC: 0-2/ HPF  Physical Exam   Constitutional: General appearance is normal. Vitals: Reviewed. Ht-5 Ft.11.00 In. Wt-180 Lbs. BP-136/63.   Psychiatric: Orientation normal. Mood and affect normal.   Eyes: Pupils/irises normal. Exterior inspection conjunctivae and lids normal.   ENT: Hearing normal.   Skin: Inspection normal.   Musculoskeletal: Gait normal.   Respiratory: Normal effort.  Bladder Scan Results   Post Void Residual: 5 mL   R35.1 Nocturia    Assessment    Diagnosis   N134: Hydroureter   R351: Nocturia   N401: BPH with LUTS (obstruction)   Procedures   32055: Bladder Scan   53158: Urinalysis, by dip stick or tablet reagent for bilirubin, glucose, hemoglobin, ketones, leukocytes, nitrite, pH, protein, specific gravity, urobilinogen, any number of these constituents; automated, with microscopy   88526: Established Patient Office visit Expanded    Plan    Counseling  Today I discussed the following with ALEM:   General Issues:   The patient was asked to call if any new voiding symptoms or  issues arise.   The risks and complications of therapy were discussed with the patient.   Consulted with physician who agrees with changes to the care plan   All risks were discussed and all questions were answered   BPH - Follow up in 1 year.   Elevated PSA - No further PSA testing with age.   new hydro ureternephrosis - reviewed images. will proceed with cysto and rtg and possible uscope and stent. discussed pros, risk, cons,and complications pt voices understanding and wishes to proceed.  Orders   Cysto w/ Pyelogram for (Hydroureter)   SOAP note to be sent to PCP and referring provider

## 2019-12-03 ENCOUNTER — HOSPITAL ENCOUNTER (OUTPATIENT)
Facility: HOSPITAL | Age: 84
Setting detail: HOSPITAL OUTPATIENT SURGERY
Discharge: HOME | End: 2019-12-03
Attending: UROLOGY | Admitting: UROLOGY

## 2019-12-03 ENCOUNTER — ANESTHESIA EVENT (OUTPATIENT)
Dept: PERIOP | Facility: HOSPITAL | Age: 84
End: 2019-12-03

## 2019-12-03 ENCOUNTER — APPOINTMENT (OUTPATIENT)
Dept: GENERAL RADIOLOGY | Facility: HOSPITAL | Age: 84
End: 2019-12-03

## 2019-12-03 ENCOUNTER — ANESTHESIA (OUTPATIENT)
Dept: PERIOP | Facility: HOSPITAL | Age: 84
End: 2019-12-03

## 2019-12-03 VITALS
RESPIRATION RATE: 16 BRPM | SYSTOLIC BLOOD PRESSURE: 134 MMHG | HEIGHT: 71 IN | OXYGEN SATURATION: 94 % | DIASTOLIC BLOOD PRESSURE: 88 MMHG | BODY MASS INDEX: 26.94 KG/M2 | TEMPERATURE: 97.6 F | WEIGHT: 192.44 LBS | HEART RATE: 81 BPM

## 2019-12-03 LAB — GLUCOSE BLDC GLUCOMTR-MCNC: 133 MG/DL (ref 70–130)

## 2019-12-03 PROCEDURE — 25010000002 PROPOFOL 10 MG/ML EMULSION: Performed by: NURSE ANESTHETIST, CERTIFIED REGISTERED

## 2019-12-03 PROCEDURE — 74420 UROGRAPHY RTRGR +-KUB: CPT

## 2019-12-03 PROCEDURE — 25010000002 FENTANYL CITRATE (PF) 100 MCG/2ML SOLUTION: Performed by: NURSE ANESTHETIST, CERTIFIED REGISTERED

## 2019-12-03 PROCEDURE — C1769 GUIDE WIRE: HCPCS | Performed by: UROLOGY

## 2019-12-03 PROCEDURE — 25010000002 ONDANSETRON PER 1 MG: Performed by: NURSE ANESTHETIST, CERTIFIED REGISTERED

## 2019-12-03 PROCEDURE — C2617 STENT, NON-COR, TEM W/O DEL: HCPCS | Performed by: UROLOGY

## 2019-12-03 PROCEDURE — 82962 GLUCOSE BLOOD TEST: CPT

## 2019-12-03 PROCEDURE — 25010000002 DEXAMETHASONE PER 1 MG: Performed by: NURSE ANESTHETIST, CERTIFIED REGISTERED

## 2019-12-03 PROCEDURE — C1726 CATH, BAL DIL, NON-VASCULAR: HCPCS | Performed by: UROLOGY

## 2019-12-03 PROCEDURE — 0 IOTHALAMATE 60 % SOLUTION: Performed by: UROLOGY

## 2019-12-03 PROCEDURE — 71045 X-RAY EXAM CHEST 1 VIEW: CPT

## 2019-12-03 PROCEDURE — 25010000003 CEFAZOLIN IN DEXTROSE 2-4 GM/100ML-% SOLUTION: Performed by: UROLOGY

## 2019-12-03 DEVICE — URETERAL STENT
Type: IMPLANTABLE DEVICE | Site: URETER | Status: FUNCTIONAL
Brand: CONTOUR™

## 2019-12-03 RX ORDER — SODIUM CHLORIDE 0.9 % (FLUSH) 0.9 %
3-10 SYRINGE (ML) INJECTION AS NEEDED
Status: DISCONTINUED | OUTPATIENT
Start: 2019-12-03 | End: 2019-12-03 | Stop reason: HOSPADM

## 2019-12-03 RX ORDER — FENTANYL CITRATE 50 UG/ML
50 INJECTION, SOLUTION INTRAMUSCULAR; INTRAVENOUS
Status: DISCONTINUED | OUTPATIENT
Start: 2019-12-03 | End: 2019-12-03 | Stop reason: HOSPADM

## 2019-12-03 RX ORDER — PROMETHAZINE HYDROCHLORIDE 25 MG/1
25 TABLET ORAL ONCE AS NEEDED
Status: DISCONTINUED | OUTPATIENT
Start: 2019-12-03 | End: 2019-12-03 | Stop reason: HOSPADM

## 2019-12-03 RX ORDER — ONDANSETRON 2 MG/ML
INJECTION INTRAMUSCULAR; INTRAVENOUS AS NEEDED
Status: DISCONTINUED | OUTPATIENT
Start: 2019-12-03 | End: 2019-12-03 | Stop reason: SURG

## 2019-12-03 RX ORDER — HYDROMORPHONE HYDROCHLORIDE 1 MG/ML
0.5 INJECTION, SOLUTION INTRAMUSCULAR; INTRAVENOUS; SUBCUTANEOUS
Status: DISCONTINUED | OUTPATIENT
Start: 2019-12-03 | End: 2019-12-03 | Stop reason: HOSPADM

## 2019-12-03 RX ORDER — LIDOCAINE HYDROCHLORIDE 20 MG/ML
INJECTION, SOLUTION INFILTRATION; PERINEURAL AS NEEDED
Status: DISCONTINUED | OUTPATIENT
Start: 2019-12-03 | End: 2019-12-03 | Stop reason: SURG

## 2019-12-03 RX ORDER — EPHEDRINE SULFATE 50 MG/ML
5 INJECTION, SOLUTION INTRAVENOUS ONCE AS NEEDED
Status: DISCONTINUED | OUTPATIENT
Start: 2019-12-03 | End: 2019-12-03 | Stop reason: HOSPADM

## 2019-12-03 RX ORDER — PROMETHAZINE HYDROCHLORIDE 25 MG/ML
6.25 INJECTION, SOLUTION INTRAMUSCULAR; INTRAVENOUS
Status: DISCONTINUED | OUTPATIENT
Start: 2019-12-03 | End: 2019-12-03 | Stop reason: HOSPADM

## 2019-12-03 RX ORDER — DIPHENHYDRAMINE HYDROCHLORIDE 50 MG/ML
12.5 INJECTION INTRAMUSCULAR; INTRAVENOUS
Status: DISCONTINUED | OUTPATIENT
Start: 2019-12-03 | End: 2019-12-03 | Stop reason: HOSPADM

## 2019-12-03 RX ORDER — PROMETHAZINE HYDROCHLORIDE 25 MG/ML
12.5 INJECTION, SOLUTION INTRAMUSCULAR; INTRAVENOUS ONCE AS NEEDED
Status: DISCONTINUED | OUTPATIENT
Start: 2019-12-03 | End: 2019-12-03 | Stop reason: HOSPADM

## 2019-12-03 RX ORDER — FENTANYL CITRATE 50 UG/ML
INJECTION, SOLUTION INTRAMUSCULAR; INTRAVENOUS AS NEEDED
Status: DISCONTINUED | OUTPATIENT
Start: 2019-12-03 | End: 2019-12-03 | Stop reason: SURG

## 2019-12-03 RX ORDER — PROMETHAZINE HYDROCHLORIDE 25 MG/1
25 SUPPOSITORY RECTAL ONCE AS NEEDED
Status: DISCONTINUED | OUTPATIENT
Start: 2019-12-03 | End: 2019-12-03 | Stop reason: HOSPADM

## 2019-12-03 RX ORDER — DIPHENHYDRAMINE HCL 25 MG
25 CAPSULE ORAL
Status: DISCONTINUED | OUTPATIENT
Start: 2019-12-03 | End: 2019-12-03 | Stop reason: HOSPADM

## 2019-12-03 RX ORDER — CEFAZOLIN SODIUM 2 G/100ML
2 INJECTION, SOLUTION INTRAVENOUS
Status: COMPLETED | OUTPATIENT
Start: 2019-12-03 | End: 2019-12-03

## 2019-12-03 RX ORDER — HYDROCODONE BITARTRATE AND ACETAMINOPHEN 7.5; 325 MG/1; MG/1
1 TABLET ORAL ONCE AS NEEDED
Status: DISCONTINUED | OUTPATIENT
Start: 2019-12-03 | End: 2019-12-03 | Stop reason: HOSPADM

## 2019-12-03 RX ORDER — FLUMAZENIL 0.1 MG/ML
0.2 INJECTION INTRAVENOUS AS NEEDED
Status: DISCONTINUED | OUTPATIENT
Start: 2019-12-03 | End: 2019-12-03 | Stop reason: HOSPADM

## 2019-12-03 RX ORDER — ONDANSETRON 2 MG/ML
4 INJECTION INTRAMUSCULAR; INTRAVENOUS ONCE AS NEEDED
Status: DISCONTINUED | OUTPATIENT
Start: 2019-12-03 | End: 2019-12-03 | Stop reason: HOSPADM

## 2019-12-03 RX ORDER — HYDRALAZINE HYDROCHLORIDE 20 MG/ML
5 INJECTION INTRAMUSCULAR; INTRAVENOUS
Status: DISCONTINUED | OUTPATIENT
Start: 2019-12-03 | End: 2019-12-03 | Stop reason: HOSPADM

## 2019-12-03 RX ORDER — SODIUM CHLORIDE 0.9 % (FLUSH) 0.9 %
3 SYRINGE (ML) INJECTION EVERY 12 HOURS SCHEDULED
Status: DISCONTINUED | OUTPATIENT
Start: 2019-12-03 | End: 2019-12-03 | Stop reason: HOSPADM

## 2019-12-03 RX ORDER — DEXAMETHASONE SODIUM PHOSPHATE 10 MG/ML
INJECTION INTRAMUSCULAR; INTRAVENOUS AS NEEDED
Status: DISCONTINUED | OUTPATIENT
Start: 2019-12-03 | End: 2019-12-03 | Stop reason: SURG

## 2019-12-03 RX ORDER — LIDOCAINE HYDROCHLORIDE 10 MG/ML
0.5 INJECTION, SOLUTION EPIDURAL; INFILTRATION; INTRACAUDAL; PERINEURAL ONCE AS NEEDED
Status: DISCONTINUED | OUTPATIENT
Start: 2019-12-03 | End: 2019-12-03 | Stop reason: HOSPADM

## 2019-12-03 RX ORDER — OXYCODONE AND ACETAMINOPHEN 7.5; 325 MG/1; MG/1
1 TABLET ORAL ONCE AS NEEDED
Status: DISCONTINUED | OUTPATIENT
Start: 2019-12-03 | End: 2019-12-03 | Stop reason: HOSPADM

## 2019-12-03 RX ORDER — PROPOFOL 10 MG/ML
VIAL (ML) INTRAVENOUS AS NEEDED
Status: DISCONTINUED | OUTPATIENT
Start: 2019-12-03 | End: 2019-12-03 | Stop reason: SURG

## 2019-12-03 RX ORDER — MAGNESIUM HYDROXIDE 1200 MG/15ML
LIQUID ORAL AS NEEDED
Status: DISCONTINUED | OUTPATIENT
Start: 2019-12-03 | End: 2019-12-03 | Stop reason: HOSPADM

## 2019-12-03 RX ORDER — SODIUM CHLORIDE, SODIUM LACTATE, POTASSIUM CHLORIDE, CALCIUM CHLORIDE 600; 310; 30; 20 MG/100ML; MG/100ML; MG/100ML; MG/100ML
9 INJECTION, SOLUTION INTRAVENOUS CONTINUOUS
Status: DISCONTINUED | OUTPATIENT
Start: 2019-12-03 | End: 2019-12-03 | Stop reason: HOSPADM

## 2019-12-03 RX ORDER — FAMOTIDINE 10 MG/ML
20 INJECTION, SOLUTION INTRAVENOUS ONCE
Status: COMPLETED | OUTPATIENT
Start: 2019-12-03 | End: 2019-12-03

## 2019-12-03 RX ORDER — NALOXONE HCL 0.4 MG/ML
0.2 VIAL (ML) INJECTION AS NEEDED
Status: DISCONTINUED | OUTPATIENT
Start: 2019-12-03 | End: 2019-12-03 | Stop reason: HOSPADM

## 2019-12-03 RX ORDER — LABETALOL HYDROCHLORIDE 5 MG/ML
5 INJECTION, SOLUTION INTRAVENOUS
Status: DISCONTINUED | OUTPATIENT
Start: 2019-12-03 | End: 2019-12-03 | Stop reason: HOSPADM

## 2019-12-03 RX ORDER — ACETAMINOPHEN 325 MG/1
650 TABLET ORAL ONCE AS NEEDED
Status: DISCONTINUED | OUTPATIENT
Start: 2019-12-03 | End: 2019-12-03 | Stop reason: HOSPADM

## 2019-12-03 RX ADMIN — FAMOTIDINE 20 MG: 10 INJECTION INTRAVENOUS at 08:28

## 2019-12-03 RX ADMIN — CEFAZOLIN SODIUM 2 G: 2 INJECTION, SOLUTION INTRAVENOUS at 08:42

## 2019-12-03 RX ADMIN — ONDANSETRON HYDROCHLORIDE 4 MG: 2 SOLUTION INTRAMUSCULAR; INTRAVENOUS at 08:49

## 2019-12-03 RX ADMIN — FENTANYL CITRATE 50 MCG: 50 INJECTION INTRAMUSCULAR; INTRAVENOUS at 08:52

## 2019-12-03 RX ADMIN — SODIUM CHLORIDE, POTASSIUM CHLORIDE, SODIUM LACTATE AND CALCIUM CHLORIDE 9 ML/HR: 600; 310; 30; 20 INJECTION, SOLUTION INTRAVENOUS at 08:28

## 2019-12-03 RX ADMIN — LIDOCAINE HYDROCHLORIDE 80 MG: 20 INJECTION, SOLUTION INFILTRATION; PERINEURAL at 08:41

## 2019-12-03 RX ADMIN — PROPOFOL 150 MG: 10 INJECTION, EMULSION INTRAVENOUS at 08:41

## 2019-12-03 RX ADMIN — FENTANYL CITRATE 50 MCG: 50 INJECTION INTRAMUSCULAR; INTRAVENOUS at 09:12

## 2019-12-03 RX ADMIN — DEXAMETHASONE SODIUM PHOSPHATE 4 MG: 10 INJECTION INTRAMUSCULAR; INTRAVENOUS at 08:49

## 2019-12-03 NOTE — BRIEF OP NOTE
CYSTOSCOPY  Progress Note    Home Wynn  12/3/2019    Pre-op Diagnosis:   Left hydro       Post-Op Diagnosis Codes:   same    Procedure/CPT® Codes:      Procedure(s):  CYSTOSCOPY WITH PYELOGRAM URETEROSCOPY WITH LEFT STENT AND DILATION OF URETERAL STRICTURE    Surgeon(s):  Ceasar Nj MD    Anesthesia: General    Staff:   Circulator: Mary Castillo RN; Mackenzie Ordonez RN  Scrub Person: Angelito Vazquez    Estimated Blood Loss: 0 mL    Urine Voided: 0 mL    Specimens:                None          Drains:   Ureteral Drain/Stent Left ureter 6 Fr. (Active)       Findings: left stx    Complications: none      Ceasar Nj MD     Date: 12/3/2019  Time: 9:28 AM

## 2019-12-03 NOTE — ANESTHESIA PREPROCEDURE EVALUATION
Anesthesia Evaluation     Patient summary reviewed and Nursing notes reviewed   NPO Solid Status: > 8 hours  NPO Liquid Status: > 2 hours           Airway   Mallampati: II  TM distance: >3 FB  Neck ROM: limited  Possible difficult intubation  Dental - normal exam     Pulmonary - normal exam    breath sounds clear to auscultation  (+) shortness of breath, sleep apnea,   Cardiovascular     ECG reviewed  Rhythm: irregular  Rate: normal    (+) hypertension, dysrhythmias Paroxysmal Atrial Fib, CHAN, DVT resolved, hyperlipidemia,   (-) angina, orthopnea, PND      Neuro/Psych  (+) dizziness/light headedness, syncope, numbness (Peripheral neuropathy),     GI/Hepatic/Renal/Endo    (+)   renal disease (Cr = 1.46) CRI, diabetes mellitus type 2 using insulin,     Musculoskeletal     (+) myalgias, neck pain (Cervical spinal stenosis),   Abdominal    Substance History - negative use     OB/GYN negative ob/gyn ROS         Other   arthritis,                    Anesthesia Plan    ASA 3     general     intravenous induction     Anesthetic plan, all risks, benefits, and alternatives have been provided, discussed and informed consent has been obtained with: patient.

## 2019-12-03 NOTE — ANESTHESIA POSTPROCEDURE EVALUATION
Patient: Home Wynn    Procedure Summary     Date:  12/03/19 Room / Location:  [facility] OR  / [facility] MAIN OR    Anesthesia Start:  0838 Anesthesia Stop:  0927    Procedure:  CYSTOSCOPY WITH PYELOGRAM URETEROSCOPY WITH LEFT STENT AND DILATION OF URETERAL STRICTURE (Left Urethra) Diagnosis:      Surgeon:  Ceasar Nj MD Provider:  Jim Pino MD    Anesthesia Type:  general ASA Status:  3          Anesthesia Type: general  Last vitals  BP   97/65 (12/03/19 0922)   Temp   36.5 °C (97.7 °F) (12/03/19 0922)   Pulse   81 (12/03/19 0922)   Resp   16 (12/03/19 0922)     SpO2   92 % (12/03/19 0922)     Post Anesthesia Care and Evaluation    Patient location during evaluation: PACU  Patient participation: complete - patient participated  Level of consciousness: awake and alert  Pain management: adequate  Airway patency: patent  Anesthetic complications: No anesthetic complications    Cardiovascular status: acceptable  Respiratory status: acceptable  Hydration status: acceptable    Comments: ---------------------------               12/03/19 0922         ---------------------------   BP:           97/65         Pulse:         81           Resp:          16           Temp:   36.5 °C (97.7 °F)   SpO2:          92%         ---------------------------

## 2019-12-03 NOTE — ANESTHESIA PROCEDURE NOTES
Airway  Urgency: elective    Date/Time: 12/3/2019 8:43 AM  Airway not difficult    General Information and Staff    Patient location during procedure: OR  CRNA: Evi Chilel CRNA    Indications and Patient Condition  Indications for airway management: airway protection    Preoxygenated: yes  Mask difficulty assessment: 0 - not attempted    Final Airway Details  Final airway type: supraglottic airway      Successful airway: classic  Size 4    Number of attempts at approach: 1    Additional Comments  LMA inserted without difficulty.  Lips and teeth intact as preop condition.  No signs or symptoms of gastric regurgitation.  Seal with minimal pressure and secure.

## 2019-12-03 NOTE — OP NOTE
OPERATIVE DIAGNOSIS: New onset left hydronephrosis.    PROCEDURES PERFORMED:   1. Cystoscopy.   2. Dilation of ureteral stricture.   3. Double J stent placement on the left.     SURGEON: Ceasar Nj MD    COMPLICATIONS: None.    CONSULTATIONS: None.    FINDINGS: This patient appeared to have a narrowed segment of ureter roughly 5 to 10 cm long located over the iliac vessels.    INDICATIONS FOR PROCEDURE: This patient has had no previous upper tract genitourinary instrumentation or stone disease. He has no history of aortic aneurysm or vascular bypass procedure in the past. He has new onset left hydronephrosis at the level of the iliac vessels. There is no obvious extrinsic or intrinsic mass present on CT scan. He presents now for further evaluation and potential treatment. I have discussed the pros, cons, risks, and complications. He understands and wishes to proceed.     DESCRIPTION OF PROCEDURE:  The patient was brought in the operating room and placed on the operating table. General anesthesia was induced. Cystoscopy revealed some enlargement of the prostate and no other abnormalities. Bilateral retrograde pyelograms were performed.    The right retrograde pyelogram showed some angulation of the ureter over the iliac vessels but no obvious hydronephrosis or obstruction. The left ureter clearly showed a narrowed segment relatively 5-10 mm long located over the iliac vessels. Attempts to dilate this area with sequential dilators was unsuccessful. It was successfully dilated with a dilating balloon. Ureteroscopy revealed no obvious tumor in this area. The ureter did appear somewhat pale and fibrotic in this area. A 6-Bulgarian 26 cm double-J stent was placed and its position was confirmed fluoroscopically and endoscopically.     I have no obvious explanation for this patient's ureteral stricture. I plan on seeing the patient back in the office in 2 months with a followup CT scan to rule out an extrinsic  abnormality and plan on removing his stent in 2 months at the same visit.

## 2019-12-06 ENCOUNTER — TELEPHONE (OUTPATIENT)
Dept: CARDIOLOGY | Facility: CLINIC | Age: 84
End: 2019-12-06

## 2019-12-06 NOTE — TELEPHONE ENCOUNTER
Dr Tripathi,   Doesn't look like you were added to this message,   Please review note below  Mary Alarcon RN  Triage nurse

## 2019-12-09 ENCOUNTER — HOSPITAL ENCOUNTER (OUTPATIENT)
Dept: CARDIOLOGY | Facility: HOSPITAL | Age: 84
Discharge: HOME | End: 2019-12-09
Admitting: INTERNAL MEDICINE

## 2019-12-09 ENCOUNTER — OFFICE VISIT (OUTPATIENT)
Dept: CARDIOLOGY | Facility: CLINIC | Age: 84
End: 2019-12-09

## 2019-12-09 VITALS
SYSTOLIC BLOOD PRESSURE: 122 MMHG | HEART RATE: 85 BPM | WEIGHT: 192.4 LBS | DIASTOLIC BLOOD PRESSURE: 70 MMHG | BODY MASS INDEX: 26.94 KG/M2 | HEIGHT: 71 IN

## 2019-12-09 DIAGNOSIS — R06.02 SHORT OF BREATH ON EXERTION: Primary | ICD-10-CM

## 2019-12-09 DIAGNOSIS — R06.02 SHORT OF BREATH ON EXERTION: ICD-10-CM

## 2019-12-09 LAB
AORTIC ARCH: 2.5 CM
AORTIC ROOT ANNULUS: 2.3 CM
ASCENDING AORTA: 3.4 CM
BH CV ECHO MEAS - ACS: 1.8 CM
BH CV ECHO MEAS - AO MAX PG (FULL): 2.4 MMHG
BH CV ECHO MEAS - AO MAX PG: 3.8 MMHG
BH CV ECHO MEAS - AO MEAN PG (FULL): 1.4 MMHG
BH CV ECHO MEAS - AO MEAN PG: 2.1 MMHG
BH CV ECHO MEAS - AO V2 MAX: 97.7 CM/SEC
BH CV ECHO MEAS - AO V2 MEAN: 66.1 CM/SEC
BH CV ECHO MEAS - AO V2 VTI: 17.3 CM
BH CV ECHO MEAS - ASC AORTA: 3.4 CM
BH CV ECHO MEAS - AVA(I,A): 1.6 CM^2
BH CV ECHO MEAS - AVA(I,D): 1.6 CM^2
BH CV ECHO MEAS - AVA(V,A): 1.9 CM^2
BH CV ECHO MEAS - AVA(V,D): 1.9 CM^2
BH CV ECHO MEAS - BSA(HAYCOCK): 2.1 M^2
BH CV ECHO MEAS - BSA: 2.1 M^2
BH CV ECHO MEAS - BZI_BMI: 26.8 KILOGRAMS/M^2
BH CV ECHO MEAS - BZI_METRIC_HEIGHT: 180.3 CM
BH CV ECHO MEAS - BZI_METRIC_WEIGHT: 87.1 KG
BH CV ECHO MEAS - EDV(MOD-SP2): 226 ML
BH CV ECHO MEAS - EDV(MOD-SP4): 180 ML
BH CV ECHO MEAS - EDV(TEICH): 179 ML
BH CV ECHO MEAS - EF(CUBED): 32.6 %
BH CV ECHO MEAS - EF(MOD-BP): 19 %
BH CV ECHO MEAS - EF(MOD-SP2): 18.6 %
BH CV ECHO MEAS - EF(MOD-SP4): 19.4 %
BH CV ECHO MEAS - EF(TEICH): 26.1 %
BH CV ECHO MEAS - ESV(MOD-SP2): 184 ML
BH CV ECHO MEAS - ESV(MOD-SP4): 145 ML
BH CV ECHO MEAS - ESV(TEICH): 132.2 ML
BH CV ECHO MEAS - FS: 12.3 %
BH CV ECHO MEAS - IVS/LVPW: 0.9
BH CV ECHO MEAS - IVSD: 0.96 CM
BH CV ECHO MEAS - LAT PEAK E' VEL: 8 CM/SEC
BH CV ECHO MEAS - LV DIASTOLIC VOL/BSA (35-75): 86.8 ML/M^2
BH CV ECHO MEAS - LV MASS(C)D: 248.8 GRAMS
BH CV ECHO MEAS - LV MASS(C)DI: 120 GRAMS/M^2
BH CV ECHO MEAS - LV MAX PG: 1.4 MMHG
BH CV ECHO MEAS - LV MEAN PG: 0.71 MMHG
BH CV ECHO MEAS - LV SYSTOLIC VOL/BSA (12-30): 70 ML/M^2
BH CV ECHO MEAS - LV V1 MAX: 60 CM/SEC
BH CV ECHO MEAS - LV V1 MEAN: 38.1 CM/SEC
BH CV ECHO MEAS - LV V1 VTI: 8.8 CM
BH CV ECHO MEAS - LVIDD: 6 CM
BH CV ECHO MEAS - LVIDS: 5.2 CM
BH CV ECHO MEAS - LVLD AP2: 8.8 CM
BH CV ECHO MEAS - LVLD AP4: 8.5 CM
BH CV ECHO MEAS - LVLS AP2: 7.8 CM
BH CV ECHO MEAS - LVLS AP4: 8.4 CM
BH CV ECHO MEAS - LVOT AREA (M): 3.1 CM^2
BH CV ECHO MEAS - LVOT AREA: 3.1 CM^2
BH CV ECHO MEAS - LVOT DIAM: 2 CM
BH CV ECHO MEAS - LVPWD: 1.1 CM
BH CV ECHO MEAS - MED PEAK E' VEL: 5 CM/SEC
BH CV ECHO MEAS - MR MAX PG: 88.6 MMHG
BH CV ECHO MEAS - MR MAX VEL: 470.6 CM/SEC
BH CV ECHO MEAS - MV DEC SLOPE: 264.1 CM/SEC^2
BH CV ECHO MEAS - MV DEC TIME: 0.25 SEC
BH CV ECHO MEAS - MV E MAX VEL: 63 CM/SEC
BH CV ECHO MEAS - MV MAX PG: 2.9 MMHG
BH CV ECHO MEAS - MV MEAN PG: 1 MMHG
BH CV ECHO MEAS - MV P1/2T MAX VEL: 63.4 CM/SEC
BH CV ECHO MEAS - MV P1/2T: 70.3 MSEC
BH CV ECHO MEAS - MV V2 MAX: 85.1 CM/SEC
BH CV ECHO MEAS - MV V2 MEAN: 45.8 CM/SEC
BH CV ECHO MEAS - MV V2 VTI: 21.5 CM
BH CV ECHO MEAS - MVA P1/2T LCG: 3.5 CM^2
BH CV ECHO MEAS - MVA(P1/2T): 3.1 CM^2
BH CV ECHO MEAS - MVA(VTI): 1.3 CM^2
BH CV ECHO MEAS - PA ACC TIME: 0.11 SEC
BH CV ECHO MEAS - PA MAX PG (FULL): 4 MMHG
BH CV ECHO MEAS - PA MAX PG: 4.8 MMHG
BH CV ECHO MEAS - PA PR(ACCEL): 27.9 MMHG
BH CV ECHO MEAS - PA V2 MAX: 109.6 CM/SEC
BH CV ECHO MEAS - PVA(V,A): 1.7 CM^2
BH CV ECHO MEAS - PVA(V,D): 1.7 CM^2
BH CV ECHO MEAS - QP/QS: 1.3
BH CV ECHO MEAS - RAP SYSTOLE: 15 MMHG
BH CV ECHO MEAS - RV MAX PG: 0.78 MMHG
BH CV ECHO MEAS - RV MEAN PG: 0.49 MMHG
BH CV ECHO MEAS - RV V1 MAX: 44.1 CM/SEC
BH CV ECHO MEAS - RV V1 MEAN: 33.3 CM/SEC
BH CV ECHO MEAS - RV V1 VTI: 8.1 CM
BH CV ECHO MEAS - RVOT AREA: 4.2 CM^2
BH CV ECHO MEAS - RVOT DIAM: 2.3 CM
BH CV ECHO MEAS - RVSP: 62 MMHG
BH CV ECHO MEAS - SI(CUBED): 33.7 ML/M^2
BH CV ECHO MEAS - SI(LVOT): 13.1 ML/M^2
BH CV ECHO MEAS - SI(MOD-SP2): 20.3 ML/M^2
BH CV ECHO MEAS - SI(MOD-SP4): 16.9 ML/M^2
BH CV ECHO MEAS - SI(TEICH): 22.6 ML/M^2
BH CV ECHO MEAS - SUP REN AO DIAM: 2 CM
BH CV ECHO MEAS - SV(CUBED): 69.9 ML
BH CV ECHO MEAS - SV(LVOT): 27.2 ML
BH CV ECHO MEAS - SV(MOD-SP2): 42 ML
BH CV ECHO MEAS - SV(MOD-SP4): 35 ML
BH CV ECHO MEAS - SV(RVOT): 34.1 ML
BH CV ECHO MEAS - SV(TEICH): 46.8 ML
BH CV ECHO MEAS - TAPSE (>1.6): 1.4 CM2
BH CV ECHO MEAS - TR MAX VEL: 285.8 CM/SEC
BH CV ECHO MEASUREMENTS AVERAGE E/E' RATIO: 9.69
BH CV XLRA - RV BASE: 4.7 CM
BH CV XLRA - RV LENGTH: 7.6 CM
BH CV XLRA - RV MID: 3.2 CM
BH CV XLRA - TDI S': 11 CM/SEC
LEFT ATRIUM VOLUME INDEX: 62 ML/M2
MAXIMAL PREDICTED HEART RATE: 132 BPM
SINUS: 3.7 CM
STJ: 3.1 CM
STRESS TARGET HR: 112 BPM

## 2019-12-09 PROCEDURE — 25010000002 PERFLUTREN (DEFINITY) 8.476 MG IN SODIUM CHLORIDE 0.9 % 10 ML INJECTION: Performed by: INTERNAL MEDICINE

## 2019-12-09 PROCEDURE — 93306 TTE W/DOPPLER COMPLETE: CPT | Performed by: INTERNAL MEDICINE

## 2019-12-09 PROCEDURE — 93306 TTE W/DOPPLER COMPLETE: CPT

## 2019-12-09 PROCEDURE — 93000 ELECTROCARDIOGRAM COMPLETE: CPT | Performed by: INTERNAL MEDICINE

## 2019-12-09 PROCEDURE — 99214 OFFICE O/P EST MOD 30 MIN: CPT | Performed by: INTERNAL MEDICINE

## 2019-12-09 RX ADMIN — PERFLUTREN 1.5 ML: 6.52 INJECTION, SUSPENSION INTRAVENOUS at 12:29

## 2019-12-10 NOTE — PROGRESS NOTES
Subjective:     Encounter Date:12/09/19      Patient ID: Home Wynn is a 88 y.o. male.    Chief Complaint:  Shortness of Breath   Associated symptoms include syncope. Pertinent negatives include no chest pain.   Atrial Fibrillation   Presents for follow-up visit. Symptoms include shortness of breath and syncope. Symptoms are negative for bradycardia, chest pain, dizziness, hypertension, hypotension, pacemaker problem, palpitations, tachycardia and weakness. The symptoms have been stable. Past medical history includes atrial fibrillation.   Hypertension   This is a chronic problem. The problem is controlled. Associated symptoms include malaise/fatigue and shortness of breath. Pertinent negatives include no chest pain or palpitations.       88-year-old gentleman who was seen today for worsening problems.  He has been choking easily coughing a lot and has been more more short of breath.  He says he is short of breath at baseline as well as with exertion.  He says he is also had decreased appetite.  He saw his gastroenterologist which really did not have a etiology but with his continued issue he called the office is worked in my schedule today.    Review of Systems   Constitution: Positive for chills, decreased appetite and malaise/fatigue.   Cardiovascular: Positive for syncope. Negative for chest pain and palpitations.   Respiratory: Positive for shortness of breath and snoring.    Neurological: Negative for dizziness and weakness.   All other systems reviewed and are negative.        ECG 12 Lead  Date/Time: 12/9/2019 11:15 AM  Performed by: Aaron Tripathi MD  Authorized by: Aaron Tripathi MD   Comparison: compared with previous ECG from 8/28/2019  Similar to previous ECG  Rhythm: atrial fibrillation  Conduction: left bundle branch block    Clinical impression: abnormal EKG               Objective:     Physical Exam   Constitutional: He is oriented to person, place, and time. He appears  well-developed.   HENT:   Head: Normocephalic.   Eyes: Conjunctivae are normal.   Neck: Normal range of motion.   Cardiovascular: Normal rate and normal heart sounds. An irregularly irregular rhythm present.   Pulmonary/Chest: Breath sounds normal.   Abdominal: Soft. Bowel sounds are normal.   Musculoskeletal: Normal range of motion. He exhibits no edema.   Neurological: He is alert and oriented to person, place, and time.   Skin: Skin is warm and dry.   Psychiatric: He has a normal mood and affect. His behavior is normal.   Vitals reviewed.      Lab Review:       Assessment:          Diagnosis Plan   1. Short of breath on exertion  Adult Transthoracic Echo Complete W/ Cont if Necessary Per Protocol    Holter Monitor - 24 Hour    Stress Test With Myocardial Perfusion One Day          Plan:       1.  Chronic atrial fibrillation stable  2.  Hypertension blood pressure is good  3.  Patient is been having increasing shortness of breath and actually signs of heart failure.  With his known atrial fibrillation I placed a monitor to assess his heart rates.  I also set him up for a stress test as well as an echocardiogram to see if there is anything that has changed from a cardiovascular standpoint.    Atrial Fibrillation and Atrial Flutter  Assessment  • The patient has paroxysmal atrial fibrillation  • This is non-valvular in etiology  • The patient's CHADS2-VASc score is 2  • A [id number] score of 2 or more is considered a high risk for a thromboembolic event  • Warfarin not prescribed for medical reasons  • Rivaroxaban prescribed    Plan  • Continue in atrial fibrillation with rate control  • Continue rivaroxaban for antithrombotic therapy, bleeding issues discussed  • Add beta blocker for rate control

## 2019-12-11 ENCOUNTER — OFFICE VISIT (OUTPATIENT)
Dept: GASTROENTEROLOGY | Facility: CLINIC | Age: 84
End: 2019-12-11

## 2019-12-11 VITALS
SYSTOLIC BLOOD PRESSURE: 130 MMHG | TEMPERATURE: 97.5 F | DIASTOLIC BLOOD PRESSURE: 84 MMHG | WEIGHT: 190 LBS | BODY MASS INDEX: 26.6 KG/M2 | HEIGHT: 71 IN

## 2019-12-11 DIAGNOSIS — Z87.19 HISTORY OF ACUTE PANCREATITIS: ICD-10-CM

## 2019-12-11 DIAGNOSIS — R14.0 ABDOMINAL BLOATING: ICD-10-CM

## 2019-12-11 DIAGNOSIS — D50.9 IRON DEFICIENCY ANEMIA, UNSPECIFIED IRON DEFICIENCY ANEMIA TYPE: ICD-10-CM

## 2019-12-11 DIAGNOSIS — R10.30 LOWER ABDOMINAL PAIN: Primary | ICD-10-CM

## 2019-12-11 PROCEDURE — 99214 OFFICE O/P EST MOD 30 MIN: CPT | Performed by: NURSE PRACTITIONER

## 2019-12-11 NOTE — PROGRESS NOTES
Chief Complaint   Patient presents with   • Follow-up   • Abdominal Pain       Home Wynn is a  88 y.o. male here for a follow up visit for abdominal pain.    HPI  88-year-old male presents today accompanied by his wife for follow-up visit for abdominal pain.  He is a patient of Dr. Rosales.  He was last seen in the office on 11/7/2019.  He recently had a CT scan of the abdomen and pelvis done that showed a ureteral stricture and was recently seen by urology where he had a stent placed.  He goes back in a couple weeks to remove the stent and have further work-up to see why he had the stricture in the first place.  No obvious tumor or mass was noted.  He does admit that his abdominal pain that he was having is much better now.  His bowels are moving pretty good.  He takes a daily stool softener and admits he might have a bowel movement every other day.  He did have labs done at his last visit that did show some low iron so he has started a daily iron supplement.  He also is a history of a one-time episode of acute pancreatitis.  Likely he has had no further episodes since then.  Most recently though over the last couple of weeks he has been having worsening shortness of breath and is being followed up by cardiology.  He does have a history of chronic renal disease as well as atrial fib and takes Xarelto.  He denies any dysphasia, reflux, nausea and vomiting, diarrhea, rectal bleeding or melena.  He admits his appetite is okay and his weight is stable.  His last colonoscopy was in 2011.  At this point he tells me his biggest issues are cardiac in nature but he does have some issues can still with daily bloating and distention.  He has not tried taking any Gas-X over-the-counter.  Past Medical History:   Diagnosis Date   • Abnormal chest x-ray 12/12/2017    Infiltrate or opacities left heart border, right lung 12/12/2017   • Abnormal electrocardiogram    • Acute pancreatitis 12/27/2018   • Acute renal failure  (CMS/[facility])    • Atrial fibrillation (CMS/HCC)    • Cellulitis of right lower extremity 7/11/2017   • Chronic kidney disease     renal failure   • Deep vein thrombosis (CMS/HCC)     acute   • Diabetes mellitus (CMS/HCC)    • Ecchymosis    • Generalized weakness    • Hyperlipidemia    • Hypertension    • Insulin dependent diabetes mellitus (CMS/HCC)    • Malaise and fatigue    • Multiple falls    • Myalgia    • JESSICA (obstructive sleep apnea)    • Osteoarthritis of knee    • PAF (paroxysmal atrial fibrillation) (CMS/[facility])    • Pancreatitis    • Peripheral neuropathy    • Rib fracture    • Right leg weakness    • Sleep apnea    • SOB (shortness of breath)    • Syncope    • Type 2 diabetes mellitus (CMS/HCC)    • Vallecular mass    • Vertigo        Past Surgical History:   Procedure Laterality Date   • CATARACT EXTRACTION     • CHOLECYSTECTOMY  2010   • COLONOSCOPY  04/13/2011   • CYSTOSCOPY Left 12/3/2019    Procedure: CYSTOSCOPY WITH PYELOGRAM URETEROSCOPY WITH LEFT STENT AND DILATION OF URETERAL STRICTURE;  Surgeon: Ceasar Nj MD;  Location: [facility];  Service: Urology   • EYE SURGERY      cataract surg   • HERNIA REPAIR     • PROSTATE SURGERY     • TONSILLECTOMY     • UVULECTOMY         Scheduled Meds:    Continuous Infusions:  No current facility-administered medications for this visit.     PRN Meds:.    No Known Allergies    Social History     Socioeconomic History   • Marital status:      Spouse name: Not on file   • Number of children: 2   • Years of education: 12   • Highest education level: Not on file   Occupational History   • Occupation: RETIRED   Tobacco Use   • Smoking status: Never Smoker   • Smokeless tobacco: Never Used   • Tobacco comment: caffine use 2 cups daily   Substance and Sexual Activity   • Alcohol use: No   • Drug use: No   • Sexual activity: Defer   Social History Narrative    LIVES WITH WIFE       Family History   Problem Relation Age of Onset   • Cancer Mother    • Stroke  Father    • Cancer Sister    • Heart disease Sister    • Malig Hyperthermia Neg Hx        Review of Systems   Constitutional: Negative for appetite change, chills, diaphoresis, fatigue, fever and unexpected weight change.   HENT: Negative for nosebleeds, postnasal drip, sore throat, trouble swallowing and voice change.    Respiratory: Positive for shortness of breath. Negative for cough, choking, chest tightness, wheezing and stridor.    Cardiovascular: Negative for chest pain, palpitations and leg swelling.   Gastrointestinal: Positive for abdominal distention. Negative for abdominal pain, anal bleeding, blood in stool, constipation, diarrhea, nausea, rectal pain and vomiting.   Endocrine: Negative for polydipsia, polyphagia and polyuria.   Genitourinary: Positive for frequency and urgency.   Musculoskeletal: Negative for gait problem.   Skin: Negative for rash and wound.   Allergic/Immunologic: Negative for food allergies.   Neurological: Negative for dizziness, speech difficulty and light-headedness.   Psychiatric/Behavioral: Negative for confusion, self-injury, sleep disturbance and suicidal ideas.       Vitals:    12/11/19 1127   BP: 130/84   Temp: 97.5 °F (36.4 °C)       Physical Exam   Constitutional: He is oriented to person, place, and time. He appears well-developed and well-nourished. He does not appear ill. No distress.   HENT:   Head: Normocephalic.   Eyes: Pupils are equal, round, and reactive to light.   Cardiovascular: Normal rate, regular rhythm and normal heart sounds.   Pulmonary/Chest: Effort normal and breath sounds normal.   Abdominal: Soft. Bowel sounds are normal. He exhibits distension. He exhibits no mass. There is no hepatosplenomegaly. There is no tenderness. There is no rebound and no guarding. No hernia.   Musculoskeletal: Normal range of motion.   Neurological: He is alert and oriented to person, place, and time.   Skin: Skin is warm and dry.   Psychiatric: He has a normal mood and  affect. His speech is normal and behavior is normal. Judgment normal.       No images are attached to the encounter.    Assessment and plan     1. Lower abdominal pain    2. Abdominal bloating    3. Iron deficiency anemia, unspecified iron deficiency anemia type    4. History of acute pancreatitis    Reviewed his most recent labs with him today.  Continue iron supplement for now.  Patient to have follow-up with cardiology to continue work-up for his new onset of shortness of breath and weakness.  Bowels seem to be moving okay but I do recommend that he switch to MiraLAX if he is staying more constipated especially now that he is taking the iron supplement.  Abdominal pain seems to be better but bloating still continues to be an issue.  This could be secondary to his constipation.  I do recommend that he trial some Gas-X OTC as needed.  Patient to call the office with any issues.  I will get the patient to follow-up with Dr. Rosales in 1 to 2 months.

## 2019-12-12 ENCOUNTER — APPOINTMENT (OUTPATIENT)
Dept: GENERAL RADIOLOGY | Facility: HOSPITAL | Age: 84
End: 2019-12-12

## 2019-12-12 ENCOUNTER — TELEPHONE (OUTPATIENT)
Dept: GASTROENTEROLOGY | Facility: CLINIC | Age: 84
End: 2019-12-12

## 2019-12-12 ENCOUNTER — TELEPHONE (OUTPATIENT)
Dept: CARDIOLOGY | Facility: CLINIC | Age: 84
End: 2019-12-12

## 2019-12-12 ENCOUNTER — HOSPITAL ENCOUNTER (EMERGENCY)
Facility: HOSPITAL | Age: 84
Discharge: HOME | End: 2019-12-12
Attending: EMERGENCY MEDICINE | Admitting: EMERGENCY MEDICINE

## 2019-12-12 VITALS
SYSTOLIC BLOOD PRESSURE: 141 MMHG | WEIGHT: 190 LBS | DIASTOLIC BLOOD PRESSURE: 90 MMHG | BODY MASS INDEX: 26.6 KG/M2 | OXYGEN SATURATION: 98 % | HEIGHT: 71 IN | TEMPERATURE: 97.7 F | HEART RATE: 75 BPM | RESPIRATION RATE: 15 BRPM

## 2019-12-12 DIAGNOSIS — I50.9 ACUTE CONGESTIVE HEART FAILURE, UNSPECIFIED HEART FAILURE TYPE (HCC): Primary | ICD-10-CM

## 2019-12-12 LAB
ALBUMIN SERPL-MCNC: 4.1 G/DL (ref 3.5–5.2)
ALBUMIN/GLOB SERPL: 1.1 G/DL
ALP SERPL-CCNC: 79 U/L (ref 39–117)
ALT SERPL W P-5'-P-CCNC: 15 U/L (ref 1–41)
ANION GAP SERPL CALCULATED.3IONS-SCNC: 12.8 MMOL/L (ref 5–15)
AST SERPL-CCNC: 21 U/L (ref 1–40)
BASOPHILS # BLD AUTO: 0.02 10*3/MM3 (ref 0–0.2)
BASOPHILS NFR BLD AUTO: 0.4 % (ref 0–1.5)
BILIRUB SERPL-MCNC: 0.8 MG/DL (ref 0.2–1.2)
BUN BLD-MCNC: 29 MG/DL (ref 8–23)
BUN/CREAT SERPL: 20.4 (ref 7–25)
CALCIUM SPEC-SCNC: 9.3 MG/DL (ref 8.6–10.5)
CHLORIDE SERPL-SCNC: 101 MMOL/L (ref 98–107)
CO2 SERPL-SCNC: 25.2 MMOL/L (ref 22–29)
CREAT BLD-MCNC: 1.42 MG/DL (ref 0.76–1.27)
DEPRECATED RDW RBC AUTO: 49.8 FL (ref 37–54)
EOSINOPHIL # BLD AUTO: 0.02 10*3/MM3 (ref 0–0.4)
EOSINOPHIL NFR BLD AUTO: 0.4 % (ref 0.3–6.2)
ERYTHROCYTE [DISTWIDTH] IN BLOOD BY AUTOMATED COUNT: 14.4 % (ref 12.3–15.4)
GFR SERPL CREATININE-BSD FRML MDRD: 47 ML/MIN/1.73
GLOBULIN UR ELPH-MCNC: 3.6 GM/DL
GLUCOSE BLD-MCNC: 94 MG/DL (ref 65–99)
HCT VFR BLD AUTO: 38.1 % (ref 37.5–51)
HGB BLD-MCNC: 12.7 G/DL (ref 13–17.7)
HOLD SPECIMEN: NORMAL
HOLD SPECIMEN: NORMAL
IMM GRANULOCYTES # BLD AUTO: 0.06 10*3/MM3 (ref 0–0.05)
IMM GRANULOCYTES NFR BLD AUTO: 1.3 % (ref 0–0.5)
LYMPHOCYTES # BLD AUTO: 1.06 10*3/MM3 (ref 0.7–3.1)
LYMPHOCYTES NFR BLD AUTO: 22.8 % (ref 19.6–45.3)
MCH RBC QN AUTO: 31.7 PG (ref 26.6–33)
MCHC RBC AUTO-ENTMCNC: 33.3 G/DL (ref 31.5–35.7)
MCV RBC AUTO: 95 FL (ref 79–97)
MONOCYTES # BLD AUTO: 0.52 10*3/MM3 (ref 0.1–0.9)
MONOCYTES NFR BLD AUTO: 11.2 % (ref 5–12)
NEUTROPHILS # BLD AUTO: 2.96 10*3/MM3 (ref 1.7–7)
NEUTROPHILS NFR BLD AUTO: 63.9 % (ref 42.7–76)
NRBC BLD AUTO-RTO: 0 /100 WBC (ref 0–0.2)
NT-PROBNP SERPL-MCNC: 9228 PG/ML (ref 5–1800)
PLATELET # BLD AUTO: 145 10*3/MM3 (ref 140–450)
PMV BLD AUTO: 12.6 FL (ref 6–12)
POTASSIUM BLD-SCNC: 4.6 MMOL/L (ref 3.5–5.2)
PROT SERPL-MCNC: 7.7 G/DL (ref 6–8.5)
RBC # BLD AUTO: 4.01 10*6/MM3 (ref 4.14–5.8)
SODIUM BLD-SCNC: 139 MMOL/L (ref 136–145)
TROPONIN T SERPL-MCNC: <0.01 NG/ML (ref 0–0.03)
WBC NRBC COR # BLD: 4.64 10*3/MM3 (ref 3.4–10.8)
WHOLE BLOOD HOLD SPECIMEN: NORMAL
WHOLE BLOOD HOLD SPECIMEN: NORMAL

## 2019-12-12 PROCEDURE — 85025 COMPLETE CBC W/AUTO DIFF WBC: CPT | Performed by: PHYSICIAN ASSISTANT

## 2019-12-12 PROCEDURE — 96374 THER/PROPH/DIAG INJ IV PUSH: CPT

## 2019-12-12 PROCEDURE — 99284 EMERGENCY DEPT VISIT MOD MDM: CPT

## 2019-12-12 PROCEDURE — 71045 X-RAY EXAM CHEST 1 VIEW: CPT

## 2019-12-12 PROCEDURE — 93005 ELECTROCARDIOGRAM TRACING: CPT | Performed by: PHYSICIAN ASSISTANT

## 2019-12-12 PROCEDURE — 84484 ASSAY OF TROPONIN QUANT: CPT | Performed by: EMERGENCY MEDICINE

## 2019-12-12 PROCEDURE — 83880 ASSAY OF NATRIURETIC PEPTIDE: CPT | Performed by: EMERGENCY MEDICINE

## 2019-12-12 PROCEDURE — 80053 COMPREHEN METABOLIC PANEL: CPT | Performed by: PHYSICIAN ASSISTANT

## 2019-12-12 PROCEDURE — 93010 ELECTROCARDIOGRAM REPORT: CPT | Performed by: INTERNAL MEDICINE

## 2019-12-12 PROCEDURE — 25010000002 FUROSEMIDE PER 20 MG: Performed by: PHYSICIAN ASSISTANT

## 2019-12-12 RX ORDER — FUROSEMIDE 10 MG/ML
40 INJECTION INTRAMUSCULAR; INTRAVENOUS ONCE
Status: COMPLETED | OUTPATIENT
Start: 2019-12-12 | End: 2019-12-12

## 2019-12-12 RX ORDER — FUROSEMIDE 20 MG/1
20 TABLET ORAL DAILY
Qty: 14 TABLET | Refills: 0 | Status: SHIPPED | OUTPATIENT
Start: 2019-12-12 | End: 2019-12-23 | Stop reason: SDUPTHER

## 2019-12-12 RX ORDER — INSULIN GLARGINE 100 [IU]/ML
INJECTION, SOLUTION SUBCUTANEOUS
Qty: 18 PEN | Refills: 4 | Status: SHIPPED | OUTPATIENT
Start: 2019-12-12 | End: 2020-10-26

## 2019-12-12 RX ADMIN — FUROSEMIDE 40 MG: 10 INJECTION, SOLUTION INTRAMUSCULAR; INTRAVENOUS at 15:32

## 2019-12-12 NOTE — TELEPHONE ENCOUNTER
12/12/19  1:25 PM  Home Wynn  11/19/1931  Home Phone [phone]     Home's wife, Marisol, called about him this afternoon. She said his SOA is much worse today than it was yesterday. She wanted to get him in for an appt with Dr. Tripathi today.    Called and spoke to Dr. Tripathi. He advised that the patient go to the ER.     I called Home. I let him know Dr. Tripathi's recommendations. He verbalized understanding.    Adrianna Gómez, RN  Triage [facility]

## 2019-12-12 NOTE — ED PROVIDER NOTES
"Pt is a 88 y.o. male who presents to the ED complaining of gradually worsening dyspnea that started several weeks ago. Pt also complains of BLE edema and states that \"I cannot sleep.\" Pt states that he uses O2 at home at night only. Pt is followed by  (cardiology). Recent echocardiogram showed an EF-19%. Family at bedside states pt was to go to 's office tomorrow for echocardiogram stress exam and heart monitor placement. Pt denies hx of CHF. Pt states that \"he had a stent placed in his kidney last week\" but denies any abd pain or urinary sx.      On exam,  Constitutional: awake, alert, NAD  Neck: JVD  Cardiovascular: RRR  Pulmonary: CTAB  Abdomen: soft, nontender, nondistent  Musculoskeletal: BLE edema  Neurological: A/Ox3      Plan: Obtain labs and imaging studies. Consult with cardiology.       MD ATTESTATION NOTE    The JESSICA and I have discussed this patient's history, physical exam, and treatment plan.  I have reviewed the documentation and personally had a face to face interaction with the patient. I affirm the documentation and agree with the treatment and plan.  The attached note describes my personal findings.      Documentation assistance provided by liset Hong for MD Faustino. Information recorded by the scribe was done at my direction and has been verified and validated by me.             Darline Hong  12/12/19 4900       Edin Carey MD  12/12/19 1414    "

## 2019-12-12 NOTE — TELEPHONE ENCOUNTER
----- Message from Ne Horan sent at 12/12/2019  9:51 AM EST -----  1/9 845AM    PLEASE CONFIRM DATE/TIME W PT  ----- Message -----  From: Miesha Ji APRN  Sent: 12/11/2019  11:53 AM EST  To: Ne Horan    Patient needs follow-up appointment with Dr. Rosales in the next 1-2 months.  Thank you

## 2019-12-12 NOTE — ED PROVIDER NOTES
EMERGENCY DEPARTMENT ENCOUNTER    CHIEF COMPLAINT  Chief Complaint: SOA  History given by: patient and pt family  History limited by: none  Room Number: 19/19  PMD: Yodit Mahoney MD      HPI:  Pt is a 88 y.o. male who presents complaining of intermittent SOA that began about 6 weeks ago. He affirms bilateral leg swelling that has also worsened within the last 4 to 5 days. Pt states that he uses home O2 at night and that the episodes of SOA are exacerbated when lying flat. Pt states he had an Echo done by Dr. Tripathi (cardiology) 3 days ago which showed some heart failure. Pt wife states that Dr. Tripathi was planning to put pt on a Holter monitor and do a follow up stress test tomorrow.    Duration:  6 weeks  Onset: gradual  Timing: intermittent  Location: lungs  Radiation: none  Quality: SOA  Intensity/Severity: mild  Progression: unchanged  Associated Symptoms: bilateral leg swelling  Aggravating Factors: lying flat  Alleviating Factors: none  Previous Episodes: none  Treatment before arrival: none      PAST MEDICAL HISTORY  Active Ambulatory Problems     Diagnosis Date Noted   • Atrial fibrillation (CMS/HCC) 04/20/2016   • Diabetes mellitus (CMS/HCC) 04/20/2016   • Hyperlipidemia 04/20/2016   • Hypertension 04/20/2016   • Osteoarthritis of knee 04/20/2016   • Chronic atrial fibrillation 04/20/2016   • Peripheral neuropathy 04/20/2016   • Cervical spinal stenosis 07/11/2017   • Swelling 07/11/2017   • Skin infection 07/11/2017   • Cervical spondylosis without myelopathy 10/20/2017   • Nocturnal hypoxia 11/15/2019     Resolved Ambulatory Problems     Diagnosis Date Noted   • Uncontrolled type 2 diabetes mellitus (CMS/HCC) 04/20/2016   • Neck pain 08/24/2016   • Bronchitis 12/14/2016   • Cellulitis of right lower extremity 07/11/2017   • Flu-like symptoms 11/14/2017   • Cough 12/07/2017   • Body aches 12/07/2017   • Acute non-recurrent maxillary sinusitis 12/07/2017   • Abnormal chest x-ray 12/12/2017   • Pneumonia  due to infectious organism 12/19/2017   • Acute pancreatitis 12/27/2018     Past Medical History:   Diagnosis Date   • Abnormal electrocardiogram    • Acute renal failure (CMS/HCC)    • Chronic kidney disease    • Deep vein thrombosis (CMS/HCC)    • Ecchymosis    • Generalized weakness    • Insulin dependent diabetes mellitus (CMS/HCC)    • Malaise and fatigue    • Multiple falls    • Myalgia    • JESSICA (obstructive sleep apnea)    • PAF (paroxysmal atrial fibrillation) (CMS/HCC)    • Pancreatitis    • Rib fracture    • Right leg weakness    • Sleep apnea    • SOB (shortness of breath)    • Syncope    • Type 2 diabetes mellitus (CMS/HCC)    • Vallecular mass    • Vertigo        PAST SURGICAL HISTORY  Past Surgical History:   Procedure Laterality Date   • CATARACT EXTRACTION     • CHOLECYSTECTOMY  2010   • COLONOSCOPY  04/13/2011   • CYSTOSCOPY Left 12/3/2019    Procedure: CYSTOSCOPY WITH PYELOGRAM URETEROSCOPY WITH LEFT STENT AND DILATION OF URETERAL STRICTURE;  Surgeon: Ceasar Nj MD;  Location: [facility];  Service: Urology   • EYE SURGERY      cataract surg   • HERNIA REPAIR     • PROSTATE SURGERY     • TONSILLECTOMY     • UVULECTOMY         FAMILY HISTORY  Family History   Problem Relation Age of Onset   • Cancer Mother    • Stroke Father    • Cancer Sister    • Heart disease Sister    • Malig Hyperthermia Neg Hx        SOCIAL HISTORY  Social History     Socioeconomic History   • Marital status:      Spouse name: Not on file   • Number of children: 2   • Years of education: 12   • Highest education level: Not on file   Occupational History   • Occupation: RETIRED   Tobacco Use   • Smoking status: Never Smoker   • Smokeless tobacco: Never Used   • Tobacco comment: caffine use 2 cups daily   Substance and Sexual Activity   • Alcohol use: No   • Drug use: No   • Sexual activity: Defer   Social History Narrative    LIVES WITH WIFE       ALLERGIES  Patient has no known allergies.    REVIEW OF  SYSTEMS  Review of Systems   Constitutional: Negative for activity change, appetite change and fever.   HENT: Negative for congestion and sore throat.    Eyes: Negative.    Respiratory: Positive for shortness of breath. Negative for cough.    Cardiovascular: Positive for leg swelling. Negative for chest pain.   Gastrointestinal: Negative for abdominal pain, diarrhea and vomiting.   Genitourinary: Negative for decreased urine volume and dysuria.   Musculoskeletal: Negative for neck pain.   Skin: Negative for rash and wound.   Neurological: Negative for weakness, numbness and headaches.   Psychiatric/Behavioral: Negative.    All other systems reviewed and are negative.      PHYSICAL EXAM  ED Triage Vitals   Temp Heart Rate Resp BP SpO2   12/12/19 1423 12/12/19 1423 12/12/19 1423 12/12/19 1429 12/12/19 1423   97.4 °F (36.3 °C) 99 22 152/96 96 %      Temp src Heart Rate Source Patient Position BP Location FiO2 (%)   12/12/19 1423 -- -- -- --   Tympanic           Physical Exam   Constitutional: He is oriented to person, place, and time. No distress.   HENT:   Head: Normocephalic and atraumatic.   Eyes: Pupils are equal, round, and reactive to light. EOM are normal.   Neck: Normal range of motion. Neck supple. JVD ( mild) present.   Cardiovascular: Normal rate and normal heart sounds. An irregularly irregular rhythm present.   Pulmonary/Chest: Effort normal and breath sounds normal. No respiratory distress.   Abdominal: Soft. There is no tenderness. There is no rebound and no guarding.   Musculoskeletal: Normal range of motion. He exhibits no edema.   2+ pedal edema   Neurological: He is alert and oriented to person, place, and time. He has normal sensation and normal strength.   Skin: Skin is warm and dry.   Psychiatric: Mood and affect normal.   Nursing note and vitals reviewed.      LAB RESULTS  Lab Results (last 24 hours)     Procedure Component Value Units Date/Time    CBC & Differential [[id number]] Collected:   12/12/19 1433    Specimen:  Blood Updated:  12/12/19 1547    Narrative:       The following orders were created for panel order CBC & Differential.  Procedure                               Abnormality         Status                     ---------                               -----------         ------                     CBC Auto Differential[[id number]]        Abnormal            Final result                 Please view results for these tests on the individual orders.    CBC Auto Differential [[id number]]  (Abnormal) Collected:  12/12/19 1433    Specimen:  Blood Updated:  12/12/19 1547     WBC 4.64 10*3/mm3      RBC 4.01 10*6/mm3      Hemoglobin 12.7 g/dL      Hematocrit 38.1 %      MCV 95.0 fL      MCH 31.7 pg      MCHC 33.3 g/dL      RDW 14.4 %      RDW-SD 49.8 fl      MPV 12.6 fL      Platelets 145 10*3/mm3      Neutrophil % 63.9 %      Lymphocyte % 22.8 %      Monocyte % 11.2 %      Eosinophil % 0.4 %      Basophil % 0.4 %      Immature Grans % 1.3 %      Neutrophils, Absolute 2.96 10*3/mm3      Lymphocytes, Absolute 1.06 10*3/mm3      Monocytes, Absolute 0.52 10*3/mm3      Eosinophils, Absolute 0.02 10*3/mm3      Basophils, Absolute 0.02 10*3/mm3      Immature Grans, Absolute 0.06 10*3/mm3      nRBC 0.0 /100 WBC     Comprehensive Metabolic Panel [[id number]]  (Abnormal) Collected:  12/12/19 1434    Specimen:  Blood Updated:  12/12/19 1544     Glucose 94 mg/dL      BUN 29 mg/dL      Creatinine 1.42 mg/dL      Sodium 139 mmol/L      Potassium 4.6 mmol/L      Chloride 101 mmol/L      CO2 25.2 mmol/L      Calcium 9.3 mg/dL      Total Protein 7.7 g/dL      Albumin 4.10 g/dL      ALT (SGPT) 15 U/L      AST (SGOT) 21 U/L      Alkaline Phosphatase 79 U/L      Total Bilirubin 0.8 mg/dL      eGFR Non African Amer 47 mL/min/1.73      Globulin 3.6 gm/dL      A/G Ratio 1.1 g/dL      BUN/Creatinine Ratio 20.4     Anion Gap 12.8 mmol/L     Narrative:       GFR Normal >60  Chronic Kidney Disease <60  Kidney Failure <15       Troponin [[id number]]  (Normal) Collected:  12/12/19 1434    Specimen:  Blood Updated:  12/12/19 1636     Troponin T <0.010 ng/mL     Narrative:       Troponin T Reference Range:  <= 0.03 ng/mL-   Negative for AMI  >0.03 ng/mL-     Abnormal for myocardial necrosis.  Clinicians would have to utilize clinical acumen, EKG, Troponin and serial changes to determine if it is an Acute Myocardial Infarction or myocardial injury due to an underlying chronic condition.     BNP [[id number]]  (Abnormal) Collected:  12/12/19 1434    Specimen:  Blood Updated:  12/12/19 1636     proBNP 9,228.0 pg/mL     Narrative:       Among patients with dyspnea, NT-proBNP is highly sensitive for the detection of acute congestive heart failure. In addition NT-proBNP of <300 pg/ml effectively rules out acute congestive heart failure with 99% negative predictive value.            I ordered the above labs and reviewed the results    RADIOLOGY  XR Chest 1 View   Final Result           I ordered the above noted radiological studies. Interpreted by radiologist. Discussed with radiologist. Reviewed by me in PACS.       PROCEDURES  Procedures  EKG          EKG time: 1547  Rhythm/Rate: Afib rate 81 PVCs  P waves and CA: Absent P waves  QRS, axis: LBBB  ST and T waves: nl     Interpreted Contemporaneously by me, independently viewed  unchanged compared to prior 12/09/19      PROGRESS AND CONSULTS      1525: Patient with recent echo of 19% EF.  Patient with obvious congestive heart failure.  Give dose of Lasix and evaluate.    1539: Spoke with Dr. Carey who agrees with the pt plan for care.    1703: Spoke with Dr. Espitia (cardiology). He agrees with plan to discharge with Rx for Lasix and follow up tomorrow for stress test with Dr. Tripathi.    1920: Pt rechecked and resting comfortably. Discussed plan for discharge with Lasix Rx and follow up with cardiology tomorrow as scheduled. Pt understands and agrees with the plan, all questions  answered.      MEDICAL DECISION MAKING  Results were reviewed/discussed with the patient and they were also made aware of online access. Pt also made aware that some labs, such as cultures, will not be resulted during ER visit and follow up with PMD is necessary.     MDM  Number of Diagnoses or Management Options     Amount and/or Complexity of Data Reviewed  Clinical lab tests: ordered and reviewed  Tests in the radiology section of CPT®: ordered and reviewed (Negative acute)  Tests in the medicine section of CPT®: ordered and reviewed (See note)  Decide to obtain previous medical records or to obtain history from someone other than the patient: yes  Obtain history from someone other than the patient: yes (Pt family)  Review and summarize past medical records: yes (· Calculated EF = 19%.  · The following left ventricular wall segments are hypokinetic: mid anterior, apical anterior, basal anterolateral, mid anterolateral, apical lateral, basal inferolateral, mid inferolateral, apical inferior, mid inferior, apical septal, basal inferoseptal, mid inferoseptal, apex hypokinetic, mid anteroseptal, basal anterior, basal inferior and basal inferoseptal.  · The left ventricular cavity is moderate-to-severely dilated.  · Left ventricular systolic function is severely decreased.  · Left and right atrial cavity size is severely dilated.  · Right ventricular cavity is moderately dilated.  · Moderate mitral valve regurgitation is present  · Moderate tricuspid valve regurgitation is present.  · Calculated right ventricular systolic pressure from tricuspid regurgitation is 62 mmHg.  · Moderate pulmonic valve regurgitation is present.)  Discuss the patient with other providers: yes (Dr. Espitia (cardiology))           DIAGNOSIS  Final diagnoses:   Acute congestive heart failure, unspecified heart failure type (CMS/HCC)       DISPOSITION  DISCHARGE    Patient discharged in stable condition.    Reviewed implications of results,  diagnosis, meds, responsibility to follow up, warning signs and symptoms of possible worsening, potential complications and reasons to return to ER.    Patient/Family voiced understanding of above instructions.    Discussed plan for discharge, as there is no emergent indication for admission. Patient referred to primary care provider for BP management due to today's BP. Pt/family is agreeable and understands need for follow up and repeat testing.  Pt is aware that discharge does not mean that nothing is wrong but it indicates no emergency is present that requires admission and they must continue care with follow-up as given below or physician of their choice.     FOLLOW-UP  Aaron Tripathi MD  [address] [address]  [phone]      see tomorrow as directed         Medication List      New Prescriptions    furosemide 20 MG tablet  Commonly known as:  LASIX  Take 1 tablet by mouth Daily.              Latest Documented Vital Signs:  As of 5:24 PM  BP- 132/90 HR- 69 Temp- 97.4 °F (36.3 °C) (Tympanic) O2 sat- 97%    --  Documentation assistance provided by liset Loyola for KETURAH Pineda.  Information recorded by the scribe was done at my direction and has been verified and validated by me.         Mary Ann Loyola  12/12/19 [id number]       Joshua Yin PA  12/12/19 3828

## 2019-12-13 ENCOUNTER — HOSPITAL ENCOUNTER (OUTPATIENT)
Dept: CARDIOLOGY | Facility: HOSPITAL | Age: 84
Discharge: HOME | End: 2019-12-13
Admitting: INTERNAL MEDICINE

## 2019-12-13 VITALS — HEIGHT: 71 IN | WEIGHT: 190.92 LBS | BODY MASS INDEX: 26.73 KG/M2

## 2019-12-13 DIAGNOSIS — R06.02 SHORT OF BREATH ON EXERTION: ICD-10-CM

## 2019-12-13 LAB
BH CV NUCLEAR PRIOR STUDY: 2
BH CV STRESS BP STAGE 1: NORMAL
BH CV STRESS COMMENTS STAGE 1: NORMAL
BH CV STRESS DOSE REGADENOSON STAGE 1: 0.4
BH CV STRESS DURATION MIN STAGE 1: 0
BH CV STRESS DURATION SEC STAGE 1: 10
BH CV STRESS HR STAGE 1: 93
BH CV STRESS PROTOCOL 1: NORMAL
BH CV STRESS RECOVERY BP: NORMAL MMHG
BH CV STRESS RECOVERY HR: 86 BPM
BH CV STRESS STAGE 1: 1
LV EF NUC BP: 29 %
MAXIMAL PREDICTED HEART RATE: 132 BPM
PERCENT MAX PREDICTED HR: 70.45 %
STRESS BASELINE BP: NORMAL MMHG
STRESS BASELINE HR: 85 BPM
STRESS PERCENT HR: 83 %
STRESS POST EXERCISE DUR SEC: 10 SEC
STRESS POST PEAK BP: NORMAL MMHG
STRESS POST PEAK HR: 93 BPM
STRESS TARGET HR: 112 BPM

## 2019-12-13 PROCEDURE — 78492 MYOCRD IMG PET MLT RST&STRS: CPT | Performed by: INTERNAL MEDICINE

## 2019-12-13 PROCEDURE — 78492 MYOCRD IMG PET MLT RST&STRS: CPT

## 2019-12-13 PROCEDURE — 93016 CV STRESS TEST SUPVJ ONLY: CPT | Performed by: INTERNAL MEDICINE

## 2019-12-13 PROCEDURE — 93018 CV STRESS TEST I&R ONLY: CPT | Performed by: INTERNAL MEDICINE

## 2019-12-13 PROCEDURE — 0 RUBIDIUM CHLORIDE: Performed by: INTERNAL MEDICINE

## 2019-12-13 PROCEDURE — 25010000002 REGADENOSON 0.4 MG/5ML SOLUTION: Performed by: INTERNAL MEDICINE

## 2019-12-13 PROCEDURE — A9555 RB82 RUBIDIUM: HCPCS | Performed by: INTERNAL MEDICINE

## 2019-12-13 PROCEDURE — 93017 CV STRESS TEST TRACING ONLY: CPT

## 2019-12-13 RX ADMIN — REGADENOSON 0.4 MG: 0.08 INJECTION, SOLUTION INTRAVENOUS at 13:47

## 2019-12-13 RX ADMIN — RUBIDIUM CHLORIDE RB-82 1 DOSE: 150 INJECTION, SOLUTION INTRAVENOUS at 13:47

## 2019-12-13 RX ADMIN — RUBIDIUM CHLORIDE RB-82 1 DOSE: 150 INJECTION, SOLUTION INTRAVENOUS at 13:35

## 2019-12-23 ENCOUNTER — OFFICE VISIT (OUTPATIENT)
Dept: CARDIOLOGY | Facility: CLINIC | Age: 84
End: 2019-12-23

## 2019-12-23 VITALS
DIASTOLIC BLOOD PRESSURE: 70 MMHG | BODY MASS INDEX: 25.8 KG/M2 | OXYGEN SATURATION: 100 % | HEIGHT: 71 IN | SYSTOLIC BLOOD PRESSURE: 130 MMHG | HEART RATE: 68 BPM | WEIGHT: 184.3 LBS

## 2019-12-23 DIAGNOSIS — I48.20 CHRONIC ATRIAL FIBRILLATION (HCC): Primary | ICD-10-CM

## 2019-12-23 PROCEDURE — 99214 OFFICE O/P EST MOD 30 MIN: CPT | Performed by: INTERNAL MEDICINE

## 2019-12-23 RX ORDER — FUROSEMIDE 20 MG/1
20 TABLET ORAL DAILY
Qty: 30 TABLET | Refills: 11 | Status: SHIPPED | OUTPATIENT
Start: 2019-12-23 | End: 2020-02-20

## 2019-12-23 NOTE — PROGRESS NOTES
Subjective:     Encounter Date:12/23/19      Patient ID: Home Wynn is a 88 y.o. male.    Chief Complaint:  Atrial Fibrillation   Presents for follow-up visit. Symptoms are negative for bradycardia, dizziness, hypertension, hypotension, pacemaker problem, palpitations, tachycardia and weakness. The symptoms have been stable. Past medical history includes atrial fibrillation.   Hypertension   This is a chronic problem. The problem is controlled. Associated symptoms include malaise/fatigue. Pertinent negatives include no palpitations.   Cardiomyopathy   This is a chronic problem. The current episode started more than 1 month ago. The problem has been rapidly improving. Associated symptoms include chills. Pertinent negatives include no weakness.       88-year-old gentleman who presents today for reevaluation.  Patient is doing significantly better than he was several weeks ago.  He was found to have a new cardiomyopathy with ejection fraction about 20-30%.  His echo showed an EF of 19 but his nuclear study showed ejection fraction better than that.  Clinically he is less short of breath he is able to lay down without any types of issues.  He says he is about 90% better than he was several weeks ago.    Review of Systems   Constitution: Positive for chills, decreased appetite and malaise/fatigue.   Cardiovascular: Negative for palpitations.   Respiratory: Positive for snoring.    Neurological: Negative for dizziness and weakness.   All other systems reviewed and are negative.      Procedures         Objective:     Physical Exam   Constitutional: He is oriented to person, place, and time. He appears well-developed.   HENT:   Head: Normocephalic.   Eyes: Conjunctivae are normal.   Neck: Normal range of motion.   Cardiovascular: Normal rate and normal heart sounds. An irregularly irregular rhythm present.   Pulmonary/Chest: He has rhonchi in the right lower field and the left lower field.   Abdominal: Soft. Bowel  sounds are normal.   Musculoskeletal: Normal range of motion. He exhibits no edema.   Neurological: He is alert and oriented to person, place, and time.   Skin: Skin is warm and dry.   Psychiatric: He has a normal mood and affect. His behavior is normal.   Vitals reviewed.      Lab Review:       Assessment:          Diagnosis Plan   1. Chronic atrial fibrillation  Basic Metabolic Panel          Plan:       1.  Chronic atrial fibrillation stable  2.  Hypertension blood pressure is good  3.  Myopathy.  Patient has multiple reasons to have a cardiomyopathy.  His heart rate is well controlled his stress test showed no ischemia and his ejection fraction was about 30%.  At this point he is responding nicely to the diuretics still has some crackles at the base so I am going to continue his Lasix.  He is to get a BMP checked on Thursday which will be 1 week since his initiation of Lasix I will follow back up with him in 6 weeks sooner course if he has issues.    Atrial Fibrillation and Atrial Flutter  Assessment  • The patient has paroxysmal atrial fibrillation  • This is non-valvular in etiology  • The patient's CHADS2-VASc score is 2  • A [id number] score of 2 or more is considered a high risk for a thromboembolic event  • Warfarin not prescribed for medical reasons  • Rivaroxaban prescribed    Plan  • Continue in atrial fibrillation with rate control  • Continue rivaroxaban for antithrombotic therapy, bleeding issues discussed  • Add beta blocker for rate control

## 2019-12-27 LAB
AMBIG ABBREV BMP8 DEFAULT: NORMAL
BUN SERPL-MCNC: 25 MG/DL (ref 8–27)
BUN/CREAT SERPL: 18 (ref 10–24)
CALCIUM SERPL-MCNC: 9.3 MG/DL (ref 8.6–10.2)
CHLORIDE SERPL-SCNC: 98 MMOL/L (ref 96–106)
CO2 SERPL-SCNC: 26 MMOL/L (ref 20–29)
CREAT SERPL-MCNC: 1.41 MG/DL (ref 0.76–1.27)
GLUCOSE SERPL-MCNC: 162 MG/DL (ref 65–99)
POTASSIUM SERPL-SCNC: 4.8 MMOL/L (ref 3.5–5.2)
SODIUM SERPL-SCNC: 140 MMOL/L (ref 134–144)

## 2020-01-09 ENCOUNTER — OFFICE VISIT (OUTPATIENT)
Dept: GASTROENTEROLOGY | Facility: CLINIC | Age: 85
End: 2020-01-09

## 2020-01-09 VITALS
HEIGHT: 70 IN | TEMPERATURE: 97.7 F | DIASTOLIC BLOOD PRESSURE: 68 MMHG | SYSTOLIC BLOOD PRESSURE: 124 MMHG | BODY MASS INDEX: 26.2 KG/M2 | WEIGHT: 183 LBS

## 2020-01-09 DIAGNOSIS — K59.00 CONSTIPATION, UNSPECIFIED CONSTIPATION TYPE: Primary | ICD-10-CM

## 2020-01-09 PROCEDURE — 99213 OFFICE O/P EST LOW 20 MIN: CPT | Performed by: INTERNAL MEDICINE

## 2020-01-09 NOTE — PROGRESS NOTES
Subjective   Chief Complaint   Patient presents with   • Follow-up       Home Wynn is a  88 y.o. male here for a follow up visit for abdominal pain bloating and constipation    Patient has a complicated medical history notable for CHF with an ejection fraction of 19%, A. fib on Xarelto, recent ureteral stricture that required stenting.  His abdominal pain has essentially resolved since his urologic procedure.  He denies any bloating.  He does report that over the last year he is lost about 20 pounds.  He does not eat as much as he used to.  He has no nausea or abdominal pain.  Bowels are irregular.  He has some intermittent loose stools.  He does not take anything regularly for his constipation.  He does not see blood in his stool.    He had a recent emergency room visit for volume overload and was diuresed.  He is feeling much better.  He was very short of breath at that time..   HPI  Past Medical History:   Diagnosis Date   • Abnormal chest x-ray 12/12/2017    Infiltrate or opacities left heart border, right lung 12/12/2017   • Abnormal electrocardiogram    • Acute pancreatitis 12/27/2018   • Acute renal failure (CMS/HCC)    • Atrial fibrillation (CMS/HCC)    • Cellulitis of right lower extremity 7/11/2017   • Chronic kidney disease     renal failure   • Deep vein thrombosis (CMS/HCC)     acute   • Diabetes mellitus (CMS/HCC)    • Ecchymosis    • Generalized weakness    • Hyperlipidemia    • Hypertension    • Insulin dependent diabetes mellitus (CMS/HCC)    • Malaise and fatigue    • Multiple falls    • Myalgia    • JESSICA (obstructive sleep apnea)    • Osteoarthritis of knee    • PAF (paroxysmal atrial fibrillation) (CMS/HCC)    • Pancreatitis    • Peripheral neuropathy    • Rib fracture    • Right leg weakness    • Sleep apnea    • SOB (shortness of breath)    • Syncope    • Type 2 diabetes mellitus (CMS/HCC)    • Vallecular mass    • Vertigo      Past Surgical History:   Procedure Laterality Date   • CATARACT  EXTRACTION     • CHOLECYSTECTOMY  2010   • COLONOSCOPY  04/13/2011   • CYSTOSCOPY Left 12/3/2019    Procedure: CYSTOSCOPY WITH PYELOGRAM URETEROSCOPY WITH LEFT STENT AND DILATION OF URETERAL STRICTURE;  Surgeon: Ceasar Nj MD;  Location: [facility];  Service: Urology   • EYE SURGERY      cataract surg   • HERNIA REPAIR     • PROSTATE SURGERY     • TONSILLECTOMY     • UVULECTOMY         Current Outpatient Medications:   •  ferrous sulfate 325 (65 FE) MG tablet, Take 1 tablet by mouth Daily With Breakfast., Disp: 30 tablet, Rfl: 6  •  furosemide (LASIX) 20 MG tablet, Take 1 tablet by mouth Daily. (Patient taking differently: Take 20 mg by mouth As Needed.), Disp: 30 tablet, Rfl: 11  •  Insulin Pen Needle (PEN NEEDLES) 31G X 6 MM misc, USE FOR INJECTION DAILY, Disp: 100 each, Rfl: 1  •  LANTUS SOLOSTAR 100 UNIT/ML injection pen, INJECT 18 UNITS UNDER THE SKIN AS DIRECTED DAILY, Disp: 18 pen, Rfl: 4  •  metoprolol succinate XL (TOPROL-XL) 25 MG 24 hr tablet, Take 25 mg by mouth Daily. Taking half a tablet daily, Disp: , Rfl:   •  Multiple Vitamins-Minerals (PRESERVISION AREDS) capsule, Take 2 capsules by mouth Daily., Disp: , Rfl:   •  OXYGEN-HELIUM IN, Oxygen; Patient Sig: Oxygen he uses 2 LPM at bedtime.; 0; 21-Mar-2014; Active, Disp: , Rfl:   •  rivaroxaban (XARELTO) 20 MG tablet, Take 1 tablet by mouth Daily. WILL HOLD PER DIRECTIONS, Disp: , Rfl:   PRN Meds:.  No Known Allergies  Social History     Socioeconomic History   • Marital status:      Spouse name: Not on file   • Number of children: 2   • Years of education: 12   • Highest education level: Not on file   Occupational History   • Occupation: RETIRED   Tobacco Use   • Smoking status: Never Smoker   • Smokeless tobacco: Never Used   • Tobacco comment: caffine use 2 cups daily   Substance and Sexual Activity   • Alcohol use: No   • Drug use: No   • Sexual activity: Defer   Social History Narrative    LIVES WITH WIFE     Family History    Problem Relation Age of Onset   • Cancer Mother    • Stroke Father    • Cancer Sister    • Heart disease Sister    • Malig Hyperthermia Neg Hx      Review of Systems   Constitutional: Negative for appetite change and unexpected weight change.   Gastrointestinal: Positive for constipation. Negative for abdominal pain and blood in stool.   All other systems reviewed and are negative.    Vitals:    01/09/20 0839   BP: 124/68   Temp: 97.7 °F (36.5 °C)         01/09/20  0839   Weight: 83 kg (183 lb)       Objective   Physical Exam   Constitutional: He appears well-developed and well-nourished.   HENT:   Head: Normocephalic and atraumatic.   Eyes: No scleral icterus.   Pulmonary/Chest: Effort normal.   Abdominal: Soft. He exhibits no distension. There is no tenderness.   Neurological: He is alert.   Skin: Skin is warm and dry.   Psychiatric: He has a normal mood and affect.     No images are attached to the encounter.    Assessment/Plan   Diagnoses and all orders for this visit:    Constipation, unspecified constipation type      Plan:  · Overall he is doing well from a GI standpoint.  His abdominal pain has resolved since his ureteral stent was placed.  He is having fairly mild constipation and I recommended that he try MiraLAX at half dose daily.  He can titrate this as needed.  · We discussed nutrition and management of weight given that he has had significant weight loss over the past year.  We discussed adding a healthful snack in the afternoon.  He dislikes dairy so supplements are not ideal.  · No further recommendations at this time-have encouraged him to call me if any symptoms develop.  He can follow-up as needed

## 2020-02-04 ENCOUNTER — OFFICE VISIT (OUTPATIENT)
Dept: CARDIOLOGY | Facility: CLINIC | Age: 85
End: 2020-02-04

## 2020-02-04 VITALS
DIASTOLIC BLOOD PRESSURE: 70 MMHG | SYSTOLIC BLOOD PRESSURE: 126 MMHG | OXYGEN SATURATION: 99 % | HEART RATE: 78 BPM | WEIGHT: 186 LBS | BODY MASS INDEX: 26.63 KG/M2 | HEIGHT: 70 IN

## 2020-02-04 DIAGNOSIS — I10 ESSENTIAL HYPERTENSION: ICD-10-CM

## 2020-02-04 DIAGNOSIS — I48.20 CHRONIC ATRIAL FIBRILLATION (HCC): Primary | ICD-10-CM

## 2020-02-04 PROCEDURE — 99214 OFFICE O/P EST MOD 30 MIN: CPT | Performed by: INTERNAL MEDICINE

## 2020-02-04 PROCEDURE — 93000 ELECTROCARDIOGRAM COMPLETE: CPT | Performed by: INTERNAL MEDICINE

## 2020-02-04 NOTE — PROGRESS NOTES
Subjective:     Encounter Date:12/23/19      Patient ID: Home Wynn is a 88 y.o. male.    Chief Complaint:  Congestive Heart Failure     Atrial Fibrillation   Presents for follow-up visit. Symptoms are negative for bradycardia, dizziness, hypertension, hypotension, pacemaker problem, tachycardia and weakness. The symptoms have been stable. Past medical history includes atrial fibrillation and CHF.   Hypertension   This is a chronic problem. The problem is controlled. Associated symptoms include malaise/fatigue.   Cardiomyopathy   This is a chronic problem. The current episode started more than 1 month ago. The problem has been rapidly improving. Associated symptoms include chills. Pertinent negatives include no weakness.       88-year-old gentleman who presents today for reevaluation.  Patient is back to baseline doing well.  He denies shortness of breath chest discomforts palpitations lightheadedness swelling fatigue.    Review of Systems   Constitution: Positive for chills, decreased appetite and malaise/fatigue.   Respiratory: Positive for snoring.    Neurological: Negative for dizziness and weakness.   All other systems reviewed and are negative.        ECG 12 Lead  Date/Time: 2/4/2020 2:24 PM  Performed by: Aaron Tripathi MD  Authorized by: Aaron Tripathi MD   Comparison: compared with previous ECG from 12/12/2019  Similar to previous ECG  Rhythm: atrial fibrillation  Other findings: left ventricular hypertrophy with strain    Clinical impression: abnormal EKG               Objective:     Physical Exam   Constitutional: He is oriented to person, place, and time. He appears well-developed.   HENT:   Head: Normocephalic.   Eyes: Conjunctivae are normal.   Neck: Normal range of motion.   Cardiovascular: Normal rate and normal heart sounds. An irregularly irregular rhythm present.   Pulmonary/Chest: He has rhonchi in the right lower field and the left lower field.   Abdominal: Soft. Bowel sounds  are normal.   Musculoskeletal: Normal range of motion. He exhibits no edema.   Neurological: He is alert and oriented to person, place, and time.   Skin: Skin is warm and dry.   Psychiatric: He has a normal mood and affect. His behavior is normal.   Vitals reviewed.      Lab Review:       Assessment:          Diagnosis Plan   1. Chronic atrial fibrillation     2. Essential hypertension            Plan:       1.  Chronic atrial fibrillation stable  2.  Hypertension blood pressure is good  3.  Myopathy.  Patient has multiple reasons to have a cardiomyopathy.  His heart rate is well controlled his stress test showed no ischemia and his ejection fraction was about 30%.  Patient continues to do well.  He has had no swelling no shortness of breath and says he feels back to normal.  At this point I am going to have him take his Lasix every other day.  I also told the importance of weighing himself every day.  If he does well with this I told him to go every third day after about 2 weeks and then he can start spacing it out as he feels necessary.  I told him if he starts gaining over 3 pounds in a day the importance of taking his diuretic and contacting me sooner than later will be absolute imperative so he does not want up in the hospital.  Patient told to follow-up in 3 months sooner if issues    Atrial Fibrillation and Atrial Flutter  Assessment  • The patient has paroxysmal atrial fibrillation  • This is non-valvular in etiology  • The patient's CHADS2-VASc score is 2  • A [id number] score of 2 or more is considered a high risk for a thromboembolic event  • Warfarin not prescribed for medical reasons  • Rivaroxaban prescribed    Plan  • Continue in atrial fibrillation with rate control  • Continue rivaroxaban for antithrombotic therapy, bleeding issues discussed  • Add beta blocker for rate control

## 2020-02-13 ENCOUNTER — TELEPHONE (OUTPATIENT)
Dept: CARDIOLOGY | Facility: CLINIC | Age: 85
End: 2020-02-13

## 2020-02-13 NOTE — TELEPHONE ENCOUNTER
Saundra with [facility] called stating that PT needs to have two stents replaced and needs a surgery clearance and PT will need to come off Xarelto for 2 days.[facility]

## 2020-02-19 ENCOUNTER — LAB (OUTPATIENT)
Dept: LAB | Facility: HOSPITAL | Age: 85
End: 2020-02-19

## 2020-02-19 ENCOUNTER — CONSULT (OUTPATIENT)
Dept: ONCOLOGY | Facility: CLINIC | Age: 85
End: 2020-02-19

## 2020-02-19 VITALS
RESPIRATION RATE: 16 BRPM | SYSTOLIC BLOOD PRESSURE: 127 MMHG | HEART RATE: 72 BPM | OXYGEN SATURATION: 95 % | HEIGHT: 69 IN | BODY MASS INDEX: 27.33 KG/M2 | WEIGHT: 184.5 LBS | TEMPERATURE: 97.9 F | DIASTOLIC BLOOD PRESSURE: 72 MMHG

## 2020-02-19 DIAGNOSIS — D69.6 THROMBOCYTOPENIA (HCC): ICD-10-CM

## 2020-02-19 DIAGNOSIS — D64.9 NORMOCYTIC ANEMIA: ICD-10-CM

## 2020-02-19 DIAGNOSIS — R93.89 ABNORMAL CT SCAN: Primary | ICD-10-CM

## 2020-02-19 DIAGNOSIS — R59.0 INGUINAL LYMPHADENOPATHY: Primary | ICD-10-CM

## 2020-02-19 LAB
BASOPHILS # BLD AUTO: 0.02 10*3/MM3 (ref 0–0.2)
BASOPHILS NFR BLD AUTO: 0.4 % (ref 0–1.5)
DEPRECATED RDW RBC AUTO: 56.4 FL (ref 37–54)
EOSINOPHIL # BLD AUTO: 0.02 10*3/MM3 (ref 0–0.4)
EOSINOPHIL NFR BLD AUTO: 0.4 % (ref 0.3–6.2)
ERYTHROCYTE [DISTWIDTH] IN BLOOD BY AUTOMATED COUNT: 16.4 % (ref 12.3–15.4)
HCT VFR BLD AUTO: 39.6 % (ref 37.5–51)
HGB BLD-MCNC: 12.9 G/DL (ref 13–17.7)
HGB RETIC QN AUTO: 33.9 PG (ref 29.8–36.1)
IMM GRANULOCYTES # BLD AUTO: 0.06 10*3/MM3 (ref 0–0.05)
IMM GRANULOCYTES NFR BLD AUTO: 1.3 % (ref 0–0.5)
IMM RETICS NFR: 11 % (ref 3–15.8)
LYMPHOCYTES # BLD AUTO: 0.78 10*3/MM3 (ref 0.7–3.1)
LYMPHOCYTES NFR BLD AUTO: 16.7 % (ref 19.6–45.3)
MCH RBC QN AUTO: 30.6 PG (ref 26.6–33)
MCHC RBC AUTO-ENTMCNC: 32.6 G/DL (ref 31.5–35.7)
MCV RBC AUTO: 93.8 FL (ref 79–97)
MONOCYTES # BLD AUTO: 0.51 10*3/MM3 (ref 0.1–0.9)
MONOCYTES NFR BLD AUTO: 10.9 % (ref 5–12)
NEUTROPHILS # BLD AUTO: 3.29 10*3/MM3 (ref 1.7–7)
NEUTROPHILS NFR BLD AUTO: 70.3 % (ref 42.7–76)
NRBC BLD AUTO-RTO: 0 /100 WBC (ref 0–0.2)
PLATELET # BLD AUTO: 136 10*3/MM3 (ref 140–450)
PMV BLD AUTO: 11.2 FL (ref 6–12)
RBC # BLD AUTO: 4.22 10*6/MM3 (ref 4.14–5.8)
RETICS/RBC NFR AUTO: 1.2 % (ref 0.7–1.9)
WBC NRBC COR # BLD: 4.68 10*3/MM3 (ref 3.4–10.8)

## 2020-02-19 PROCEDURE — 85025 COMPLETE CBC W/AUTO DIFF WBC: CPT

## 2020-02-19 PROCEDURE — 85046 RETICYTE/HGB CONCENTRATE: CPT | Performed by: INTERNAL MEDICINE

## 2020-02-19 PROCEDURE — 99205 OFFICE O/P NEW HI 60 MIN: CPT | Performed by: INTERNAL MEDICINE

## 2020-02-19 PROCEDURE — 36415 COLL VENOUS BLD VENIPUNCTURE: CPT

## 2020-02-19 NOTE — PROGRESS NOTES
REFERRING PROVIDER:    Ceasar Nj MD  [address] [facility] [place]  [address] [place] [address]    REASON FOR CONSULTATION: Concern for metastatic cancer    HISTORY OF PRESENT ILLNESS:  Home Wynn is a 88 y.o. male who is referred today due to concern for metastatic cancer based on abnormal CT imaging.    History of congestive heart failure with a left ventricular ejection fraction of 19% on 12/9/2019.    He has a history of a left ureteral stricture identified 10/31/2019 treated with balloon dilation and stent on 12/3/2019.    He had CT imaging of the abdomen and pelvis ordered by urology on 2/6/2020.  This showed a left ureteral stent and resolution of left hydronephrosis previously present.  New right mild pelvocaliectasis and ureterectasis to the level of the right common iliac artery.  The distal right ureter was decompressed.  Heavy atherosclerotic changes.  A soft tissue density measuring 2 to 5 mm peripheral to both common iliac arteries.  This rind of intermediate density was progressed compared to 10/31/2019 and new compared to 6/5/2019.  Multiple renal cysts were noted.   Cardiomegaly was noted.  Right inguinal hernia.  13 mm right paraesophageal node.  14 mm left lateral pelvic node.  Prominent bilateral groin and borderline external iliac nodes.  20 mm node in the right pelvis posterior to the extremity like veins.  3 cm right external iliac node.    He was referred for further evaluation.    He complains of some lower abdominal discomfort ongoing for a few months.  He does not believe that this is related to the ureteral stent.  He has had some mild constipation.  No fevers chills night sweats or unintended weight loss.  No palpable lymphadenopathy that he has noticed.  No personal history of malignancy.  Sister with a history of breast cancer.    On 2/27/2020 he is scheduled for a left ureteral stent replacement and placement of a right ureteral stent.    Past Medical History:   Diagnosis Date   •  Abnormal chest x-ray 12/12/2017    Infiltrate or opacities left heart border, right lung 12/12/2017   • Abnormal electrocardiogram    • Acute pancreatitis 12/27/2018   • Acute renal failure (CMS/HCC)    • Atrial fibrillation (CMS/HCC)    • Cellulitis of right lower extremity 7/11/2017   • Chronic kidney disease     renal failure   • Deep vein thrombosis (CMS/HCC)     acute   • Diabetes mellitus (CMS/HCC)    • Ecchymosis    • Generalized weakness    • Hyperlipidemia    • Hypertension    • Insulin dependent diabetes mellitus (CMS/HCC)    • Malaise and fatigue    • Multiple falls    • Myalgia    • JESSICA (obstructive sleep apnea)    • Osteoarthritis of knee    • PAF (paroxysmal atrial fibrillation) (CMS/HCC)    • Pancreatitis    • Peripheral neuropathy    • Rib fracture    • Right leg weakness    • Sleep apnea    • SOB (shortness of breath)    • Syncope    • Type 2 diabetes mellitus (CMS/HCC)    • Vallecular mass    • Vertigo        Past Surgical History:   Procedure Laterality Date   • CATARACT EXTRACTION     • CHOLECYSTECTOMY  2010   • COLONOSCOPY  04/13/2011   • CYSTOSCOPY Left 12/3/2019    Procedure: CYSTOSCOPY WITH PYELOGRAM URETEROSCOPY WITH LEFT STENT AND DILATION OF URETERAL STRICTURE;  Surgeon: Ceasar Nj MD;  Location: [facility];  Service: Urology   • EYE SURGERY      cataract surg   • HERNIA REPAIR     • PROSTATE SURGERY     • TONSILLECTOMY     • UVULECTOMY         SOCIAL HISTORY:   reports that he has never smoked. He has never used smokeless tobacco. He reports that he does not drink alcohol or use drugs.    FAMILY HISTORY:  family history includes Cancer in his mother and sister; Heart disease in his sister; Stroke in his father.    ALLERGIES:  No Known Allergies    MEDICATIONS:  The medication list has been reviewed with the patient by the medical assistant, and the list has been updated in the electronic medical record, which I reviewed.  Medication dosages and frequencies were confirmed to be  "accurate.    Review of Systems   Review of Systems   Constitutional: Positive for fatigue.   Respiratory: Positive for shortness of breath.    Gastrointestinal: Positive for abdominal pain and constipation.   Hematological: Negative for adenopathy.   All other systems reviewed and are negative.      Vitals:    02/19/20 1356   BP: 127/72   Pulse: 72   Resp: 16   Temp: 97.9 °F (36.6 °C)   TempSrc: Oral   SpO2: 95%   Weight: 83.7 kg (184 lb 8 oz)   Height: 175.8 cm (69.21\")  Comment: new   PainSc: 0-No pain       Physical Exam   Constitutional: He is oriented to person, place, and time. He appears well-developed and well-nourished. No distress.   HENT:   Head: Normocephalic and atraumatic. Hair is normal.   Mouth/Throat: Oropharynx is clear and moist.   Eyes: Conjunctivae, EOM and lids are normal. Pupils are equal.   Neck: Neck supple. No JVD present. No thyroid mass and no thyromegaly present.   Cardiovascular: Normal rate and regular rhythm. Exam reveals no gallop and no friction rub.   No murmur heard.  Pulmonary/Chest: Effort normal and breath sounds normal. No respiratory distress. He has no wheezes. He has no rales.   Abdominal: Soft. He exhibits no distension and no mass. There is no splenomegaly or hepatomegaly. There is no tenderness. There is no guarding.   Musculoskeletal: Normal range of motion. He exhibits no edema, tenderness or deformity.   Lymphadenopathy:     He has no cervical adenopathy.     He has no axillary adenopathy.        Right: Inguinal (Shotty, about 1 cm or less) adenopathy present. No supraclavicular adenopathy present.        Left: Inguinal (Shotty, about 1 cm or less) adenopathy present. No supraclavicular adenopathy present.   Neurological: He is alert and oriented to person, place, and time. He has normal strength.   Skin: Skin is warm and dry. No rash noted.   Psychiatric: He has a normal mood and affect. His behavior is normal. Judgment and thought content normal. Cognition and " memory are normal.   Nursing note and vitals reviewed.      DIAGNOSTIC DATA:    Results for orders placed or performed in visit on 02/19/20   CBC Auto Differential   Result Value Ref Range    WBC 4.68 3.40 - 10.80 10*3/mm3    RBC 4.22 4.14 - 5.80 10*6/mm3    Hemoglobin 12.9 (L) 13.0 - 17.7 g/dL    Hematocrit 39.6 37.5 - 51.0 %    MCV 93.8 79.0 - 97.0 fL    MCH 30.6 26.6 - 33.0 pg    MCHC 32.6 31.5 - 35.7 g/dL    RDW 16.4 (H) 12.3 - 15.4 %    RDW-SD 56.4 (H) 37.0 - 54.0 fl    MPV 11.2 6.0 - 12.0 fL    Platelets 136 (L) 140 - 450 10*3/mm3    Neutrophil % 70.3 42.7 - 76.0 %    Lymphocyte % 16.7 (L) 19.6 - 45.3 %    Monocyte % 10.9 5.0 - 12.0 %    Eosinophil % 0.4 0.3 - 6.2 %    Basophil % 0.4 0.0 - 1.5 %    Immature Grans % 1.3 (H) 0.0 - 0.5 %    Neutrophils, Absolute 3.29 1.70 - 7.00 10*3/mm3    Lymphocytes, Absolute 0.78 0.70 - 3.10 10*3/mm3    Monocytes, Absolute 0.51 0.10 - 0.90 10*3/mm3    Eosinophils, Absolute 0.02 0.00 - 0.40 10*3/mm3    Basophils, Absolute 0.02 0.00 - 0.20 10*3/mm3    Immature Grans, Absolute 0.06 (H) 0.00 - 0.05 10*3/mm3    nRBC 0.0 0.0 - 0.2 /100 WBC       IMAGING:    I personally reviewed CT images of the abdomen and pelvis from 10/31/2019.  Small inguinal adenopathy.    IMPRESSION:  1. There is new mild left hydroureteronephrosis with an abrupt  transition in caliber within the pelvis adjacent to the bifurcation of  the left common iliac artery. There are no renal or ureteral stones. The  appearance may be secondary to a ureteral stricture or a nonradiopaque  stone. Follow-up is recommended.  2. Passive hepatic congestion is suspected. There is a new tiny amount  of free fluid within the abdomen and pelvis as well as a new tiny right  pleural effusion.    ASSESSMENT:  This is a 88 y.o. male with:  1.  Lymphadenopathy: There is some soft tissue thickening within the pelvis around the blood vessels and also scattered inguinal and pelvic lymphadenopathy that is still relatively small but seems  to have increased in size from October to February.  Significance unclear.  We discussed the variety of possibilities including both nonmalignant and malignant etiologies today.  He does have some lower abdominal discomfort of uncertain etiology.  No fevers chills night sweats or unintended weight loss.  No history of malignancy.  I think the best course of action at this point is to obtain an ultrasound-guided inguinal lymph node biopsy as are some palpable inguinal lymph nodes.  2.  Thrombocytopenia: This is very mild.  We will check an immature platelet fraction and a vitamin B12 level.  3.  Mild normocytic anemia: Hemoglobin is essentially normal.  Continue to monitor.  Laboratory evaluation.  Chronic kidney disease is likely contributing.  4.  Ureteral strictures: Unclear etiology.  Ureteral stenting planned for 2/27/2020.    PLAN:   1.  We will schedule an ultrasound-guided inguinal lymph node biopsy  2.  Labs tomorrow and his preadmission testing visit including reticulocyte count, ferritin, iron profile, vitamin B12 level, RBC folate level, serum protein electrophoresis, LDH, immature platelet fraction.

## 2020-02-19 NOTE — H&P (VIEW-ONLY)
REFERRING PROVIDER:    Ceasar Nj MD  [address] [facility] [place] [place]  [address]  [place] [address]    REASON FOR CONSULTATION: Concern for metastatic cancer    HISTORY OF PRESENT ILLNESS:  Home Wynn is a 88 y.o. male who is referred today due to concern for metastatic cancer based on abnormal CT imaging.    History of congestive heart failure with a left ventricular ejection fraction of 19% on 12/9/2019.    He has a history of a left ureteral stricture identified 10/31/2019 treated with balloon dilation and stent on 12/3/2019.    He had CT imaging of the abdomen and pelvis ordered by urology on 2/6/2020.  This showed a left ureteral stent and resolution of left hydronephrosis previously present.  New right mild pelvocaliectasis and ureterectasis to the level of the right common iliac artery.  The distal right ureter was decompressed.  Heavy atherosclerotic changes.  A soft tissue density measuring 2 to 5 mm peripheral to both common iliac arteries.  This rind of intermediate density was progressed compared to 10/31/2019 and new compared to 6/5/2019.  Multiple renal cysts were noted.   Cardiomegaly was noted.  Right inguinal hernia.  13 mm right paraesophageal node.  14 mm left lateral pelvic node.  Prominent bilateral groin and borderline external iliac nodes.  20 mm node in the right pelvis posterior to the extremity like veins.  3 cm right external iliac node.    He was referred for further evaluation.    He complains of some lower abdominal discomfort ongoing for a few months.  He does not believe that this is related to the ureteral stent.  He has had some mild constipation.  No fevers chills night sweats or unintended weight loss.  No palpable lymphadenopathy that he has noticed.  No personal history of malignancy.  Sister with a history of breast cancer.    On 2/27/2020 he is scheduled for a left ureteral stent replacement and placement of a right ureteral stent.    Past Medical History:   Diagnosis Date   •  Abnormal chest x-ray 12/12/2017    Infiltrate or opacities left heart border, right lung 12/12/2017   • Abnormal electrocardiogram    • Acute pancreatitis 12/27/2018   • Acute renal failure (CMS/HCC)    • Atrial fibrillation (CMS/HCC)    • Cellulitis of right lower extremity 7/11/2017   • Chronic kidney disease     renal failure   • Deep vein thrombosis (CMS/HCC)     acute   • Diabetes mellitus (CMS/HCC)    • Ecchymosis    • Generalized weakness    • Hyperlipidemia    • Hypertension    • Insulin dependent diabetes mellitus (CMS/HCC)    • Malaise and fatigue    • Multiple falls    • Myalgia    • JESSICA (obstructive sleep apnea)    • Osteoarthritis of knee    • PAF (paroxysmal atrial fibrillation) (CMS/HCC)    • Pancreatitis    • Peripheral neuropathy    • Rib fracture    • Right leg weakness    • Sleep apnea    • SOB (shortness of breath)    • Syncope    • Type 2 diabetes mellitus (CMS/HCC)    • Vallecular mass    • Vertigo        Past Surgical History:   Procedure Laterality Date   • CATARACT EXTRACTION     • CHOLECYSTECTOMY  2010   • COLONOSCOPY  04/13/2011   • CYSTOSCOPY Left 12/3/2019    Procedure: CYSTOSCOPY WITH PYELOGRAM URETEROSCOPY WITH LEFT STENT AND DILATION OF URETERAL STRICTURE;  Surgeon: Ceasar Nj MD;  Location: [facility];  Service: Urology   • EYE SURGERY      cataract surg   • HERNIA REPAIR     • PROSTATE SURGERY     • TONSILLECTOMY     • UVULECTOMY         SOCIAL HISTORY:   reports that he has never smoked. He has never used smokeless tobacco. He reports that he does not drink alcohol or use drugs.    FAMILY HISTORY:  family history includes Cancer in his mother and sister; Heart disease in his sister; Stroke in his father.    ALLERGIES:  No Known Allergies    MEDICATIONS:  The medication list has been reviewed with the patient by the medical assistant, and the list has been updated in the electronic medical record, which I reviewed.  Medication dosages and frequencies were confirmed to be  "accurate.    Review of Systems   Review of Systems   Constitutional: Positive for fatigue.   Respiratory: Positive for shortness of breath.    Gastrointestinal: Positive for abdominal pain and constipation.   Hematological: Negative for adenopathy.   All other systems reviewed and are negative.      Vitals:    02/19/20 1356   BP: 127/72   Pulse: 72   Resp: 16   Temp: 97.9 °F (36.6 °C)   TempSrc: Oral   SpO2: 95%   Weight: 83.7 kg (184 lb 8 oz)   Height: 175.8 cm (69.21\")  Comment: new   PainSc: 0-No pain       Physical Exam   Constitutional: He is oriented to person, place, and time. He appears well-developed and well-nourished. No distress.   HENT:   Head: Normocephalic and atraumatic. Hair is normal.   Mouth/Throat: Oropharynx is clear and moist.   Eyes: Conjunctivae, EOM and lids are normal. Pupils are equal.   Neck: Neck supple. No JVD present. No thyroid mass and no thyromegaly present.   Cardiovascular: Normal rate and regular rhythm. Exam reveals no gallop and no friction rub.   No murmur heard.  Pulmonary/Chest: Effort normal and breath sounds normal. No respiratory distress. He has no wheezes. He has no rales.   Abdominal: Soft. He exhibits no distension and no mass. There is no splenomegaly or hepatomegaly. There is no tenderness. There is no guarding.   Musculoskeletal: Normal range of motion. He exhibits no edema, tenderness or deformity.   Lymphadenopathy:     He has no cervical adenopathy.     He has no axillary adenopathy.        Right: Inguinal (Shotty, about 1 cm or less) adenopathy present. No supraclavicular adenopathy present.        Left: Inguinal (Shotty, about 1 cm or less) adenopathy present. No supraclavicular adenopathy present.   Neurological: He is alert and oriented to person, place, and time. He has normal strength.   Skin: Skin is warm and dry. No rash noted.   Psychiatric: He has a normal mood and affect. His behavior is normal. Judgment and thought content normal. Cognition and " memory are normal.   Nursing note and vitals reviewed.      DIAGNOSTIC DATA:    Results for orders placed or performed in visit on 02/19/20   CBC Auto Differential   Result Value Ref Range    WBC 4.68 3.40 - 10.80 10*3/mm3    RBC 4.22 4.14 - 5.80 10*6/mm3    Hemoglobin 12.9 (L) 13.0 - 17.7 g/dL    Hematocrit 39.6 37.5 - 51.0 %    MCV 93.8 79.0 - 97.0 fL    MCH 30.6 26.6 - 33.0 pg    MCHC 32.6 31.5 - 35.7 g/dL    RDW 16.4 (H) 12.3 - 15.4 %    RDW-SD 56.4 (H) 37.0 - 54.0 fl    MPV 11.2 6.0 - 12.0 fL    Platelets 136 (L) 140 - 450 10*3/mm3    Neutrophil % 70.3 42.7 - 76.0 %    Lymphocyte % 16.7 (L) 19.6 - 45.3 %    Monocyte % 10.9 5.0 - 12.0 %    Eosinophil % 0.4 0.3 - 6.2 %    Basophil % 0.4 0.0 - 1.5 %    Immature Grans % 1.3 (H) 0.0 - 0.5 %    Neutrophils, Absolute 3.29 1.70 - 7.00 10*3/mm3    Lymphocytes, Absolute 0.78 0.70 - 3.10 10*3/mm3    Monocytes, Absolute 0.51 0.10 - 0.90 10*3/mm3    Eosinophils, Absolute 0.02 0.00 - 0.40 10*3/mm3    Basophils, Absolute 0.02 0.00 - 0.20 10*3/mm3    Immature Grans, Absolute 0.06 (H) 0.00 - 0.05 10*3/mm3    nRBC 0.0 0.0 - 0.2 /100 WBC       IMAGING:    I personally reviewed CT images of the abdomen and pelvis from 10/31/2019.  Small inguinal adenopathy.    IMPRESSION:  1. There is new mild left hydroureteronephrosis with an abrupt  transition in caliber within the pelvis adjacent to the bifurcation of  the left common iliac artery. There are no renal or ureteral stones. The  appearance may be secondary to a ureteral stricture or a nonradiopaque  stone. Follow-up is recommended.  2. Passive hepatic congestion is suspected. There is a new tiny amount  of free fluid within the abdomen and pelvis as well as a new tiny right  pleural effusion.    ASSESSMENT:  This is a 88 y.o. male with:  1.  Lymphadenopathy: There is some soft tissue thickening within the pelvis around the blood vessels and also scattered inguinal and pelvic lymphadenopathy that is still relatively small but seems  to have increased in size from October to February.  Significance unclear.  We discussed the variety of possibilities including both nonmalignant and malignant etiologies today.  He does have some lower abdominal discomfort of uncertain etiology.  No fevers chills night sweats or unintended weight loss.  No history of malignancy.  I think the best course of action at this point is to obtain an ultrasound-guided inguinal lymph node biopsy as are some palpable inguinal lymph nodes.  2.  Thrombocytopenia: This is very mild.  We will check an immature platelet fraction and a vitamin B12 level.  3.  Mild normocytic anemia: Hemoglobin is essentially normal.  Continue to monitor.  Laboratory evaluation.  Chronic kidney disease is likely contributing.  4.  Ureteral strictures: Unclear etiology.  Ureteral stenting planned for 2/27/2020.    PLAN:   1.  We will schedule an ultrasound-guided inguinal lymph node biopsy  2.  Labs tomorrow and his preadmission testing visit including reticulocyte count, ferritin, iron profile, vitamin B12 level, RBC folate level, serum protein electrophoresis, LDH, immature platelet fraction.

## 2020-02-20 ENCOUNTER — APPOINTMENT (OUTPATIENT)
Dept: LAB | Facility: HOSPITAL | Age: 85
End: 2020-02-20

## 2020-02-20 ENCOUNTER — APPOINTMENT (OUTPATIENT)
Dept: PREADMISSION TESTING | Facility: HOSPITAL | Age: 85
End: 2020-02-20

## 2020-02-20 VITALS
HEART RATE: 76 BPM | OXYGEN SATURATION: 98 % | DIASTOLIC BLOOD PRESSURE: 74 MMHG | TEMPERATURE: 97.6 F | WEIGHT: 185 LBS | RESPIRATION RATE: 16 BRPM | HEIGHT: 71 IN | BODY MASS INDEX: 25.9 KG/M2 | SYSTOLIC BLOOD PRESSURE: 149 MMHG

## 2020-02-20 DIAGNOSIS — R59.0 INGUINAL LYMPHADENOPATHY: ICD-10-CM

## 2020-02-20 DIAGNOSIS — D64.9 NORMOCYTIC ANEMIA: ICD-10-CM

## 2020-02-20 DIAGNOSIS — D69.6 THROMBOCYTOPENIA (HCC): ICD-10-CM

## 2020-02-20 LAB
ALBUMIN SERPL-MCNC: 3.8 G/DL (ref 3.5–5.2)
ALBUMIN/GLOB SERPL: 1 G/DL
ALP SERPL-CCNC: 133 U/L (ref 39–117)
ALT SERPL W P-5'-P-CCNC: 38 U/L (ref 1–41)
ANION GAP SERPL CALCULATED.3IONS-SCNC: 11.9 MMOL/L (ref 5–15)
AST SERPL-CCNC: 31 U/L (ref 1–40)
BILIRUB SERPL-MCNC: 0.6 MG/DL (ref 0.2–1.2)
BUN BLD-MCNC: 25 MG/DL (ref 8–23)
BUN/CREAT SERPL: 18 (ref 7–25)
CALCIUM SPEC-SCNC: 9.1 MG/DL (ref 8.6–10.5)
CHLORIDE SERPL-SCNC: 104 MMOL/L (ref 98–107)
CO2 SERPL-SCNC: 25.1 MMOL/L (ref 22–29)
CREAT BLD-MCNC: 1.39 MG/DL (ref 0.76–1.27)
FERRITIN SERPL-MCNC: 152 NG/ML (ref 30–400)
GFR SERPL CREATININE-BSD FRML MDRD: 48 ML/MIN/1.73
GLOBULIN UR ELPH-MCNC: 3.7 GM/DL
GLUCOSE BLD-MCNC: 220 MG/DL (ref 65–99)
IRON 24H UR-MRATE: 69 MCG/DL (ref 59–158)
IRON SATN MFR SERPL: 23 % (ref 20–50)
LDH SERPL-CCNC: 168 U/L (ref 135–225)
PLATELET # BLD AUTO: 141 10*3/MM3 (ref 140–450)
PLATELETS.RETICULATED NFR BLD AUTO: 7.6 % (ref 0.9–6.5)
POTASSIUM BLD-SCNC: 4.7 MMOL/L (ref 3.5–5.2)
PROT SERPL-MCNC: 7.5 G/DL (ref 6–8.5)
SODIUM BLD-SCNC: 141 MMOL/L (ref 136–145)
TIBC SERPL-MCNC: 299 MCG/DL (ref 298–536)
TRANSFERRIN SERPL-MCNC: 201 MG/DL (ref 200–360)
VIT B12 BLD-MCNC: 1032 PG/ML (ref 211–946)

## 2020-02-20 PROCEDURE — 82784 ASSAY IGA/IGD/IGG/IGM EACH: CPT | Performed by: INTERNAL MEDICINE

## 2020-02-20 PROCEDURE — 82728 ASSAY OF FERRITIN: CPT | Performed by: INTERNAL MEDICINE

## 2020-02-20 PROCEDURE — 80053 COMPREHEN METABOLIC PANEL: CPT | Performed by: INTERNAL MEDICINE

## 2020-02-20 PROCEDURE — 84165 PROTEIN E-PHORESIS SERUM: CPT | Performed by: INTERNAL MEDICINE

## 2020-02-20 PROCEDURE — 85014 HEMATOCRIT: CPT | Performed by: INTERNAL MEDICINE

## 2020-02-20 PROCEDURE — 82747 ASSAY OF FOLIC ACID RBC: CPT | Performed by: INTERNAL MEDICINE

## 2020-02-20 PROCEDURE — 36415 COLL VENOUS BLD VENIPUNCTURE: CPT

## 2020-02-20 PROCEDURE — 82607 VITAMIN B-12: CPT | Performed by: INTERNAL MEDICINE

## 2020-02-20 PROCEDURE — 86334 IMMUNOFIX E-PHORESIS SERUM: CPT | Performed by: INTERNAL MEDICINE

## 2020-02-20 PROCEDURE — 83615 LACTATE (LD) (LDH) ENZYME: CPT | Performed by: INTERNAL MEDICINE

## 2020-02-20 PROCEDURE — 84466 ASSAY OF TRANSFERRIN: CPT | Performed by: INTERNAL MEDICINE

## 2020-02-20 PROCEDURE — 85055 RETICULATED PLATELET ASSAY: CPT | Performed by: INTERNAL MEDICINE

## 2020-02-20 PROCEDURE — 83540 ASSAY OF IRON: CPT | Performed by: INTERNAL MEDICINE

## 2020-02-20 NOTE — DISCHARGE INSTRUCTIONS
PLEASE ARRIVE AT 6:30AM ON 2/27/2020      Take the following medications the morning of surgery with a small sip of water:  NO MEDS      General Instructions:  • Do not eat or drink anything after midnight the night before surgery.  • Infants may have breast milk up to four hours before surgery.  • Infants drinking formula may drink formula up to six hours before surgery.   • Patients who avoid smoking, chewing tobacco and alcohol for 4 weeks prior to surgery have a reduced risk of post-operative complications.  Quit smoking as many days before surgery as you can.  • Do not smoke, use chewing tobacco or drink alcohol the day of surgery.   • If applicable bring your C-PAP/ BI-PAP machine.  • Bring any papers given to you in the doctor’s office.  • Wear clean comfortable clothes.  • Do not wear contact lenses, false eyelashes or make-up.  Bring a case for your glasses.   • Bring crutches or walker if applicable.  • Remove all piercings.  Leave jewelry and any other valuables at home.  • Hair extensions with metal clips must be removed prior to surgery.  • The Pre-Admission Testing nurse will instruct you to bring medications if unable to obtain an accurate list in Pre-Admission Testing.        If you were given a blood bank ID arm band remember to bring it with you the day of surgery.    Preventing a Surgical Site Infection:  • For 2 to 3 days before surgery, avoid shaving with a razor because the razor can irritate skin and make it easier to develop an infection.    • Any areas of open skin can increase the risk of a post-operative wound infection by allowing bacteria to enter and travel throughout the body.  Notify your surgeon if you have any skin wounds / rashes even if it is not near the expected surgical site.  The area will need assessed to determine if surgery should be delayed until it is healed.  • The night prior to surgery sleep in a clean bed with clean clothing.  Do not allow pets to sleep with  you.  • Shower on the morning of surgery using a fresh bar of anti-bacterial soap (such as Dial) and clean washcloth.  Dry with a clean towel and dress in clean clothing.  • Ask your surgeon if you will be receiving antibiotics prior to surgery.  • Make sure you, your family, and all healthcare providers clean their hands with soap and water or an alcohol based hand  before caring for you or your wound.    Day of surgery:  Your arrival time is approximately two hours before your scheduled surgery time.  Upon arrival, a Pre-op nurse and Anesthesiologist will review your health history, obtain vital signs, and answer questions you may have.  The only belongings needed at this time will be your home medications and if applicable your C-PAP/BI-PAP machine.  If you are staying overnight your family can leave the rest of your belongings in the car and bring them to your room later.  A Pre-op nurse will start an IV and you may receive medication in preparation for surgery, including something to help you relax.  Your family will be able to see you in the Pre-op area.  Two visitors at a time will be allowed in the Pre-op room.  While you are in surgery your family should notify the waiting room  if they leave the waiting room area and provide a contact phone number.    Please be aware that surgery does come with discomfort.  We want to make every effort to control your discomfort so please discuss any uncontrolled symptoms with your nurse.   Your doctor will most likely have prescribed pain medications.      If you are going home after surgery you will receive individualized written care instructions before being discharged.  A responsible adult must drive you to and from the hospital on the day of your surgery and stay with you for 24 hours.    If you are staying overnight following surgery, you will be transported to your hospital room following the recovery period.  [facility] has all  private rooms.    If you have any questions please call Pre-Admission Testing at [phone].  Deductibles and co-payments are collected on the day of service. Please be prepared to pay the required co-pay, deductible or deposit on the day of service as defined by your plan.

## 2020-02-21 LAB
ALBUMIN SERPL-MCNC: 3.5 G/DL (ref 2.9–4.4)
ALBUMIN/GLOB SERPL: 0.9 {RATIO} (ref 0.7–1.7)
ALPHA1 GLOB FLD ELPH-MCNC: 0.2 G/DL (ref 0–0.4)
ALPHA2 GLOB SERPL ELPH-MCNC: 0.7 G/DL (ref 0.4–1)
B-GLOBULIN SERPL ELPH-MCNC: 1 G/DL (ref 0.7–1.3)
FOLATE BLD-MCNC: 393 NG/ML
FOLATE RBC-MCNC: 1042 NG/ML
GAMMA GLOB SERPL ELPH-MCNC: 2 G/DL (ref 0.4–1.8)
GLOBULIN SER CALC-MCNC: 3.9 G/DL (ref 2.2–3.9)
HCT VFR BLD AUTO: 37.7 % (ref 37.5–51)
IGA SERPL-MCNC: 386 MG/DL (ref 61–437)
IGG SERPL-MCNC: 1934 MG/DL (ref 700–1600)
IGM SERPL-MCNC: 55 MG/DL (ref 15–143)
Lab: ABNORMAL
M-SPIKE: ABNORMAL G/DL
PROT PATTERN SERPL IFE-IMP: ABNORMAL
PROT SERPL-MCNC: 7.4 G/DL (ref 6–8.5)

## 2020-02-24 ENCOUNTER — HOSPITAL ENCOUNTER (OUTPATIENT)
Dept: ULTRASOUND IMAGING | Facility: HOSPITAL | Age: 85
Discharge: HOME | End: 2020-02-24
Admitting: INTERNAL MEDICINE

## 2020-02-24 VITALS
RESPIRATION RATE: 16 BRPM | WEIGHT: 180 LBS | SYSTOLIC BLOOD PRESSURE: 141 MMHG | DIASTOLIC BLOOD PRESSURE: 80 MMHG | OXYGEN SATURATION: 96 % | HEART RATE: 73 BPM | TEMPERATURE: 95.3 F | BODY MASS INDEX: 25.77 KG/M2 | HEIGHT: 70 IN

## 2020-02-24 DIAGNOSIS — R59.0 INGUINAL LYMPHADENOPATHY: ICD-10-CM

## 2020-02-24 PROCEDURE — 88184 FLOWCYTOMETRY/ TC 1 MARKER: CPT

## 2020-02-24 PROCEDURE — 88185 FLOWCYTOMETRY/TC ADD-ON: CPT

## 2020-02-24 PROCEDURE — 88300 SURGICAL PATH GROSS: CPT | Performed by: INTERNAL MEDICINE

## 2020-02-24 PROCEDURE — 76942 ECHO GUIDE FOR BIOPSY: CPT

## 2020-02-24 PROCEDURE — 25010000003 LIDOCAINE 1 % SOLUTION: Performed by: RADIOLOGY

## 2020-02-24 PROCEDURE — 88342 IMHCHEM/IMCYTCHM 1ST ANTB: CPT | Performed by: INTERNAL MEDICINE

## 2020-02-24 PROCEDURE — 88341 IMHCHEM/IMCYTCHM EA ADD ANTB: CPT | Performed by: INTERNAL MEDICINE

## 2020-02-24 PROCEDURE — 88305 TISSUE EXAM BY PATHOLOGIST: CPT | Performed by: INTERNAL MEDICINE

## 2020-02-24 RX ORDER — LIDOCAINE HYDROCHLORIDE 10 MG/ML
10 INJECTION, SOLUTION INFILTRATION; PERINEURAL ONCE
Status: COMPLETED | OUTPATIENT
Start: 2020-02-24 | End: 2020-02-24

## 2020-02-24 RX ADMIN — LIDOCAINE HYDROCHLORIDE 5 ML: 10 INJECTION, SOLUTION INFILTRATION; PERINEURAL at 14:14

## 2020-02-24 NOTE — DISCHARGE INSTRUCTIONS
EDUCATION /DISCHARGE INSTRUCTIONS  CT/US guided biopsy:  A biopsy is a procedure done to remove tissue for further analysis.  Before images are taken to locate the target area.  Images can be obtained using ultrasound, CT or MRI.  A physician will clean your skin with antiseptic soap, place a sterile towel around the site and administer a local anesthetic to numb the area.  The physician will then insert a special needle.  Sometimes images are taken of the needle after it is inserted to ensure the needle is in the correct area to be biopsied.   A sample is obtained and sent to the laboratory for study.  Occasionally the laboratory is unable to make a diagnosis from the sample and the procedure may need to be repeated.  Within a week the radiologist will send a report to your physician.  A pathologist will also examine the tissue and send a report.    Risks of the procedure include but are not limited to:   *  Bleeding    *  Infection   *  Puncture of surrounding organs *  Death     *  Lung collapse if the biopsy is near the chest which may require insertion of a      chest tube to re-inflate the lung if severe.    Benefits of the procedure:  Using x-ray helps to locate the area that requires a biopsy. The procedure is less invasive than a surgical procedure, there are no large incisions and it does not require anesthesia.    Alternatives to the procedure:  A biopsy can be performed surgically.  Risks of a surgical biopsy include exposure to anesthesia, infection, excessive bleeding and injury to abdominal organs.  A benefit of surgical biopsy is the ability to see the area to be biopsied and remove of a larger piece of tissue.    THIS EDUCATION INFORMATION WAS REVIEWED PRIOR TO PROCEDURE AND CONSENT. Patient initials__________________Time____1251_______________    Post Procedure:    *  Expect the biopsy site may be tender up to one week.    *  Rest today (no pushing pulling or straining).   *  Slowly increase  activity tomorrow.    *  If you received sedation do not drive for 24 hours.   *  Keep dressing clean and dry.   *  Leave dressing on puncture site for 24 hours.    *  You may shower when dressing removed.  Call your doctor if experiencing:   *  Signs of infection such as redness, swelling, excessive pain and / or foul        smelling drainage from the puncture site.   *  Chills or fever over 101 degrees (by mouth).   *  Unrelieved pain.   *  Any new or severe symptoms.   *  If experiencing sudden / severe shortness of breath or chest pain go to the       nearest emergency room.   Following the procedure:     Follow-up with the ordering physician as directed.    Continue to take other medications as directed by your physician unless    otherwise instructed.   If applicable, resume taking your blood thinners or Aspirin on ______Xarelto -resume in 24 hours but hold as you have been told for procedure on Thursday_____.    If you have any concerns please call the Radiology Nurses Desk at [phone].  You are the most important factor in your recovery.  Follow the above instructions carefully.

## 2020-02-25 ENCOUNTER — TELEPHONE (OUTPATIENT)
Dept: INTERVENTIONAL RADIOLOGY/VASCULAR | Facility: HOSPITAL | Age: 85
End: 2020-02-25

## 2020-02-25 LAB
CYTO UR: NORMAL
LAB AP CASE REPORT: NORMAL
LAB AP CLINICAL INFORMATION: NORMAL
LAB AP DIAGNOSIS COMMENT: NORMAL
LAB AP SPECIAL STAINS: NORMAL
PATH REPORT.FINAL DX SPEC: NORMAL
PATH REPORT.GROSS SPEC: NORMAL

## 2020-02-26 RX ORDER — PEN NEEDLE, DIABETIC 31 G X1/4"
NEEDLE, DISPOSABLE MISCELLANEOUS
Qty: 100 EACH | Refills: 1 | Status: SHIPPED | OUTPATIENT
Start: 2020-02-26 | End: 2020-10-02

## 2020-02-26 RX ORDER — RIVAROXABAN 20 MG/1
TABLET, FILM COATED ORAL
Qty: 90 TABLET | Refills: 1 | Status: ON HOLD | OUTPATIENT
Start: 2020-02-26 | End: 2020-04-01 | Stop reason: SDUPTHER

## 2020-02-27 ENCOUNTER — HOSPITAL ENCOUNTER (OUTPATIENT)
Facility: HOSPITAL | Age: 85
Setting detail: HOSPITAL OUTPATIENT SURGERY
Discharge: HOME | End: 2020-02-27
Attending: UROLOGY | Admitting: UROLOGY

## 2020-02-27 ENCOUNTER — ANESTHESIA (OUTPATIENT)
Dept: PERIOP | Facility: HOSPITAL | Age: 85
End: 2020-02-27

## 2020-02-27 ENCOUNTER — ANESTHESIA EVENT (OUTPATIENT)
Dept: PERIOP | Facility: HOSPITAL | Age: 85
End: 2020-02-27

## 2020-02-27 ENCOUNTER — APPOINTMENT (OUTPATIENT)
Dept: GENERAL RADIOLOGY | Facility: HOSPITAL | Age: 85
End: 2020-02-27

## 2020-02-27 VITALS
OXYGEN SATURATION: 98 % | TEMPERATURE: 98 F | SYSTOLIC BLOOD PRESSURE: 135 MMHG | RESPIRATION RATE: 16 BRPM | HEIGHT: 71 IN | DIASTOLIC BLOOD PRESSURE: 87 MMHG | HEART RATE: 67 BPM | BODY MASS INDEX: 25.45 KG/M2 | WEIGHT: 181.8 LBS

## 2020-02-27 LAB
GLUCOSE BLDC GLUCOMTR-MCNC: 100 MG/DL (ref 70–130)
GLUCOSE BLDC GLUCOMTR-MCNC: 112 MG/DL (ref 70–130)

## 2020-02-27 PROCEDURE — 25010000002 DEXAMETHASONE PER 1 MG: Performed by: ANESTHESIOLOGY

## 2020-02-27 PROCEDURE — C1769 GUIDE WIRE: HCPCS | Performed by: UROLOGY

## 2020-02-27 PROCEDURE — 25010000003 CEFAZOLIN 1-4 GM/50ML-% SOLUTION: Performed by: UROLOGY

## 2020-02-27 PROCEDURE — C2617 STENT, NON-COR, TEM W/O DEL: HCPCS | Performed by: UROLOGY

## 2020-02-27 PROCEDURE — 25010000002 PROPOFOL 10 MG/ML EMULSION: Performed by: ANESTHESIOLOGY

## 2020-02-27 PROCEDURE — 25010000002 ONDANSETRON PER 1 MG: Performed by: ANESTHESIOLOGY

## 2020-02-27 PROCEDURE — C1758 CATHETER, URETERAL: HCPCS | Performed by: UROLOGY

## 2020-02-27 PROCEDURE — 82962 GLUCOSE BLOOD TEST: CPT

## 2020-02-27 PROCEDURE — 0 IOTHALAMATE 60 % SOLUTION: Performed by: UROLOGY

## 2020-02-27 PROCEDURE — 74420 UROGRAPHY RTRGR +-KUB: CPT

## 2020-02-27 DEVICE — URETERAL STENT
Type: IMPLANTABLE DEVICE | Site: URETER | Status: FUNCTIONAL
Brand: CONTOUR™

## 2020-02-27 RX ORDER — SODIUM CHLORIDE 0.9 % (FLUSH) 0.9 %
10 SYRINGE (ML) INJECTION EVERY 12 HOURS SCHEDULED
Status: DISCONTINUED | OUTPATIENT
Start: 2020-02-27 | End: 2020-02-27 | Stop reason: HOSPADM

## 2020-02-27 RX ORDER — SODIUM CHLORIDE, SODIUM LACTATE, POTASSIUM CHLORIDE, CALCIUM CHLORIDE 600; 310; 30; 20 MG/100ML; MG/100ML; MG/100ML; MG/100ML
9 INJECTION, SOLUTION INTRAVENOUS CONTINUOUS
Status: DISCONTINUED | OUTPATIENT
Start: 2020-02-27 | End: 2020-02-27 | Stop reason: HOSPADM

## 2020-02-27 RX ORDER — PROMETHAZINE HYDROCHLORIDE 25 MG/ML
6.25 INJECTION, SOLUTION INTRAMUSCULAR; INTRAVENOUS ONCE AS NEEDED
Status: DISCONTINUED | OUTPATIENT
Start: 2020-02-27 | End: 2020-02-27 | Stop reason: HOSPADM

## 2020-02-27 RX ORDER — PROPOFOL 10 MG/ML
VIAL (ML) INTRAVENOUS AS NEEDED
Status: DISCONTINUED | OUTPATIENT
Start: 2020-02-27 | End: 2020-02-27 | Stop reason: SURG

## 2020-02-27 RX ORDER — NALOXONE HCL 0.4 MG/ML
0.4 VIAL (ML) INJECTION AS NEEDED
Status: DISCONTINUED | OUTPATIENT
Start: 2020-02-27 | End: 2020-02-27 | Stop reason: HOSPADM

## 2020-02-27 RX ORDER — FENTANYL CITRATE 50 UG/ML
25 INJECTION, SOLUTION INTRAMUSCULAR; INTRAVENOUS
Status: DISCONTINUED | OUTPATIENT
Start: 2020-02-27 | End: 2020-02-27 | Stop reason: HOSPADM

## 2020-02-27 RX ORDER — NALBUPHINE HCL 10 MG/ML
2 AMPUL (ML) INJECTION EVERY 4 HOURS PRN
Status: DISCONTINUED | OUTPATIENT
Start: 2020-02-27 | End: 2020-02-27 | Stop reason: HOSPADM

## 2020-02-27 RX ORDER — SODIUM CHLORIDE 0.9 % (FLUSH) 0.9 %
10 SYRINGE (ML) INJECTION AS NEEDED
Status: DISCONTINUED | OUTPATIENT
Start: 2020-02-27 | End: 2020-02-27 | Stop reason: HOSPADM

## 2020-02-27 RX ORDER — DEXAMETHASONE SODIUM PHOSPHATE 10 MG/ML
INJECTION INTRAMUSCULAR; INTRAVENOUS AS NEEDED
Status: DISCONTINUED | OUTPATIENT
Start: 2020-02-27 | End: 2020-02-27 | Stop reason: SURG

## 2020-02-27 RX ORDER — CEFAZOLIN SODIUM 1 G/50ML
1 INJECTION, SOLUTION INTRAVENOUS ONCE
Status: COMPLETED | OUTPATIENT
Start: 2020-02-27 | End: 2020-02-27

## 2020-02-27 RX ORDER — HYDROCODONE BITARTRATE AND ACETAMINOPHEN 5; 325 MG/1; MG/1
1 TABLET ORAL ONCE AS NEEDED
Status: DISCONTINUED | OUTPATIENT
Start: 2020-02-27 | End: 2020-02-27 | Stop reason: HOSPADM

## 2020-02-27 RX ORDER — PROMETHAZINE HYDROCHLORIDE 25 MG/1
25 TABLET ORAL ONCE AS NEEDED
Status: DISCONTINUED | OUTPATIENT
Start: 2020-02-27 | End: 2020-02-27 | Stop reason: HOSPADM

## 2020-02-27 RX ORDER — MAGNESIUM HYDROXIDE 1200 MG/15ML
LIQUID ORAL AS NEEDED
Status: DISCONTINUED | OUTPATIENT
Start: 2020-02-27 | End: 2020-02-27 | Stop reason: HOSPADM

## 2020-02-27 RX ORDER — LIDOCAINE HYDROCHLORIDE 10 MG/ML
0.5 INJECTION, SOLUTION EPIDURAL; INFILTRATION; INTRACAUDAL; PERINEURAL ONCE AS NEEDED
Status: DISCONTINUED | OUTPATIENT
Start: 2020-02-27 | End: 2020-02-27 | Stop reason: HOSPADM

## 2020-02-27 RX ORDER — DIPHENHYDRAMINE HYDROCHLORIDE 50 MG/ML
12.5 INJECTION INTRAMUSCULAR; INTRAVENOUS
Status: DISCONTINUED | OUTPATIENT
Start: 2020-02-27 | End: 2020-02-27 | Stop reason: HOSPADM

## 2020-02-27 RX ORDER — ONDANSETRON 2 MG/ML
INJECTION INTRAMUSCULAR; INTRAVENOUS AS NEEDED
Status: DISCONTINUED | OUTPATIENT
Start: 2020-02-27 | End: 2020-02-27 | Stop reason: SURG

## 2020-02-27 RX ORDER — NALBUPHINE HCL 10 MG/ML
10 AMPUL (ML) INJECTION EVERY 4 HOURS PRN
Status: DISCONTINUED | OUTPATIENT
Start: 2020-02-27 | End: 2020-02-27 | Stop reason: HOSPADM

## 2020-02-27 RX ORDER — PROMETHAZINE HYDROCHLORIDE 25 MG/1
25 SUPPOSITORY RECTAL ONCE AS NEEDED
Status: DISCONTINUED | OUTPATIENT
Start: 2020-02-27 | End: 2020-02-27 | Stop reason: HOSPADM

## 2020-02-27 RX ORDER — LIDOCAINE HYDROCHLORIDE 20 MG/ML
INJECTION, SOLUTION INFILTRATION; PERINEURAL AS NEEDED
Status: DISCONTINUED | OUTPATIENT
Start: 2020-02-27 | End: 2020-02-27 | Stop reason: SURG

## 2020-02-27 RX ORDER — HYDRALAZINE HYDROCHLORIDE 20 MG/ML
5 INJECTION INTRAMUSCULAR; INTRAVENOUS
Status: DISCONTINUED | OUTPATIENT
Start: 2020-02-27 | End: 2020-02-27 | Stop reason: HOSPADM

## 2020-02-27 RX ADMIN — DEXAMETHASONE SODIUM PHOSPHATE 4 MG: 10 INJECTION INTRAMUSCULAR; INTRAVENOUS at 08:54

## 2020-02-27 RX ADMIN — SODIUM CHLORIDE, POTASSIUM CHLORIDE, SODIUM LACTATE AND CALCIUM CHLORIDE 9 ML/HR: 600; 310; 30; 20 INJECTION, SOLUTION INTRAVENOUS at 07:05

## 2020-02-27 RX ADMIN — ONDANSETRON HYDROCHLORIDE 4 MG: 2 SOLUTION INTRAMUSCULAR; INTRAVENOUS at 08:55

## 2020-02-27 RX ADMIN — PROPOFOL 100 MG: 10 INJECTION, EMULSION INTRAVENOUS at 08:51

## 2020-02-27 RX ADMIN — LIDOCAINE HYDROCHLORIDE 8 MG: 20 INJECTION, SOLUTION INFILTRATION; PERINEURAL at 08:54

## 2020-02-27 RX ADMIN — CEFAZOLIN SODIUM 1 G: 1 INJECTION, SOLUTION INTRAVENOUS at 08:51

## 2020-02-27 NOTE — ANESTHESIA PREPROCEDURE EVALUATION
Anesthesia Evaluation     no history of anesthetic complications:  NPO Solid Status: > 8 hours             Airway   Mallampati: II  TM distance: >3 FB  Neck ROM: full  Dental      Pulmonary - normal exam   (+) sleep apnea on CPAP,   (-) not a smoker  Cardiovascular     ECG reviewed  Rhythm: irregular    (+) hypertension well controlled less than 2 medications, dysrhythmias Atrial Fib, hyperlipidemia,   (-) murmur, carotid bruits    ROS comment: Took metoprolol    Neuro/Psych  (+) dizziness/light headedness, syncope, numbness,     GI/Hepatic/Renal/Endo    (+)   renal disease CRI, diabetes mellitus type 2,     ROS Comment: Stage 2 CRI    Musculoskeletal     Abdominal  - normal exam   Substance History      OB/GYN          Other                        Anesthesia Plan    ASA 3     general   (  D/W R&B of GA including but not limited to: heart, lung, liver, kidney, neurologic problems, positioning injuries, dental damage, corneal abrasion and TMJ.  .)  intravenous induction

## 2020-02-27 NOTE — ANESTHESIA POSTPROCEDURE EVALUATION
Patient: Home Wynn    Procedure Summary     Date:  02/27/20 Room / Location:  [facility] OR 01 / [facility] MAIN OR    Anesthesia Start:  0851 Anesthesia Stop:  0924    Procedure:  CYSTOSCOPY RIGHT STENT PLACEMENT AND LEFT STENT CHANGE NO STRINGS (Bilateral ) Diagnosis:      Surgeon:  Ceasar Nj MD Provider:  David, Kenton Rosa MD    Anesthesia Type:  general ASA Status:  3          Anesthesia Type: general    Vitals  Vitals Value Taken Time   /72 2/27/2020  9:30 AM   Temp 36.8 °C (98.3 °F) 2/27/2020  9:19 AM   Pulse 57 2/27/2020  9:45 AM   Resp 16 2/27/2020  9:30 AM   SpO2 99 % 2/27/2020  9:45 AM   Vitals shown include unvalidated device data.        Post Anesthesia Care and Evaluation    Patient location during evaluation: PACU  Patient participation: complete - patient participated  Level of consciousness: awake and alert  Pain management: adequate  Airway patency: patent  Anesthetic complications: No anesthetic complications    Cardiovascular status: acceptable  Respiratory status: acceptable  Hydration status: acceptable    Comments: --------------------            02/27/20               0930     --------------------   BP:       133/72     Pulse:      66       Resp:       16       Temp:                SpO2:      95%      --------------------

## 2020-02-27 NOTE — ANESTHESIA PROCEDURE NOTES
Airway  Urgency: elective    Date/Time: 2/27/2020 8:58 AM    General Information and Staff    Patient location during procedure: OR  Anesthesiologist: Render, Kenton Ray, MD    Indications and Patient Condition  Indications for airway management: airway protection    Preoxygenated: yes  Mask difficulty assessment: 1 - vent by mask    Final Airway Details  Final airway type: supraglottic airway      Successful airway: classic  Size 4

## 2020-03-11 ENCOUNTER — OFFICE VISIT (OUTPATIENT)
Dept: ONCOLOGY | Facility: CLINIC | Age: 85
End: 2020-03-11

## 2020-03-11 ENCOUNTER — LAB (OUTPATIENT)
Dept: LAB | Facility: HOSPITAL | Age: 85
End: 2020-03-11

## 2020-03-11 VITALS
RESPIRATION RATE: 16 BRPM | OXYGEN SATURATION: 96 % | WEIGHT: 181.1 LBS | SYSTOLIC BLOOD PRESSURE: 129 MMHG | TEMPERATURE: 98.1 F | HEIGHT: 69 IN | HEART RATE: 69 BPM | BODY MASS INDEX: 26.82 KG/M2 | DIASTOLIC BLOOD PRESSURE: 75 MMHG

## 2020-03-11 DIAGNOSIS — E78.5 HYPERLIPIDEMIA, UNSPECIFIED HYPERLIPIDEMIA TYPE: ICD-10-CM

## 2020-03-11 DIAGNOSIS — D69.6 THROMBOCYTOPENIA (HCC): ICD-10-CM

## 2020-03-11 DIAGNOSIS — R59.0 INGUINAL LYMPHADENOPATHY: Primary | ICD-10-CM

## 2020-03-11 DIAGNOSIS — R93.89 ABNORMAL CT SCAN: Primary | ICD-10-CM

## 2020-03-11 LAB
BASOPHILS # BLD AUTO: 0.02 10*3/MM3 (ref 0–0.2)
BASOPHILS NFR BLD AUTO: 0.5 % (ref 0–1.5)
DEPRECATED RDW RBC AUTO: 55.9 FL (ref 37–54)
EOSINOPHIL # BLD AUTO: 0.05 10*3/MM3 (ref 0–0.4)
EOSINOPHIL NFR BLD AUTO: 1.3 % (ref 0.3–6.2)
ERYTHROCYTE [DISTWIDTH] IN BLOOD BY AUTOMATED COUNT: 16.2 % (ref 12.3–15.4)
HCT VFR BLD AUTO: 39.7 % (ref 37.5–51)
HGB BLD-MCNC: 12.9 G/DL (ref 13–17.7)
IMM GRANULOCYTES # BLD AUTO: 0.04 10*3/MM3 (ref 0–0.05)
IMM GRANULOCYTES NFR BLD AUTO: 1.1 % (ref 0–0.5)
LYMPHOCYTES # BLD AUTO: 0.83 10*3/MM3 (ref 0.7–3.1)
LYMPHOCYTES NFR BLD AUTO: 21.9 % (ref 19.6–45.3)
MCH RBC QN AUTO: 30.6 PG (ref 26.6–33)
MCHC RBC AUTO-ENTMCNC: 32.5 G/DL (ref 31.5–35.7)
MCV RBC AUTO: 94.3 FL (ref 79–97)
MONOCYTES # BLD AUTO: 0.46 10*3/MM3 (ref 0.1–0.9)
MONOCYTES NFR BLD AUTO: 12.1 % (ref 5–12)
NEUTROPHILS # BLD AUTO: 2.39 10*3/MM3 (ref 1.7–7)
NEUTROPHILS NFR BLD AUTO: 63.1 % (ref 42.7–76)
NRBC BLD AUTO-RTO: 0 /100 WBC (ref 0–0.2)
PLATELET # BLD AUTO: 161 10*3/MM3 (ref 140–450)
PMV BLD AUTO: 10.9 FL (ref 6–12)
RBC # BLD AUTO: 4.21 10*6/MM3 (ref 4.14–5.8)
WBC NRBC COR # BLD: 3.79 10*3/MM3 (ref 3.4–10.8)

## 2020-03-11 PROCEDURE — 85025 COMPLETE CBC W/AUTO DIFF WBC: CPT

## 2020-03-11 PROCEDURE — 36415 COLL VENOUS BLD VENIPUNCTURE: CPT

## 2020-03-11 PROCEDURE — 99214 OFFICE O/P EST MOD 30 MIN: CPT | Performed by: INTERNAL MEDICINE

## 2020-03-11 NOTE — PROGRESS NOTES
[facility] GROUP OUTPATIENT FOLLOW UP CLINIC VISIT    REASON FOR FOLLOW-UP:    Concern for metastatic cancer    HISTORY OF PRESENT ILLNESS:  Home Wynn is a 88 y.o. male who returns today for follow up of the above issue.  He had his left ureteral stent replaced and a right ureteral stent placed.  He continues to have intermittent hematuria following this.  He has some discomfort in the pelvis as a result of these stents as well.  No fevers or chills.  He did have an ultrasound-guided needle biopsy of a right inguinal lymph node performed on 2/24/2020.  Pathology was nondiagnostic.    Otherwise he is doing well.        ONCOLOGIC HISTORY:  He was initially seen in consultation in the office on 2/19/2020.    He has a history of a left ureteral stricture identified 10/31/2019 treated with balloon dilation and stent on 12/3/2019.     He had CT imaging of the abdomen and pelvis ordered by urology on 2/6/2020.  This showed a left ureteral stent and resolution of left hydronephrosis previously present.  New right mild pelvocaliectasis and ureterectasis to the level of the right common iliac artery.  The distal right ureter was decompressed.  Heavy atherosclerotic changes.  A soft tissue density measuring 2 to 5 mm peripheral to both common iliac arteries.  This rind of intermediate density was progressed compared to 10/31/2019 and new compared to 6/5/2019.  Multiple renal cysts were noted.   Cardiomegaly was noted.  Right inguinal hernia.  13 mm right paraesophageal node.  14 mm left lateral pelvic node.  Prominent bilateral groin and borderline external iliac nodes.  20 mm node in the right pelvis posterior to the extremity like veins.  3 cm right external iliac node.     He was referred for further evaluation.     He complains of some lower abdominal discomfort ongoing for a few months.  He does not believe that this is related to the ureteral stent.  He has had some mild constipation.  No fevers chills night  "sweats or unintended weight loss.  No palpable lymphadenopathy that he has noticed.  No personal history of malignancy.  Sister with a history of breast cancer.    On 2/24/2020 he had an ultrasound-guided lymph node biopsy of a right inguinal lymph node performed in interventional radiology.  Flow cytometry was negative.  The lymph node biopsy showed a minute fragment of lymphoid tissue and no pathologic diagnosis could be made.     On 2/27/2020 he had a left ureteral stent replacement and placement of a right ureteral stent by Dr. Nj.      ALLERGIES:  No Known Allergies    MEDICATIONS:  The medication list has been reviewed with the patient by the medical assistant, and the list has been updated in the electronic medical record, which I reviewed.  Medication dosages and frequencies were confirmed to be accurate.    REVIEW OF SYSTEMS:  PAIN:  See Vital Signs below.  GENERAL:  No fevers, chills, night sweats, or unintended weight loss.  Fatigue  SKIN:  No rashes or non-healing lesions.  HEME/LYMPH:  No abnormal bleeding.  Inguinal adenopathy  EYES:  No vision changes or diplopia.  ENT:  No sore throat or difficulty swallowing.  RESPIRATORY:  No cough, shortness of breath, hemoptysis, or wheezing.  CARDIOVASCULAR:  No chest pain, palpitations, orthopnea, or dyspnea on exertion.  GASTROINTESTINAL: Intermittent constipation  GENITOURINARY: Hematuria which is intermittent.  Pelvic discomfort from the ureteral stents.  MUSCULOSKELETAL:  No joint pain, swelling, or erythema.  NEUROLOGIC:  No dizziness, loss of consciousness, or seizures.  PSYCHIATRIC:  No depression, anxiety, or mood changes.    Vitals:    03/11/20 1102   BP: 129/75   Pulse: 69   Resp: 16   Temp: 98.1 °F (36.7 °C)   SpO2: 96%   Weight: 82.1 kg (181 lb 1.6 oz)   Height: 175.8 cm (69.21\")   PainSc: 0-No pain       PHYSICAL EXAMINATION:  GENERAL:  Well-developed well-nourished male; awake, alert and oriented, in no acute distress.  SKIN:  Warm and dry, " without rashes, purpura, or petechiae.  HEAD:  Normocephalic, atraumatic.  EYES:  Pupils equal, round and reactive to light.  Extraocular movements intact.  Conjunctivae normal.  EARS:  Hearing intact.  NOSE:  Septum midline.  No excoriations or nasal discharge.  MOUTH:  No stomatitis or ulcers.  Lips are normal.  THROAT:  Oropharynx without lesions or exudates.  NECK:  Supple with good range of motion; no thyromegaly or masses; no JVD or bruits.  LYMPHATICS: Shotty bilateral inguinal adenopathy measuring less than 1 cm  CHEST:  Lungs are clear to auscultation bilaterally.  No wheezes, rales, or rhonchi.  HEART:  Regular rate; normal rhythm.  No murmurs, gallops or rubs.  ABDOMEN:  Soft, non-tender, non-distended.  Normal active bowel sounds.  No organomegaly.  EXTREMITIES:  No clubbing, cyanosis, or edema.  NEUROLOGICAL:  No focal neurologic deficits.    DIAGNOSTIC DATA:  Results for orders placed or performed in visit on 03/11/20   CBC Auto Differential   Result Value Ref Range    WBC 3.79 3.40 - 10.80 10*3/mm3    RBC 4.21 4.14 - 5.80 10*6/mm3    Hemoglobin 12.9 (L) 13.0 - 17.7 g/dL    Hematocrit 39.7 37.5 - 51.0 %    MCV 94.3 79.0 - 97.0 fL    MCH 30.6 26.6 - 33.0 pg    MCHC 32.5 31.5 - 35.7 g/dL    RDW 16.2 (H) 12.3 - 15.4 %    RDW-SD 55.9 (H) 37.0 - 54.0 fl    MPV 10.9 6.0 - 12.0 fL    Platelets 161 140 - 450 10*3/mm3    Neutrophil % 63.1 42.7 - 76.0 %    Lymphocyte % 21.9 19.6 - 45.3 %    Monocyte % 12.1 (H) 5.0 - 12.0 %    Eosinophil % 1.3 0.3 - 6.2 %    Basophil % 0.5 0.0 - 1.5 %    Immature Grans % 1.1 (H) 0.0 - 0.5 %    Neutrophils, Absolute 2.39 1.70 - 7.00 10*3/mm3    Lymphocytes, Absolute 0.83 0.70 - 3.10 10*3/mm3    Monocytes, Absolute 0.46 0.10 - 0.90 10*3/mm3    Eosinophils, Absolute 0.05 0.00 - 0.40 10*3/mm3    Basophils, Absolute 0.02 0.00 - 0.20 10*3/mm3    Immature Grans, Absolute 0.04 0.00 - 0.05 10*3/mm3    nRBC 0.0 0.0 - 0.2 /100 WBC       IMAGING:  None reviewed today    ASSESSMENT:  This is  a 88 y.o. male with:  1.  Lymphadenopathy: There is some soft tissue thickening within the pelvis around the blood vessels and also scattered inguinal and pelvic lymphadenopathy that is still relatively small but seems to have increased in size from October to February.  Significance unclear.  We discussed the variety of possibilities including both nonmalignant and malignant etiologies.  No fevers chills night sweats or unintended weight loss.  No history of malignancy.    When he was initially seen on 2/29/2020 we planned obtain an ultrasound-guided inguinal lymph node biopsy as are some palpable inguinal lymph nodes.  This was performed on 2/24/2020 but there is only a small amount of tissue obtained and no pathologic diagnosis could be provided and the flow cytometry was unremarkable.  Excisional node biopsy suggested.  I will refer him to Dr. Maria with general surgery to consider an excisional lymph node biopsy.  She performed his cholecystectomy back in 2010.    2.  Thrombocytopenia: This is very mild. The immature platelet fraction was 7.6%, slightly elevated.      3.  Mild normocytic anemia: Hemoglobin is essentially normal.  Continue to monitor. Vitamin B12 is adequate at 1032.  Ferritin was 152 with an iron of 69.  LDH was normal.  No evidence for monoclonal protein.  Chronic kidney disease is likely contributing.    4.  Ureteral strictures: Unclear etiology.  Ureteral stenting repeated on 2/27/2020 with replacement of his left ureteral stent and placement of a new right ureteral stent..     PLAN:  1.  Refer to Dr. Maria with general surgery to consider an excisional inguinal  lymph node biopsy  2.  I will see him back in about 6 weeks for follow-up with a CBC and reticulocyte count for review.

## 2020-03-12 ENCOUNTER — OFFICE VISIT (OUTPATIENT)
Dept: FAMILY MEDICINE CLINIC | Facility: CLINIC | Age: 85
End: 2020-03-12

## 2020-03-12 VITALS
TEMPERATURE: 98.3 F | OXYGEN SATURATION: 95 % | DIASTOLIC BLOOD PRESSURE: 71 MMHG | HEART RATE: 72 BPM | RESPIRATION RATE: 13 BRPM | HEIGHT: 69 IN | WEIGHT: 181.2 LBS | SYSTOLIC BLOOD PRESSURE: 134 MMHG | BODY MASS INDEX: 26.84 KG/M2

## 2020-03-12 DIAGNOSIS — E78.5 HYPERLIPIDEMIA, UNSPECIFIED HYPERLIPIDEMIA TYPE: ICD-10-CM

## 2020-03-12 DIAGNOSIS — E11.9 TYPE 2 DIABETES MELLITUS WITHOUT COMPLICATION, WITH LONG-TERM CURRENT USE OF INSULIN (HCC): Primary | ICD-10-CM

## 2020-03-12 DIAGNOSIS — Z79.4 TYPE 2 DIABETES MELLITUS WITHOUT COMPLICATION, WITH LONG-TERM CURRENT USE OF INSULIN (HCC): Primary | ICD-10-CM

## 2020-03-12 DIAGNOSIS — R31.9 HEMATURIA, UNSPECIFIED TYPE: ICD-10-CM

## 2020-03-12 LAB
BILIRUB BLD-MCNC: NEGATIVE MG/DL
CLARITY, POC: CLEAR
COLOR UR: ABNORMAL
GLUCOSE UR STRIP-MCNC: NEGATIVE MG/DL
KETONES UR QL: NEGATIVE
LEUKOCYTE EST, POC: ABNORMAL
NITRITE UR-MCNC: NEGATIVE MG/ML
PH UR: 6 [PH] (ref 5–8)
PROT UR STRIP-MCNC: ABNORMAL MG/DL
RBC # UR STRIP: ABNORMAL /UL
SP GR UR: 1.03 (ref 1–1.03)
UROBILINOGEN UR QL: NORMAL

## 2020-03-12 PROCEDURE — 81003 URINALYSIS AUTO W/O SCOPE: CPT | Performed by: FAMILY MEDICINE

## 2020-03-12 PROCEDURE — 99213 OFFICE O/P EST LOW 20 MIN: CPT | Performed by: FAMILY MEDICINE

## 2020-03-12 NOTE — PROGRESS NOTES
"Chief Complaint   Patient presents with   • Diabetes       Subjective   Home Wynn is a 88 y.o. male.     History of Present Illness   DM  He has lost about 20 lbs over the last year and wondering if he can get off the insulin. He takes 18 units. His BG has been between   He is having blood in his urine. It cleared up and then started again.   Says he has been off the xarelto. Off since 2/22/20 because of the procedures. He took it one night since then and the next day he had blood in the urine and went back off of it. He has not had any blood in the urine for two days.   Saw LNs so then he had bx, did not get enough tissue repeating with LN removal on 3/31/20.    He had hydronephrosis bilaterally related to BPH. He has had B ureter stenting and meatotomy. With imaging follow up forund to have pelvic LAD. bx needs to be repeated.     The following portions of the patient's history were reviewed and updated as appropriate: allergies, current medications, past medical history, past social history, past surgical history and problem list.    Review of Systems   Constitutional: Negative for activity change, appetite change and unexpected weight change.   Endocrine: Negative for polydipsia and polyuria.   Genitourinary: Positive for hematuria.   Psychiatric/Behavioral: The patient is not nervous/anxious.        /71 (BP Location: Left arm, Patient Position: Sitting, Cuff Size: Adult)   Pulse 72   Temp 98.3 °F (36.8 °C) (Oral)   Resp 13   Ht 175.8 cm (69.21\")   Wt 82.2 kg (181 lb 3.2 oz)   SpO2 95%   BMI 26.60 kg/m²       Objective   Physical Exam   Constitutional: He is oriented to person, place, and time. He appears well-nourished. No distress.   Eyes: Conjunctivae are normal. Right eye exhibits no discharge. Left eye exhibits no discharge. No scleral icterus.   Cardiovascular: Normal rate, regular rhythm, normal heart sounds and intact distal pulses. Exam reveals no gallop and no friction rub.   No " murmur heard.  Pulmonary/Chest: Effort normal and breath sounds normal. No respiratory distress. He has no wheezes.   Musculoskeletal: He exhibits no edema.   Neurological: He is alert and oriented to person, place, and time.   Psychiatric: He has a normal mood and affect. His behavior is normal.   Vitals reviewed.      Assessment/Plan   Home was seen today for diabetes.    Diagnoses and all orders for this visit:    Type 2 diabetes mellitus without complication, with long-term current use of insulin (CMS/[facility])  -     Protein / Creatinine Ratio, Urine - Urine, Clean Catch  -     Hemoglobin A1c    Hyperlipidemia, unspecified hyperlipidemia type  -     Lipid Panel    Hematuria, unspecified type  -     POC Urinalysis Dipstick, Automated  -     CBC (No Diff)  -     Urine Culture - Urine, Urine, Clean Catch; Future  -     Urine Culture - Urine, Urine, Clean Catch      He is interested in getting off insulin. He has lost weight and feels his sugars have been better. We will see the labs and if we can we will reduce or discontinue insulin use.   He has had multiple ureter stents. He is having some mild urinary symptoms. We will check today to make sure that there is no sign of infections.   Due for labs today.   He is overall doing well.

## 2020-03-13 LAB
CHOLEST SERPL-MCNC: 169 MG/DL (ref 0–200)
CREAT UR-MCNC: 119.5 MG/DL
ERYTHROCYTE [DISTWIDTH] IN BLOOD BY AUTOMATED COUNT: 14.5 % (ref 12.3–15.4)
HBA1C MFR BLD: 7.5 % (ref 4.8–5.6)
HCT VFR BLD AUTO: 39 % (ref 37.5–51)
HDLC SERPL-MCNC: 45 MG/DL (ref 40–60)
HGB BLD-MCNC: 12.7 G/DL (ref 13–17.7)
LDLC SERPL CALC-MCNC: 115 MG/DL (ref 0–100)
MCH RBC QN AUTO: 30 PG (ref 26.6–33)
MCHC RBC AUTO-ENTMCNC: 32.6 G/DL (ref 31.5–35.7)
MCV RBC AUTO: 92 FL (ref 79–97)
PLATELET # BLD AUTO: 195 10*3/MM3 (ref 140–450)
PROT UR-MCNC: 139 MG/DL
PROT/CREAT UR: 1163.2 MG/G CREA (ref 0–200)
RBC # BLD AUTO: 4.24 10*6/MM3 (ref 4.14–5.8)
TRIGL SERPL-MCNC: 43 MG/DL (ref 0–150)
VLDLC SERPL CALC-MCNC: 8.6 MG/DL
WBC # BLD AUTO: 3.84 10*3/MM3 (ref 3.4–10.8)

## 2020-03-14 LAB
BACTERIA UR CULT: NO GROWTH
BACTERIA UR CULT: NORMAL

## 2020-03-17 ENCOUNTER — TELEPHONE (OUTPATIENT)
Dept: FAMILY MEDICINE CLINIC | Facility: CLINIC | Age: 85
End: 2020-03-17

## 2020-03-24 NOTE — PROGRESS NOTES
SURGERY  Home Wynn   11/19/1931 03/30/2020    Chief Complaint:  Adenopathy    HPI    Mr. Wynn is a nice 88 y.o. male who presents at the referral of Dr Mahoney, with need for open lymph node resection, having bilateral groin nodes and percutaneous US guided biopsy by Dr Pruitt, that was non-diagnostic with minute fragment of lymphoid tissue, and flow negative.  His last CT showed prominent bilateral groin nodes, new compared to June 2019 with differential including vasculitis, retroperitoneal fibrosis and lymphoma.  Incidentally were also found abnormal left paraesophageal, left pelvic, bilateral external iliac nodes, cardiomegaly ([facility]).  I reviewed the CT scan, and he does have inguinal adenopathy, right appearing to be greater than that on the left.    He does have mild lower abdominal pain and constipation.  His last c scope was in 2011.  He has had hematuria since his stent placement which he is concerned about, having already seen Dr Clark post op.  He has stopped taking his xarelto due to this bleeding.    Complicating medical decision making for or against surgery are the fact that he has a history of DVT/a fib, diabetes, and home use of oxygen.  He had a recent admission with shortness of breath requiring diuresis, with CT showing passive hepatic congestion.  He states he has not felt well for the last year, with epigastric pain, firmness and ultimate emesis with resolution.  There is nothing seen on the stomach on this CT.   He has early satiety and weight loss of 20 pounds in one year due to inability to eat.  He has seen GI but i could not find an EGD on the chart.      Past Medical History:   Diagnosis Date   • Abnormal chest x-ray 12/12/2017    Infiltrate or opacities left heart border, right lung 12/12/2017   • Abnormal electrocardiogram    • Acute pancreatitis 12/27/2018   • Acute renal failure (CMS/HCC)    • Atrial fibrillation (CMS/HCC)    • Cellulitis of right lower extremity  7/11/2017   • CHF (congestive heart failure) (CMS/[facility]) 12/2019   • Chronic kidney disease     renal failure   • Colon polyps    • Deep vein thrombosis (CMS/[facility])     BILATERAL LOWER EXT   • Generalized weakness    • Hyperlipidemia    • Insulin dependent diabetes mellitus (CMS/[facility])    • Malaise and fatigue    • Multiple falls    • Myalgia    • Osteoarthritis of knee    • PAF (paroxysmal atrial fibrillation) (CMS/[facility])    • Pancreatitis    • Peripheral neuropathy     GREAT TOE RIGHT FOOT   • Rib fracture    • Right leg weakness    • Sleep apnea     2L O2 PER NC AT NIGHT   • SOB (shortness of breath)    • Syncope    • Type 2 diabetes mellitus (CMS/[facility])    • Vallecular mass    • Vertigo      Past Surgical History:   Procedure Laterality Date   • CATARACT EXTRACTION     • CHOLECYSTECTOMY N/A 2010    Dr. Maria   • COLONOSCOPY N/A 04/13/2011    Normal-Dr. Barry Wilson   • CYSTOSCOPY Left 12/3/2019    Procedure: CYSTOSCOPY WITH PYELOGRAM URETEROSCOPY WITH LEFT STENT AND DILATION OF URETERAL STRICTURE;  Surgeon: Ceasar Nj MD;  Location: [facility];  Service: Urology   • CYSTOSCOPY W/ URETERAL STENT PLACEMENT Bilateral 2/27/2020    Procedure: CYSTOSCOPY RIGHT STENT PLACEMENT AND LEFT STENT CHANGE NO STRINGS;  Surgeon: Ceasar Nj MD;  Location: [facility];  Service: Urology;  Laterality: Bilateral;   • HERNIA REPAIR N/A 2003   • PROSTATE SURGERY     • TONSILLECTOMY AND ADENOIDECTOMY     • US GUIDED LYMPH NODE BIOPSY  2/24/2020   • UVULECTOMY       Family History   Problem Relation Age of Onset   • Cancer Mother    • Heart disease Mother    • Stroke Father    • Bleeding Disorder Father    • Cancer Sister    • Heart disease Sister    • Malig Hyperthermia Neg Hx      Social History     Socioeconomic History   • Marital status:      Spouse name: Marisol   • Number of children: 2   • Years of education: High school   • Highest education level: Not on file   Occupational History     Employer: RETIRED  "  Tobacco Use   • Smoking status: Never Smoker   • Smokeless tobacco: Never Used   • Tobacco comment: caffine use 2 cups daily   Substance and Sexual Activity   • Alcohol use: No   • Drug use: No   • Sexual activity: Defer   Social History Narrative    LIVES WITH WIFE         Current Outpatient Medications:   •  Insulin Pen Needle (PEN NEEDLES) 31G X 6 MM misc, USE DAILY AS DIRECTED, Disp: 100 each, Rfl: 1  •  LANTUS SOLOSTAR 100 UNIT/ML injection pen, INJECT 18 UNITS UNDER THE SKIN AS DIRECTED DAILY (Patient taking differently: Inject 18 Units under the skin into the appropriate area as directed Every Night.), Disp: 18 pen, Rfl: 4  •  metoprolol succinate XL (TOPROL-XL) 25 MG 24 hr tablet, Take 12.5 mg by mouth Every Night., Disp: , Rfl:   •  Multiple Vitamins-Minerals (PRESERVISION AREDS) capsule, Take 2 capsules by mouth Daily., Disp: , Rfl:   •  O2 (OXYGEN), Inhale 2 L/min 1 (One) Time. Wears at night., Disp: , Rfl:   •  XARELTO 20 MG tablet, TAKE ONE TABLET BY MOUTH DAILY, Disp: 90 tablet, Rfl: 1    No Known Allergies  Review of Systems  Positive for activity change and appetite change, fatigue, unexpected weight change, sleep apnea, irregular heartbeat, abdominal pain, vomiting, excess urination, urinary frequency, blood in urine, joint pain  All other systems reviewed and negative.    Vitals:    03/30/20 1431   Weight: 81.6 kg (180 lb)   Height: 177.8 cm (70\")       PHYSICAL EXAM:    Ht 177.8 cm (70\")   Wt 81.6 kg (180 lb)   BMI 25.83 kg/m²   Body mass index is 25.83 kg/m².    Constitutional: well developed, well nourished, appears  healthy, younger than stated age  Eyes: sclera nonicteric, conjunctiva not injected   ENMT: Hearing only minimally diminished, trachea midline, dentitia in reasonable order  CVS: RRR, many ectopic beats, no murmur, peripheral edema not present, no carotid bruit  Respiratory: CTA, normal respiratory effort   Gastrointestinal: no hepatosplenomegaly, abdomen soft with some " distention in the area that would be his gastric bubble, abdominal hernia not detected, some firmness in the right upper quadrant, ventral diastases  Genitourinary: inguinal hernia not detected though I know there is one on the CT scan  Musculoskeletal: gait normal, muscle mass normal  Skin: warm and dry, no rashes visible  Neurological: awake and alert, seems to have reasonable capacity for understanding for medical decision making  Psychiatric: appears to have reasonable judgement, pleasant  Lymphatics: no cervical adenopathy, palpable node right inguinal region    Radiographic/Lab Findings: I reviewed the CT scan and the right inguinal lymph node appears larger than the left    IMPRESSION:  · Inguinal adenopathy, right greater than left, with adenopathy elsewhere including a paraesophageal lymph node  · Weight loss 20 pounds 1 year  · Early satiety, with symptoms that could be consistent with gastric outlet obstruction.  Patient is convinced that if there is anything wrong with him that it is in this location.  · Hematuria with the patient stopping his Xarelto on his own, with recent stents  · Stents without etiology determined  · A. fib  · Congestive heart failure, with recent admission for such  · Home oxygen use  · Diabetes    PLAN:  · Right inguinal node lymph node excision.  He has had a failed attempt at diagnosis with percutaneous biopsy and this will be imperative.  Discussed the potential for postoperative seroma  · EGD on the stretcher prior to the placement for the inguinal lymph node biopsy.  I feel like I have to do this to rule out something in the stomach as he is quite concerned this is the source of his difficulties.  · Bring CPAP day of procedure  · We will try to get on quickly before we feel the surge from covid 19 in the hospital    Barbara Maria MD  03/30/2020  3:24 PM

## 2020-03-30 ENCOUNTER — OFFICE VISIT (OUTPATIENT)
Dept: SURGERY | Facility: CLINIC | Age: 85
End: 2020-03-30

## 2020-03-30 ENCOUNTER — PREP FOR SURGERY (OUTPATIENT)
Dept: OTHER | Facility: HOSPITAL | Age: 85
End: 2020-03-30

## 2020-03-30 VITALS — WEIGHT: 180 LBS | BODY MASS INDEX: 25.77 KG/M2 | HEIGHT: 70 IN

## 2020-03-30 DIAGNOSIS — R68.81 EARLY SATIETY: ICD-10-CM

## 2020-03-30 DIAGNOSIS — R59.0 INGUINAL LYMPHADENOPATHY: Primary | ICD-10-CM

## 2020-03-30 DIAGNOSIS — R63.4 WEIGHT LOSS: ICD-10-CM

## 2020-03-30 PROCEDURE — 99204 OFFICE O/P NEW MOD 45 MIN: CPT | Performed by: SURGERY

## 2020-03-30 RX ORDER — CEFAZOLIN SODIUM 2 G/100ML
2 INJECTION, SOLUTION INTRAVENOUS ONCE
Status: CANCELLED | OUTPATIENT
Start: 2020-04-01 | End: 2020-03-30

## 2020-03-30 RX ORDER — SODIUM CHLORIDE 0.9 % (FLUSH) 0.9 %
3 SYRINGE (ML) INJECTION EVERY 12 HOURS SCHEDULED
Status: CANCELLED | OUTPATIENT
Start: 2020-04-01

## 2020-03-30 RX ORDER — ACETAMINOPHEN 500 MG
1000 TABLET ORAL ONCE
Status: CANCELLED | OUTPATIENT
Start: 2020-04-01 | End: 2020-03-30

## 2020-03-30 RX ORDER — SODIUM CHLORIDE 0.9 % (FLUSH) 0.9 %
10 SYRINGE (ML) INJECTION AS NEEDED
Status: CANCELLED | OUTPATIENT
Start: 2020-04-01

## 2020-04-01 ENCOUNTER — ANESTHESIA (OUTPATIENT)
Dept: PERIOP | Facility: HOSPITAL | Age: 85
End: 2020-04-01

## 2020-04-01 ENCOUNTER — ANESTHESIA EVENT (OUTPATIENT)
Dept: PERIOP | Facility: HOSPITAL | Age: 85
End: 2020-04-01

## 2020-04-01 ENCOUNTER — HOSPITAL ENCOUNTER (OUTPATIENT)
Facility: HOSPITAL | Age: 85
Setting detail: HOSPITAL OUTPATIENT SURGERY
Discharge: HOME | End: 2020-04-01
Attending: SURGERY | Admitting: SURGERY

## 2020-04-01 VITALS
DIASTOLIC BLOOD PRESSURE: 78 MMHG | OXYGEN SATURATION: 99 % | BODY MASS INDEX: 25.5 KG/M2 | TEMPERATURE: 97.9 F | SYSTOLIC BLOOD PRESSURE: 146 MMHG | HEART RATE: 62 BPM | WEIGHT: 178.13 LBS | RESPIRATION RATE: 16 BRPM | HEIGHT: 70 IN

## 2020-04-01 DIAGNOSIS — R63.4 WEIGHT LOSS: ICD-10-CM

## 2020-04-01 DIAGNOSIS — R59.0 INGUINAL LYMPHADENOPATHY: ICD-10-CM

## 2020-04-01 DIAGNOSIS — R68.81 EARLY SATIETY: ICD-10-CM

## 2020-04-01 LAB
ABO GROUP BLD: NORMAL
ALBUMIN SERPL-MCNC: 3.7 G/DL (ref 3.5–5.2)
ALP SERPL-CCNC: 207 U/L (ref 39–117)
ALT SERPL W P-5'-P-CCNC: 101 U/L (ref 1–41)
AMYLASE SERPL-CCNC: 50 U/L (ref 28–100)
ANION GAP SERPL CALCULATED.3IONS-SCNC: 9.7 MMOL/L (ref 5–15)
AST SERPL-CCNC: 76 U/L (ref 1–40)
BILIRUB CONJ SERPL-MCNC: 0.3 MG/DL (ref 0.2–0.3)
BILIRUB INDIRECT SERPL-MCNC: 0.5 MG/DL
BILIRUB SERPL-MCNC: 0.8 MG/DL (ref 0.2–1.2)
BLD GP AB SCN SERPL QL: NEGATIVE
BUN BLD-MCNC: 20 MG/DL (ref 8–23)
BUN/CREAT SERPL: 18.2 (ref 7–25)
CALCIUM SPEC-SCNC: 9.1 MG/DL (ref 8.6–10.5)
CHLORIDE SERPL-SCNC: 101 MMOL/L (ref 98–107)
CO2 SERPL-SCNC: 25.3 MMOL/L (ref 22–29)
CREAT BLD-MCNC: 1.1 MG/DL (ref 0.76–1.27)
GFR SERPL CREATININE-BSD FRML MDRD: 63 ML/MIN/1.73
GLUCOSE BLD-MCNC: 158 MG/DL (ref 65–99)
GLUCOSE BLDC GLUCOMTR-MCNC: 146 MG/DL (ref 70–130)
GLUCOSE BLDC GLUCOMTR-MCNC: 162 MG/DL (ref 70–130)
LIPASE SERPL-CCNC: 32 U/L (ref 13–60)
POTASSIUM BLD-SCNC: 4.4 MMOL/L (ref 3.5–5.2)
PROT SERPL-MCNC: 6.7 G/DL (ref 6–8.5)
RH BLD: POSITIVE
SODIUM BLD-SCNC: 136 MMOL/L (ref 136–145)
T&S EXPIRATION DATE: NORMAL

## 2020-04-01 PROCEDURE — 82962 GLUCOSE BLOOD TEST: CPT

## 2020-04-01 PROCEDURE — 25010000002 PROPOFOL 10 MG/ML EMULSION: Performed by: ANESTHESIOLOGY

## 2020-04-01 PROCEDURE — 25010000002 PHENYLEPHRINE PER 1 ML: Performed by: ANESTHESIOLOGY

## 2020-04-01 PROCEDURE — 86901 BLOOD TYPING SEROLOGIC RH(D): CPT | Performed by: SURGERY

## 2020-04-01 PROCEDURE — 43239 EGD BIOPSY SINGLE/MULTIPLE: CPT | Performed by: SURGERY

## 2020-04-01 PROCEDURE — 82150 ASSAY OF AMYLASE: CPT | Performed by: SURGERY

## 2020-04-01 PROCEDURE — 88185 FLOWCYTOMETRY/TC ADD-ON: CPT

## 2020-04-01 PROCEDURE — 88312 SPECIAL STAINS GROUP 1: CPT | Performed by: SURGERY

## 2020-04-01 PROCEDURE — 38531 OPEN BX/EXC INGUINOFEM NODES: CPT | Performed by: SURGERY

## 2020-04-01 PROCEDURE — 25010000003 CEFAZOLIN IN DEXTROSE 2-4 GM/100ML-% SOLUTION: Performed by: SURGERY

## 2020-04-01 PROCEDURE — 86850 RBC ANTIBODY SCREEN: CPT | Performed by: SURGERY

## 2020-04-01 PROCEDURE — 88305 TISSUE EXAM BY PATHOLOGIST: CPT | Performed by: SURGERY

## 2020-04-01 PROCEDURE — 86900 BLOOD TYPING SEROLOGIC ABO: CPT | Performed by: SURGERY

## 2020-04-01 PROCEDURE — 83690 ASSAY OF LIPASE: CPT | Performed by: SURGERY

## 2020-04-01 PROCEDURE — 80076 HEPATIC FUNCTION PANEL: CPT | Performed by: SURGERY

## 2020-04-01 PROCEDURE — 80048 BASIC METABOLIC PNL TOTAL CA: CPT | Performed by: SURGERY

## 2020-04-01 PROCEDURE — 88184 FLOWCYTOMETRY/ TC 1 MARKER: CPT

## 2020-04-01 PROCEDURE — 25010000003 LIDOCAINE 1 % SOLUTION 20 ML VIAL: Performed by: SURGERY

## 2020-04-01 DEVICE — CLIP LIGAT VASC HORIZON TI SM/WD RD 6CT: Type: IMPLANTABLE DEVICE | Site: GROIN | Status: FUNCTIONAL

## 2020-04-01 RX ORDER — CEFAZOLIN SODIUM 2 G/100ML
2 INJECTION, SOLUTION INTRAVENOUS ONCE
Status: COMPLETED | OUTPATIENT
Start: 2020-04-01 | End: 2020-04-01

## 2020-04-01 RX ORDER — LIDOCAINE HYDROCHLORIDE 20 MG/ML
INJECTION, SOLUTION INFILTRATION; PERINEURAL AS NEEDED
Status: DISCONTINUED | OUTPATIENT
Start: 2020-04-01 | End: 2020-04-01 | Stop reason: SURG

## 2020-04-01 RX ORDER — SODIUM CHLORIDE 0.9 % (FLUSH) 0.9 %
3-10 SYRINGE (ML) INJECTION AS NEEDED
Status: DISCONTINUED | OUTPATIENT
Start: 2020-04-01 | End: 2020-04-01 | Stop reason: HOSPADM

## 2020-04-01 RX ORDER — LIDOCAINE HYDROCHLORIDE 10 MG/ML
0.5 INJECTION, SOLUTION EPIDURAL; INFILTRATION; INTRACAUDAL; PERINEURAL ONCE AS NEEDED
Status: DISCONTINUED | OUTPATIENT
Start: 2020-04-01 | End: 2020-04-01 | Stop reason: HOSPADM

## 2020-04-01 RX ORDER — PROMETHAZINE HYDROCHLORIDE 25 MG/ML
12.5 INJECTION, SOLUTION INTRAMUSCULAR; INTRAVENOUS ONCE AS NEEDED
Status: DISCONTINUED | OUTPATIENT
Start: 2020-04-01 | End: 2020-04-01 | Stop reason: HOSPADM

## 2020-04-01 RX ORDER — SODIUM CHLORIDE 0.9 % (FLUSH) 0.9 %
10 SYRINGE (ML) INJECTION AS NEEDED
Status: DISCONTINUED | OUTPATIENT
Start: 2020-04-01 | End: 2020-04-01 | Stop reason: HOSPADM

## 2020-04-01 RX ORDER — FLUMAZENIL 0.1 MG/ML
0.2 INJECTION INTRAVENOUS AS NEEDED
Status: DISCONTINUED | OUTPATIENT
Start: 2020-04-01 | End: 2020-04-01 | Stop reason: HOSPADM

## 2020-04-01 RX ORDER — DIPHENHYDRAMINE HCL 25 MG
25 CAPSULE ORAL
Status: DISCONTINUED | OUTPATIENT
Start: 2020-04-01 | End: 2020-04-01 | Stop reason: HOSPADM

## 2020-04-01 RX ORDER — ACETAMINOPHEN 325 MG/1
650 TABLET ORAL ONCE AS NEEDED
Status: DISCONTINUED | OUTPATIENT
Start: 2020-04-01 | End: 2020-04-01 | Stop reason: HOSPADM

## 2020-04-01 RX ORDER — DIPHENHYDRAMINE HYDROCHLORIDE 50 MG/ML
12.5 INJECTION INTRAMUSCULAR; INTRAVENOUS
Status: DISCONTINUED | OUTPATIENT
Start: 2020-04-01 | End: 2020-04-01 | Stop reason: HOSPADM

## 2020-04-01 RX ORDER — LABETALOL HYDROCHLORIDE 5 MG/ML
5 INJECTION, SOLUTION INTRAVENOUS
Status: DISCONTINUED | OUTPATIENT
Start: 2020-04-01 | End: 2020-04-01 | Stop reason: HOSPADM

## 2020-04-01 RX ORDER — PROMETHAZINE HYDROCHLORIDE 25 MG/1
25 TABLET ORAL ONCE AS NEEDED
Status: DISCONTINUED | OUTPATIENT
Start: 2020-04-01 | End: 2020-04-01 | Stop reason: HOSPADM

## 2020-04-01 RX ORDER — EPHEDRINE SULFATE 50 MG/ML
5 INJECTION, SOLUTION INTRAVENOUS ONCE AS NEEDED
Status: DISCONTINUED | OUTPATIENT
Start: 2020-04-01 | End: 2020-04-01 | Stop reason: HOSPADM

## 2020-04-01 RX ORDER — PANTOPRAZOLE SODIUM 40 MG/1
40 TABLET, DELAYED RELEASE ORAL DAILY
Qty: 90 TABLET | Refills: 0 | Status: SHIPPED | OUTPATIENT
Start: 2020-04-01 | End: 2020-06-30

## 2020-04-01 RX ORDER — FENTANYL CITRATE 50 UG/ML
50 INJECTION, SOLUTION INTRAMUSCULAR; INTRAVENOUS
Status: DISCONTINUED | OUTPATIENT
Start: 2020-04-01 | End: 2020-04-01 | Stop reason: HOSPADM

## 2020-04-01 RX ORDER — NALOXONE HCL 0.4 MG/ML
0.2 VIAL (ML) INJECTION AS NEEDED
Status: DISCONTINUED | OUTPATIENT
Start: 2020-04-01 | End: 2020-04-01 | Stop reason: HOSPADM

## 2020-04-01 RX ORDER — SODIUM CHLORIDE 0.9 % (FLUSH) 0.9 %
3 SYRINGE (ML) INJECTION EVERY 12 HOURS SCHEDULED
Status: DISCONTINUED | OUTPATIENT
Start: 2020-04-01 | End: 2020-04-01 | Stop reason: HOSPADM

## 2020-04-01 RX ORDER — HYDROCODONE BITARTRATE AND ACETAMINOPHEN 7.5; 325 MG/1; MG/1
1 TABLET ORAL ONCE AS NEEDED
Status: DISCONTINUED | OUTPATIENT
Start: 2020-04-01 | End: 2020-04-01 | Stop reason: HOSPADM

## 2020-04-01 RX ORDER — PROMETHAZINE HYDROCHLORIDE 25 MG/1
25 SUPPOSITORY RECTAL ONCE AS NEEDED
Status: DISCONTINUED | OUTPATIENT
Start: 2020-04-01 | End: 2020-04-01 | Stop reason: HOSPADM

## 2020-04-01 RX ORDER — ONDANSETRON 2 MG/ML
4 INJECTION INTRAMUSCULAR; INTRAVENOUS ONCE AS NEEDED
Status: DISCONTINUED | OUTPATIENT
Start: 2020-04-01 | End: 2020-04-01 | Stop reason: HOSPADM

## 2020-04-01 RX ORDER — HYDROMORPHONE HYDROCHLORIDE 1 MG/ML
0.5 INJECTION, SOLUTION INTRAMUSCULAR; INTRAVENOUS; SUBCUTANEOUS
Status: DISCONTINUED | OUTPATIENT
Start: 2020-04-01 | End: 2020-04-01 | Stop reason: HOSPADM

## 2020-04-01 RX ORDER — ACETAMINOPHEN 500 MG
1000 TABLET ORAL ONCE
Status: COMPLETED | OUTPATIENT
Start: 2020-04-01 | End: 2020-04-01

## 2020-04-01 RX ORDER — HYDRALAZINE HYDROCHLORIDE 20 MG/ML
5 INJECTION INTRAMUSCULAR; INTRAVENOUS
Status: DISCONTINUED | OUTPATIENT
Start: 2020-04-01 | End: 2020-04-01 | Stop reason: HOSPADM

## 2020-04-01 RX ORDER — FAMOTIDINE 10 MG/ML
20 INJECTION, SOLUTION INTRAVENOUS ONCE
Status: COMPLETED | OUTPATIENT
Start: 2020-04-01 | End: 2020-04-01

## 2020-04-01 RX ORDER — SODIUM CHLORIDE, SODIUM LACTATE, POTASSIUM CHLORIDE, CALCIUM CHLORIDE 600; 310; 30; 20 MG/100ML; MG/100ML; MG/100ML; MG/100ML
9 INJECTION, SOLUTION INTRAVENOUS CONTINUOUS
Status: DISCONTINUED | OUTPATIENT
Start: 2020-04-01 | End: 2020-04-01 | Stop reason: HOSPADM

## 2020-04-01 RX ORDER — OXYCODONE AND ACETAMINOPHEN 7.5; 325 MG/1; MG/1
1 TABLET ORAL ONCE AS NEEDED
Status: DISCONTINUED | OUTPATIENT
Start: 2020-04-01 | End: 2020-04-01 | Stop reason: HOSPADM

## 2020-04-01 RX ORDER — PROMETHAZINE HYDROCHLORIDE 25 MG/ML
6.25 INJECTION, SOLUTION INTRAMUSCULAR; INTRAVENOUS
Status: DISCONTINUED | OUTPATIENT
Start: 2020-04-01 | End: 2020-04-01 | Stop reason: HOSPADM

## 2020-04-01 RX ORDER — PROPOFOL 10 MG/ML
VIAL (ML) INTRAVENOUS CONTINUOUS PRN
Status: DISCONTINUED | OUTPATIENT
Start: 2020-04-01 | End: 2020-04-01 | Stop reason: SURG

## 2020-04-01 RX ADMIN — FAMOTIDINE 20 MG: 10 INJECTION, SOLUTION INTRAVENOUS at 09:41

## 2020-04-01 RX ADMIN — CEFAZOLIN SODIUM 2 G: 2 INJECTION, SOLUTION INTRAVENOUS at 11:44

## 2020-04-01 RX ADMIN — SODIUM CHLORIDE, POTASSIUM CHLORIDE, SODIUM LACTATE AND CALCIUM CHLORIDE 9 ML/HR: 600; 310; 30; 20 INJECTION, SOLUTION INTRAVENOUS at 09:26

## 2020-04-01 RX ADMIN — PHENYLEPHRINE HYDROCHLORIDE 100 MCG: 10 INJECTION INTRAVENOUS at 11:57

## 2020-04-01 RX ADMIN — PHENYLEPHRINE HYDROCHLORIDE 100 MCG: 10 INJECTION INTRAVENOUS at 11:50

## 2020-04-01 RX ADMIN — PROPOFOL 100 MCG/KG/MIN: 10 INJECTION, EMULSION INTRAVENOUS at 11:45

## 2020-04-01 RX ADMIN — ACETAMINOPHEN 1000 MG: 500 TABLET, FILM COATED ORAL at 09:25

## 2020-04-01 RX ADMIN — LIDOCAINE HYDROCHLORIDE 60 MG: 20 INJECTION, SOLUTION INFILTRATION; PERINEURAL at 11:45

## 2020-04-01 NOTE — PERIOPERATIVE NURSING NOTE
Pt's spouse Marisol notified pt headed back to the OR now and Dr. Maria will call her when finished with the procedure.

## 2020-04-01 NOTE — INTERVAL H&P NOTE
H&P updated. The patient was examined and the following changes are noted:  patient continues with epigastric pain.  if nothing on EGD, will get amylase, lipase, CMP

## 2020-04-01 NOTE — DISCHARGE INSTRUCTIONS
PT TO FOLLOW INSTRUCTIONS PER MD          Outpatient Surgery Guidelines, Adult  Outpatient procedures are those for which the person having the procedure is allowed to go home the same day as the procedure. Various procedures are done on an outpatient basis. You should follow some general guidelines if you will be having an outpatient procedure.  AFTER THE  PROCEDURE  After surgery, you will be taken to a recovery area, where your progress will be monitored. If there are no complications, you will be allowed to go home when you are awake, stable, and taking fluids well. You may have numbness around the surgical site. Healing will take some time. You will have tenderness at the surgical site and may have some swelling and bruising. You may also have some nausea.  HOME CARE INSTRUCTIONS  · Do not drive for 24 hours, or as directed by your health care provider. Do not drive while taking prescription pain medicines.  · Do not drink alcohol for 24 hours.  · Do not make important decisions or sign legal documents for 24 hours.  · Plan on having a responsible adult stay with your for 24 hours following your procedure.  · You may resume a normal diet and activities as directed.  · Do not lift anything heavier than 10 pounds (4.5 kg) or play contact sports until your health care provider says it is okay.  · Only take over-the-counter or prescription medicines as directed by your health care provider.  · Follow up with your health care provider as directed.  · If you have sleep apnea, surgery and certain medicines can increase your risk for breathing problems. Follow instructions from your health care provider about wearing your sleep device:  ? Anytime you are sleeping, including during daytime naps.  ? While taking prescription pain medicines, sleeping medicines, or medicines that make you drowsy.  ·   SEEK MEDICAL CARE IF:  · You have increased bleeding (more than a small spot) from the surgical site.  · You have  redness, swelling, or increasing pain in the wound.  · You see pus coming from the wound.  · You have a fever > 101.  · You notice a bad smell coming from the wound or dressing.  · You feel lightheaded or faint.  · You develop a rash.  · You have trouble breathing.  · You develop allergies.  MAKE SURE YOU:  · Understand these instructions.  · Will watch your condition.  · Will get help right away if you are not doing well or get worse.

## 2020-04-01 NOTE — OP NOTE
SURGERY  Operative Note :  CANDACE Wynn  11/19/1931    Procedure Date: 04/01/20    Pre-op Diagnosis:  · Inguinal adenopathy, right greater than left, with adenopathy elsewhere including a paraesophageal lymph node  · Weight loss 20 pounds 1 year  · Early satiety, with symptoms that could be consistent with gastric outlet obstruction.  Patient is convinced that if there is anything wrong with him that it is in this location.    Post-op Diagnosis:  · Large esophageal ulcer, longitudinally oriented, 2-1/2 x 1 cm in size, located approximately 1 cm from the GE junction  · Hyperemic appearing bulb of the duodenum  · Right groin lymph node    Procedure:   · EGD with biopsy of the second duodenum, bulb duodenum, antrum, esophageal ulcer  · Excision of right groin lymph node    Surgeon: Candace    Assistant: Sim      Associated Issues:  · Hematuria with the patient stopping his Xarelto on his own, with recent stents  · Stents without etiology determined  · A. fib  · Congestive heart failure, with recent admission for such  · Home oxygen use  · Diabetes    Findings:   · As above    Recommendations:   · Initiation of Protonix for his esophageal ulcer  · We will phone him with pathology but I have told him that it will be likely a week or more.  · No need for follow-up in the office unless he has a problem with the wound    Specimens:   · Endoscopic biopsies  · Right groin lymph node, sent for pathology and fresh for flow    EBL: Less than 25 cc    Technique:     MAC anesthesia was induced.  Bite block was placed and the gastroscope was inserted thru such and advanced under direct vision to the second portion of the duodenum.  A careful inspection was made as the scope was withdrawn and photographs taken, including a retroflexed view of the stomach.   Findings and interventions are described above.  Biopsies were  done with the cold biopsy forceps.  The most notable thing was the ulcer in the esophagus which  was biopsied.  I did irrigate to make sure that there was no further bleeding, and then suctioned that fluid out of the esophagus to reduce the risk of aspiration.   Air was removed at the end of the procedure    He was then moved to the OR table, where the right groin was clipped prepped with ChloraPrep and draped sterilely.  IV antibiotics were given.  Oblique incision was made in the inguinal crease, carried down through the subcutaneous tissue with the cautery, fascia opened with cautery.  The palpable mass was quite deep and I lifted that up, and resected some of the tissue with clips that appeared to be lymphatics, others with the cautery.  When I got to the surface under the mass, I used mosquitoes to clamp the tissue and then tied that off with 3-0 Vicryl's, thus releasing the lymph node.  I  one third for flow cytometry, two thirds for path.  Hemostasis was carried out with the cautery    Subcutaneous fascia was closed with interrupted 3-0 Vicryl, dermis with 3-0 Vicryl, skin with running 5-0 Vicryl.  Dermabond.    Barbara Maria MD  04/01/20  12:40 PM

## 2020-04-01 NOTE — ANESTHESIA PREPROCEDURE EVALUATION
Anesthesia Evaluation     Patient summary reviewed and Nursing notes reviewed   no history of anesthetic complications:  NPO Solid Status: > 8 hours  NPO Liquid Status: > 4 hours           Airway   Mallampati: II  Dental - normal exam     Pulmonary - normal exam   (+) shortness of breath, sleep apnea,   Cardiovascular - normal exam    (+) hypertension less than 2 medications, dysrhythmias Atrial Fib, CHF , DVT resolved, hyperlipidemia,       Neuro/Psych  (+) dizziness/light headedness, syncope, numbness,     GI/Hepatic/Renal/Endo    (+)   renal disease CRI, diabetes mellitus type 2 using insulin,     Musculoskeletal     Abdominal    Substance History      OB/GYN          Other   arthritis,                      Anesthesia Plan    ASA 4     MAC     intravenous induction     Anesthetic plan, all risks, benefits, and alternatives have been provided, discussed and informed consent has been obtained with: patient.

## 2020-04-01 NOTE — ANESTHESIA POSTPROCEDURE EVALUATION
"Patient: Home Wynn    Procedure Summary     Date:  04/01/20 Room / Location:  [facility] OR [address] [place] [address] MAIN OR    Anesthesia Start:  1141 Anesthesia Stop:  1249    Procedures:       right inguinal lymph node excision (Right )      ESOPHAGOGASTRODUODENOSCOPY WITH BIOPSY (N/A Esophagus) Diagnosis:       Inguinal lymphadenopathy      Early satiety      Weight loss      (Inguinal lymphadenopathy [R59.0])      (Early satiety [R68.81])      (Weight loss [R63.4])    Surgeon:  Barbara Maria MD Provider:  Mary Cuadra MD    Anesthesia Type:  MAC ASA Status:  4          Anesthesia Type: MAC    Vitals  Vitals Value Taken Time   /79 4/1/2020  2:05 PM   Temp 36.6 °C (97.9 °F) 4/1/2020  1:50 PM   Pulse 62 4/1/2020  2:05 PM   Resp 16 4/1/2020  2:05 PM   SpO2 100 % 4/1/2020  2:05 PM           Post Anesthesia Care and Evaluation    Patient location during evaluation: PHASE II  Patient participation: complete - patient participated  Level of consciousness: awake and alert  Pain management: adequate  Airway patency: patent  Anesthetic complications: No anesthetic complications    Cardiovascular status: acceptable  Respiratory status: acceptable  Hydration status: acceptable    Comments: /62   Pulse 61   Temp 36.6 °C (97.9 °F) (Oral)   Resp 16   Ht 177.8 cm (70\")   Wt 80.8 kg (178 lb 2.1 oz)   SpO2 100%   BMI 25.56 kg/m²         "

## 2020-04-03 LAB
CYTO UR: NORMAL
LAB AP CASE REPORT: NORMAL
LAB AP FLOW CYTOMETRY SUMMARY: NORMAL
LAB AP INTRADEPARTMENTAL CONSULT: NORMAL
PATH REPORT.FINAL DX SPEC: NORMAL
PATH REPORT.GROSS SPEC: NORMAL

## 2020-04-03 NOTE — PROGRESS NOTES
Tatum Love,  His pathology was benign and flow cytometry negative.  Do you think we need to do anything else.  i've not called him yet.  leena

## 2020-04-05 NOTE — PROGRESS NOTES
Phoned patient today to give him his benign result.  Also informed that i had contacted Dr James who will get repeat CTs but no further work up at this time.  Patient has begun protonix for esophageal ulcer and should return to me if symptoms resume after protonix finished in 90 days.

## 2020-04-17 ENCOUNTER — TELEMEDICINE (OUTPATIENT)
Dept: GASTROENTEROLOGY | Facility: CLINIC | Age: 85
End: 2020-04-17

## 2020-04-17 ENCOUNTER — TELEPHONE (OUTPATIENT)
Dept: GASTROENTEROLOGY | Facility: CLINIC | Age: 85
End: 2020-04-17

## 2020-04-17 DIAGNOSIS — R63.4 WEIGHT LOSS: ICD-10-CM

## 2020-04-17 DIAGNOSIS — R79.89 ELEVATED LFTS: ICD-10-CM

## 2020-04-17 DIAGNOSIS — R11.2 NAUSEA AND VOMITING, INTRACTABILITY OF VOMITING NOT SPECIFIED, UNSPECIFIED VOMITING TYPE: Primary | ICD-10-CM

## 2020-04-17 PROCEDURE — 99442 PR PHYS/QHP TELEPHONE EVALUATION 11-20 MIN: CPT | Performed by: INTERNAL MEDICINE

## 2020-04-17 NOTE — TELEPHONE ENCOUNTER
Call to pt.  Reports having intermittent episodes of epigastric cramping accompanied by abd feeling hard.  Dry heaves or emesis relieves this.  Occurs about 1-2x/wk - usually around 4pm.  Bowel pattern without problem.  Denies fever.    States has had this before had surgery with DR Maria 4/1.  Asking what could be causing this.     Update/question to Dr Rosales.

## 2020-04-17 NOTE — TELEPHONE ENCOUNTER
"----- Message from Marisol Kleber Gloriarafaela Rep sent at 4/17/2020 10:59 AM EDT -----  Regarding: Symptoms   Contact: [phone]  Pt states he is having \"attacks\" that come and go. States there's cramps below his rib cage, stomach is hard as rock, and has been dry heaving. He was recently at the [facility] and had a procedure done by Dr. Maria, right inguinal lymph node excision. Would like to speak to someone.  "

## 2020-04-17 NOTE — PROGRESS NOTES
Patient called complaining of episodic upper abdominal pain with associated dry heaves and emesis that is occurring approximately 1-2 times per week.  This is been going on since the end of March.  It is happened 6 or 7 times.  It is usually between 4 and 6 PM.  He had a recent EGD performed by Dr. Maria on April 1 which did not show any significant abnormalities.  He was started on pantoprazole at the time.  He has not really noted any decline in his symptoms since then.  He had a normal lipase at that time and his LFTs were elevated (transaminases, alk phos).  Patient has had prior cholecystectomy.  He was experiencing a lot of abdominal pain in the fall and underwent a CT which showed hydronephrosis.  He underwent ureteral stent placement thereafter with resolution of his symptoms.  He does remark that he is continued to have hematuria off and on since that time.  He is on Xarelto.  He is scheduled to see Dr. Clark on Monday.  He reports that his bowel movements are adequate and he is going most days.  He feels like he is completely emptying.  He does not see any bright red blood or melena.  He eats breakfast and a small lunch and then usually eats his dinner around 5 PM.  If he has the symptoms, dry heaving or emesis will relieve the symptoms.  He does not have heartburn.  He does not feel nauseated prior to the onset of symptoms.    Review of symptoms is negative for fevers and chills, negative for shortness of air cough, the remainder of the 12 point review systems is negative except for that which is listed in the HPI    Labs reviewed, path from recent EGD and lymph node biopsy reviewed    Assessment-  1.  Nausea with dry heaves and emesis, intermittent  2.  Hematuria  3.  Persistent weight loss  4.  Upper abdominal pain    Plan-  -continue pantoprazole  -Plan for repeat labs on Monday to follow-up on his elevated LFTs and his symptoms, check his hemoglobin in light of his persistent hematuria  -Await  recommendations from Dr. Clark regarding his prior stent placement and hematuria.  May need repeat CT scan if systems persist  -Recommend addition of daily nutritional supplement to help prevent further weight loss    Time of call was 17 minutes

## 2020-04-21 LAB
ALBUMIN SERPL-MCNC: 3.8 G/DL (ref 3.5–5.2)
ALBUMIN/GLOB SERPL: 1.1 G/DL
ALP SERPL-CCNC: 250 U/L (ref 39–117)
ALT SERPL-CCNC: 51 U/L (ref 1–41)
AST SERPL-CCNC: 31 U/L (ref 1–40)
BASOPHILS # BLD AUTO: 0.01 10*3/MM3 (ref 0–0.2)
BASOPHILS NFR BLD AUTO: 0.2 % (ref 0–1.5)
BILIRUB SERPL-MCNC: 1.4 MG/DL (ref 0.2–1.2)
BUN SERPL-MCNC: 23 MG/DL (ref 8–23)
BUN/CREAT SERPL: 15.1 (ref 7–25)
CALCIUM SERPL-MCNC: 9.9 MG/DL (ref 8.6–10.5)
CHLORIDE SERPL-SCNC: 100 MMOL/L (ref 98–107)
CO2 SERPL-SCNC: 29.8 MMOL/L (ref 22–29)
CREAT SERPL-MCNC: 1.52 MG/DL (ref 0.76–1.27)
EOSINOPHIL # BLD AUTO: 0.04 10*3/MM3 (ref 0–0.4)
EOSINOPHIL NFR BLD AUTO: 0.9 % (ref 0.3–6.2)
ERYTHROCYTE [DISTWIDTH] IN BLOOD BY AUTOMATED COUNT: 14.5 % (ref 12.3–15.4)
GLOBULIN SER CALC-MCNC: 3.6 GM/DL
GLUCOSE SERPL-MCNC: 196 MG/DL (ref 65–99)
HCT VFR BLD AUTO: 37.3 % (ref 37.5–51)
HGB BLD-MCNC: 12.4 G/DL (ref 13–17.7)
IMM GRANULOCYTES # BLD AUTO: 0.05 10*3/MM3 (ref 0–0.05)
IMM GRANULOCYTES NFR BLD AUTO: 1.1 % (ref 0–0.5)
LIPASE SERPL-CCNC: 51 U/L (ref 13–60)
LYMPHOCYTES # BLD AUTO: 0.81 10*3/MM3 (ref 0.7–3.1)
LYMPHOCYTES NFR BLD AUTO: 17.2 % (ref 19.6–45.3)
MCH RBC QN AUTO: 30.8 PG (ref 26.6–33)
MCHC RBC AUTO-ENTMCNC: 33.2 G/DL (ref 31.5–35.7)
MCV RBC AUTO: 92.6 FL (ref 79–97)
MONOCYTES # BLD AUTO: 0.56 10*3/MM3 (ref 0.1–0.9)
MONOCYTES NFR BLD AUTO: 11.9 % (ref 5–12)
NEUTROPHILS # BLD AUTO: 3.23 10*3/MM3 (ref 1.7–7)
NEUTROPHILS NFR BLD AUTO: 68.7 % (ref 42.7–76)
NRBC BLD AUTO-RTO: 0 /100 WBC (ref 0–0.2)
PLATELET # BLD AUTO: 215 10*3/MM3 (ref 140–450)
POTASSIUM SERPL-SCNC: 4.9 MMOL/L (ref 3.5–5.2)
PROT SERPL-MCNC: 7.4 G/DL (ref 6–8.5)
RBC # BLD AUTO: 4.03 10*6/MM3 (ref 4.14–5.8)
SODIUM SERPL-SCNC: 140 MMOL/L (ref 136–145)
WBC # BLD AUTO: 4.7 10*3/MM3 (ref 3.4–10.8)

## 2020-04-22 ENCOUNTER — APPOINTMENT (OUTPATIENT)
Dept: LAB | Facility: HOSPITAL | Age: 85
End: 2020-04-22

## 2020-04-22 ENCOUNTER — TELEPHONE (OUTPATIENT)
Dept: GASTROENTEROLOGY | Facility: CLINIC | Age: 85
End: 2020-04-22

## 2020-04-22 ENCOUNTER — OFFICE VISIT (OUTPATIENT)
Dept: ONCOLOGY | Facility: CLINIC | Age: 85
End: 2020-04-22

## 2020-04-22 DIAGNOSIS — R11.2 NAUSEA AND VOMITING, INTRACTABILITY OF VOMITING NOT SPECIFIED, UNSPECIFIED VOMITING TYPE: ICD-10-CM

## 2020-04-22 DIAGNOSIS — R59.0 INGUINAL LYMPHADENOPATHY: Primary | ICD-10-CM

## 2020-04-22 DIAGNOSIS — R79.89 ELEVATED LFTS: Primary | ICD-10-CM

## 2020-04-22 DIAGNOSIS — R63.4 WEIGHT LOSS: ICD-10-CM

## 2020-04-22 PROCEDURE — 99443 PR PHYS/QHP TELEPHONE EVALUATION 21-30 MIN: CPT | Performed by: INTERNAL MEDICINE

## 2020-04-22 NOTE — TELEPHONE ENCOUNTER
Please let him know that he has a mild stable anemia-essentially unchanged from last labs    Continued mild renal insufficiency with a creatinine of 1.5    Liver enzymes continue to be elevated and in some ways are mildly worsened.  Recommend liver ultrasound for further evaluation of these findings and his symptoms.  This needs to be done immediately and should not be delayed due to the coronavirus secondary to his symptoms.

## 2020-04-22 NOTE — PROGRESS NOTES
[facility] GROUP OUTPATIENT FOLLOW UP CLINIC VISIT    REASON FOR FOLLOW-UP:    Concern for metastatic cancer    HISTORY OF PRESENT ILLNESS:  Home Wynn is a 88 y.o. male who I talked to with a telephone visit today.    He had an EGD and lymph node biopsy by Dr. Maria on 4/1/2020.  Pathology of the lymph node biopsy was benign.  An ulcer was discovered in his esophagus.  Biopsies were also benign.  He is on a proton pump inhibitor.    He continues to have chest discomfort.  Dr. Rosales has requested a liver ultrasound after a CMP showed a total bilirubin of 1.4 with alkaline phosphatase 250 and ALT of 51.    He continues to have hematuria with ureteral stents in place.    He continues to lose weight.    ONCOLOGIC HISTORY:  He was initially seen in consultation in the office on 2/19/2020.    He has a history of a left ureteral stricture identified 10/31/2019 treated with balloon dilation and stent on 12/3/2019.     He had CT imaging of the abdomen and pelvis ordered by urology on 2/6/2020.  This showed a left ureteral stent and resolution of left hydronephrosis previously present.  New right mild pelvocaliectasis and ureterectasis to the level of the right common iliac artery.  The distal right ureter was decompressed.  Heavy atherosclerotic changes.  A soft tissue density measuring 2 to 5 mm peripheral to both common iliac arteries.  This rind of intermediate density was progressed compared to 10/31/2019 and new compared to 6/5/2019.  Multiple renal cysts were noted.   Cardiomegaly was noted.  Right inguinal hernia.  13 mm right paraesophageal node.  14 mm left lateral pelvic node.  Prominent bilateral groin and borderline external iliac nodes.  20 mm node in the right pelvis posterior to the extremity like veins.  3 cm right external iliac node.     He was referred for further evaluation.     He complains of some lower abdominal discomfort ongoing for a few months.  He does not believe that this is  related to the ureteral stent.  He has had some mild constipation.  No fevers chills night sweats or unintended weight loss.  No palpable lymphadenopathy that he has noticed.  No personal history of malignancy.  Sister with a history of breast cancer.    On 2/24/2020 he had an ultrasound-guided lymph node biopsy of a right inguinal lymph node performed in interventional radiology.  Flow cytometry was negative.  The lymph node biopsy showed a minute fragment of lymphoid tissue and no pathologic diagnosis could be made.     On 2/27/2020 he had a left ureteral stent replacement and placement of a right ureteral stent by Dr. Nj.    On 4/1/2020 he had an inguinal lymph node biopsy with negative flow cytometry and negative pathology.  No evidence of malignancy.  An EGD showed an esophageal ulcer.  Pathology of the ulcer was also benign.      ALLERGIES:  No Known Allergies    MEDICATIONS:  The medication list has been reviewed with the patient by the medical assistant, and the list has been updated in the electronic medical record, which I reviewed.  Medication dosages and frequencies were confirmed to be accurate.    REVIEW OF SYSTEMS:  Positive for chest pain, weight loss, fatigue, hematuria.  Otherwise negative except for what was discussed above.    There were no vitals filed for this visit.    PHYSICAL EXAMINATION:  GENERAL: Awake and alert.  Speech is fluent.  Telephone visit only today.    DIAGNOSTIC DATA:  Results for orders placed or performed in visit on 04/17/20   Comprehensive Metabolic Panel   Result Value Ref Range    Glucose 196 (H) 65 - 99 mg/dL    BUN 23 8 - 23 mg/dL    Creatinine 1.52 (H) 0.76 - 1.27 mg/dL    eGFR Non African Am 43 (L) >60 mL/min/1.73    eGFR African Am 53 (L) >60 mL/min/1.73    BUN/Creatinine Ratio 15.1 7.0 - 25.0    Sodium 140 136 - 145 mmol/L    Potassium 4.9 3.5 - 5.2 mmol/L    Chloride 100 98 - 107 mmol/L    Total CO2 29.8 (H) 22.0 - 29.0 mmol/L    Calcium 9.9 8.6 - 10.5 mg/dL     Total Protein 7.4 6.0 - 8.5 g/dL    Albumin 3.80 3.50 - 5.20 g/dL    Globulin 3.6 gm/dL    A/G Ratio 1.1 g/dL    Total Bilirubin 1.4 (H) 0.2 - 1.2 mg/dL    Alkaline Phosphatase 250 (H) 39 - 117 U/L    AST (SGOT) 31 1 - 40 U/L    ALT (SGPT) 51 (H) 1 - 41 U/L   Lipase   Result Value Ref Range    Lipase 51 13 - 60 U/L   CBC & Differential   Result Value Ref Range    WBC 4.70 3.40 - 10.80 10*3/mm3    RBC 4.03 (L) 4.14 - 5.80 10*6/mm3    Hemoglobin 12.4 (L) 13.0 - 17.7 g/dL    Hematocrit 37.3 (L) 37.5 - 51.0 %    MCV 92.6 79.0 - 97.0 fL    MCH 30.8 26.6 - 33.0 pg    MCHC 33.2 31.5 - 35.7 g/dL    RDW 14.5 12.3 - 15.4 %    Platelets 215 140 - 450 10*3/mm3    Neutrophil Rel % 68.7 42.7 - 76.0 %    Lymphocyte Rel % 17.2 (L) 19.6 - 45.3 %    Monocyte Rel % 11.9 5.0 - 12.0 %    Eosinophil Rel % 0.9 0.3 - 6.2 %    Basophil Rel % 0.2 0.0 - 1.5 %    Neutrophils Absolute 3.23 1.70 - 7.00 10*3/mm3    Lymphocytes Absolute 0.81 0.70 - 3.10 10*3/mm3    Monocytes Absolute 0.56 0.10 - 0.90 10*3/mm3    Eosinophils Absolute 0.04 0.00 - 0.40 10*3/mm3    Basophils Absolute 0.01 0.00 - 0.20 10*3/mm3    Immature Granulocyte Rel % 1.1 (H) 0.0 - 0.5 %    Immature Grans Absolute 0.05 0.00 - 0.05 10*3/mm3    nRBC 0.0 0.0 - 0.2 /100 WBC       IMAGING:  None reviewed today    ASSESSMENT:  This is a 88 y.o. male with:  1.  Lymphadenopathy: There is some soft tissue thickening within the pelvis around the blood vessels and also scattered inguinal and pelvic lymphadenopathy that is still relatively small but seems to have increased in size from October to February.  Significance unclear.  We discussed the variety of possibilities including both nonmalignant and malignant etiologies.  No fevers chills night sweats or unintended weight loss.  No history of malignancy.    When he was initially seen on 2/29/2020 we planned obtain an ultrasound-guided inguinal lymph node biopsy as are some palpable inguinal lymph nodes.  This was performed on 2/24/2020  but there is only a small amount of tissue obtained and no pathologic diagnosis could be provided and the flow cytometry was unremarkable.  Excisional node biopsy suggested.  Referred to Dr. Maria.  Excisional lymph node biopsy performed.  Pathology and flow cytometry of a right inguinal lymph node showed benign reactive lymph node with negative flow cytometry.    2.  Thrombocytopenia: This is very mild. The immature platelet fraction was 7.6%, slightly elevated.      3.  Mild normocytic anemia: Hemoglobin is essentially normal.  Continue to monitor. Vitamin B12 is adequate at 1032.  Ferritin was 152 with an iron of 69.  LDH was normal.  No evidence for monoclonal protein.  Chronic kidney disease is likely contributing.    4.  Ureteral strictures: Unclear etiology.  Ureteral stenting repeated on 2/27/2020 with replacement of his left ureteral stent and placement of a new right ureteral stent.  Concern for malignancy but nothing proven.     PLAN:  1.  Liver ultrasound planned for tomorrow.  I will follow-up the result.    2.  I will communicate with Dr. José and Dr. Maria.  Obvious concern for malignancy but nothing is been discovered.  Question utility of exploratory laparoscopy.    3.  Follow-up as appropriate    25 minutes

## 2020-04-22 NOTE — TELEPHONE ENCOUNTER
Call to pt.  Advise per DR Rosales note.      Advise that [facility] will contact to arrange.  Verb understanding.     Call to [facility] and spoke with Velma. She will contact pt to arrange US.

## 2020-04-23 ENCOUNTER — HOSPITAL ENCOUNTER (OUTPATIENT)
Dept: ULTRASOUND IMAGING | Facility: HOSPITAL | Age: 85
Discharge: HOME | End: 2020-04-23
Admitting: INTERNAL MEDICINE

## 2020-04-23 ENCOUNTER — HOSPITAL ENCOUNTER (INPATIENT)
Facility: HOSPITAL | Age: 85
LOS: 10 days | Discharge: HOME HEALTH | End: 2020-05-03
Attending: EMERGENCY MEDICINE | Admitting: INTERNAL MEDICINE

## 2020-04-23 ENCOUNTER — APPOINTMENT (OUTPATIENT)
Dept: MRI IMAGING | Facility: HOSPITAL | Age: 85
End: 2020-04-23

## 2020-04-23 ENCOUNTER — APPOINTMENT (OUTPATIENT)
Dept: GENERAL RADIOLOGY | Facility: HOSPITAL | Age: 85
End: 2020-04-23

## 2020-04-23 ENCOUNTER — APPOINTMENT (OUTPATIENT)
Dept: CT IMAGING | Facility: HOSPITAL | Age: 85
End: 2020-04-23

## 2020-04-23 DIAGNOSIS — E11.65 UNCONTROLLED TYPE 2 DIABETES MELLITUS WITH HYPERGLYCEMIA (HCC): ICD-10-CM

## 2020-04-23 DIAGNOSIS — R11.2 NAUSEA AND VOMITING, INTRACTABILITY OF VOMITING NOT SPECIFIED, UNSPECIFIED VOMITING TYPE: ICD-10-CM

## 2020-04-23 DIAGNOSIS — A41.9 SEPSIS, DUE TO UNSPECIFIED ORGANISM, UNSPECIFIED WHETHER ACUTE ORGAN DYSFUNCTION PRESENT (HCC): ICD-10-CM

## 2020-04-23 DIAGNOSIS — D50.0 IRON DEFICIENCY ANEMIA DUE TO CHRONIC BLOOD LOSS: ICD-10-CM

## 2020-04-23 DIAGNOSIS — K85.10 ACUTE BILIARY PANCREATITIS WITHOUT INFECTION OR NECROSIS: ICD-10-CM

## 2020-04-23 DIAGNOSIS — R79.89 ELEVATED LFTS: ICD-10-CM

## 2020-04-23 DIAGNOSIS — K83.1 BILIARY OBSTRUCTION: Primary | ICD-10-CM

## 2020-04-23 DIAGNOSIS — K92.1 MELENA: ICD-10-CM

## 2020-04-23 DIAGNOSIS — R63.4 WEIGHT LOSS: ICD-10-CM

## 2020-04-23 PROBLEM — K85.90 ACUTE ON CHRONIC PANCREATITIS (HCC): Status: ACTIVE | Noted: 2020-04-23

## 2020-04-23 PROBLEM — K86.1 ACUTE ON CHRONIC PANCREATITIS: Status: ACTIVE | Noted: 2020-04-23

## 2020-04-23 PROBLEM — E87.20 LACTIC ACIDOSIS: Status: ACTIVE | Noted: 2020-04-23

## 2020-04-23 PROBLEM — N17.9 AKI (ACUTE KIDNEY INJURY) (HCC): Status: ACTIVE | Noted: 2020-04-23

## 2020-04-23 PROBLEM — Z96.0 STATUS POST PLACEMENT OF URETERAL STENT: Status: ACTIVE | Noted: 2020-04-23

## 2020-04-23 PROBLEM — K86.1 CHRONIC PANCREATITIS (HCC): Status: ACTIVE | Noted: 2020-04-23

## 2020-04-23 PROBLEM — R31.9 HEMATURIA: Status: ACTIVE | Noted: 2020-04-23

## 2020-04-23 LAB
ALBUMIN SERPL-MCNC: 3.8 G/DL (ref 3.5–5.2)
ALBUMIN/GLOB SERPL: 1 G/DL
ALP SERPL-CCNC: 370 U/L (ref 39–117)
ALT SERPL W P-5'-P-CCNC: 148 U/L (ref 1–41)
ANION GAP SERPL CALCULATED.3IONS-SCNC: 11.3 MMOL/L (ref 5–15)
AST SERPL-CCNC: 249 U/L (ref 1–40)
BACTERIA BLD CULT: ABNORMAL
BACTERIA UR QL AUTO: ABNORMAL /HPF
BASOPHILS # BLD AUTO: 0.01 10*3/MM3 (ref 0–0.2)
BASOPHILS NFR BLD AUTO: 0.1 % (ref 0–1.5)
BILIRUB SERPL-MCNC: 3.1 MG/DL (ref 0.2–1.2)
BILIRUB UR QL STRIP: NEGATIVE
BUN BLD-MCNC: 19 MG/DL (ref 8–23)
BUN/CREAT SERPL: 14 (ref 7–25)
CALCIUM SPEC-SCNC: 9.9 MG/DL (ref 8.6–10.5)
CHLORIDE SERPL-SCNC: 96 MMOL/L (ref 98–107)
CLARITY UR: ABNORMAL
CO2 SERPL-SCNC: 25.7 MMOL/L (ref 22–29)
COLOR UR: YELLOW
CREAT BLD-MCNC: 1.36 MG/DL (ref 0.76–1.27)
D-LACTATE SERPL-SCNC: 2 MMOL/L (ref 0.5–2)
D-LACTATE SERPL-SCNC: 2.3 MMOL/L (ref 0.5–2)
DEPRECATED RDW RBC AUTO: 50.2 FL (ref 37–54)
EOSINOPHIL # BLD AUTO: 0 10*3/MM3 (ref 0–0.4)
EOSINOPHIL NFR BLD AUTO: 0 % (ref 0.3–6.2)
ERYTHROCYTE [DISTWIDTH] IN BLOOD BY AUTOMATED COUNT: 14.5 % (ref 12.3–15.4)
GFR SERPL CREATININE-BSD FRML MDRD: 49 ML/MIN/1.73
GLOBULIN UR ELPH-MCNC: 3.9 GM/DL
GLUCOSE BLD-MCNC: 361 MG/DL (ref 65–99)
GLUCOSE BLDC GLUCOMTR-MCNC: 228 MG/DL (ref 70–130)
GLUCOSE BLDC GLUCOMTR-MCNC: 362 MG/DL (ref 70–130)
GLUCOSE UR STRIP-MCNC: ABNORMAL MG/DL
HCT VFR BLD AUTO: 38.6 % (ref 37.5–51)
HGB BLD-MCNC: 12.4 G/DL (ref 13–17.7)
HGB UR QL STRIP.AUTO: ABNORMAL
HYALINE CASTS UR QL AUTO: ABNORMAL /LPF
IMM GRANULOCYTES # BLD AUTO: 0.13 10*3/MM3 (ref 0–0.05)
IMM GRANULOCYTES NFR BLD AUTO: 1.3 % (ref 0–0.5)
KETONES UR QL STRIP: ABNORMAL
LACTATE HOLD SPECIMEN: NORMAL
LEUKOCYTE ESTERASE UR QL STRIP.AUTO: NEGATIVE
LIPASE SERPL-CCNC: 599 U/L (ref 13–60)
LYMPHOCYTES # BLD AUTO: 0.25 10*3/MM3 (ref 0.7–3.1)
LYMPHOCYTES NFR BLD AUTO: 2.4 % (ref 19.6–45.3)
MCH RBC QN AUTO: 30.5 PG (ref 26.6–33)
MCHC RBC AUTO-ENTMCNC: 32.1 G/DL (ref 31.5–35.7)
MCV RBC AUTO: 94.8 FL (ref 79–97)
MONOCYTES # BLD AUTO: 0.6 10*3/MM3 (ref 0.1–0.9)
MONOCYTES NFR BLD AUTO: 5.8 % (ref 5–12)
NEUTROPHILS # BLD AUTO: 9.37 10*3/MM3 (ref 1.7–7)
NEUTROPHILS NFR BLD AUTO: 90.4 % (ref 42.7–76)
NITRITE UR QL STRIP: NEGATIVE
NRBC BLD AUTO-RTO: 0 /100 WBC (ref 0–0.2)
NT-PROBNP SERPL-MCNC: 9912 PG/ML (ref 5–1800)
PH UR STRIP.AUTO: 7 [PH] (ref 5–8)
PLATELET # BLD AUTO: 212 10*3/MM3 (ref 140–450)
PMV BLD AUTO: 11.3 FL (ref 6–12)
POTASSIUM BLD-SCNC: 4.7 MMOL/L (ref 3.5–5.2)
PROCALCITONIN SERPL-MCNC: 0.13 NG/ML (ref 0.1–0.25)
PROT SERPL-MCNC: 7.7 G/DL (ref 6–8.5)
PROT UR QL STRIP: ABNORMAL
RBC # BLD AUTO: 4.07 10*6/MM3 (ref 4.14–5.8)
RBC # UR: ABNORMAL /HPF
REF LAB TEST METHOD: ABNORMAL
SARS-COV-2 RNA RESP QL NAA+PROBE: NOT DETECTED
SODIUM BLD-SCNC: 133 MMOL/L (ref 136–145)
SP GR UR STRIP: 1.02 (ref 1–1.03)
SQUAMOUS #/AREA URNS HPF: ABNORMAL /HPF
TROPONIN T SERPL-MCNC: <0.01 NG/ML (ref 0–0.03)
UROBILINOGEN UR QL STRIP: ABNORMAL
WBC NRBC COR # BLD: 10.36 10*3/MM3 (ref 3.4–10.8)
WBC UR QL AUTO: ABNORMAL /HPF

## 2020-04-23 PROCEDURE — 87186 SC STD MICRODIL/AGAR DIL: CPT | Performed by: PHYSICIAN ASSISTANT

## 2020-04-23 PROCEDURE — 25010000002 PIPERACILLIN SOD-TAZOBACTAM PER 1 G: Performed by: NURSE PRACTITIONER

## 2020-04-23 PROCEDURE — 0 GADOBENATE DIMEGLUMINE 529 MG/ML SOLUTION: Performed by: INTERNAL MEDICINE

## 2020-04-23 PROCEDURE — 36415 COLL VENOUS BLD VENIPUNCTURE: CPT

## 2020-04-23 PROCEDURE — 81001 URINALYSIS AUTO W/SCOPE: CPT | Performed by: PHYSICIAN ASSISTANT

## 2020-04-23 PROCEDURE — 83690 ASSAY OF LIPASE: CPT | Performed by: PHYSICIAN ASSISTANT

## 2020-04-23 PROCEDURE — 25010000002 ONDANSETRON PER 1 MG: Performed by: PHYSICIAN ASSISTANT

## 2020-04-23 PROCEDURE — 25010000002 MORPHINE PER 10 MG: Performed by: EMERGENCY MEDICINE

## 2020-04-23 PROCEDURE — 25010000002 PIPERACILLIN SOD-TAZOBACTAM PER 1 G: Performed by: PHYSICIAN ASSISTANT

## 2020-04-23 PROCEDURE — 76705 ECHO EXAM OF ABDOMEN: CPT

## 2020-04-23 PROCEDURE — 63710000001 INSULIN LISPRO (HUMAN) PER 5 UNITS: Performed by: NURSE PRACTITIONER

## 2020-04-23 PROCEDURE — 83880 ASSAY OF NATRIURETIC PEPTIDE: CPT | Performed by: PHYSICIAN ASSISTANT

## 2020-04-23 PROCEDURE — 80053 COMPREHEN METABOLIC PANEL: CPT | Performed by: PHYSICIAN ASSISTANT

## 2020-04-23 PROCEDURE — 85025 COMPLETE CBC W/AUTO DIFF WBC: CPT | Performed by: PHYSICIAN ASSISTANT

## 2020-04-23 PROCEDURE — 87040 BLOOD CULTURE FOR BACTERIA: CPT | Performed by: PHYSICIAN ASSISTANT

## 2020-04-23 PROCEDURE — 74177 CT ABD & PELVIS W/CONTRAST: CPT

## 2020-04-23 PROCEDURE — 99285 EMERGENCY DEPT VISIT HI MDM: CPT

## 2020-04-23 PROCEDURE — 87150 DNA/RNA AMPLIFIED PROBE: CPT | Performed by: PHYSICIAN ASSISTANT

## 2020-04-23 PROCEDURE — 87635 SARS-COV-2 COVID-19 AMP PRB: CPT | Performed by: PHYSICIAN ASSISTANT

## 2020-04-23 PROCEDURE — 71045 X-RAY EXAM CHEST 1 VIEW: CPT

## 2020-04-23 PROCEDURE — 93010 ELECTROCARDIOGRAM REPORT: CPT | Performed by: INTERNAL MEDICINE

## 2020-04-23 PROCEDURE — A9577 INJ MULTIHANCE: HCPCS | Performed by: INTERNAL MEDICINE

## 2020-04-23 PROCEDURE — 83605 ASSAY OF LACTIC ACID: CPT | Performed by: PHYSICIAN ASSISTANT

## 2020-04-23 PROCEDURE — 25010000002 IOPAMIDOL 61 % SOLUTION: Performed by: EMERGENCY MEDICINE

## 2020-04-23 PROCEDURE — 93005 ELECTROCARDIOGRAM TRACING: CPT | Performed by: PHYSICIAN ASSISTANT

## 2020-04-23 PROCEDURE — 84145 PROCALCITONIN (PCT): CPT | Performed by: PHYSICIAN ASSISTANT

## 2020-04-23 PROCEDURE — 84484 ASSAY OF TROPONIN QUANT: CPT | Performed by: PHYSICIAN ASSISTANT

## 2020-04-23 PROCEDURE — 82962 GLUCOSE BLOOD TEST: CPT

## 2020-04-23 PROCEDURE — 74183 MRI ABD W/O CNTR FLWD CNTR: CPT

## 2020-04-23 RX ORDER — PANTOPRAZOLE SODIUM 40 MG/1
40 TABLET, DELAYED RELEASE ORAL DAILY
Status: DISCONTINUED | OUTPATIENT
Start: 2020-04-23 | End: 2020-05-03 | Stop reason: HOSPADM

## 2020-04-23 RX ORDER — MORPHINE SULFATE 2 MG/ML
2 INJECTION, SOLUTION INTRAMUSCULAR; INTRAVENOUS
Status: DISPENSED | OUTPATIENT
Start: 2020-04-23 | End: 2020-04-23

## 2020-04-23 RX ORDER — SODIUM CHLORIDE 0.9 % (FLUSH) 0.9 %
10 SYRINGE (ML) INJECTION EVERY 12 HOURS SCHEDULED
Status: DISCONTINUED | OUTPATIENT
Start: 2020-04-23 | End: 2020-05-03 | Stop reason: HOSPADM

## 2020-04-23 RX ORDER — NICOTINE POLACRILEX 4 MG
15 LOZENGE BUCCAL
Status: DISCONTINUED | OUTPATIENT
Start: 2020-04-23 | End: 2020-05-03 | Stop reason: HOSPADM

## 2020-04-23 RX ORDER — METOPROLOL SUCCINATE 25 MG/1
12.5 TABLET, EXTENDED RELEASE ORAL NIGHTLY
Status: DISCONTINUED | OUTPATIENT
Start: 2020-04-23 | End: 2020-05-03 | Stop reason: HOSPADM

## 2020-04-23 RX ORDER — SODIUM CHLORIDE 9 MG/ML
75 INJECTION, SOLUTION INTRAVENOUS CONTINUOUS
Status: DISCONTINUED | OUTPATIENT
Start: 2020-04-23 | End: 2020-04-24

## 2020-04-23 RX ORDER — ONDANSETRON 2 MG/ML
2 INJECTION INTRAMUSCULAR; INTRAVENOUS
Status: DISCONTINUED | OUTPATIENT
Start: 2020-04-23 | End: 2020-04-23

## 2020-04-23 RX ORDER — ONDANSETRON 2 MG/ML
4 INJECTION INTRAMUSCULAR; INTRAVENOUS EVERY 6 HOURS PRN
Status: DISCONTINUED | OUTPATIENT
Start: 2020-04-23 | End: 2020-05-03 | Stop reason: HOSPADM

## 2020-04-23 RX ORDER — ACETAMINOPHEN 325 MG/1
650 TABLET ORAL EVERY 6 HOURS PRN
Status: DISCONTINUED | OUTPATIENT
Start: 2020-04-23 | End: 2020-05-03 | Stop reason: HOSPADM

## 2020-04-23 RX ORDER — DEXTROSE MONOHYDRATE 25 G/50ML
25 INJECTION, SOLUTION INTRAVENOUS
Status: DISCONTINUED | OUTPATIENT
Start: 2020-04-23 | End: 2020-05-03 | Stop reason: HOSPADM

## 2020-04-23 RX ORDER — MORPHINE SULFATE 2 MG/ML
2 INJECTION, SOLUTION INTRAMUSCULAR; INTRAVENOUS EVERY 4 HOURS PRN
Status: DISPENSED | OUTPATIENT
Start: 2020-04-23 | End: 2020-05-03

## 2020-04-23 RX ORDER — ONDANSETRON 2 MG/ML
4 INJECTION INTRAMUSCULAR; INTRAVENOUS ONCE
Status: COMPLETED | OUTPATIENT
Start: 2020-04-23 | End: 2020-04-23

## 2020-04-23 RX ORDER — MORPHINE SULFATE 2 MG/ML
2 INJECTION, SOLUTION INTRAMUSCULAR; INTRAVENOUS EVERY 4 HOURS PRN
Status: DISCONTINUED | OUTPATIENT
Start: 2020-04-23 | End: 2020-04-23

## 2020-04-23 RX ORDER — SODIUM CHLORIDE 0.9 % (FLUSH) 0.9 %
10 SYRINGE (ML) INJECTION AS NEEDED
Status: DISCONTINUED | OUTPATIENT
Start: 2020-04-23 | End: 2020-05-03 | Stop reason: HOSPADM

## 2020-04-23 RX ORDER — TRAMADOL HYDROCHLORIDE 50 MG/1
50 TABLET ORAL EVERY 4 HOURS PRN
Status: DISPENSED | OUTPATIENT
Start: 2020-04-23 | End: 2020-05-03

## 2020-04-23 RX ORDER — NITROGLYCERIN 0.4 MG/1
0.4 TABLET SUBLINGUAL
Status: DISCONTINUED | OUTPATIENT
Start: 2020-04-23 | End: 2020-05-03 | Stop reason: HOSPADM

## 2020-04-23 RX ORDER — NALOXONE HCL 0.4 MG/ML
0.4 VIAL (ML) INJECTION
Status: DISCONTINUED | OUTPATIENT
Start: 2020-04-23 | End: 2020-04-23

## 2020-04-23 RX ORDER — NALOXONE HCL 0.4 MG/ML
0.4 VIAL (ML) INJECTION
Status: DISCONTINUED | OUTPATIENT
Start: 2020-04-23 | End: 2020-05-03 | Stop reason: HOSPADM

## 2020-04-23 RX ADMIN — IOPAMIDOL 85 ML: 612 INJECTION, SOLUTION INTRAVENOUS at 10:17

## 2020-04-23 RX ADMIN — MORPHINE SULFATE 2 MG: 2 INJECTION, SOLUTION INTRAMUSCULAR; INTRAVENOUS at 10:25

## 2020-04-23 RX ADMIN — METOPROLOL SUCCINATE 12.5 MG: 25 TABLET, EXTENDED RELEASE ORAL at 20:13

## 2020-04-23 RX ADMIN — SODIUM CHLORIDE, PRESERVATIVE FREE 10 ML: 5 INJECTION INTRAVENOUS at 14:06

## 2020-04-23 RX ADMIN — SODIUM CHLORIDE 75 ML/HR: 9 INJECTION, SOLUTION INTRAVENOUS at 17:29

## 2020-04-23 RX ADMIN — TAZOBACTAM SODIUM AND PIPERACILLIN SODIUM 3.38 G: 375; 3 INJECTION, SOLUTION INTRAVENOUS at 19:37

## 2020-04-23 RX ADMIN — SODIUM CHLORIDE, PRESERVATIVE FREE 10 ML: 5 INJECTION INTRAVENOUS at 20:14

## 2020-04-23 RX ADMIN — GADOBENATE DIMEGLUMINE 16 ML: 529 INJECTION, SOLUTION INTRAVENOUS at 15:23

## 2020-04-23 RX ADMIN — TAZOBACTAM SODIUM AND PIPERACILLIN SODIUM 3.38 G: 375; 3 INJECTION, SOLUTION INTRAVENOUS at 11:36

## 2020-04-23 RX ADMIN — PANTOPRAZOLE SODIUM 40 MG: 40 TABLET, DELAYED RELEASE ORAL at 17:23

## 2020-04-23 RX ADMIN — ONDANSETRON 4 MG: 2 INJECTION INTRAMUSCULAR; INTRAVENOUS at 10:25

## 2020-04-23 RX ADMIN — INSULIN LISPRO 8 UNITS: 100 INJECTION, SOLUTION INTRAVENOUS; SUBCUTANEOUS at 17:25

## 2020-04-24 ENCOUNTER — ANESTHESIA (OUTPATIENT)
Dept: GASTROENTEROLOGY | Facility: HOSPITAL | Age: 85
End: 2020-04-24

## 2020-04-24 ENCOUNTER — APPOINTMENT (OUTPATIENT)
Dept: GENERAL RADIOLOGY | Facility: HOSPITAL | Age: 85
End: 2020-04-24

## 2020-04-24 ENCOUNTER — ANESTHESIA EVENT (OUTPATIENT)
Dept: GASTROENTEROLOGY | Facility: HOSPITAL | Age: 85
End: 2020-04-24

## 2020-04-24 PROBLEM — R78.81 POSITIVE BLOOD CULTURE: Status: ACTIVE | Noted: 2020-04-24

## 2020-04-24 LAB
ALBUMIN SERPL-MCNC: 2.8 G/DL (ref 3.5–5.2)
ALBUMIN/GLOB SERPL: 0.8 G/DL
ALP SERPL-CCNC: 252 U/L (ref 39–117)
ALT SERPL W P-5'-P-CCNC: 101 U/L (ref 1–41)
AMORPH URATE CRY URNS QL MICRO: ABNORMAL /HPF
AMYLASE SERPL-CCNC: 65 U/L (ref 28–100)
ANION GAP SERPL CALCULATED.3IONS-SCNC: 10.6 MMOL/L (ref 5–15)
AST SERPL-CCNC: 111 U/L (ref 1–40)
BACTERIA UR QL AUTO: ABNORMAL /HPF
BILIRUB SERPL-MCNC: 5.2 MG/DL (ref 0.2–1.2)
BILIRUB UR QL STRIP: ABNORMAL
BUN BLD-MCNC: 21 MG/DL (ref 8–23)
BUN/CREAT SERPL: 15.9 (ref 7–25)
CALCIUM SPEC-SCNC: 8.9 MG/DL (ref 8.6–10.5)
CHLORIDE SERPL-SCNC: 103 MMOL/L (ref 98–107)
CLARITY UR: ABNORMAL
CO2 SERPL-SCNC: 25.4 MMOL/L (ref 22–29)
COLOR UR: ABNORMAL
CREAT BLD-MCNC: 1.32 MG/DL (ref 0.76–1.27)
DEPRECATED RDW RBC AUTO: 49.9 FL (ref 37–54)
ERYTHROCYTE [DISTWIDTH] IN BLOOD BY AUTOMATED COUNT: 14.7 % (ref 12.3–15.4)
GFR SERPL CREATININE-BSD FRML MDRD: 51 ML/MIN/1.73
GLOBULIN UR ELPH-MCNC: 3.5 GM/DL
GLUCOSE BLD-MCNC: 165 MG/DL (ref 65–99)
GLUCOSE BLDC GLUCOMTR-MCNC: 146 MG/DL (ref 70–130)
GLUCOSE BLDC GLUCOMTR-MCNC: 158 MG/DL (ref 70–130)
GLUCOSE BLDC GLUCOMTR-MCNC: 164 MG/DL (ref 70–130)
GLUCOSE UR STRIP-MCNC: NEGATIVE MG/DL
HBA1C MFR BLD: 7.2 % (ref 4.8–5.6)
HCT VFR BLD AUTO: 32 % (ref 37.5–51)
HGB BLD-MCNC: 10.7 G/DL (ref 13–17.7)
HGB UR QL STRIP.AUTO: ABNORMAL
HYALINE CASTS UR QL AUTO: ABNORMAL /LPF
INR PPP: 1.2 (ref 0.9–1.1)
KETONES UR QL STRIP: NEGATIVE
LEUKOCYTE ESTERASE UR QL STRIP.AUTO: ABNORMAL
LIPASE SERPL-CCNC: 30 U/L (ref 13–60)
MCH RBC QN AUTO: 30.9 PG (ref 26.6–33)
MCHC RBC AUTO-ENTMCNC: 33.4 G/DL (ref 31.5–35.7)
MCV RBC AUTO: 92.5 FL (ref 79–97)
NITRITE UR QL STRIP: POSITIVE
PH UR STRIP.AUTO: <=5 [PH] (ref 5–8)
PLATELET # BLD AUTO: 164 10*3/MM3 (ref 140–450)
PMV BLD AUTO: 11.5 FL (ref 6–12)
POTASSIUM BLD-SCNC: 4.4 MMOL/L (ref 3.5–5.2)
PROT SERPL-MCNC: 6.3 G/DL (ref 6–8.5)
PROT UR QL STRIP: ABNORMAL
PROTHROMBIN TIME: 14.9 SECONDS (ref 11.7–14.2)
RBC # BLD AUTO: 3.46 10*6/MM3 (ref 4.14–5.8)
RBC # UR: ABNORMAL /HPF
REF LAB TEST METHOD: ABNORMAL
SODIUM BLD-SCNC: 139 MMOL/L (ref 136–145)
SP GR UR STRIP: >=1.03 (ref 1–1.03)
SQUAMOUS #/AREA URNS HPF: ABNORMAL /HPF
UROBILINOGEN UR QL STRIP: ABNORMAL
WBC NRBC COR # BLD: 8.13 10*3/MM3 (ref 3.4–10.8)
WBC UR QL AUTO: ABNORMAL /HPF

## 2020-04-24 PROCEDURE — 87086 URINE CULTURE/COLONY COUNT: CPT | Performed by: NURSE PRACTITIONER

## 2020-04-24 PROCEDURE — BF101ZZ FLUOROSCOPY OF BILE DUCTS USING LOW OSMOLAR CONTRAST: ICD-10-PCS | Performed by: INTERNAL MEDICINE

## 2020-04-24 PROCEDURE — 87040 BLOOD CULTURE FOR BACTERIA: CPT | Performed by: NURSE PRACTITIONER

## 2020-04-24 PROCEDURE — C1769 GUIDE WIRE: HCPCS | Performed by: INTERNAL MEDICINE

## 2020-04-24 PROCEDURE — 83690 ASSAY OF LIPASE: CPT | Performed by: INTERNAL MEDICINE

## 2020-04-24 PROCEDURE — 81001 URINALYSIS AUTO W/SCOPE: CPT | Performed by: NURSE PRACTITIONER

## 2020-04-24 PROCEDURE — 63710000001 INSULIN LISPRO (HUMAN) PER 5 UNITS: Performed by: INTERNAL MEDICINE

## 2020-04-24 PROCEDURE — 85027 COMPLETE CBC AUTOMATED: CPT | Performed by: NURSE PRACTITIONER

## 2020-04-24 PROCEDURE — 99222 1ST HOSP IP/OBS MODERATE 55: CPT | Performed by: NURSE PRACTITIONER

## 2020-04-24 PROCEDURE — 82962 GLUCOSE BLOOD TEST: CPT

## 2020-04-24 PROCEDURE — 43264 ERCP REMOVE DUCT CALCULI: CPT | Performed by: INTERNAL MEDICINE

## 2020-04-24 PROCEDURE — 36415 COLL VENOUS BLD VENIPUNCTURE: CPT | Performed by: NURSE PRACTITIONER

## 2020-04-24 PROCEDURE — 25010000002 PIPERACILLIN SOD-TAZOBACTAM PER 1 G: Performed by: INTERNAL MEDICINE

## 2020-04-24 PROCEDURE — 82150 ASSAY OF AMYLASE: CPT | Performed by: INTERNAL MEDICINE

## 2020-04-24 PROCEDURE — 0FC98ZZ EXTIRPATION OF MATTER FROM COMMON BILE DUCT, VIA NATURAL OR ARTIFICIAL OPENING ENDOSCOPIC: ICD-10-PCS | Performed by: INTERNAL MEDICINE

## 2020-04-24 PROCEDURE — 83036 HEMOGLOBIN GLYCOSYLATED A1C: CPT | Performed by: NURSE PRACTITIONER

## 2020-04-24 PROCEDURE — 0 IOPAMIDOL PER 1 ML: Performed by: INTERNAL MEDICINE

## 2020-04-24 PROCEDURE — 25010000002 PROPOFOL 10 MG/ML EMULSION: Performed by: ANESTHESIOLOGY

## 2020-04-24 PROCEDURE — 85610 PROTHROMBIN TIME: CPT | Performed by: NURSE PRACTITIONER

## 2020-04-24 PROCEDURE — 25010000002 PIPERACILLIN SOD-TAZOBACTAM PER 1 G: Performed by: NURSE PRACTITIONER

## 2020-04-24 PROCEDURE — 74328 X-RAY BILE DUCT ENDOSCOPY: CPT

## 2020-04-24 PROCEDURE — 80053 COMPREHEN METABOLIC PANEL: CPT | Performed by: NURSE PRACTITIONER

## 2020-04-24 PROCEDURE — 43262 ENDO CHOLANGIOPANCREATOGRAPH: CPT | Performed by: INTERNAL MEDICINE

## 2020-04-24 RX ORDER — LIDOCAINE HYDROCHLORIDE 20 MG/ML
INJECTION, SOLUTION INFILTRATION; PERINEURAL AS NEEDED
Status: DISCONTINUED | OUTPATIENT
Start: 2020-04-24 | End: 2020-04-24 | Stop reason: SURG

## 2020-04-24 RX ORDER — PROPOFOL 10 MG/ML
VIAL (ML) INTRAVENOUS CONTINUOUS PRN
Status: DISCONTINUED | OUTPATIENT
Start: 2020-04-24 | End: 2020-04-24 | Stop reason: SURG

## 2020-04-24 RX ORDER — SODIUM CHLORIDE 9 MG/ML
1000 INJECTION, SOLUTION INTRAVENOUS CONTINUOUS
Status: DISCONTINUED | OUTPATIENT
Start: 2020-04-24 | End: 2020-04-24

## 2020-04-24 RX ORDER — PROPOFOL 10 MG/ML
VIAL (ML) INTRAVENOUS AS NEEDED
Status: DISCONTINUED | OUTPATIENT
Start: 2020-04-24 | End: 2020-04-24 | Stop reason: SURG

## 2020-04-24 RX ORDER — GLYCOPYRROLATE 0.2 MG/ML
INJECTION INTRAMUSCULAR; INTRAVENOUS AS NEEDED
Status: DISCONTINUED | OUTPATIENT
Start: 2020-04-24 | End: 2020-04-24 | Stop reason: SURG

## 2020-04-24 RX ADMIN — GLYCOPYRROLATE 0.2 MCG: 0.2 INJECTION INTRAMUSCULAR; INTRAVENOUS at 13:40

## 2020-04-24 RX ADMIN — METOPROLOL SUCCINATE 12.5 MG: 25 TABLET, EXTENDED RELEASE ORAL at 20:08

## 2020-04-24 RX ADMIN — TAZOBACTAM SODIUM AND PIPERACILLIN SODIUM 3.38 G: 375; 3 INJECTION, SOLUTION INTRAVENOUS at 03:59

## 2020-04-24 RX ADMIN — PANTOPRAZOLE SODIUM 40 MG: 40 TABLET, DELAYED RELEASE ORAL at 08:39

## 2020-04-24 RX ADMIN — PROPOFOL 100 MCG/KG/MIN: 10 INJECTION, EMULSION INTRAVENOUS at 13:41

## 2020-04-24 RX ADMIN — SODIUM CHLORIDE 75 ML/HR: 9 INJECTION, SOLUTION INTRAVENOUS at 08:39

## 2020-04-24 RX ADMIN — TAZOBACTAM SODIUM AND PIPERACILLIN SODIUM 3.38 G: 375; 3 INJECTION, SOLUTION INTRAVENOUS at 12:35

## 2020-04-24 RX ADMIN — PROPOFOL 50 MG: 10 INJECTION, EMULSION INTRAVENOUS at 13:41

## 2020-04-24 RX ADMIN — SODIUM CHLORIDE 1000 ML: 9 INJECTION, SOLUTION INTRAVENOUS at 13:21

## 2020-04-24 RX ADMIN — INSULIN LISPRO 2 UNITS: 100 INJECTION, SOLUTION INTRAVENOUS; SUBCUTANEOUS at 17:31

## 2020-04-24 RX ADMIN — LIDOCAINE HYDROCHLORIDE 60 MG: 20 INJECTION, SOLUTION INFILTRATION; PERINEURAL at 13:41

## 2020-04-24 RX ADMIN — SODIUM CHLORIDE, PRESERVATIVE FREE 10 ML: 5 INJECTION INTRAVENOUS at 20:09

## 2020-04-24 RX ADMIN — TAZOBACTAM SODIUM AND PIPERACILLIN SODIUM 3.38 G: 375; 3 INJECTION, SOLUTION INTRAVENOUS at 20:08

## 2020-04-24 NOTE — ANESTHESIA PREPROCEDURE EVALUATION
Anesthesia Evaluation     Patient summary reviewed and Nursing notes reviewed   no history of anesthetic complications:  NPO Solid Status: > 8 hours  NPO Liquid Status: > 8 hours           Airway   Mallampati: II  Dental      Pulmonary - normal exam   (+) shortness of breath, sleep apnea,   Cardiovascular - normal exam    (+) hypertension less than 2 medications, dysrhythmias Atrial Fib, CHF , DVT, hyperlipidemia,       Neuro/Psych  (+) dizziness/light headedness, syncope, numbness,     GI/Hepatic/Renal/Endo    (+)   renal disease CRI, diabetes mellitus type 2 using insulin,     Musculoskeletal     Abdominal    Substance History      OB/GYN          Other   arthritis,                      Anesthesia Plan    ASA 4     MAC     intravenous induction     Anesthetic plan, all risks, benefits, and alternatives have been provided, discussed and informed consent has been obtained with: patient.

## 2020-04-24 NOTE — ANESTHESIA POSTPROCEDURE EVALUATION
"Patient: Home Wynn    Procedure Summary     Date:  04/24/20 Room / Location:  [facility] ENDOSCOPY 4 / [facility] ENDOSCOPY    Anesthesia Start:  1331 Anesthesia Stop:  1420    Procedure:  ENDOSCOPIC RETROGRADE CHOLANGIOPANCREATOGRAPHY with sphincerotomy with  balloon exrtraction (N/A ) Diagnosis:       Biliary obstruction      Bile duct calculus      (Biliary obstruction [K83.1])    Surgeon:  Julio Cesar Figueroa MD Provider:  Lester Chilel MD    Anesthesia Type:  MAC ASA Status:  4          Anesthesia Type: MAC    Vitals  Vitals Value Taken Time   /89 4/24/2020  2:21 PM   Temp     Pulse 99 4/24/2020  2:21 PM   Resp 16 4/24/2020  2:21 PM   SpO2 99 % 4/24/2020  2:21 PM           Post Anesthesia Care and Evaluation    Patient location during evaluation: bedside  Patient participation: complete - patient participated  Level of consciousness: awake and alert  Pain management: adequate  Airway patency: patent  Anesthetic complications: No anesthetic complications    Cardiovascular status: acceptable  Respiratory status: acceptable  Hydration status: acceptable    Comments: /89 (BP Location: Left arm, Patient Position: Lying)   Pulse 99   Temp 36.9 °C (98.4 °F) (Oral)   Resp 16   Ht 177.8 cm (70\")   Wt 79.1 kg (174 lb 6.1 oz)   SpO2 99%   BMI 25.02 kg/m²       "

## 2020-04-25 PROBLEM — R78.81 E COLI BACTEREMIA: Status: ACTIVE | Noted: 2020-04-25

## 2020-04-25 PROBLEM — B96.20 E COLI BACTEREMIA: Status: ACTIVE | Noted: 2020-04-25

## 2020-04-25 LAB
ALBUMIN SERPL-MCNC: 2.7 G/DL (ref 3.5–5.2)
ALBUMIN/GLOB SERPL: 0.7 G/DL
ALP SERPL-CCNC: 234 U/L (ref 39–117)
ALT SERPL W P-5'-P-CCNC: 73 U/L (ref 1–41)
ANION GAP SERPL CALCULATED.3IONS-SCNC: 9.1 MMOL/L (ref 5–15)
AST SERPL-CCNC: 57 U/L (ref 1–40)
BACTERIA SPEC AEROBE CULT: NO GROWTH
BASOPHILS # BLD AUTO: 0.02 10*3/MM3 (ref 0–0.2)
BASOPHILS NFR BLD AUTO: 0.3 % (ref 0–1.5)
BILIRUB SERPL-MCNC: 5.1 MG/DL (ref 0.2–1.2)
BUN BLD-MCNC: 23 MG/DL (ref 8–23)
BUN/CREAT SERPL: 16.3 (ref 7–25)
CALCIUM SPEC-SCNC: 9.1 MG/DL (ref 8.6–10.5)
CHLORIDE SERPL-SCNC: 100 MMOL/L (ref 98–107)
CO2 SERPL-SCNC: 25.9 MMOL/L (ref 22–29)
CREAT BLD-MCNC: 1.41 MG/DL (ref 0.76–1.27)
DEPRECATED RDW RBC AUTO: 49.6 FL (ref 37–54)
EOSINOPHIL # BLD AUTO: 0.03 10*3/MM3 (ref 0–0.4)
EOSINOPHIL NFR BLD AUTO: 0.5 % (ref 0.3–6.2)
ERYTHROCYTE [DISTWIDTH] IN BLOOD BY AUTOMATED COUNT: 14.6 % (ref 12.3–15.4)
GFR SERPL CREATININE-BSD FRML MDRD: 47 ML/MIN/1.73
GLOBULIN UR ELPH-MCNC: 3.8 GM/DL
GLUCOSE BLD-MCNC: 232 MG/DL (ref 65–99)
GLUCOSE BLDC GLUCOMTR-MCNC: 179 MG/DL (ref 70–130)
GLUCOSE BLDC GLUCOMTR-MCNC: 195 MG/DL (ref 70–130)
GLUCOSE BLDC GLUCOMTR-MCNC: 213 MG/DL (ref 70–130)
GLUCOSE BLDC GLUCOMTR-MCNC: 286 MG/DL (ref 70–130)
HCT VFR BLD AUTO: 32.2 % (ref 37.5–51)
HGB BLD-MCNC: 10.7 G/DL (ref 13–17.7)
IMM GRANULOCYTES # BLD AUTO: 0.11 10*3/MM3 (ref 0–0.05)
IMM GRANULOCYTES NFR BLD AUTO: 1.9 % (ref 0–0.5)
LYMPHOCYTES # BLD AUTO: 0.6 10*3/MM3 (ref 0.7–3.1)
LYMPHOCYTES NFR BLD AUTO: 10.3 % (ref 19.6–45.3)
MCH RBC QN AUTO: 30.8 PG (ref 26.6–33)
MCHC RBC AUTO-ENTMCNC: 33.2 G/DL (ref 31.5–35.7)
MCV RBC AUTO: 92.8 FL (ref 79–97)
MONOCYTES # BLD AUTO: 0.54 10*3/MM3 (ref 0.1–0.9)
MONOCYTES NFR BLD AUTO: 9.3 % (ref 5–12)
NEUTROPHILS # BLD AUTO: 4.51 10*3/MM3 (ref 1.7–7)
NEUTROPHILS NFR BLD AUTO: 77.7 % (ref 42.7–76)
NRBC BLD AUTO-RTO: 0 /100 WBC (ref 0–0.2)
PLATELET # BLD AUTO: 158 10*3/MM3 (ref 140–450)
PMV BLD AUTO: 11.1 FL (ref 6–12)
POTASSIUM BLD-SCNC: 4.6 MMOL/L (ref 3.5–5.2)
PROT SERPL-MCNC: 6.5 G/DL (ref 6–8.5)
RBC # BLD AUTO: 3.47 10*6/MM3 (ref 4.14–5.8)
SODIUM BLD-SCNC: 135 MMOL/L (ref 136–145)
WBC NRBC COR # BLD: 5.81 10*3/MM3 (ref 3.4–10.8)

## 2020-04-25 PROCEDURE — 63710000001 INSULIN LISPRO (HUMAN) PER 5 UNITS: Performed by: INTERNAL MEDICINE

## 2020-04-25 PROCEDURE — 80053 COMPREHEN METABOLIC PANEL: CPT | Performed by: NURSE PRACTITIONER

## 2020-04-25 PROCEDURE — 85025 COMPLETE CBC W/AUTO DIFF WBC: CPT | Performed by: NURSE PRACTITIONER

## 2020-04-25 PROCEDURE — 25010000002 PIPERACILLIN SOD-TAZOBACTAM PER 1 G: Performed by: INTERNAL MEDICINE

## 2020-04-25 PROCEDURE — 25010000002 ONDANSETRON PER 1 MG: Performed by: INTERNAL MEDICINE

## 2020-04-25 PROCEDURE — 25010000002 MORPHINE PER 10 MG: Performed by: INTERNAL MEDICINE

## 2020-04-25 PROCEDURE — 82962 GLUCOSE BLOOD TEST: CPT

## 2020-04-25 PROCEDURE — 99232 SBSQ HOSP IP/OBS MODERATE 35: CPT | Performed by: INTERNAL MEDICINE

## 2020-04-25 RX ADMIN — ONDANSETRON 4 MG: 2 INJECTION INTRAMUSCULAR; INTRAVENOUS at 11:49

## 2020-04-25 RX ADMIN — TAZOBACTAM SODIUM AND PIPERACILLIN SODIUM 3.38 G: 375; 3 INJECTION, SOLUTION INTRAVENOUS at 11:56

## 2020-04-25 RX ADMIN — SODIUM CHLORIDE, PRESERVATIVE FREE 10 ML: 5 INJECTION INTRAVENOUS at 20:34

## 2020-04-25 RX ADMIN — TAZOBACTAM SODIUM AND PIPERACILLIN SODIUM 3.38 G: 375; 3 INJECTION, SOLUTION INTRAVENOUS at 04:39

## 2020-04-25 RX ADMIN — SODIUM CHLORIDE, PRESERVATIVE FREE 10 ML: 5 INJECTION INTRAVENOUS at 11:51

## 2020-04-25 RX ADMIN — TAZOBACTAM SODIUM AND PIPERACILLIN SODIUM 3.38 G: 375; 3 INJECTION, SOLUTION INTRAVENOUS at 20:33

## 2020-04-25 RX ADMIN — INSULIN LISPRO 6 UNITS: 100 INJECTION, SOLUTION INTRAVENOUS; SUBCUTANEOUS at 17:53

## 2020-04-25 RX ADMIN — PANTOPRAZOLE SODIUM 40 MG: 40 TABLET, DELAYED RELEASE ORAL at 08:31

## 2020-04-25 RX ADMIN — INSULIN LISPRO 2 UNITS: 100 INJECTION, SOLUTION INTRAVENOUS; SUBCUTANEOUS at 08:30

## 2020-04-25 RX ADMIN — MORPHINE SULFATE 2 MG: 2 INJECTION, SOLUTION INTRAMUSCULAR; INTRAVENOUS at 11:49

## 2020-04-25 RX ADMIN — METOPROLOL SUCCINATE 12.5 MG: 25 TABLET, EXTENDED RELEASE ORAL at 20:33

## 2020-04-26 PROBLEM — K83.09 ASCENDING CHOLANGITIS: Status: ACTIVE | Noted: 2020-04-26

## 2020-04-26 LAB
ALBUMIN SERPL-MCNC: 2.7 G/DL (ref 3.5–5.2)
ALBUMIN/GLOB SERPL: 0.7 G/DL
ALP SERPL-CCNC: 202 U/L (ref 39–117)
ALT SERPL W P-5'-P-CCNC: 55 U/L (ref 1–41)
ANION GAP SERPL CALCULATED.3IONS-SCNC: 11 MMOL/L (ref 5–15)
AST SERPL-CCNC: 34 U/L (ref 1–40)
BACTERIA SPEC AEROBE CULT: ABNORMAL
BASOPHILS # BLD AUTO: 0.02 10*3/MM3 (ref 0–0.2)
BASOPHILS NFR BLD AUTO: 0.5 % (ref 0–1.5)
BILIRUB SERPL-MCNC: 4.1 MG/DL (ref 0.2–1.2)
BUN BLD-MCNC: 20 MG/DL (ref 8–23)
BUN/CREAT SERPL: 14 (ref 7–25)
CALCIUM SPEC-SCNC: 9.2 MG/DL (ref 8.6–10.5)
CHLORIDE SERPL-SCNC: 101 MMOL/L (ref 98–107)
CO2 SERPL-SCNC: 28 MMOL/L (ref 22–29)
CREAT BLD-MCNC: 1.43 MG/DL (ref 0.76–1.27)
DEPRECATED RDW RBC AUTO: 47.9 FL (ref 37–54)
EOSINOPHIL # BLD AUTO: 0.05 10*3/MM3 (ref 0–0.4)
EOSINOPHIL NFR BLD AUTO: 1.2 % (ref 0.3–6.2)
ERYTHROCYTE [DISTWIDTH] IN BLOOD BY AUTOMATED COUNT: 14.4 % (ref 12.3–15.4)
GFR SERPL CREATININE-BSD FRML MDRD: 47 ML/MIN/1.73
GLOBULIN UR ELPH-MCNC: 3.8 GM/DL
GLUCOSE BLD-MCNC: 186 MG/DL (ref 65–99)
GLUCOSE BLDC GLUCOMTR-MCNC: 172 MG/DL (ref 70–130)
GLUCOSE BLDC GLUCOMTR-MCNC: 215 MG/DL (ref 70–130)
GLUCOSE BLDC GLUCOMTR-MCNC: 239 MG/DL (ref 70–130)
GRAM STN SPEC: ABNORMAL
GRAM STN SPEC: ABNORMAL
HCT VFR BLD AUTO: 31.7 % (ref 37.5–51)
HGB BLD-MCNC: 10.7 G/DL (ref 13–17.7)
IMM GRANULOCYTES # BLD AUTO: 0.12 10*3/MM3 (ref 0–0.05)
IMM GRANULOCYTES NFR BLD AUTO: 2.9 % (ref 0–0.5)
ISOLATED FROM: ABNORMAL
LYMPHOCYTES # BLD AUTO: 0.51 10*3/MM3 (ref 0.7–3.1)
LYMPHOCYTES NFR BLD AUTO: 12.4 % (ref 19.6–45.3)
MCH RBC QN AUTO: 30.8 PG (ref 26.6–33)
MCHC RBC AUTO-ENTMCNC: 33.8 G/DL (ref 31.5–35.7)
MCV RBC AUTO: 91.4 FL (ref 79–97)
MONOCYTES # BLD AUTO: 0.47 10*3/MM3 (ref 0.1–0.9)
MONOCYTES NFR BLD AUTO: 11.4 % (ref 5–12)
NEUTROPHILS # BLD AUTO: 2.94 10*3/MM3 (ref 1.7–7)
NEUTROPHILS NFR BLD AUTO: 71.6 % (ref 42.7–76)
NRBC BLD AUTO-RTO: 0 /100 WBC (ref 0–0.2)
PLATELET # BLD AUTO: 157 10*3/MM3 (ref 140–450)
PMV BLD AUTO: 11.5 FL (ref 6–12)
POTASSIUM BLD-SCNC: 4.7 MMOL/L (ref 3.5–5.2)
PROT SERPL-MCNC: 6.5 G/DL (ref 6–8.5)
RBC # BLD AUTO: 3.47 10*6/MM3 (ref 4.14–5.8)
SODIUM BLD-SCNC: 140 MMOL/L (ref 136–145)
WBC NRBC COR # BLD: 4.11 10*3/MM3 (ref 3.4–10.8)

## 2020-04-26 PROCEDURE — 25010000002 PIPERACILLIN SOD-TAZOBACTAM PER 1 G: Performed by: INTERNAL MEDICINE

## 2020-04-26 PROCEDURE — 80053 COMPREHEN METABOLIC PANEL: CPT | Performed by: INTERNAL MEDICINE

## 2020-04-26 PROCEDURE — 99231 SBSQ HOSP IP/OBS SF/LOW 25: CPT | Performed by: INTERNAL MEDICINE

## 2020-04-26 PROCEDURE — 82962 GLUCOSE BLOOD TEST: CPT

## 2020-04-26 PROCEDURE — 63710000001 INSULIN LISPRO (HUMAN) PER 5 UNITS: Performed by: INTERNAL MEDICINE

## 2020-04-26 PROCEDURE — 85025 COMPLETE CBC W/AUTO DIFF WBC: CPT | Performed by: INTERNAL MEDICINE

## 2020-04-26 RX ORDER — POLYETHYLENE GLYCOL 3350 17 G/17G
17 POWDER, FOR SOLUTION ORAL DAILY
Status: DISCONTINUED | OUTPATIENT
Start: 2020-04-26 | End: 2020-05-03 | Stop reason: HOSPADM

## 2020-04-26 RX ADMIN — INSULIN LISPRO 2 UNITS: 100 INJECTION, SOLUTION INTRAVENOUS; SUBCUTANEOUS at 08:26

## 2020-04-26 RX ADMIN — TAZOBACTAM SODIUM AND PIPERACILLIN SODIUM 3.38 G: 375; 3 INJECTION, SOLUTION INTRAVENOUS at 12:20

## 2020-04-26 RX ADMIN — INSULIN LISPRO 2 UNITS: 100 INJECTION, SOLUTION INTRAVENOUS; SUBCUTANEOUS at 17:46

## 2020-04-26 RX ADMIN — TAZOBACTAM SODIUM AND PIPERACILLIN SODIUM 3.38 G: 375; 3 INJECTION, SOLUTION INTRAVENOUS at 20:57

## 2020-04-26 RX ADMIN — INSULIN LISPRO 4 UNITS: 100 INJECTION, SOLUTION INTRAVENOUS; SUBCUTANEOUS at 12:20

## 2020-04-26 RX ADMIN — PANTOPRAZOLE SODIUM 40 MG: 40 TABLET, DELAYED RELEASE ORAL at 08:25

## 2020-04-26 RX ADMIN — METOPROLOL SUCCINATE 12.5 MG: 25 TABLET, EXTENDED RELEASE ORAL at 20:57

## 2020-04-26 RX ADMIN — SODIUM CHLORIDE, PRESERVATIVE FREE 10 ML: 5 INJECTION INTRAVENOUS at 12:20

## 2020-04-26 RX ADMIN — TAZOBACTAM SODIUM AND PIPERACILLIN SODIUM 3.38 G: 375; 3 INJECTION, SOLUTION INTRAVENOUS at 05:00

## 2020-04-27 ENCOUNTER — TELEPHONE (OUTPATIENT)
Dept: FAMILY MEDICINE CLINIC | Facility: CLINIC | Age: 85
End: 2020-04-27

## 2020-04-27 LAB
ALBUMIN SERPL-MCNC: 2.6 G/DL (ref 3.5–5.2)
ALP SERPL-CCNC: 174 U/L (ref 39–117)
ALT SERPL W P-5'-P-CCNC: 41 U/L (ref 1–41)
ANION GAP SERPL CALCULATED.3IONS-SCNC: 10 MMOL/L (ref 5–15)
AST SERPL-CCNC: 27 U/L (ref 1–40)
BASOPHILS # BLD AUTO: 0.02 10*3/MM3 (ref 0–0.2)
BASOPHILS NFR BLD AUTO: 0.5 % (ref 0–1.5)
BILIRUB CONJ SERPL-MCNC: 1.7 MG/DL (ref 0.2–0.3)
BILIRUB INDIRECT SERPL-MCNC: 0.9 MG/DL
BILIRUB SERPL-MCNC: 2.6 MG/DL (ref 0.2–1.2)
BUN BLD-MCNC: 27 MG/DL (ref 8–23)
BUN/CREAT SERPL: 22 (ref 7–25)
CALCIUM SPEC-SCNC: 9 MG/DL (ref 8.6–10.5)
CHLORIDE SERPL-SCNC: 100 MMOL/L (ref 98–107)
CO2 SERPL-SCNC: 27 MMOL/L (ref 22–29)
CREAT BLD-MCNC: 1.23 MG/DL (ref 0.76–1.27)
DEPRECATED RDW RBC AUTO: 47.1 FL (ref 37–54)
EOSINOPHIL # BLD AUTO: 0.06 10*3/MM3 (ref 0–0.4)
EOSINOPHIL NFR BLD AUTO: 1.5 % (ref 0.3–6.2)
ERYTHROCYTE [DISTWIDTH] IN BLOOD BY AUTOMATED COUNT: 14.1 % (ref 12.3–15.4)
GFR SERPL CREATININE-BSD FRML MDRD: 56 ML/MIN/1.73
GLUCOSE BLD-MCNC: 220 MG/DL (ref 65–99)
GLUCOSE BLDC GLUCOMTR-MCNC: 210 MG/DL (ref 70–130)
GLUCOSE BLDC GLUCOMTR-MCNC: 255 MG/DL (ref 70–130)
GLUCOSE BLDC GLUCOMTR-MCNC: 255 MG/DL (ref 70–130)
GLUCOSE BLDC GLUCOMTR-MCNC: 261 MG/DL (ref 70–130)
HCT VFR BLD AUTO: 31.4 % (ref 37.5–51)
HGB BLD-MCNC: 10.1 G/DL (ref 13–17.7)
IMM GRANULOCYTES # BLD AUTO: 0.18 10*3/MM3 (ref 0–0.05)
IMM GRANULOCYTES NFR BLD AUTO: 4.4 % (ref 0–0.5)
LYMPHOCYTES # BLD AUTO: 0.56 10*3/MM3 (ref 0.7–3.1)
LYMPHOCYTES NFR BLD AUTO: 13.7 % (ref 19.6–45.3)
MCH RBC QN AUTO: 30.1 PG (ref 26.6–33)
MCHC RBC AUTO-ENTMCNC: 32.2 G/DL (ref 31.5–35.7)
MCV RBC AUTO: 93.5 FL (ref 79–97)
MONOCYTES # BLD AUTO: 0.45 10*3/MM3 (ref 0.1–0.9)
MONOCYTES NFR BLD AUTO: 11 % (ref 5–12)
NEUTROPHILS # BLD AUTO: 2.83 10*3/MM3 (ref 1.7–7)
NEUTROPHILS NFR BLD AUTO: 68.9 % (ref 42.7–76)
NRBC BLD AUTO-RTO: 0 /100 WBC (ref 0–0.2)
PLATELET # BLD AUTO: 178 10*3/MM3 (ref 140–450)
PMV BLD AUTO: 11.5 FL (ref 6–12)
POTASSIUM BLD-SCNC: 4.2 MMOL/L (ref 3.5–5.2)
PROT SERPL-MCNC: 6.3 G/DL (ref 6–8.5)
RBC # BLD AUTO: 3.36 10*6/MM3 (ref 4.14–5.8)
SODIUM BLD-SCNC: 137 MMOL/L (ref 136–145)
WBC NRBC COR # BLD: 4.1 10*3/MM3 (ref 3.4–10.8)

## 2020-04-27 PROCEDURE — 99232 SBSQ HOSP IP/OBS MODERATE 35: CPT | Performed by: INTERNAL MEDICINE

## 2020-04-27 PROCEDURE — 80076 HEPATIC FUNCTION PANEL: CPT | Performed by: INTERNAL MEDICINE

## 2020-04-27 PROCEDURE — 80048 BASIC METABOLIC PNL TOTAL CA: CPT | Performed by: INTERNAL MEDICINE

## 2020-04-27 PROCEDURE — 85025 COMPLETE CBC W/AUTO DIFF WBC: CPT | Performed by: INTERNAL MEDICINE

## 2020-04-27 PROCEDURE — 63710000001 INSULIN LISPRO (HUMAN) PER 5 UNITS: Performed by: INTERNAL MEDICINE

## 2020-04-27 PROCEDURE — 82962 GLUCOSE BLOOD TEST: CPT

## 2020-04-27 PROCEDURE — 25010000002 PIPERACILLIN SOD-TAZOBACTAM PER 1 G: Performed by: INTERNAL MEDICINE

## 2020-04-27 RX ADMIN — SODIUM CHLORIDE, PRESERVATIVE FREE 10 ML: 5 INJECTION INTRAVENOUS at 20:25

## 2020-04-27 RX ADMIN — SODIUM CHLORIDE, PRESERVATIVE FREE 10 ML: 5 INJECTION INTRAVENOUS at 09:13

## 2020-04-27 RX ADMIN — INSULIN LISPRO 4 UNITS: 100 INJECTION, SOLUTION INTRAVENOUS; SUBCUTANEOUS at 09:13

## 2020-04-27 RX ADMIN — INSULIN LISPRO 6 UNITS: 100 INJECTION, SOLUTION INTRAVENOUS; SUBCUTANEOUS at 12:02

## 2020-04-27 RX ADMIN — TAZOBACTAM SODIUM AND PIPERACILLIN SODIUM 3.38 G: 375; 3 INJECTION, SOLUTION INTRAVENOUS at 05:11

## 2020-04-27 RX ADMIN — PANTOPRAZOLE SODIUM 40 MG: 40 TABLET, DELAYED RELEASE ORAL at 09:14

## 2020-04-27 RX ADMIN — METOPROLOL SUCCINATE 12.5 MG: 25 TABLET, EXTENDED RELEASE ORAL at 20:24

## 2020-04-27 RX ADMIN — TAZOBACTAM SODIUM AND PIPERACILLIN SODIUM 3.38 G: 375; 3 INJECTION, SOLUTION INTRAVENOUS at 12:02

## 2020-04-27 RX ADMIN — SODIUM CHLORIDE, PRESERVATIVE FREE 10 ML: 5 INJECTION INTRAVENOUS at 05:11

## 2020-04-27 RX ADMIN — POLYETHYLENE GLYCOL 3350 17 G: 17 POWDER, FOR SOLUTION ORAL at 09:13

## 2020-04-27 RX ADMIN — TAZOBACTAM SODIUM AND PIPERACILLIN SODIUM 3.38 G: 375; 3 INJECTION, SOLUTION INTRAVENOUS at 20:24

## 2020-04-27 RX ADMIN — INSULIN LISPRO 6 UNITS: 100 INJECTION, SOLUTION INTRAVENOUS; SUBCUTANEOUS at 20:39

## 2020-04-27 RX ADMIN — INSULIN LISPRO 6 UNITS: 100 INJECTION, SOLUTION INTRAVENOUS; SUBCUTANEOUS at 17:35

## 2020-04-27 NOTE — TELEPHONE ENCOUNTER
Chanel from [facility] called and stated that the patient had been admitted to [facility] on 04/23/2020 for a biliary obstruction and acute pancreatitis. Patient is going to be sent home with IV Antibiotics, and Chanel was looking to see if Dr. Mahoney would send in a Nursing Order to teach the patient and relatives how to take care of the IV.    Please advise.  Chanel  [phone]

## 2020-04-28 ENCOUNTER — APPOINTMENT (OUTPATIENT)
Dept: GENERAL RADIOLOGY | Facility: HOSPITAL | Age: 85
End: 2020-04-28

## 2020-04-28 LAB
ALBUMIN SERPL-MCNC: 2.7 G/DL (ref 3.5–5.2)
ALBUMIN/GLOB SERPL: 0.7 G/DL
ALP SERPL-CCNC: 161 U/L (ref 39–117)
ALT SERPL W P-5'-P-CCNC: 42 U/L (ref 1–41)
ANION GAP SERPL CALCULATED.3IONS-SCNC: 12.1 MMOL/L (ref 5–15)
AST SERPL-CCNC: 39 U/L (ref 1–40)
BACTERIA SPEC AEROBE CULT: NORMAL
BILIRUB SERPL-MCNC: 2.4 MG/DL (ref 0.2–1.2)
BUN BLD-MCNC: 27 MG/DL (ref 8–23)
BUN/CREAT SERPL: 21.3 (ref 7–25)
CALCIUM SPEC-SCNC: 9.4 MG/DL (ref 8.6–10.5)
CHLORIDE SERPL-SCNC: 93 MMOL/L (ref 98–107)
CO2 SERPL-SCNC: 25.9 MMOL/L (ref 22–29)
CREAT BLD-MCNC: 1.27 MG/DL (ref 0.76–1.27)
DEPRECATED RDW RBC AUTO: 47.2 FL (ref 37–54)
ERYTHROCYTE [DISTWIDTH] IN BLOOD BY AUTOMATED COUNT: 14.3 % (ref 12.3–15.4)
GFR SERPL CREATININE-BSD FRML MDRD: 54 ML/MIN/1.73
GLOBULIN UR ELPH-MCNC: 3.8 GM/DL
GLUCOSE BLD-MCNC: 318 MG/DL (ref 65–99)
GLUCOSE BLDC GLUCOMTR-MCNC: 152 MG/DL (ref 70–130)
GLUCOSE BLDC GLUCOMTR-MCNC: 229 MG/DL (ref 70–130)
GLUCOSE BLDC GLUCOMTR-MCNC: 231 MG/DL (ref 70–130)
GLUCOSE BLDC GLUCOMTR-MCNC: 298 MG/DL (ref 70–130)
HCT VFR BLD AUTO: 29.6 % (ref 37.5–51)
HGB BLD-MCNC: 9.9 G/DL (ref 13–17.7)
MCH RBC QN AUTO: 31.1 PG (ref 26.6–33)
MCHC RBC AUTO-ENTMCNC: 33.4 G/DL (ref 31.5–35.7)
MCV RBC AUTO: 93.1 FL (ref 79–97)
PLATELET # BLD AUTO: 203 10*3/MM3 (ref 140–450)
PMV BLD AUTO: 11.4 FL (ref 6–12)
POTASSIUM BLD-SCNC: 4.3 MMOL/L (ref 3.5–5.2)
PROT SERPL-MCNC: 6.5 G/DL (ref 6–8.5)
RBC # BLD AUTO: 3.18 10*6/MM3 (ref 4.14–5.8)
SODIUM BLD-SCNC: 131 MMOL/L (ref 136–145)
WBC NRBC COR # BLD: 4.51 10*3/MM3 (ref 3.4–10.8)

## 2020-04-28 PROCEDURE — 82962 GLUCOSE BLOOD TEST: CPT

## 2020-04-28 PROCEDURE — 85027 COMPLETE CBC AUTOMATED: CPT | Performed by: INTERNAL MEDICINE

## 2020-04-28 PROCEDURE — 02HV33Z INSERTION OF INFUSION DEVICE INTO SUPERIOR VENA CAVA, PERCUTANEOUS APPROACH: ICD-10-PCS | Performed by: INTERNAL MEDICINE

## 2020-04-28 PROCEDURE — 25010000003 CEFTRIAXONE PER 250 MG: Performed by: NURSE PRACTITIONER

## 2020-04-28 PROCEDURE — 63710000001 INSULIN LISPRO (HUMAN) PER 5 UNITS: Performed by: INTERNAL MEDICINE

## 2020-04-28 PROCEDURE — 25010000002 PIPERACILLIN SOD-TAZOBACTAM PER 1 G: Performed by: INTERNAL MEDICINE

## 2020-04-28 PROCEDURE — 80053 COMPREHEN METABOLIC PANEL: CPT | Performed by: HOSPITALIST

## 2020-04-28 PROCEDURE — C1751 CATH, INF, PER/CENT/MIDLINE: HCPCS

## 2020-04-28 RX ORDER — CEFTRIAXONE SODIUM 2 G/50ML
2 INJECTION, SOLUTION INTRAVENOUS EVERY 24 HOURS
Status: DISCONTINUED | OUTPATIENT
Start: 2020-04-28 | End: 2020-05-03 | Stop reason: HOSPADM

## 2020-04-28 RX ORDER — CEFTRIAXONE SODIUM 2 G/50ML
2 INJECTION, SOLUTION INTRAVENOUS EVERY 24 HOURS
Qty: 700 ML | Refills: 0 | Status: CANCELLED | OUTPATIENT
Start: 2020-04-28 | End: 2020-05-12

## 2020-04-28 RX ORDER — METRONIDAZOLE 500 MG/1
500 TABLET ORAL EVERY 12 HOURS SCHEDULED
Qty: 28 TABLET | Refills: 0 | Status: CANCELLED | OUTPATIENT
Start: 2020-04-28 | End: 2020-05-12

## 2020-04-28 RX ORDER — METRONIDAZOLE 500 MG/1
500 TABLET ORAL EVERY 12 HOURS SCHEDULED
Status: DISCONTINUED | OUTPATIENT
Start: 2020-04-28 | End: 2020-04-30

## 2020-04-28 RX ADMIN — CEFTRIAXONE SODIUM 2 G: 2 INJECTION, SOLUTION INTRAVENOUS at 13:33

## 2020-04-28 RX ADMIN — METOPROLOL SUCCINATE 12.5 MG: 25 TABLET, EXTENDED RELEASE ORAL at 21:08

## 2020-04-28 RX ADMIN — POLYETHYLENE GLYCOL 3350 17 G: 17 POWDER, FOR SOLUTION ORAL at 09:35

## 2020-04-28 RX ADMIN — PANTOPRAZOLE SODIUM 40 MG: 40 TABLET, DELAYED RELEASE ORAL at 09:35

## 2020-04-28 RX ADMIN — METRONIDAZOLE 500 MG: 500 TABLET ORAL at 21:08

## 2020-04-28 RX ADMIN — INSULIN LISPRO 4 UNITS: 100 INJECTION, SOLUTION INTRAVENOUS; SUBCUTANEOUS at 17:41

## 2020-04-28 RX ADMIN — METRONIDAZOLE 500 MG: 500 TABLET ORAL at 13:33

## 2020-04-28 RX ADMIN — TAZOBACTAM SODIUM AND PIPERACILLIN SODIUM 3.38 G: 375; 3 INJECTION, SOLUTION INTRAVENOUS at 04:03

## 2020-04-28 RX ADMIN — INSULIN LISPRO 6 UNITS: 100 INJECTION, SOLUTION INTRAVENOUS; SUBCUTANEOUS at 13:32

## 2020-04-28 RX ADMIN — SODIUM CHLORIDE, PRESERVATIVE FREE 10 ML: 5 INJECTION INTRAVENOUS at 09:35

## 2020-04-29 PROBLEM — K92.1 MELENA: Status: ACTIVE | Noted: 2020-04-23

## 2020-04-29 PROBLEM — D62 ACUTE BLOOD LOSS ANEMIA: Status: ACTIVE | Noted: 2020-04-29

## 2020-04-29 PROBLEM — Z79.01 CHRONIC ANTICOAGULATION: Status: ACTIVE | Noted: 2020-04-29

## 2020-04-29 PROBLEM — K92.2 GI BLEED: Status: ACTIVE | Noted: 2020-04-29

## 2020-04-29 LAB
ALBUMIN SERPL-MCNC: 2.9 G/DL (ref 3.5–5.2)
ALBUMIN/GLOB SERPL: 0.8 G/DL
ALP SERPL-CCNC: 152 U/L (ref 39–117)
ALT SERPL W P-5'-P-CCNC: 45 U/L (ref 1–41)
ANION GAP SERPL CALCULATED.3IONS-SCNC: 10.8 MMOL/L (ref 5–15)
AST SERPL-CCNC: 47 U/L (ref 1–40)
BACTERIA SPEC AEROBE CULT: NORMAL
BILIRUB SERPL-MCNC: 1.7 MG/DL (ref 0.2–1.2)
BUN BLD-MCNC: 22 MG/DL (ref 8–23)
BUN/CREAT SERPL: 17.1 (ref 7–25)
CALCIUM SPEC-SCNC: 9.4 MG/DL (ref 8.6–10.5)
CHLORIDE SERPL-SCNC: 95 MMOL/L (ref 98–107)
CO2 SERPL-SCNC: 28.2 MMOL/L (ref 22–29)
CREAT BLD-MCNC: 1.29 MG/DL (ref 0.76–1.27)
DEPRECATED RDW RBC AUTO: 49.6 FL (ref 37–54)
ERYTHROCYTE [DISTWIDTH] IN BLOOD BY AUTOMATED COUNT: 14.5 % (ref 12.3–15.4)
GFR SERPL CREATININE-BSD FRML MDRD: 53 ML/MIN/1.73
GLOBULIN UR ELPH-MCNC: 3.8 GM/DL
GLUCOSE BLD-MCNC: 333 MG/DL (ref 65–99)
GLUCOSE BLDC GLUCOMTR-MCNC: 216 MG/DL (ref 70–130)
GLUCOSE BLDC GLUCOMTR-MCNC: 291 MG/DL (ref 70–130)
GLUCOSE BLDC GLUCOMTR-MCNC: 349 MG/DL (ref 70–130)
GLUCOSE BLDC GLUCOMTR-MCNC: 368 MG/DL (ref 70–130)
HCT VFR BLD AUTO: 29 % (ref 37.5–51)
HEMOCCULT STL QL: POSITIVE
HGB BLD-MCNC: 9.4 G/DL (ref 13–17.7)
HYPOCHROMIA BLD QL: ABNORMAL
LYMPHOCYTES # BLD MANUAL: 0.88 10*3/MM3 (ref 0.7–3.1)
LYMPHOCYTES NFR BLD MANUAL: 15 % (ref 19.6–45.3)
LYMPHOCYTES NFR BLD MANUAL: 9 % (ref 5–12)
MACROCYTES BLD QL SMEAR: ABNORMAL
MCH RBC QN AUTO: 30.7 PG (ref 26.6–33)
MCHC RBC AUTO-ENTMCNC: 32.4 G/DL (ref 31.5–35.7)
MCV RBC AUTO: 94.8 FL (ref 79–97)
MONOCYTES # BLD AUTO: 0.53 10*3/MM3 (ref 0.1–0.9)
NEUTROPHILS # BLD AUTO: 4.23 10*3/MM3 (ref 1.7–7)
NEUTROPHILS NFR BLD MANUAL: 72 % (ref 42.7–76)
PLAT MORPH BLD: NORMAL
PLATELET # BLD AUTO: 172 10*3/MM3 (ref 140–450)
PMV BLD AUTO: 11.9 FL (ref 6–12)
POTASSIUM BLD-SCNC: 4.3 MMOL/L (ref 3.5–5.2)
PROT SERPL-MCNC: 6.7 G/DL (ref 6–8.5)
RBC # BLD AUTO: 3.06 10*6/MM3 (ref 4.14–5.8)
SODIUM BLD-SCNC: 134 MMOL/L (ref 136–145)
VARIANT LYMPHS NFR BLD MANUAL: 4 % (ref 0–5)
WBC MORPH BLD: NORMAL
WBC NRBC COR # BLD: 5.87 10*3/MM3 (ref 3.4–10.8)

## 2020-04-29 PROCEDURE — 82962 GLUCOSE BLOOD TEST: CPT

## 2020-04-29 PROCEDURE — 63710000001 INSULIN LISPRO (HUMAN) PER 5 UNITS: Performed by: INTERNAL MEDICINE

## 2020-04-29 PROCEDURE — 82272 OCCULT BLD FECES 1-3 TESTS: CPT | Performed by: INTERNAL MEDICINE

## 2020-04-29 PROCEDURE — 85007 BL SMEAR W/DIFF WBC COUNT: CPT | Performed by: HOSPITALIST

## 2020-04-29 PROCEDURE — 25010000003 CEFTRIAXONE PER 250 MG: Performed by: NURSE PRACTITIONER

## 2020-04-29 PROCEDURE — 80053 COMPREHEN METABOLIC PANEL: CPT | Performed by: HOSPITALIST

## 2020-04-29 PROCEDURE — 85025 COMPLETE CBC W/AUTO DIFF WBC: CPT | Performed by: HOSPITALIST

## 2020-04-29 PROCEDURE — 63710000001 INSULIN GLARGINE PER 5 UNITS: Performed by: NURSE PRACTITIONER

## 2020-04-29 PROCEDURE — 99232 SBSQ HOSP IP/OBS MODERATE 35: CPT | Performed by: INTERNAL MEDICINE

## 2020-04-29 RX ORDER — ASPIRIN 81 MG/1
81 TABLET ORAL DAILY
Status: DISCONTINUED | OUTPATIENT
Start: 2020-04-29 | End: 2020-04-29

## 2020-04-29 RX ORDER — INSULIN GLARGINE 100 [IU]/ML
12 INJECTION, SOLUTION SUBCUTANEOUS NIGHTLY
Status: DISCONTINUED | OUTPATIENT
Start: 2020-04-29 | End: 2020-05-03 | Stop reason: HOSPADM

## 2020-04-29 RX ADMIN — INSULIN LISPRO 6 UNITS: 100 INJECTION, SOLUTION INTRAVENOUS; SUBCUTANEOUS at 20:43

## 2020-04-29 RX ADMIN — PANTOPRAZOLE SODIUM 40 MG: 40 TABLET, DELAYED RELEASE ORAL at 07:43

## 2020-04-29 RX ADMIN — METOPROLOL SUCCINATE 12.5 MG: 25 TABLET, EXTENDED RELEASE ORAL at 20:34

## 2020-04-29 RX ADMIN — INSULIN LISPRO 4 UNITS: 100 INJECTION, SOLUTION INTRAVENOUS; SUBCUTANEOUS at 07:43

## 2020-04-29 RX ADMIN — INSULIN LISPRO 7 UNITS: 100 INJECTION, SOLUTION INTRAVENOUS; SUBCUTANEOUS at 11:33

## 2020-04-29 RX ADMIN — SODIUM CHLORIDE, PRESERVATIVE FREE 10 ML: 5 INJECTION INTRAVENOUS at 20:35

## 2020-04-29 RX ADMIN — METRONIDAZOLE 500 MG: 500 TABLET ORAL at 20:34

## 2020-04-29 RX ADMIN — CEFTRIAXONE SODIUM 2 G: 2 INJECTION, SOLUTION INTRAVENOUS at 12:42

## 2020-04-29 RX ADMIN — POLYETHYLENE GLYCOL 3350 17 G: 17 POWDER, FOR SOLUTION ORAL at 07:43

## 2020-04-29 RX ADMIN — METRONIDAZOLE 500 MG: 500 TABLET ORAL at 08:56

## 2020-04-29 RX ADMIN — SODIUM CHLORIDE, PRESERVATIVE FREE 10 ML: 5 INJECTION INTRAVENOUS at 07:47

## 2020-04-29 RX ADMIN — INSULIN GLARGINE 12 UNITS: 100 INJECTION, SOLUTION SUBCUTANEOUS at 20:45

## 2020-04-29 RX ADMIN — INSULIN LISPRO 7 UNITS: 100 INJECTION, SOLUTION INTRAVENOUS; SUBCUTANEOUS at 17:25

## 2020-04-30 ENCOUNTER — ANESTHESIA (OUTPATIENT)
Dept: GASTROENTEROLOGY | Facility: HOSPITAL | Age: 85
End: 2020-04-30

## 2020-04-30 ENCOUNTER — ANESTHESIA EVENT (OUTPATIENT)
Dept: GASTROENTEROLOGY | Facility: HOSPITAL | Age: 85
End: 2020-04-30

## 2020-04-30 LAB
ANION GAP SERPL CALCULATED.3IONS-SCNC: 10.4 MMOL/L (ref 5–15)
BUN BLD-MCNC: 20 MG/DL (ref 8–23)
BUN/CREAT SERPL: 17.1 (ref 7–25)
CALCIUM SPEC-SCNC: 8.9 MG/DL (ref 8.6–10.5)
CHLORIDE SERPL-SCNC: 100 MMOL/L (ref 98–107)
CO2 SERPL-SCNC: 26.6 MMOL/L (ref 22–29)
CREAT BLD-MCNC: 1.17 MG/DL (ref 0.76–1.27)
DEPRECATED RDW RBC AUTO: 47.7 FL (ref 37–54)
ERYTHROCYTE [DISTWIDTH] IN BLOOD BY AUTOMATED COUNT: 14.7 % (ref 12.3–15.4)
GFR SERPL CREATININE-BSD FRML MDRD: 59 ML/MIN/1.73
GLUCOSE BLD-MCNC: 155 MG/DL (ref 65–99)
GLUCOSE BLDC GLUCOMTR-MCNC: 160 MG/DL (ref 70–130)
GLUCOSE BLDC GLUCOMTR-MCNC: 171 MG/DL (ref 70–130)
GLUCOSE BLDC GLUCOMTR-MCNC: 244 MG/DL (ref 70–130)
GLUCOSE BLDC GLUCOMTR-MCNC: 72 MG/DL (ref 70–130)
HCT VFR BLD AUTO: 27 % (ref 37.5–51)
HGB BLD-MCNC: 9.2 G/DL (ref 13–17.7)
MCH RBC QN AUTO: 31 PG (ref 26.6–33)
MCHC RBC AUTO-ENTMCNC: 34.1 G/DL (ref 31.5–35.7)
MCV RBC AUTO: 90.9 FL (ref 79–97)
PLATELET # BLD AUTO: 200 10*3/MM3 (ref 140–450)
PMV BLD AUTO: 11.4 FL (ref 6–12)
POTASSIUM BLD-SCNC: 4.3 MMOL/L (ref 3.5–5.2)
RBC # BLD AUTO: 2.97 10*6/MM3 (ref 4.14–5.8)
SODIUM BLD-SCNC: 137 MMOL/L (ref 136–145)
WBC NRBC COR # BLD: 6.7 10*3/MM3 (ref 3.4–10.8)

## 2020-04-30 PROCEDURE — 0DJ08ZZ INSPECTION OF UPPER INTESTINAL TRACT, VIA NATURAL OR ARTIFICIAL OPENING ENDOSCOPIC: ICD-10-PCS | Performed by: INTERNAL MEDICINE

## 2020-04-30 PROCEDURE — 85027 COMPLETE CBC AUTOMATED: CPT | Performed by: NURSE PRACTITIONER

## 2020-04-30 PROCEDURE — 63710000001 INSULIN GLARGINE PER 5 UNITS: Performed by: NURSE PRACTITIONER

## 2020-04-30 PROCEDURE — 25010000003 CEFTRIAXONE PER 250 MG: Performed by: NURSE PRACTITIONER

## 2020-04-30 PROCEDURE — 80048 BASIC METABOLIC PNL TOTAL CA: CPT | Performed by: NURSE PRACTITIONER

## 2020-04-30 PROCEDURE — 25010000002 PROPOFOL 10 MG/ML EMULSION: Performed by: ANESTHESIOLOGY

## 2020-04-30 PROCEDURE — 82962 GLUCOSE BLOOD TEST: CPT

## 2020-04-30 PROCEDURE — 43235 EGD DIAGNOSTIC BRUSH WASH: CPT | Performed by: INTERNAL MEDICINE

## 2020-04-30 PROCEDURE — 63710000001 INSULIN LISPRO (HUMAN) PER 5 UNITS: Performed by: INTERNAL MEDICINE

## 2020-04-30 RX ORDER — PROPOFOL 10 MG/ML
VIAL (ML) INTRAVENOUS CONTINUOUS PRN
Status: DISCONTINUED | OUTPATIENT
Start: 2020-04-30 | End: 2020-04-30 | Stop reason: SURG

## 2020-04-30 RX ORDER — POLYETHYLENE GLYCOL 3350, SODIUM CHLORIDE, POTASSIUM CHLORIDE, SODIUM BICARBONATE, AND SODIUM SULFATE 240; 5.84; 2.98; 6.72; 22.72 G/4L; G/4L; G/4L; G/4L; G/4L
2000 POWDER, FOR SOLUTION ORAL DAILY
Status: DISCONTINUED | OUTPATIENT
Start: 2020-04-30 | End: 2020-04-30

## 2020-04-30 RX ORDER — SODIUM CHLORIDE 0.9 % (FLUSH) 0.9 %
10 SYRINGE (ML) INJECTION AS NEEDED
Status: DISCONTINUED | OUTPATIENT
Start: 2020-04-30 | End: 2020-04-30 | Stop reason: HOSPADM

## 2020-04-30 RX ORDER — METRONIDAZOLE 500 MG/1
500 TABLET ORAL EVERY 8 HOURS SCHEDULED
Status: DISCONTINUED | OUTPATIENT
Start: 2020-04-30 | End: 2020-05-03 | Stop reason: HOSPADM

## 2020-04-30 RX ORDER — PROPOFOL 10 MG/ML
VIAL (ML) INTRAVENOUS AS NEEDED
Status: DISCONTINUED | OUTPATIENT
Start: 2020-04-30 | End: 2020-04-30 | Stop reason: SURG

## 2020-04-30 RX ORDER — POLYETHYLENE GLYCOL 3350, SODIUM CHLORIDE, POTASSIUM CHLORIDE, SODIUM BICARBONATE, AND SODIUM SULFATE 240; 5.84; 2.98; 6.72; 22.72 G/4L; G/4L; G/4L; G/4L; G/4L
2000 POWDER, FOR SOLUTION ORAL 2 TIMES DAILY
Status: COMPLETED | OUTPATIENT
Start: 2020-04-30 | End: 2020-05-01

## 2020-04-30 RX ORDER — SODIUM CHLORIDE 9 MG/ML
1000 INJECTION, SOLUTION INTRAVENOUS CONTINUOUS
Status: ACTIVE | OUTPATIENT
Start: 2020-04-30 | End: 2020-05-01

## 2020-04-30 RX ORDER — METRONIDAZOLE 500 MG/1
500 TABLET ORAL EVERY 8 HOURS SCHEDULED
Qty: 12 TABLET | Refills: 0 | Status: CANCELLED | OUTPATIENT
Start: 2020-04-30 | End: 2020-05-04

## 2020-04-30 RX ORDER — CEFTRIAXONE SODIUM 2 G/50ML
2 INJECTION, SOLUTION INTRAVENOUS EVERY 24 HOURS
Qty: 700 ML | Refills: 0 | Status: CANCELLED | OUTPATIENT
Start: 2020-04-30 | End: 2020-05-14

## 2020-04-30 RX ORDER — LIDOCAINE HYDROCHLORIDE 20 MG/ML
INJECTION, SOLUTION INFILTRATION; PERINEURAL AS NEEDED
Status: DISCONTINUED | OUTPATIENT
Start: 2020-04-30 | End: 2020-04-30 | Stop reason: SURG

## 2020-04-30 RX ADMIN — METRONIDAZOLE 500 MG: 500 TABLET ORAL at 18:27

## 2020-04-30 RX ADMIN — SODIUM CHLORIDE 1000 ML: 9 INJECTION, SOLUTION INTRAVENOUS at 07:15

## 2020-04-30 RX ADMIN — LIDOCAINE HYDROCHLORIDE 60 MG: 20 INJECTION, SOLUTION INFILTRATION; PERINEURAL at 07:37

## 2020-04-30 RX ADMIN — POLYETHYLENE GLYCOL 3350 17 G: 17 POWDER, FOR SOLUTION ORAL at 08:50

## 2020-04-30 RX ADMIN — PROPOFOL 100 MCG/KG/MIN: 10 INJECTION, EMULSION INTRAVENOUS at 07:37

## 2020-04-30 RX ADMIN — SODIUM CHLORIDE, PRESERVATIVE FREE 10 ML: 5 INJECTION INTRAVENOUS at 08:50

## 2020-04-30 RX ADMIN — PROPOFOL 50 MG: 10 INJECTION, EMULSION INTRAVENOUS at 07:37

## 2020-04-30 RX ADMIN — TRAMADOL HYDROCHLORIDE 50 MG: 50 TABLET, FILM COATED ORAL at 21:59

## 2020-04-30 RX ADMIN — INSULIN LISPRO 4 UNITS: 100 INJECTION, SOLUTION INTRAVENOUS; SUBCUTANEOUS at 14:37

## 2020-04-30 RX ADMIN — SODIUM CHLORIDE, PRESERVATIVE FREE 10 ML: 5 INJECTION INTRAVENOUS at 21:59

## 2020-04-30 RX ADMIN — METRONIDAZOLE 500 MG: 500 TABLET ORAL at 08:50

## 2020-04-30 RX ADMIN — CEFTRIAXONE SODIUM 2 G: 2 INJECTION, SOLUTION INTRAVENOUS at 14:37

## 2020-04-30 RX ADMIN — POLYETHYLENE GLYCOL-3350 AND ELECTROLYTES WITH FLAVOR PACK 2000 ML: 240; 5.84; 2.98; 6.72; 22.72 POWDER, FOR SOLUTION ORAL at 18:27

## 2020-04-30 RX ADMIN — INSULIN GLARGINE 12 UNITS: 100 INJECTION, SOLUTION SUBCUTANEOUS at 21:56

## 2020-04-30 RX ADMIN — INSULIN LISPRO 2 UNITS: 100 INJECTION, SOLUTION INTRAVENOUS; SUBCUTANEOUS at 18:27

## 2020-04-30 RX ADMIN — PANTOPRAZOLE SODIUM 40 MG: 40 TABLET, DELAYED RELEASE ORAL at 08:50

## 2020-04-30 RX ADMIN — METOPROLOL SUCCINATE 12.5 MG: 25 TABLET, EXTENDED RELEASE ORAL at 21:57

## 2020-04-30 NOTE — ANESTHESIA PREPROCEDURE EVALUATION
Anesthesia Evaluation     Patient summary reviewed and Nursing notes reviewed                Airway   Mallampati: I  TM distance: >3 FB  Neck ROM: full  No difficulty expected  Dental - normal exam     Pulmonary - normal exam   (+) shortness of breath, sleep apnea,   Cardiovascular - normal exam    (+) hypertension, dysrhythmias Atrial Fib, CHF , DVT, hyperlipidemia,       Neuro/Psych  (+) dizziness/light headedness, syncope, numbness,     GI/Hepatic/Renal/Endo    (+)  GI bleeding , renal disease, diabetes mellitus using insulin,     Musculoskeletal     Abdominal  - normal exam    Bowel sounds: normal.   Substance History - negative use     OB/GYN negative ob/gyn ROS         Other   arthritis,                      Anesthesia Plan    ASA 4     MAC       Anesthetic plan, all risks, benefits, and alternatives have been provided, discussed and informed consent has been obtained with: patient.

## 2020-04-30 NOTE — ANESTHESIA POSTPROCEDURE EVALUATION
"Patient: Home Wynn    Procedure Summary     Date:  04/30/20 Room / Location:   [facility]COPY 4 /  [facility]    Anesthesia Start:  0733 Anesthesia Stop:  0809    Procedure:  ESOPHAGOGASTRODUODENOSCOPY WITH ERCP SCOPE (N/A Esophagus) Diagnosis:       Melena      (Melena [K92.1])    Surgeon:  Julio Cesar Figueroa MD Provider:  Lester Miranda MD    Anesthesia Type:  MAC ASA Status:  4          Anesthesia Type: MAC    Vitals  Vitals Value Taken Time   /75 4/30/2020  8:24 AM   Temp     Pulse 82 4/30/2020  8:24 AM   Resp 16 4/30/2020  8:24 AM   SpO2 98 % 4/30/2020  8:24 AM           Post Anesthesia Care and Evaluation    Patient location during evaluation: PACU  Patient participation: complete - patient participated  Level of consciousness: awake  Pain score: 0  Pain management: adequate  Airway patency: patent  Anesthetic complications: No anesthetic complications  PONV Status: none  Cardiovascular status: acceptable  Respiratory status: acceptable  Hydration status: acceptable    Comments: /75 (BP Location: Left arm, Patient Position: Lying)   Pulse 82   Temp 37.1 °C (98.7 °F) (Oral)   Resp 16   Ht 177.8 cm (70\")   Wt 76.8 kg (169 lb 4.8 oz)   SpO2 98%   BMI 24.29 kg/m²       "

## 2020-05-01 ENCOUNTER — ANESTHESIA (OUTPATIENT)
Dept: GASTROENTEROLOGY | Facility: HOSPITAL | Age: 85
End: 2020-05-01

## 2020-05-01 ENCOUNTER — ANESTHESIA EVENT (OUTPATIENT)
Dept: GASTROENTEROLOGY | Facility: HOSPITAL | Age: 85
End: 2020-05-01

## 2020-05-01 LAB
GLUCOSE BLDC GLUCOMTR-MCNC: 160 MG/DL (ref 70–130)
GLUCOSE BLDC GLUCOMTR-MCNC: 166 MG/DL (ref 70–130)
GLUCOSE BLDC GLUCOMTR-MCNC: 228 MG/DL (ref 70–130)
GLUCOSE BLDC GLUCOMTR-MCNC: 292 MG/DL (ref 70–130)

## 2020-05-01 PROCEDURE — 88305 TISSUE EXAM BY PATHOLOGIST: CPT | Performed by: INTERNAL MEDICINE

## 2020-05-01 PROCEDURE — 63710000001 INSULIN LISPRO (HUMAN) PER 5 UNITS: Performed by: INTERNAL MEDICINE

## 2020-05-01 PROCEDURE — 82962 GLUCOSE BLOOD TEST: CPT

## 2020-05-01 PROCEDURE — 0DBL8ZX EXCISION OF TRANSVERSE COLON, VIA NATURAL OR ARTIFICIAL OPENING ENDOSCOPIC, DIAGNOSTIC: ICD-10-PCS | Performed by: INTERNAL MEDICINE

## 2020-05-01 PROCEDURE — 25010000002 PROPOFOL 10 MG/ML EMULSION: Performed by: ANESTHESIOLOGY

## 2020-05-01 PROCEDURE — 63710000001 INSULIN GLARGINE PER 5 UNITS: Performed by: NURSE PRACTITIONER

## 2020-05-01 PROCEDURE — 45385 COLONOSCOPY W/LESION REMOVAL: CPT | Performed by: INTERNAL MEDICINE

## 2020-05-01 PROCEDURE — 25010000003 CEFTRIAXONE PER 250 MG: Performed by: NURSE PRACTITIONER

## 2020-05-01 PROCEDURE — 0DBK8ZX EXCISION OF ASCENDING COLON, VIA NATURAL OR ARTIFICIAL OPENING ENDOSCOPIC, DIAGNOSTIC: ICD-10-PCS | Performed by: INTERNAL MEDICINE

## 2020-05-01 RX ORDER — PROPOFOL 10 MG/ML
VIAL (ML) INTRAVENOUS CONTINUOUS PRN
Status: DISCONTINUED | OUTPATIENT
Start: 2020-05-01 | End: 2020-05-01 | Stop reason: SURG

## 2020-05-01 RX ORDER — SODIUM CHLORIDE 9 MG/ML
30 INJECTION, SOLUTION INTRAVENOUS CONTINUOUS PRN
Status: DISCONTINUED | OUTPATIENT
Start: 2020-05-01 | End: 2020-05-03 | Stop reason: HOSPADM

## 2020-05-01 RX ORDER — PROPOFOL 10 MG/ML
VIAL (ML) INTRAVENOUS AS NEEDED
Status: DISCONTINUED | OUTPATIENT
Start: 2020-05-01 | End: 2020-05-01 | Stop reason: SURG

## 2020-05-01 RX ORDER — LIDOCAINE HYDROCHLORIDE 20 MG/ML
INJECTION, SOLUTION INFILTRATION; PERINEURAL AS NEEDED
Status: DISCONTINUED | OUTPATIENT
Start: 2020-05-01 | End: 2020-05-01 | Stop reason: SURG

## 2020-05-01 RX ADMIN — SODIUM CHLORIDE, PRESERVATIVE FREE 10 ML: 5 INJECTION INTRAVENOUS at 11:04

## 2020-05-01 RX ADMIN — SODIUM CHLORIDE, PRESERVATIVE FREE 10 ML: 5 INJECTION INTRAVENOUS at 21:42

## 2020-05-01 RX ADMIN — METRONIDAZOLE 500 MG: 500 TABLET ORAL at 21:41

## 2020-05-01 RX ADMIN — METRONIDAZOLE 500 MG: 500 TABLET ORAL at 06:18

## 2020-05-01 RX ADMIN — PROPOFOL 100 MCG/KG/MIN: 10 INJECTION, EMULSION INTRAVENOUS at 09:48

## 2020-05-01 RX ADMIN — METOPROLOL SUCCINATE 12.5 MG: 25 TABLET, EXTENDED RELEASE ORAL at 21:41

## 2020-05-01 RX ADMIN — INSULIN GLARGINE 12 UNITS: 100 INJECTION, SOLUTION SUBCUTANEOUS at 21:40

## 2020-05-01 RX ADMIN — POLYETHYLENE GLYCOL-3350 AND ELECTROLYTES WITH FLAVOR PACK 2000 ML: 240; 5.84; 2.98; 6.72; 22.72 POWDER, FOR SOLUTION ORAL at 06:00

## 2020-05-01 RX ADMIN — PROPOFOL 50 MG: 10 INJECTION, EMULSION INTRAVENOUS at 09:48

## 2020-05-01 RX ADMIN — INSULIN LISPRO 6 UNITS: 100 INJECTION, SOLUTION INTRAVENOUS; SUBCUTANEOUS at 21:40

## 2020-05-01 RX ADMIN — INSULIN LISPRO 4 UNITS: 100 INJECTION, SOLUTION INTRAVENOUS; SUBCUTANEOUS at 17:21

## 2020-05-01 RX ADMIN — INSULIN LISPRO 2 UNITS: 100 INJECTION, SOLUTION INTRAVENOUS; SUBCUTANEOUS at 12:26

## 2020-05-01 RX ADMIN — METRONIDAZOLE 500 MG: 500 TABLET ORAL at 14:04

## 2020-05-01 RX ADMIN — SODIUM CHLORIDE 30 ML/HR: 9 INJECTION, SOLUTION INTRAVENOUS at 08:54

## 2020-05-01 RX ADMIN — PANTOPRAZOLE SODIUM 40 MG: 40 TABLET, DELAYED RELEASE ORAL at 11:02

## 2020-05-01 RX ADMIN — LIDOCAINE HYDROCHLORIDE 60 MG: 20 INJECTION, SOLUTION INFILTRATION; PERINEURAL at 09:48

## 2020-05-01 RX ADMIN — CEFTRIAXONE SODIUM 2 G: 2 INJECTION, SOLUTION INTRAVENOUS at 12:31

## 2020-05-01 NOTE — PROGRESS NOTES
Continued Stay Note  [facility]     Patient Name: Home Wynn  MRN: [id number]  Today's Date: 5/1/2020    Admit Date: 4/23/2020    Discharge Plan     Row Name 05/01/20 1313       Plan    Plan  Home with [facility] and [facility]    Patient/Family in Agreement with Plan  yes    Plan Comments  Met with patient at the bedside. Discussed DC plan. Patient agreeable to home with [facility] and [facility] infusion. Patient did verbalize he is aware of the antibiotic cost and is agreeable to cost. Colonoscopy completed today. Possible Dc today if medically stable. Wife will provide transport. Updated [facility] of probable DC today. Sera Workman RN        Discharge Codes    No documentation.             Sera Workman RN

## 2020-05-01 NOTE — PROGRESS NOTES
Continued Stay Note  [facility]     Patient Name: Home Wynn  MRN: [id number]  Today's Date: 5/1/2020    Admit Date: 4/23/2020    Discharge Plan     Row Name 05/01/20 1547       Plan    Plan Comments  Inbound call from Evi ESTRADA MD not ready for DC. Going to run some labs. Patient has antibiotics at the bedside and can DC at any time. Updated [facility]. She is aware antibiotics are at the bedside. Stated they do not need to be refrigerated. Weekend home health will f/u for DC. Sera Workman RN    Row Name 05/01/20 0804       Plan    Plan Comments  Message sent to MD to determine if patient will DC today. Awaiting response. Sera Workman RN    Row Name 05/01/20 4044       Plan    Plan  Home with [facility] and [facility]    Patient/Family in Agreement with Plan  yes    Plan Comments  Met with patient at the bedside. Discussed DC plan. Patient agreeable to home with [facility] and [facility]. Patient did verbalize he is aware of the antibiotic cost and is agreeable to cost. Colonoscopy completed today. Possible Dc today if medically stable. Wife will provide transport. Updated [facility] of probable DC today. Sera Workman RN        Discharge Codes    No documentation.             Sera Workman RN

## 2020-05-01 NOTE — BRIEF OP NOTE
COLONOSCOPY  Progress Note    Home Wynn  5/1/2020    Pre-op Diagnosis:   Melena [K92.1]       Post-Op Diagnosis Codes:     * Melena [K92.1]     * Colon polyps [K63.5]     * Internal hemorrhoids [K64.8]    Procedure/CPT® Codes:      Procedure(s):  COLONOSCOPY TO CECUM AND TI WITH COLD SNARE POLYPECTOMIES    Surgeon(s):  Julio Cesar Figueroa MD    Anesthesia: Monitored Anesthesia Care    Staff:   Endo Technician: Bertha Epperson  Endo Nurse: Sera Gomez RN    Estimated Blood Loss: minimal    Urine Voided: * No values recorded between 5/1/2020  9:35 AM and 5/1/2020 10:08 AM *    Specimens:                Specimens     ID Source Type Tests Collected By Collected At Frozen?      A Large Intestine, Right / Ascending Colon Polyp · TISSUE PATHOLOGY EXAM   Julio Cesar Figueroa MD 5/1/20 1000 No     Description: ASCENDING COLON POLYP    This specimen was not marked as sent.    B Large Intestine, Transverse Colon Polyp · TISSUE PATHOLOGY EXAM   Julio Cesar Figueroa MD 5/1/20 1004 No     Description: TRANSVERSE COLON POLYP    This specimen was not marked as sent.                Drains:   Ureteral Drain/Stent Left ureter 6 Fr. (Active)       Ureteral Drain/Stent Right ureter 6 Fr. (Active)       Findings: Colonoscopy to terminal ileum with normal mucosa.  Ascending colon and transverse colon polyps removed cold snare.  2+ internal hemorrhoids were identified.    Complications: None      Julio Cesar Figueroa MD     Date: 5/1/2020  Time: 10:09

## 2020-05-01 NOTE — PLAN OF CARE
Problem: Patient Care Overview  Goal: Plan of Care Review  Outcome: Ongoing (interventions implemented as appropriate)  Flowsheets (Taken 5/1/2020 1805)  Progress: improving  Plan of Care Reviewed With: patient  Outcome Summary: Patient is A & Ox 4. Makes needs known. Patient had colonoscopy done today. Continues with IV atb therapy. Changed IV PICC dsg today. Patient is up ad carleen. No s/s of distress noted.

## 2020-05-01 NOTE — ANESTHESIA PREPROCEDURE EVALUATION
Anesthesia Evaluation     Patient summary reviewed and Nursing notes reviewed                Airway   Mallampati: I  TM distance: >3 FB  Neck ROM: full  No difficulty expected  Dental - normal exam     Pulmonary - normal exam   (+) shortness of breath, sleep apnea,   Cardiovascular - normal exam    (+) hypertension, dysrhythmias Atrial Fib, CHF , DVT, hyperlipidemia,       Neuro/Psych  (+) dizziness/light headedness, syncope, numbness,     GI/Hepatic/Renal/Endo    (+)  GI bleeding , renal disease, diabetes mellitus,     Musculoskeletal     Abdominal  - normal exam    Bowel sounds: normal.   Substance History - negative use     OB/GYN negative ob/gyn ROS         Other   arthritis,                      Anesthesia Plan    ASA 4     MAC       Anesthetic plan, all risks, benefits, and alternatives have been provided, discussed and informed consent has been obtained with: patient.

## 2020-05-01 NOTE — PROGRESS NOTES
Continued Stay Note  [facility]     Patient Name: Home Wynn  MRN: [id number]  Today's Date: 5/1/2020    Admit Date: 4/23/2020    Discharge Plan     Row Name 05/01/20 8984       Plan    Plan Comments  Message sent to MD to determine if patient will DC today. Awaiting response. Sera Workman RN    Row Name 05/01/20 1318       Plan    Plan  Home with [facility] and [facility]    Patient/Family in Agreement with Plan  yes    Plan Comments  Met with patient at the bedside. Discussed DC plan. Patient agreeable to home with [facility] and [facility] infusion. Patient did verbalize he is aware of the antibiotic cost and is agreeable to cost. Colonoscopy completed today. Possible Dc today if medically stable. Wife will provide transport. Updated [facility] of probable DC today. Sera Workman RN        Discharge Codes    No documentation.             Sera Workman RN

## 2020-05-01 NOTE — PROGRESS NOTES
"  Infectious Diseases Progress Note    Felecia Haque MD     [facility]  Los: 8 days  Patient Identification:  Name: Home Wynn  Age: 88 y.o.  Sex: male  :  1931  MRN: [id number]         Primary Care Physician: Yodit Mahoney MD            Subjective: Had bowel prep going on all night and finally has a clear stool.  Denies any fever and chills.  Going for colonoscopy later today.  Interval History: See consultation note    Objective:    Scheduled Meds:    cefTRIAXone 2 g Intravenous Q24H   insulin glargine 12 Units Subcutaneous Nightly   insulin lispro 0-9 Units Subcutaneous 4x Daily With Meals & Nightly   metoprolol succinate XL 12.5 mg Oral Nightly   metroNIDAZOLE 500 mg Oral Q8H   pantoprazole 40 mg Oral Daily   polyethylene glycol 17 g Oral Daily   polyethylene glycol with electrolytes 2,000 mL Oral BID   sodium chloride 10 mL Intravenous Q12H     Continuous Infusions:    sodium chloride 1,000 mL Last Rate: Stopped (20 0811)       Vital signs in last 24 hours:  Temp:  [97.4 °F (36.3 °C)-97.6 °F (36.4 °C)] 97.5 °F (36.4 °C)  Heart Rate:  [] 85  Resp:  [14-18] 16  BP: (100-152)/(56-88) 134/71    Intake/Output:    Intake/Output Summary (Last 24 hours) at 2020 0728  Last data filed at 2020 1741  Gross per 24 hour   Intake 1080 ml   Output --   Net 1080 ml       Exam:  /71 (BP Location: Right arm, Patient Position: Sitting)   Pulse 85   Temp 97.5 °F (36.4 °C) (Oral)   Resp 16   Ht 177.8 cm (70\")   Wt 77.5 kg (170 lb 13.7 oz)   SpO2 98%   BMI 24.52 kg/m²   Patient is examined using the personal protective equipment as per guidelines from infection control for this particular patient as enacted.  Hand washing was performed before and after patient interaction.  General Appearance:    Alert, cooperative, no distress, AAOx3                          Head:    Normocephalic, without obvious abnormality, atraumatic                           Eyes:    PERRL, " conjunctivae/corneas clear, EOM's intact, both eyes                         Throat:   Lips, tongue, gums normal; oral mucosa pink and moist                           Neck:   Supple, symmetrical, trachea midline, no JVD                         Lungs:    Clear to auscultation bilaterally, respirations unlabored                 Chest Wall:    No tenderness or deformity                          Heart: S1-S2 regular no murmurs heard                  Abdomen:   Soft nontender no guarding or rigidity noted                 Extremities:   Extremities normal, atraumatic, no cyanosis or edema                        Pulses:   Pulses palpable in all extremities                            Skin:   Skin is warm and dry,  no rashes or palpable lesions                  Neurologic: Grossly nonfocal       Data Review:    I reviewed the patient's new clinical results.  Results from last 7 days   Lab Units 04/30/20  0505 04/29/20  1015 04/28/20  0920 04/27/20  0629 04/26/20  0552 04/25/20  0609   WBC 10*3/mm3 6.70 5.87 4.51 4.10 4.11 5.81   HEMOGLOBIN g/dL 9.2* 9.4* 9.9* 10.1* 10.7* 10.7*   PLATELETS 10*3/mm3 200 172 203 178 157 158     Results from last 7 days   Lab Units 04/30/20  0502 04/29/20  1015 04/28/20  0920 04/27/20  0629 04/26/20  0552 04/25/20  0609   SODIUM mmol/L 137 134* 131* 137 140 135*   POTASSIUM mmol/L 4.3 4.3 4.3 4.2 4.7 4.6   CHLORIDE mmol/L 100 95* 93* 100 101 100   CO2 mmol/L 26.6 28.2 25.9 27.0 28.0 25.9   BUN mg/dL 20 22 27* 27* 20 23   CREATININE mg/dL 1.17 1.29* 1.27 1.23 1.43* 1.41*   CALCIUM mg/dL 8.9 9.4 9.4 9.0 9.2 9.1   GLUCOSE mg/dL 155* 333* 318* 220* 186* 232*     Microbiology Results (last 10 days)     Procedure Component Value - Date/Time    Blood Culture - Blood, Arm, Right [[id number]] Collected:  04/24/20 1609    Lab Status:  Final result Specimen:  Blood from Arm, Right Updated:  04/29/20 1645     Blood Culture No growth at 5 days    Urine Culture - Urine, Urine, Clean Catch [[id number]]  (Normal)  Collected:  04/24/20 1305    Lab Status:  Final result Specimen:  Urine, Clean Catch Updated:  04/25/20 1611     Urine Culture No growth    SARS-CoV-2 PCR ([facility]HOUSE PERFORMED), NP SWAB IN TRANSPORT MEDIA - Swab, Nasopharynx [[id number]]  (Normal) Collected:  04/23/20 0921    Lab Status:  Final result Specimen:  Swab from Nasopharynx Updated:  04/23/20 1018     COVID19 Not Detected    Blood Culture - Blood, Arm, Right [[id number]] Collected:  04/23/20 0919    Lab Status:  Final result Specimen:  Blood from Arm, Right Updated:  04/28/20 0930     Blood Culture No growth at 5 days    Blood Culture - Blood, Arm, Right [[id number]]  (Abnormal)  (Susceptibility) Collected:  04/23/20 0919    Lab Status:  Final result Specimen:  Blood from Arm, Right Updated:  04/26/20 0611     Blood Culture Escherichia coli     Isolated from Aerobic and Anaerobic Bottles     Gram Stain Anaerobic Bottle Gram negative bacilli      Aerobic Bottle Gram negative bacilli    Susceptibility      Escherichia coli     FRANK     Ampicillin Intermediate     Ampicillin + Sulbactam Susceptible     Cefepime Susceptible     Ceftazidime Susceptible     Ceftriaxone Susceptible     Gentamicin Susceptible     Levofloxacin Susceptible     Piperacillin + Tazobactam Susceptible     Trimethoprim + Sulfamethoxazole Susceptible                Susceptibility Comments     Escherichia coli    Cefazolin sensitivity will not be reported for Enterobacteriaceae in non-urine isolates. If cefazolin is preferred, please call the microbiology lab to request an E-test.  With the exception of urinary-sourced infections, aminoglycosides should not be used as monotherapy.             Blood Culture ID, PCR - Blood, Arm, Right [[id number]]  (Abnormal) Collected:  04/23/20 0919    Lab Status:  Final result Specimen:  Blood from Arm, Right Updated:  04/23/20 2242     BCID, PCR Escherichia coli. Identification by BCID PCR.            Assessment:    Biliary obstruction    Diabetes  mellitus (CMS/HCC)    Hypertension    Chronic atrial fibrillation    Abnormal weight loss    Lactic acidosis    Acute on chronic pancreatitis (CMS/HCC)    NATHANIEL (acute kidney injury) (CMS/[facility])    Hematuria    Status post placement of ureteral stent    Elevated LFTs    Positive blood culture    E coli bacteremia    Ascending cholangitis    Melena    Acute blood loss anemia    Chronic anticoagulation    GI bleed   1-ascending cholangitis with E. coli bacteremia due to  2-choledocholithiasis with episodic biliary obstruction and associated gallstone recurrent pancreatitis now status post ERCP and sphincterotomy and balloon sweep on 4/24/2020  3-significant cardiac history consisting of hypertension cardiomyopathy atrial fibrillation on chronic anticoagulation therapy with documented left ventricular systolic function with ejection fraction of 29%.  4-chronic kidney disease  5-history of obstructive uropathy with history of ureteral stents  6-history of inguinal adenopathy status post biopsies with inconclusive results  7-history of weight loss and concern for malignancy  8-diabetes mellitus  9-reported history of black tarry stool   Recommendations/discussion:   · Continue current antibiotic regimen consisting of Zosyn while he is here.    · Would recommend couple of weeks of IV antibiotics from last negative blood culture for this bacteremic cholangitis.    · If he continues to do well and his oral intake is reliably established then:   -Either continue with Zosyn for a total of 2 weeks from last negative blood culture versus     -2 weeks of Rocephin 2 g every 24 along with oral Flagyl.      -IV Unasyn for total of 2 weeks.    -Other options such as oral quinolone or Bactrim plus Augmentin are alternative choices but given his comorbidities and cardiac history these options may not be safe for him.    · Once decision is made about antibiotic choices and is okay to discharge from infectious disease standpoint.  · Patient  would need weekly labs consisting of CBC and CMP to monitor antibiotic toxicity with abnormal results to be called to Dr. Haque at [phone].  · Discussed with nursing staff about stat CBC and stool for Hemoccult and notify primary attending about having black tarry stools to make sure that there is no acute GI bleed that is occurring.  · Continue with Rocephin IV and Flagyl p.o. for the sake of simplicity and ease of administration in the outpatient setting given the frequency of the regimen.        Felecia Haque MD  5/1/2020  07:28    Much of this encounter note is an electronic transcription/translation of spoken language to printed text. The electronic translation of spoken language may permit erroneous, or at times, nonsensical words or phrases to be inadvertently transcribed; Although I have reviewed the note for such errors, some may still exist

## 2020-05-01 NOTE — PLAN OF CARE
Problem: Patient Care Overview  Goal: Plan of Care Review  Outcome: Ongoing (interventions implemented as appropriate)  Note:   Patient continues with Golytely for bowel prep possible colonoscopy today, vss will continue to monitor.     Problem: Patient Care Overview  Goal: Plan of Care Review  Outcome: Ongoing (interventions implemented as appropriate)  Note:   Patient continues with Golytely for bowel prep possible colonoscopy today, vss will continue to monitor.     Problem: Patient Care Overview  Goal: Discharge Needs Assessment  Outcome: Ongoing (interventions implemented as appropriate)

## 2020-05-01 NOTE — ANESTHESIA POSTPROCEDURE EVALUATION
"Patient: Home Wynn    Procedure Summary     Date:  05/01/20 Room / Location:  [facility] ENDOSCOPY 4 / [facility] ENDOSCOPY    Anesthesia Start:  0937 Anesthesia Stop:  1014    Procedure:  COLONOSCOPY TO CECUM AND TI WITH COLD SNARE POLYPECTOMIES (N/A ) Diagnosis:       Melena      Colon polyps      Internal hemorrhoids      (Melena [K92.1])    Surgeon:  Julio Cesar Figueroa MD Provider:  Lester Miranda MD    Anesthesia Type:  MAC ASA Status:  4          Anesthesia Type: MAC    Vitals  Vitals Value Taken Time   /57 5/1/2020 10:34 AM   Temp     Pulse 70 5/1/2020 10:34 AM   Resp 16 5/1/2020 10:34 AM   SpO2 96 % 5/1/2020 10:34 AM           Post Anesthesia Care and Evaluation    Patient location during evaluation: PACU  Patient participation: complete - patient participated  Level of consciousness: awake  Pain score: 0  Pain management: adequate  Airway patency: patent  Anesthetic complications: No anesthetic complications  PONV Status: none  Cardiovascular status: acceptable  Respiratory status: acceptable  Hydration status: acceptable    Comments: /57 (BP Location: Left arm, Patient Position: Lying)   Pulse 70   Temp 36.7 °C (98 °F) (Oral)   Resp 16   Ht 177.8 cm (70\")   Wt 77.5 kg (170 lb 13.7 oz)   SpO2 96%   BMI 24.52 kg/m²       "

## 2020-05-01 NOTE — PROGRESS NOTES
[facility]               ASSOCIATES     LOS: 8 days     Name: Home Wynn  Age: 88 y.o.  Sex: male  :  1931  MRN: [id number]         Primary Care Physician: Yodit Mahoney MD    Diet Regular; Cardiac    Subjective     Patient is seen at bedside, no new complaints.    Objective   Temp:  [97.4 °F (36.3 °C)-98 °F (36.7 °C)] 97.7 °F (36.5 °C)  Heart Rate:  [] 70  Resp:  [16-22] 16  BP: ()/(43-88) 93/62  SpO2:  [96 %-100 %] 97 %  on  Flow (L/min):  [2] 2;   Device (Oxygen Therapy): room air  Body mass index is 24.52 kg/m².    Physical Exam   Constitutional: He is oriented to person, place, and time. No distress.   HENT:   Head: Normocephalic and atraumatic.   Eyes: Pupils are equal, round, and reactive to light. EOM are normal.   Neck: Normal range of motion. Neck supple.   Cardiovascular: Normal rate and normal heart sounds.   Irregularly, irregular   Pulmonary/Chest: Effort normal and breath sounds normal.   Abdominal: Soft. Bowel sounds are normal.   Musculoskeletal: He exhibits no edema or deformity.   Neurological: He is alert and oriented to person, place, and time.   Skin: Skin is warm and dry. He is not diaphoretic. There is pallor.   Psychiatric: He has a normal mood and affect. His behavior is normal. Judgment and thought content normal.     Reviewed medications and new clinical results        Results from last 7 days   Lab Units 20  0505 20  1015 20  0920 20  0629 20  0552 20  0609   WBC 10*3/mm3 6.70 5.87 4.51 4.10 4.11 5.81   HEMOGLOBIN g/dL 9.2* 9.4* 9.9* 10.1* 10.7* 10.7*   PLATELETS 10*3/mm3 200 172 203 178 157 158     Results from last 7 days   Lab Units 20  0502 20  1015 20  0920 20  0629 20  0552 20  0609   SODIUM mmol/L 137 134* 131* 137 140 135*   POTASSIUM mmol/L 4.3 4.3 4.3 4.2 4.7 4.6   CHLORIDE mmol/L 100 95* 93* 100 101 100   CO2 mmol/L 26.6 28.2 25.9 27.0 28.0 25.9      BUN mg/dL 20 22 27* 27* 20 23   CREATININE mg/dL 1.17 1.29* 1.27 1.23 1.43* 1.41*   CALCIUM mg/dL 8.9 9.4 9.4 9.0 9.2 9.1   GLUCOSE mg/dL 155* 333* 318* 220* 186* 232*     Lab Results   Component Value Date    ANIONGAP 10.4 04/30/2020     Glucose   Date/Time Value Ref Range Status   05/01/2020 1547 228 (H) 70 - 130 mg/dL Final   05/01/2020 1105 160 (H) 70 - 130 mg/dL Final   05/01/2020 0555 166 (H) 70 - 130 mg/dL Final   04/30/2020 2056 72 70 - 130 mg/dL Final   04/30/2020 1637 171 (H) 70 - 130 mg/dL Final   04/30/2020 1104 244 (H) 70 - 130 mg/dL Final   04/30/2020 0609 160 (H) 70 - 130 mg/dL Final   04/29/2020 2037 291 (H) 70 - 130 mg/dL Final     No results found for: HGBA1C  Estimated Creatinine Clearance: 47.8 mL/min (by C-G formula based on SCr of 1.17 mg/dL).    Assessment/Plan   Active Hospital Problems    Diagnosis  POA   • **Biliary obstruction [K83.1]  Yes   • Acute blood loss anemia [D62]  Unknown   • Chronic anticoagulation [Z79.01]  Not Applicable   • GI bleed [K92.2]  Unknown   • Ascending cholangitis [K83.09]  Yes   • E coli bacteremia [R78.81]  Yes   • Positive blood culture [R78.81]  Yes   • Lactic acidosis [E87.2]  Yes   • Acute on chronic pancreatitis (CMS/HCC) [K85.90, K86.1]  Yes   • NATHANIEL (acute kidney injury) (CMS/HCC) [N17.9]  Yes   • Hematuria [R31.9]  Yes   • Status post placement of ureteral stent [Z96.0]  Not Applicable   • Elevated LFTs [R94.5]  Yes   • Melena [K92.1]  Unknown   • Abnormal weight loss [R63.4]  Yes   • Chronic atrial fibrillation [I48.20]  Yes   • Hypertension [I10]  Yes   • Diabetes mellitus (CMS/HCC) [E11.9]  Yes      Resolved Hospital Problems   No resolved problems to display.     88 y.o. male      Assessment and plan:  1. Acute on chronic pancreatitis / biliary obstruction /transaminitis.  Patient is status post ERCP.  GI is following.  Continue to monitor liver function tests.  2.  Diabetes mellitus, continue Accu-Cheks and sliding scale insulin coverage.  3.   Atrial fibrillation, continue current rate control therapy, anticoagulation recommendations per Cardiology.  4.  Weight loss associated with lymphadenopathy.  Management per Hematology/Oncology recommendations.  5.  Acute kidney injury, renal function is currently stable.  Continue to recheck labs.  6.  E coli bacteremia, likely source is GI.  Continue current IV Zosyn, Infectious Disease on board.  7. Ascending cholangitis, continue IV antibiotics per Infectious Disease recommendations.  8.  Anticipating discharge over the weekend, if continues to do well.    Boris Prabhakar MD   05/01/20  19:45

## 2020-05-02 LAB
ANION GAP SERPL CALCULATED.3IONS-SCNC: 8.9 MMOL/L (ref 5–15)
BUN BLD-MCNC: 16 MG/DL (ref 8–23)
BUN/CREAT SERPL: 12.9 (ref 7–25)
CALCIUM SPEC-SCNC: 9.2 MG/DL (ref 8.6–10.5)
CHLORIDE SERPL-SCNC: 100 MMOL/L (ref 98–107)
CO2 SERPL-SCNC: 25.1 MMOL/L (ref 22–29)
CREAT BLD-MCNC: 1.24 MG/DL (ref 0.76–1.27)
DEPRECATED RDW RBC AUTO: 49.7 FL (ref 37–54)
EOSINOPHIL # BLD MANUAL: 0.05 10*3/MM3 (ref 0–0.4)
EOSINOPHIL NFR BLD MANUAL: 1 % (ref 0.3–6.2)
ERYTHROCYTE [DISTWIDTH] IN BLOOD BY AUTOMATED COUNT: 14.7 % (ref 12.3–15.4)
GFR SERPL CREATININE-BSD FRML MDRD: 55 ML/MIN/1.73
GLUCOSE BLD-MCNC: 230 MG/DL (ref 65–99)
GLUCOSE BLDC GLUCOMTR-MCNC: 126 MG/DL (ref 70–130)
GLUCOSE BLDC GLUCOMTR-MCNC: 172 MG/DL (ref 70–130)
GLUCOSE BLDC GLUCOMTR-MCNC: 222 MG/DL (ref 70–130)
GLUCOSE BLDC GLUCOMTR-MCNC: 238 MG/DL (ref 70–130)
HCT VFR BLD AUTO: 29.9 % (ref 37.5–51)
HGB BLD-MCNC: 9.9 G/DL (ref 13–17.7)
LYMPHOCYTES # BLD MANUAL: 0.57 10*3/MM3 (ref 0.7–3.1)
LYMPHOCYTES NFR BLD MANUAL: 11 % (ref 19.6–45.3)
LYMPHOCYTES NFR BLD MANUAL: 12 % (ref 5–12)
MCH RBC QN AUTO: 30.7 PG (ref 26.6–33)
MCHC RBC AUTO-ENTMCNC: 33.1 G/DL (ref 31.5–35.7)
MCV RBC AUTO: 92.6 FL (ref 79–97)
METAMYELOCYTES NFR BLD MANUAL: 2 % (ref 0–0)
MONOCYTES # BLD AUTO: 0.62 10*3/MM3 (ref 0.1–0.9)
MYELOCYTES NFR BLD MANUAL: 1 % (ref 0–0)
NEUTROPHILS # BLD AUTO: 3.77 10*3/MM3 (ref 1.7–7)
NEUTROPHILS NFR BLD MANUAL: 73 % (ref 42.7–76)
PLAT MORPH BLD: NORMAL
PLATELET # BLD AUTO: 187 10*3/MM3 (ref 140–450)
PMV BLD AUTO: 11.3 FL (ref 6–12)
POTASSIUM BLD-SCNC: 4.4 MMOL/L (ref 3.5–5.2)
RBC # BLD AUTO: 3.23 10*6/MM3 (ref 4.14–5.8)
RBC MORPH BLD: NORMAL
SODIUM BLD-SCNC: 134 MMOL/L (ref 136–145)
WBC MORPH BLD: NORMAL
WBC NRBC COR # BLD: 5.16 10*3/MM3 (ref 3.4–10.8)

## 2020-05-02 PROCEDURE — 25010000003 CEFTRIAXONE PER 250 MG: Performed by: INTERNAL MEDICINE

## 2020-05-02 PROCEDURE — 63710000001 INSULIN LISPRO (HUMAN) PER 5 UNITS: Performed by: INTERNAL MEDICINE

## 2020-05-02 PROCEDURE — 63710000001 INSULIN GLARGINE PER 5 UNITS: Performed by: INTERNAL MEDICINE

## 2020-05-02 PROCEDURE — 80048 BASIC METABOLIC PNL TOTAL CA: CPT | Performed by: INTERNAL MEDICINE

## 2020-05-02 PROCEDURE — 85007 BL SMEAR W/DIFF WBC COUNT: CPT | Performed by: INTERNAL MEDICINE

## 2020-05-02 PROCEDURE — 85025 COMPLETE CBC W/AUTO DIFF WBC: CPT | Performed by: INTERNAL MEDICINE

## 2020-05-02 PROCEDURE — 82962 GLUCOSE BLOOD TEST: CPT

## 2020-05-02 RX ORDER — METRONIDAZOLE 500 MG/1
500 TABLET ORAL EVERY 8 HOURS SCHEDULED
Qty: 30 TABLET | Refills: 0 | Status: SHIPPED | OUTPATIENT
Start: 2020-05-02 | End: 2020-05-03

## 2020-05-02 RX ORDER — METRONIDAZOLE 500 MG/1
500 TABLET ORAL EVERY 8 HOURS SCHEDULED
Qty: 30 TABLET | Refills: 0 | Status: SHIPPED | OUTPATIENT
Start: 2020-05-02 | End: 2020-05-02

## 2020-05-02 RX ORDER — CEFTRIAXONE SODIUM 2 G/50ML
2 INJECTION, SOLUTION INTRAVENOUS EVERY 24 HOURS
Qty: 450 ML | Refills: 0 | Status: SHIPPED | OUTPATIENT
Start: 2020-05-03 | End: 2020-05-02

## 2020-05-02 RX ORDER — CEFTRIAXONE SODIUM 2 G/50ML
2 INJECTION, SOLUTION INTRAVENOUS EVERY 24 HOURS
Qty: 450 ML | Refills: 0 | Status: SHIPPED | OUTPATIENT
Start: 2020-05-03 | End: 2020-05-03

## 2020-05-02 RX ADMIN — METRONIDAZOLE 500 MG: 500 TABLET ORAL at 13:00

## 2020-05-02 RX ADMIN — INSULIN GLARGINE 12 UNITS: 100 INJECTION, SOLUTION SUBCUTANEOUS at 21:10

## 2020-05-02 RX ADMIN — METOPROLOL SUCCINATE 12.5 MG: 25 TABLET, EXTENDED RELEASE ORAL at 20:05

## 2020-05-02 RX ADMIN — POLYETHYLENE GLYCOL 3350 17 G: 17 POWDER, FOR SOLUTION ORAL at 09:17

## 2020-05-02 RX ADMIN — INSULIN LISPRO 2 UNITS: 100 INJECTION, SOLUTION INTRAVENOUS; SUBCUTANEOUS at 21:10

## 2020-05-02 RX ADMIN — INSULIN LISPRO 4 UNITS: 100 INJECTION, SOLUTION INTRAVENOUS; SUBCUTANEOUS at 13:00

## 2020-05-02 RX ADMIN — METRONIDAZOLE 500 MG: 500 TABLET ORAL at 06:38

## 2020-05-02 RX ADMIN — METRONIDAZOLE 500 MG: 500 TABLET ORAL at 21:10

## 2020-05-02 RX ADMIN — PANTOPRAZOLE SODIUM 40 MG: 40 TABLET, DELAYED RELEASE ORAL at 09:17

## 2020-05-02 RX ADMIN — CEFTRIAXONE SODIUM 2 G: 2 INJECTION, SOLUTION INTRAVENOUS at 13:00

## 2020-05-02 RX ADMIN — SODIUM CHLORIDE, PRESERVATIVE FREE 10 ML: 5 INJECTION INTRAVENOUS at 20:05

## 2020-05-02 RX ADMIN — INSULIN LISPRO 4 UNITS: 100 INJECTION, SOLUTION INTRAVENOUS; SUBCUTANEOUS at 18:01

## 2020-05-02 NOTE — PLAN OF CARE
Problem: Patient Care Overview  Goal: Plan of Care Review  Outcome: Ongoing (interventions implemented as appropriate)  Flowsheets (Taken 5/2/2020 0806)  Progress: improving  Plan of Care Reviewed With: patient  Outcome Summary: Pt A&OX4, room air, up ad carleen with falls education given, reg diet with glucerna shakes, PICC date to change 5/5, afib on monitor, vitals as charted, used toilet, requested to not be interupted 10pm- 6am educated about vitals/ monitoring/ safety checks, po antibiotics given

## 2020-05-02 NOTE — PROGRESS NOTES
[facility]               ASSOCIATES     LOS: 9 days     Name: Home Wynn  Age: 88 y.o.  Sex: male  :  1931  MRN: [id number]         Primary Care Physician: Yodit Mahoney MD    Diet Regular; Cardiac    Subjective     Patient is seen at bedside, he had a dark bowel movement today.    Objective   Temp:  [97.7 °F (36.5 °C)-98.4 °F (36.9 °C)] 97.7 °F (36.5 °C)  Heart Rate:  [57-82] 57  Resp:  [16-20] 16  BP: (121-127)/(64-78) 121/72  SpO2:  [93 %-99 %] 98 %  on   ;   Device (Oxygen Therapy): room air  Body mass index is 24.52 kg/m².    Physical Exam   Constitutional: He is oriented to person, place, and time. No distress.   HENT:   Head: Normocephalic and atraumatic.   Eyes: Pupils are equal, round, and reactive to light. EOM are normal.   Neck: Normal range of motion. Neck supple.   Cardiovascular: Normal rate and normal heart sounds.   Irregularly, irregular   Pulmonary/Chest: Effort normal and breath sounds normal.   Abdominal: Soft. Bowel sounds are normal.   Musculoskeletal: He exhibits no edema or deformity.   Neurological: He is alert and oriented to person, place, and time.   Skin: Skin is warm and dry. He is not diaphoretic. There is pallor.   Psychiatric: He has a normal mood and affect. His behavior is normal. Judgment and thought content normal.        Reviewed medications and new clinical results        Results from last 7 days   Lab Units 20  0920  0505 20  1015 20  0920  0620  0552   WBC 10*3/mm3 5.16 6.70 5.87 4.51 4.10 4.11   HEMOGLOBIN g/dL 9.9* 9.2* 9.4* 9.9* 10.1* 10.7*   PLATELETS 10*3/mm3 187 200 172 203 178 157     Results from last 7 days   Lab Units 20  0920  0502 20  1015 20  0920 20  0629 20  0552   SODIUM mmol/L 134* 137 134* 131* 137 140   POTASSIUM mmol/L 4.4 4.3 4.3 4.3 4.2 4.7   CHLORIDE mmol/L 100 100 95* 93* 100 101   CO2 mmol/L 25.1 26.6 28.2 25.9 27.0 28.0      BUN mg/dL 16 20 22 27* 27* 20   CREATININE mg/dL 1.24 1.17 1.29* 1.27 1.23 1.43*   CALCIUM mg/dL 9.2 8.9 9.4 9.4 9.0 9.2   GLUCOSE mg/dL 230* 155* 333* 318* 220* 186*     Lab Results   Component Value Date    ANIONGAP 8.9 05/02/2020     Glucose   Date/Time Value Ref Range Status   05/02/2020 1635 238 (H) 70 - 130 mg/dL Final   05/02/2020 1127 222 (H) 70 - 130 mg/dL Final   05/02/2020 0635 126 70 - 130 mg/dL Final   05/01/2020 2118 292 (H) 70 - 130 mg/dL Final   05/01/2020 1547 228 (H) 70 - 130 mg/dL Final   05/01/2020 1105 160 (H) 70 - 130 mg/dL Final   05/01/2020 0555 166 (H) 70 - 130 mg/dL Final   04/30/2020 2056 72 70 - 130 mg/dL Final     No results found for: HGBA1C  Estimated Creatinine Clearance: 45.1 mL/min (by C-G formula based on SCr of 1.24 mg/dL).    Assessment/Plan   Active Hospital Problems    Diagnosis  POA   • **Biliary obstruction [K83.1]  Yes   • Acute blood loss anemia [D62]  Unknown   • Chronic anticoagulation [Z79.01]  Not Applicable   • GI bleed [K92.2]  Unknown   • Ascending cholangitis [K83.09]  Yes   • E coli bacteremia [R78.81]  Yes   • Positive blood culture [R78.81]  Yes   • Lactic acidosis [E87.2]  Yes   • Acute on chronic pancreatitis (CMS/HCC) [K85.90, K86.1]  Yes   • NATHANIEL (acute kidney injury) (CMS/[facility]) [N17.9]  Yes   • Hematuria [R31.9]  Yes   • Status post placement of ureteral stent [Z96.0]  Not Applicable   • Elevated LFTs [R94.5]  Yes   • Melena [K92.1]  Unknown   • Abnormal weight loss [R63.4]  Yes   • Chronic atrial fibrillation [I48.20]  Yes   • Hypertension [I10]  Yes   • Diabetes mellitus (CMS/HCC) [E11.9]  Yes      Resolved Hospital Problems   No resolved problems to display.     88 y.o. male      Assessment and plan:  1. Acute on chronic pancreatitis / biliary obstruction /transaminitis.  Patient is status post ERCP.  GI is following.  Continue to monitor liver function tests.  2.  Diabetes mellitus, continue Accu-Cheks and sliding scale insulin coverage.  3.  Atrial  fibrillation, continue current rate control therapy, anticoagulation recommendations per Cardiology.  4.  Weight loss associated with lymphadenopathy.  Management per Hematology/Oncology recommendations.  5.  Acute kidney injury, renal function is currently stable.  Continue to recheck labs.  6.  E coli bacteremia, likely source is GI.  Continue current IV Zosyn, Infectious Disease on board.  7. Ascending cholangitis, continue IV antibiotics per Infectious Disease recommendations.  8. Anemia, continue to monitor hemoglobin trend.  Patient had dark bowel movement today.  9.  Complexity of medical decision making is high.    Boris Prabhakar MD   05/02/20  17:14

## 2020-05-02 NOTE — PROGRESS NOTES
"  Infectious Diseases Progress Note    Felecia Haque MD     [facility]  Los: 9 days  Patient Identification:  Name: Home Wynn  Age: 88 y.o.  Sex: male  :  1931  MRN: [id number]         Primary Care Physician: Yodit Mahoney MD            Subjective: Sitting up eating his breakfast and wants to know when can he get out of here.  Interval History: See consultation note    Objective:    Scheduled Meds:    cefTRIAXone 2 g Intravenous Q24H   insulin glargine 12 Units Subcutaneous Nightly   insulin lispro 0-9 Units Subcutaneous 4x Daily With Meals & Nightly   metoprolol succinate XL 12.5 mg Oral Nightly   metroNIDAZOLE 500 mg Oral Q8H   pantoprazole 40 mg Oral Daily   polyethylene glycol 17 g Oral Daily   sodium chloride 10 mL Intravenous Q12H     Continuous Infusions:    sodium chloride 30 mL/hr Last Rate: Stopped (20 1035)       Vital signs in last 24 hours:  Temp:  [97.5 °F (36.4 °C)-98 °F (36.7 °C)] 98 °F (36.7 °C)  Heart Rate:  [56-74] 69  Resp:  [16-22] 16  BP: ()/(43-81) 127/78    Intake/Output:    Intake/Output Summary (Last 24 hours) at 2020 0639  Last data filed at 2020 0637  Gross per 24 hour   Intake 1250 ml   Output 650 ml   Net 600 ml       Exam:  /78 (BP Location: Right arm, Patient Position: Lying)   Pulse 69   Temp 98 °F (36.7 °C) (Oral)   Resp 16   Ht 177.8 cm (70\")   Wt 77.5 kg (170 lb 14.4 oz)   SpO2 99%   BMI 24.52 kg/m²   Patient is examined using the personal protective equipment as per guidelines from infection control for this particular patient as enacted.  Hand washing was performed before and after patient interaction.  General Appearance:    Alert, cooperative, no distress, AAOx3                          Head:    Normocephalic, without obvious abnormality, atraumatic                           Eyes:    PERRL, conjunctivae/corneas clear, EOM's intact, both eyes                         Throat:   Lips, tongue, gums normal; oral mucosa " pink and moist                           Neck:   Supple, symmetrical, trachea midline, no JVD                         Lungs:    Clear to auscultation bilaterally, respirations unlabored                 Chest Wall:    No tenderness or deformity                          Heart: S1-S2 regular no murmurs heard                  Abdomen:   Soft nontender no guarding or rigidity noted                 Extremities:   Extremities normal, atraumatic, no cyanosis or edema                        Pulses:   Pulses palpable in all extremities                            Skin:   Skin is warm and dry,  no rashes or palpable lesions                  Neurologic: Grossly nonfocal       Data Review:    I reviewed the patient's new clinical results.  Results from last 7 days   Lab Units 04/30/20  0505 04/29/20  1015 04/28/20  0920 04/27/20  0629 04/26/20  0552   WBC 10*3/mm3 6.70 5.87 4.51 4.10 4.11   HEMOGLOBIN g/dL 9.2* 9.4* 9.9* 10.1* 10.7*   PLATELETS 10*3/mm3 200 172 203 178 157     Results from last 7 days   Lab Units 04/30/20  0502 04/29/20  1015 04/28/20  0920 04/27/20  0629 04/26/20  0552   SODIUM mmol/L 137 134* 131* 137 140   POTASSIUM mmol/L 4.3 4.3 4.3 4.2 4.7   CHLORIDE mmol/L 100 95* 93* 100 101   CO2 mmol/L 26.6 28.2 25.9 27.0 28.0   BUN mg/dL 20 22 27* 27* 20   CREATININE mg/dL 1.17 1.29* 1.27 1.23 1.43*   CALCIUM mg/dL 8.9 9.4 9.4 9.0 9.2   GLUCOSE mg/dL 155* 333* 318* 220* 186*     Microbiology Results (last 10 days)     Procedure Component Value - Date/Time    Blood Culture - Blood, Arm, Right [[id number]] Collected:  04/24/20 1609    Lab Status:  Final result Specimen:  Blood from Arm, Right Updated:  04/29/20 1645     Blood Culture No growth at 5 days    Urine Culture - Urine, Urine, Clean Catch [[id number]]  (Normal) Collected:  04/24/20 1305    Lab Status:  Final result Specimen:  Urine, Clean Catch Updated:  04/25/20 1611     Urine Culture No growth    SARS-CoV-2 PCR ([place] IN-HOUSE PERFORMED), NP SWAB IN  TRANSPORT MEDIA - Swab, Nasopharynx [[id number]]  (Normal) Collected:  04/23/20 0921    Lab Status:  Final result Specimen:  Swab from Nasopharynx Updated:  04/23/20 1018     COVID19 Not Detected    Blood Culture - Blood, Arm, Right [[id number]] Collected:  04/23/20 0919    Lab Status:  Final result Specimen:  Blood from Arm, Right Updated:  04/28/20 0930     Blood Culture No growth at 5 days    Blood Culture - Blood, Arm, Right [[id number]]  (Abnormal)  (Susceptibility) Collected:  04/23/20 0919    Lab Status:  Final result Specimen:  Blood from Arm, Right Updated:  04/26/20 0611     Blood Culture Escherichia coli     Isolated from Aerobic and Anaerobic Bottles     Gram Stain Anaerobic Bottle Gram negative bacilli      Aerobic Bottle Gram negative bacilli    Susceptibility      Escherichia coli     FRANK     Ampicillin Intermediate     Ampicillin + Sulbactam Susceptible     Cefepime Susceptible     Ceftazidime Susceptible     Ceftriaxone Susceptible     Gentamicin Susceptible     Levofloxacin Susceptible     Piperacillin + Tazobactam Susceptible     Trimethoprim + Sulfamethoxazole Susceptible                Susceptibility Comments     Escherichia coli    Cefazolin sensitivity will not be reported for Enterobacteriaceae in non-urine isolates. If cefazolin is preferred, please call the microbiology lab to request an E-test.  With the exception of urinary-sourced infections, aminoglycosides should not be used as monotherapy.             Blood Culture ID, PCR - Blood, Arm, Right [[id number]]  (Abnormal) Collected:  04/23/20 0919    Lab Status:  Final result Specimen:  Blood from Arm, Right Updated:  04/23/20 2242     BCID, PCR Escherichia coli. Identification by BCID PCR.            Assessment:   1-ascending cholangitis with E. coli bacteremia due to  2-choledocholithiasis with episodic biliary obstruction and associated gallstone recurrent pancreatitis now status post ERCP and sphincterotomy and balloon sweep on  4/24/2020  3-significant cardiac history consisting of hypertension cardiomyopathy atrial fibrillation on chronic anticoagulation therapy with documented left ventricular systolic function with ejection fraction of 29%.  4-chronic kidney disease  5-history of obstructive uropathy with history of ureteral stents  6-history of inguinal adenopathy status post biopsies with inconclusive results  7-history of weight loss and concern for malignancy  8-diabetes mellitus  9-reported history of black tarry stool   Recommendations/discussion:   · Continue current antibiotic regimen consisting of Zosyn while he is here.    · Would recommend couple of weeks of IV antibiotics from last negative blood culture, which was 4/24/2020, for this bacteremic cholangitis.    · If he continues to do well and his oral intake is reliably established then:   -Either continue with Zosyn for a total of 2 weeks from last negative blood culture versus     -2 weeks of Rocephin 2 g every 24 along with oral Flagyl.      -IV Unasyn for total of 2 weeks.    -Other options such as oral quinolone or Bactrim plus Augmentin are alternative choices but given his comorbidities and cardiac history these options may not be safe for him.    · Once decision is made about antibiotic choices and is okay to discharge from infectious disease standpoint.  · Patient would need weekly labs consisting of CBC and CMP to monitor antibiotic toxicity with abnormal results to be called to Dr. Haque at [phone].  · Discussed with nursing staff about stat CBC and stool for Hemoccult and notify primary attending about having black tarry stools to make sure that there is no acute GI bleed that is occurring.  · Continue with Rocephin IV and Flagyl p.o. for the sake of simplicity and ease of administration in the outpatient setting given the frequency of the regimen.  Patient to finish antibiotics after last dose on 5/8/2020.        Felecia Haque MD  5/2/2020  06:39    Much of  this encounter note is an electronic transcription/translation of spoken language to printed text. The electronic translation of spoken language may permit erroneous, or at times, nonsensical words or phrases to be inadvertently transcribed; Although I have reviewed the note for such errors, some may still exist

## 2020-05-02 NOTE — PLAN OF CARE
Problem: Patient Care Overview  Goal: Plan of Care Review  Outcome: Ongoing (interventions implemented as appropriate)  Flowsheets (Taken 5/2/2020 4433)  Progress: improving  Plan of Care Reviewed With: patient  Outcome Summary: Ambulating around unit. Denies pain or nausea. PICC to LUE CDI. One dark/ tarry BM today.  Will hold D/C to monitor serial H&Hs. Poss d/c on 5/3.

## 2020-05-03 ENCOUNTER — READMISSION MANAGEMENT (OUTPATIENT)
Dept: CALL CENTER | Facility: HOSPITAL | Age: 85
End: 2020-05-03

## 2020-05-03 VITALS
BODY MASS INDEX: 24.38 KG/M2 | OXYGEN SATURATION: 98 % | RESPIRATION RATE: 18 BRPM | SYSTOLIC BLOOD PRESSURE: 129 MMHG | HEART RATE: 73 BPM | HEIGHT: 70 IN | WEIGHT: 170.3 LBS | DIASTOLIC BLOOD PRESSURE: 76 MMHG | TEMPERATURE: 97.2 F

## 2020-05-03 LAB
ANION GAP SERPL CALCULATED.3IONS-SCNC: 9.2 MMOL/L (ref 5–15)
BUN BLD-MCNC: 19 MG/DL (ref 8–23)
BUN/CREAT SERPL: 15.1 (ref 7–25)
CALCIUM SPEC-SCNC: 8.7 MG/DL (ref 8.6–10.5)
CHLORIDE SERPL-SCNC: 102 MMOL/L (ref 98–107)
CO2 SERPL-SCNC: 25.8 MMOL/L (ref 22–29)
CREAT BLD-MCNC: 1.26 MG/DL (ref 0.76–1.27)
DEPRECATED RDW RBC AUTO: 49.7 FL (ref 37–54)
ERYTHROCYTE [DISTWIDTH] IN BLOOD BY AUTOMATED COUNT: 15 % (ref 12.3–15.4)
GFR SERPL CREATININE-BSD FRML MDRD: 54 ML/MIN/1.73
GIANT PLATELETS: ABNORMAL
GLUCOSE BLD-MCNC: 158 MG/DL (ref 65–99)
GLUCOSE BLDC GLUCOMTR-MCNC: 172 MG/DL (ref 70–130)
GLUCOSE BLDC GLUCOMTR-MCNC: 258 MG/DL (ref 70–130)
HCT VFR BLD AUTO: 28.6 % (ref 37.5–51)
HGB BLD-MCNC: 9.6 G/DL (ref 13–17.7)
LYMPHOCYTES # BLD MANUAL: 0.39 10*3/MM3 (ref 0.7–3.1)
LYMPHOCYTES NFR BLD MANUAL: 10 % (ref 5–12)
LYMPHOCYTES NFR BLD MANUAL: 9 % (ref 19.6–45.3)
MCH RBC QN AUTO: 30.7 PG (ref 26.6–33)
MCHC RBC AUTO-ENTMCNC: 33.6 G/DL (ref 31.5–35.7)
MCV RBC AUTO: 91.4 FL (ref 79–97)
MONOCYTES # BLD AUTO: 0.43 10*3/MM3 (ref 0.1–0.9)
NEUTROPHILS # BLD AUTO: 3.48 10*3/MM3 (ref 1.7–7)
NEUTROPHILS NFR BLD MANUAL: 81 % (ref 42.7–76)
PLATELET # BLD AUTO: 168 10*3/MM3 (ref 140–450)
PMV BLD AUTO: 10.6 FL (ref 6–12)
POTASSIUM BLD-SCNC: 4.3 MMOL/L (ref 3.5–5.2)
RBC # BLD AUTO: 3.13 10*6/MM3 (ref 4.14–5.8)
RBC MORPH BLD: NORMAL
SODIUM BLD-SCNC: 137 MMOL/L (ref 136–145)
WBC MORPH BLD: NORMAL
WBC NRBC COR # BLD: 4.3 10*3/MM3 (ref 3.4–10.8)

## 2020-05-03 PROCEDURE — 80048 BASIC METABOLIC PNL TOTAL CA: CPT | Performed by: INTERNAL MEDICINE

## 2020-05-03 PROCEDURE — 85007 BL SMEAR W/DIFF WBC COUNT: CPT | Performed by: INTERNAL MEDICINE

## 2020-05-03 PROCEDURE — 82962 GLUCOSE BLOOD TEST: CPT

## 2020-05-03 PROCEDURE — 63710000001 INSULIN LISPRO (HUMAN) PER 5 UNITS: Performed by: INTERNAL MEDICINE

## 2020-05-03 PROCEDURE — 85025 COMPLETE CBC W/AUTO DIFF WBC: CPT | Performed by: INTERNAL MEDICINE

## 2020-05-03 PROCEDURE — 25010000003 CEFTRIAXONE PER 250 MG: Performed by: INTERNAL MEDICINE

## 2020-05-03 RX ORDER — CEFTRIAXONE SODIUM 2 G/50ML
2 INJECTION, SOLUTION INTRAVENOUS EVERY 24 HOURS
Qty: 250 ML | Refills: 0 | Status: SHIPPED | OUTPATIENT
Start: 2020-05-03 | End: 2020-05-08

## 2020-05-03 RX ORDER — METRONIDAZOLE 500 MG/1
500 TABLET ORAL EVERY 8 HOURS SCHEDULED
Qty: 15 TABLET | Refills: 0 | Status: SHIPPED | OUTPATIENT
Start: 2020-05-03 | End: 2020-05-08

## 2020-05-03 RX ADMIN — INSULIN LISPRO 6 UNITS: 100 INJECTION, SOLUTION INTRAVENOUS; SUBCUTANEOUS at 12:04

## 2020-05-03 RX ADMIN — SODIUM CHLORIDE, PRESERVATIVE FREE 10 ML: 5 INJECTION INTRAVENOUS at 09:49

## 2020-05-03 RX ADMIN — POLYETHYLENE GLYCOL 3350 17 G: 17 POWDER, FOR SOLUTION ORAL at 09:49

## 2020-05-03 RX ADMIN — CEFTRIAXONE SODIUM 2 G: 2 INJECTION, SOLUTION INTRAVENOUS at 12:04

## 2020-05-03 RX ADMIN — PANTOPRAZOLE SODIUM 40 MG: 40 TABLET, DELAYED RELEASE ORAL at 09:48

## 2020-05-03 RX ADMIN — METRONIDAZOLE 500 MG: 500 TABLET ORAL at 06:44

## 2020-05-03 RX ADMIN — METRONIDAZOLE 500 MG: 500 TABLET ORAL at 12:04

## 2020-05-03 RX ADMIN — INSULIN LISPRO 2 UNITS: 100 INJECTION, SOLUTION INTRAVENOUS; SUBCUTANEOUS at 09:48

## 2020-05-03 NOTE — PROGRESS NOTES
"  Infectious Diseases Progress Note    Felecia Haque MD     [facility]  Los: 10 days  Patient Identification:  Name: Home Wynn  Age: 88 y.o.  Sex: male  :  1931  MRN: [id number]         Primary Care Physician: Yodit Mahoney MD            Subjective: Denies any specific complaint except for another dark bowel movement yesterday.  Denies any nausea vomiting hematemesis or dizziness.  Feels strong.  Denies any fever and chills.  Tolerating antibiotics without any problem.  Interval History: See consultation note.  Had EGD repeat ERCP and colonoscopy to evaluate for melena.  Discharge held because of another episode of dark bowel movement.    Objective:    Scheduled Meds:    cefTRIAXone 2 g Intravenous Q24H   insulin glargine 12 Units Subcutaneous Nightly   insulin lispro 0-9 Units Subcutaneous 4x Daily With Meals & Nightly   metoprolol succinate XL 12.5 mg Oral Nightly   metroNIDAZOLE 500 mg Oral Q8H   pantoprazole 40 mg Oral Daily   polyethylene glycol 17 g Oral Daily   sodium chloride 10 mL Intravenous Q12H     Continuous Infusions:    sodium chloride 30 mL/hr Last Rate: Stopped (20 1035)       Vital signs in last 24 hours:  Temp:  [97.2 °F (36.2 °C)-98.4 °F (36.9 °C)] 97.2 °F (36.2 °C)  Heart Rate:  [57-82] 73  Resp:  [16-20] 18  BP: (117-129)/(70-76) 129/76    Intake/Output:    Intake/Output Summary (Last 24 hours) at 5/3/2020 0642  Last data filed at 5/3/2020 0355  Gross per 24 hour   Intake 490 ml   Output 480 ml   Net 10 ml       Exam:  /76 (BP Location: Right arm, Patient Position: Lying)   Pulse 73   Temp 97.2 °F (36.2 °C) (Oral)   Resp 18   Ht 177.8 cm (70\")   Wt 77.2 kg (170 lb 4.8 oz)   SpO2 98%   BMI 24.44 kg/m²   Patient is examined using the personal protective equipment as per guidelines from infection control for this particular patient as enacted.  Hand washing was performed before and after patient interaction.  General Appearance:    Alert, " cooperative, no distress, AAOx3                          Head:    Normocephalic, without obvious abnormality, atraumatic                           Eyes:    PERRL, conjunctivae/corneas clear, EOM's intact, both eyes                         Throat:   Lips, tongue, gums normal; oral mucosa pink and moist                           Neck:   Supple, symmetrical, trachea midline, no JVD                         Lungs:    Clear to auscultation bilaterally, respirations unlabored                 Chest Wall:    No tenderness or deformity                          Heart: S1-S2 regular no murmurs heard                  Abdomen:   Soft nontender no guarding or rigidity noted                 Extremities:   Extremities normal, atraumatic, no cyanosis or edema                        Pulses:   Pulses palpable in all extremities                            Skin:   Skin is warm and dry,  no rashes or palpable lesions                  Neurologic: Grossly nonfocal       Data Review:    I reviewed the patient's new clinical results.  Results from last 7 days   Lab Units 05/03/20  0532 05/02/20  0928 04/30/20  0505 04/29/20  1015 04/28/20  0920 04/27/20  0629   WBC 10*3/mm3 4.30 5.16 6.70 5.87 4.51 4.10   HEMOGLOBIN g/dL 9.6* 9.9* 9.2* 9.4* 9.9* 10.1*   PLATELETS 10*3/mm3 168 187 200 172 203 178     Results from last 7 days   Lab Units 05/03/20  0532 05/02/20  0928 04/30/20  0502 04/29/20  1015 04/28/20  0920 04/27/20  0629   SODIUM mmol/L 137 134* 137 134* 131* 137   POTASSIUM mmol/L 4.3 4.4 4.3 4.3 4.3 4.2   CHLORIDE mmol/L 102 100 100 95* 93* 100   CO2 mmol/L 25.8 25.1 26.6 28.2 25.9 27.0   BUN mg/dL 19 16 20 22 27* 27*   CREATININE mg/dL 1.26 1.24 1.17 1.29* 1.27 1.23   CALCIUM mg/dL 8.7 9.2 8.9 9.4 9.4 9.0   GLUCOSE mg/dL 158* 230* 155* 333* 318* 220*     Microbiology Results (last 10 days)     Procedure Component Value - Date/Time    Blood Culture - Blood, Arm, Right [[id number]] Collected:  04/24/20 1609    Lab Status:  Final result  Specimen:  Blood from Arm, Right Updated:  04/29/20 1645     Blood Culture No growth at 5 days    Urine Culture - Urine, Urine, Clean Catch [[id number]]  (Normal) Collected:  04/24/20 1305    Lab Status:  Final result Specimen:  Urine, Clean Catch Updated:  04/25/20 1611     Urine Culture No growth    SARS-CoV-2 PCR ([place] IN-HOUSE PERFORMED), NP SWAB IN TRANSPORT MEDIA - Swab, Nasopharynx [[id number]]  (Normal) Collected:  04/23/20 0921    Lab Status:  Final result Specimen:  Swab from Nasopharynx Updated:  04/23/20 1018     COVID19 Not Detected    Blood Culture - Blood, Arm, Right [[id number]] Collected:  04/23/20 0919    Lab Status:  Final result Specimen:  Blood from Arm, Right Updated:  04/28/20 0930     Blood Culture No growth at 5 days    Blood Culture - Blood, Arm, Right [[id number]]  (Abnormal)  (Susceptibility) Collected:  04/23/20 0919    Lab Status:  Final result Specimen:  Blood from Arm, Right Updated:  04/26/20 0611     Blood Culture Escherichia coli     Isolated from Aerobic and Anaerobic Bottles     Gram Stain Anaerobic Bottle Gram negative bacilli      Aerobic Bottle Gram negative bacilli    Susceptibility      Escherichia coli     FRANK     Ampicillin Intermediate     Ampicillin + Sulbactam Susceptible     Cefepime Susceptible     Ceftazidime Susceptible     Ceftriaxone Susceptible     Gentamicin Susceptible     Levofloxacin Susceptible     Piperacillin + Tazobactam Susceptible     Trimethoprim + Sulfamethoxazole Susceptible                Susceptibility Comments     Escherichia coli    Cefazolin sensitivity will not be reported for Enterobacteriaceae in non-urine isolates. If cefazolin is preferred, please call the microbiology lab to request an E-test.  With the exception of urinary-sourced infections, aminoglycosides should not be used as monotherapy.             Blood Culture ID, PCR - Blood, Arm, Right [[id number]]  (Abnormal) Collected:  04/23/20 0919    Lab Status:  Final result Specimen:   Blood from Arm, Right Updated:  04/23/20 2242     BCID, PCR Escherichia coli. Identification by BCID PCR.            Assessment:   1-ascending cholangitis with E. coli bacteremia due to  2-choledocholithiasis with episodic biliary obstruction and associated gallstone recurrent pancreatitis now status post ERCP and sphincterotomy and balloon sweep on 4/24/2020  3-significant cardiac history consisting of hypertension cardiomyopathy atrial fibrillation on chronic anticoagulation therapy with documented left ventricular systolic function with ejection fraction of 29%.  4-chronic kidney disease  5-history of obstructive uropathy with history of ureteral stents  6-history of inguinal adenopathy status post biopsies with inconclusive results  7-history of weight loss and concern for malignancy  8-diabetes mellitus  9-reported history of black tarry stool   Recommendations/discussion:   · Continue current antibiotic regimen consisting of Zosyn while he is here.    · Would recommend couple of weeks of IV antibiotics from last negative blood culture, which was 4/24/2020, for this bacteremic cholangitis.    · If he continues to do well and his oral intake is reliably established then:   -Either continue with Zosyn for a total of 2 weeks from last negative blood culture versus     -2 weeks of Rocephin 2 g every 24 along with oral Flagyl.      -IV Unasyn for total of 2 weeks.    -Other options such as oral quinolone or Bactrim plus Augmentin are alternative choices but given his comorbidities and cardiac history these options may not be safe for him.    · Once decision is made about antibiotic choices and is okay to discharge from infectious disease standpoint.  · Patient would need weekly labs consisting of CBC and CMP to monitor antibiotic toxicity with abnormal results to be called to Dr. Haque at [phone].  · Discussed with nursing staff about stat CBC and stool for Hemoccult and notify primary attending about having  black tarry stools to make sure that there is no acute GI bleed that is occurring.  · Continue with Rocephin IV and Flagyl p.o. for the sake of simplicity and ease of administration in the outpatient setting given the frequency of the regimen.  Patient to finish antibiotics after last dose on 5/8/2020.        Felecia Haque MD  5/3/2020  06:42    Much of this encounter note is an electronic transcription/translation of spoken language to printed text. The electronic translation of spoken language may permit erroneous, or at times, nonsensical words or phrases to be inadvertently transcribed; Although I have reviewed the note for such errors, some may still exist

## 2020-05-03 NOTE — PLAN OF CARE
Problem: Patient Care Overview  Goal: Plan of Care Review  Outcome: Ongoing (interventions implemented as appropriate)  Flowsheets (Taken 5/3/2020 1629)  Progress: improving  Plan of Care Reviewed With: patient  Outcome Summary: A/Ox4, slept good, no c/o pain, no distress noted, up by self, ambulating a lot, no reports of tarry stool tonight, VSS, NSR, will monitor Hgb this am, will monitor

## 2020-05-03 NOTE — OUTREACH NOTE
Prep Survey      Responses   [facility] patient discharged from?  [place]   Is LACE score < 7 ?  No   Eligibility  [facility]   Date of Admission  04/23/20   Date of Discharge  05/03/20   Discharge Disposition  Home or Self Care   Discharge diagnosis  acute on chronic pancreatitis secondary to biliary obstruction with sepsis,    underwent ERCP with sphincterotomy and balloon stone extraction    COVID-19 Test Status  Negative   Does the patient have one of the following disease processes/diagnoses(primary or secondary)?  Sepsis   Does the patient have Home health ordered?  Yes   What is the Home health agency?   Home with [facility] and [facility] [place], operated by [facility]   Is there a DME ordered?  No   Prep survey completed?  Yes          Morenita Sanches RN

## 2020-05-03 NOTE — DISCHARGE SUMMARY
[facility]    Date of Discharge:  5/3/2020    PCP: Yodit Mahoney MD    Discharge Diagnosis:   Active Hospital Problems    Diagnosis  POA   • **Biliary obstruction [K83.1]  Yes   • Acute blood loss anemia [D62]  Unknown   • Chronic anticoagulation [Z79.01]  Not Applicable   • GI bleed [K92.2]  Unknown   • Ascending cholangitis [K83.09]  Yes   • E coli bacteremia [R78.81]  Yes   • Positive blood culture [R78.81]  Yes   • Lactic acidosis [E87.2]  Yes   • Acute on chronic pancreatitis (CMS/HCC) [K85.90, K86.1]  Yes   • NATHANIEL (acute kidney injury) (CMS/HCC) [N17.9]  Yes   • Hematuria [R31.9]  Yes   • Status post placement of ureteral stent [Z96.0]  Not Applicable   • Elevated LFTs [R94.5]  Yes   • Melena [K92.1]  Unknown   • Abnormal weight loss [R63.4]  Yes   • Chronic atrial fibrillation [I48.20]  Yes   • Hypertension [I10]  Yes   • Diabetes mellitus (CMS/HCC) [E11.9]  Yes      Resolved Hospital Problems   No resolved problems to display.     Procedures Performed  Procedure(s):  COLONOSCOPY TO CECUM AND TI WITH COLD SNARE POLYPECTOMIES  05/01 0949 COLONOSCOPY  Consults     Date and Time Order Name Status Description    4/25/2020 1713 Inpatient Infectious Diseases Consult Completed     4/23/2020 1358 Inpatient Cardiology Consult Completed     4/23/2020 1217 Inpatient Gastroenterology Consult Completed     4/23/2020 1048 Gastroenterology (on-call MD unless specified) Completed     4/23/2020 1048 [facility] (on-call MD unless specified) Details Completed         Hospital Course  This is a an 88-year-old male with past medical history significant for diabetes, paroxysmal atrial fibrillation, hyperlipidemia, chronic kidney disease, presented to the hospital with abdominal pain.  Patient underwent ERCP with sphincterotomy and stone extraction.  Patient's liver function tests have improved.  Patient also was seen by infectious disease service for positive E coli blood cultures,  patient will complete the course of antibiotics on outpatient basis.  I have seen examined patient by the bedside today on the day of discharge and total time spent is more than 30 minutes.  Plan of care was discussed with the patient and he has verbalized understanding.       - Physical Exam  General appearance: awake  Head/Eyes: atraumatic, clear cornea, EOMI, normal conjunctiva/sclera, normal eyelids/periorb., normocephalic, PERRL  ENT: moist mucosal membranes, normal dentition, normal nose, normal pharynx, normal sinus  Neck: full range of motion, non-tender, normal thyroid, supple/no meningismus, no bruit/NL carotids, no JVD, no masses or swelling  Cardiovascular: normal capillary refill, regular rate & rhythm  Respiratory: clear to auscultation, no distress  Abdomen: non-tender, normal bowel sounds, soft, no distention, no guarding, no hernia, no mass/organomegaly, no rebound  Rectal: not indicated  Extremities: moves all, normal capillary refill, normal range of motion, no edema  Musculoskeletal: normal inspection  Neuro/CNS: alert, oriented X 3    Condition on Discharge: Improved.     Temp:  [97.2 °F (36.2 °C)-98.3 °F (36.8 °C)] 97.2 °F (36.2 °C)  Heart Rate:  [73] 73  Resp:  [16-18] 18  BP: (117-129)/(70-76) 129/76  Body mass index is 24.44 kg/m².         Discharge Medications      New Medications      Instructions Start Date   cefTRIAXone 40 MG/ML IVPB  Commonly known as:  ROCEPHIN   2 g, Intravenous, Every 24 Hours      metroNIDAZOLE 500 MG tablet  Commonly known as:  FLAGYL   500 mg, Oral, Every 8 Hours Scheduled         Changes to Medications      Instructions Start Date   Lantus SoloStar 100 UNIT/ML injection pen  Generic drug:  Insulin Glargine  What changed:  See the new instructions.   INJECT 18 UNITS UNDER THE SKIN AS DIRECTED DAILY         Continue These Medications      Instructions Start Date   metoprolol succinate XL 25 MG 24 hr tablet  Commonly known as:  TOPROL-XL   12.5 mg, Oral, Nightly        O2  Commonly known as:  OXYGEN   2 L/min, Inhalation, Once, Wears at night.       pantoprazole 40 MG EC tablet  Commonly known as:  Protonix   40 mg, Oral, Daily      Pen Needles 31G X 6 MM misc   USE DAILY AS DIRECTED      PreserVision AREDS capsule   2 capsules, Oral, Daily      rivaroxaban 20 MG tablet  Commonly known as:  Xarelto   If giving by tube: suspend in 50mL water, administer, and immediately follow with enteral feeding. PT HAS NOT TAKEN X 1 MONTH            Diet Instructions     Diet: Regular      Discharge Diet:  Regular          Additional Instructions for the Follow-ups that You Need to Schedule     Discharge Follow-up with PCP   As directed       Currently Documented PCP:    Yodit Mahoney MD    PCP Phone Number:    [phone]     Follow Up Details:  Please call to schedule one week follow-up with PCP via OP or telehealth         Discharge Follow-up with PCP   As directed       Currently Documented PCP:    Yodit Mahoney MD    PCP Phone Number:    [phone]     Follow Up Details:  1-2 weeks         Discharge Follow-up with Specialty: Infectious disease   As directed      Specialty:  Infectious disease    Follow Up Details:  Please obtain weekly CBC & CMP & call results to Dr. Haque at [phone]         Discharge Follow-up with Specified Provider: Henry   As directed      To:  Henry    Follow Up Details:  per his office instructions           Follow-up Information     Yodit Mahoney MD Follow up.    Specialty:  Family Medicine  Why:  Please call to schedule one week follow-up with PCP via OP or telehealth  Please obtain weekly CBC & CMP & call results to Dr. Haque at [phone]   Contact information:  [address] [address] [phone]             Felecia Haque MD Follow up.    Specialties:  Infectious Diseases, Hospitalist  Why:  Please obtain weekly CBC & CMP & call results to Dr. Haque at [phone]   Contact information:  [address] [facility]  [address]  [place] [address]  [phone]             Yodit Mahoney MD .    Specialty:  Family Medicine  Why:  1-2 weeks  Contact information:  [address] [facility]  [address] [place] [address]  [phone]             Yodit Mahoney MD .    Specialty:  Family Medicine  Contact information:  [address] [facility]  [address] [place] [address]  [phone]                 Test Results Pending at Discharge   Order Current Status    Tissue Pathology Exam In process         Boris Prabhakar MD  05/03/20  16:42    Discharge time spent greater than 30 minutes.

## 2020-05-04 ENCOUNTER — TRANSITIONAL CARE MANAGEMENT TELEPHONE ENCOUNTER (OUTPATIENT)
Dept: CALL CENTER | Facility: HOSPITAL | Age: 85
End: 2020-05-04

## 2020-05-04 LAB
CYTO UR: NORMAL
LAB AP CASE REPORT: NORMAL
PATH REPORT.FINAL DX SPEC: NORMAL
PATH REPORT.GROSS SPEC: NORMAL

## 2020-05-04 RX ORDER — METOPROLOL SUCCINATE 25 MG/1
TABLET, EXTENDED RELEASE ORAL
Qty: 45 TABLET | Refills: 0 | Status: SHIPPED | OUTPATIENT
Start: 2020-05-04 | End: 2020-06-15

## 2020-05-04 NOTE — PROGRESS NOTES
Case Management Discharge Note      Final Note: Patient DC'd home with spouse and [facility] for IV ATB            Home Medical Care      Service Provider Request Status Selected Services Address Phone Number Fax Number    [facility] Selected Home Health Services [address] [address] [phone]           Transportation Services  Private: Car    Final Discharge Disposition Code: 06 - home with home health care

## 2020-05-04 NOTE — OUTREACH NOTE
Call Center TCM Note      Responses   [facility]   COVID-19 Test Status  Negative   Does the patient have one of the following disease processes/diagnoses(primary or secondary)?  Sepsis   TCM attempt successful?  Yes   Call start time  1150   Call end time  1158   Discharge diagnosis  acute on chronic pancreatitis secondary to biliary obstruction with sepsis,    underwent ERCP with sphincterotomy and balloon stone extraction    Meds reviewed with patient/caregiver?  Yes   Does the patient have all medications related to this admission filled (includes all antibiotics, inhalers, nebulizers,steroids,etc.)  Yes   Is the patient taking all medications as directed (includes completed medication regime)?  Yes   Does the patient have a primary care provider?   Yes   Comments regarding PCP  Dr. Yodit Mahoney   Does the patient have an appointment with their PCP within 7 days of discharge?  No   What is preventing the patient from scheduling follow up appointments within 7 days of discharge?  Haven't had time   Nursing Interventions  Educated patient on importance of making appointment, Advised patient to make appointment [Discussed options for f/u, MyChart video, telephone, in-peson. Pt does not feel he would be able to complete video visit. Encounter will be sent to PCP office to request TCM appt.]   What is the Home health agency?   Home with [facility] and [facility]   Has home health visited the patient within 72 hours of discharge?  Yes   Home health comments  Pt reports  nurse scheduled to visit home today.  Pt has a PICC and IB abx   Psychosocial issues?  No   Did the patient receive a copy of their discharge instructions?  Yes   Nursing interventions  Reviewed instructions with patient   What is the patient's perception of their health status since discharge?  Improving   Is the patient/caregiver able to teach back Sepsis?  S - Shivering,fever or very cold, E - Extreme  pain or generalized discomfort (worst ever,especially abdomen), P - Pale or discolored skin, S - Sleepy, difficult to arouse,confused, I -   I feel like I might die-a feeling of hopelessness, S - Short of breath   Nursing interventions  Nurse provided patient education   Is patient/caregiver able to teach back steps to recovery at home?  Set small, achievable goals for return to baseline health, Rest and regain strength, Eat a balanced diet   Is the patient/caregiver able to teach back signs and symptoms of worsening condition:  Fever, Shortness of breath/rapid respiratory rate, Rapid heart rate (>90), Edema   Is the patient/caregiver able to teach back the hierarchy of who to call/visit for symptoms/problems? PCP, Specialist, Home health nurse, Urgent Care, ED, 911  Yes   TCM call completed?  Yes   Wrap up additional comments  Pt states he is feeling better. Abd pain resolved. He denies any fever, chills, SOB, n/v. Appetite decreased. Encouraged small meals / small portions, hydration. Will request PCP f/u through routing comments.          Fina Aldridge RN    5/4/2020, 12:02

## 2020-05-05 ENCOUNTER — READMISSION MANAGEMENT (OUTPATIENT)
Dept: CALL CENTER | Facility: HOSPITAL | Age: 85
End: 2020-05-05

## 2020-05-05 NOTE — OUTREACH NOTE
Sepsis Week 1 Survey      Responses   [facility]   COVID-19 Test Status  Negative   Does the patient have one of the following disease processes/diagnoses(primary or secondary)?  Sepsis   Is there a successful TCM telephone encounter documented?  No   Week 1 attempt successful?  Yes   Call start time  1820   Call end time  1823   Discharge diagnosis  acute on chronic pancreatitis secondary to biliary obstruction with sepsis,    underwent ERCP with sphincterotomy and balloon stone extraction    Meds reviewed with patient/caregiver?  Yes   Is the patient having any side effects they believe may be caused by any medication additions or changes?  No   Does the patient have all medications related to this admission filled (includes all antibiotics, inhalers, nebulizers,steroids,etc.)  Yes   Is the patient taking all medications as directed (includes completed medication regime)?  Yes   Does the patient have a primary care provider?   Yes   Has the patient kept scheduled appointments due by today?  N/A   What is the Home health agency?   Home with [facility] and [facility]   Has home health visited the patient within 72 hours of discharge?  Yes   Pulse Ox monitoring  Intermittent   Psychosocial issues?  No   Did the patient receive a copy of their discharge instructions?  Yes   Nursing interventions  Reviewed instructions with patient   What is the patient's perception of their health status since discharge?  Improving   Nursing interventions  Nurse provided patient education   Is the patient/caregiver able to teach back Sepsis?  S - Shivering,fever or very cold, E - Extreme pain or generalized discomfort (worst ever,especially abdomen), P - Pale or discolored skin, S - Sleepy, difficult to arouse,confused, I -   I feel like I might die-a feeling of hopelessness, S - Short of breath   Nursing interventions  Nurse provided reassurance to patient   Is patient/caregiver able to  teach back steps to recovery at home?  Set small, achievable goals for return to baseline health, Rest and regain strength, Eat a balanced diet, Make a list of questions for PCP appoinment   Is the patient/caregiver able to teach back signs and symptoms of worsening condition:  Fever, Rapid heart rate (>90), Altered mental status(confusion/coma), Edema, Shortness of breath/rapid respiratory rate   Is the patient/caregiver able to teach back the hierarchy of who to call/visit for symptoms/problems? PCP, Specialist, Home health nurse, Urgent Care, ED, 911  Yes   Additional teach back comments  No Cough, no Soa, he says he has some issues with exertion.  Encouraged toothbrush change,    Week 1 call completed?  Yes   Wrap up additional comments  Says he is a bit better today.          Isabel Collado RN

## 2020-05-06 ENCOUNTER — READMISSION MANAGEMENT (OUTPATIENT)
Dept: CALL CENTER | Facility: HOSPITAL | Age: 85
End: 2020-05-06

## 2020-05-06 NOTE — OUTREACH NOTE
Sepsis Week 1 Survey      Responses   [facility] patient discharged from?  [facility]   COVID-19 Test Status  Negative   Does the patient have one of the following disease processes/diagnoses(primary or secondary)?  Sepsis   Is there a successful TCM telephone encounter documented?  No   Week 1 attempt successful?  No   Unsuccessful attempts  Attempt 1          Grace Wilkes LPN

## 2020-05-07 ENCOUNTER — TELEPHONE (OUTPATIENT)
Dept: FAMILY MEDICINE CLINIC | Facility: CLINIC | Age: 85
End: 2020-05-07

## 2020-05-08 ENCOUNTER — LAB REQUISITION (OUTPATIENT)
Dept: LAB | Facility: HOSPITAL | Age: 85
End: 2020-05-08

## 2020-05-08 DIAGNOSIS — I50.9 HEART FAILURE, UNSPECIFIED (HCC): ICD-10-CM

## 2020-05-08 DIAGNOSIS — K83.1 OBSTRUCTION OF BILE DUCT: ICD-10-CM

## 2020-05-08 LAB
ALBUMIN SERPL-MCNC: 2.9 G/DL (ref 3.5–5.2)
ALBUMIN/GLOB SERPL: 0.8 G/DL
ALP SERPL-CCNC: 89 U/L (ref 39–117)
ALT SERPL W P-5'-P-CCNC: 14 U/L (ref 1–41)
ANION GAP SERPL CALCULATED.3IONS-SCNC: 8.2 MMOL/L (ref 5–15)
AST SERPL-CCNC: 19 U/L (ref 1–40)
BILIRUB SERPL-MCNC: 0.9 MG/DL (ref 0.2–1.2)
BUN BLD-MCNC: 23 MG/DL (ref 8–23)
BUN/CREAT SERPL: 17.7 (ref 7–25)
CALCIUM SPEC-SCNC: 9.4 MG/DL (ref 8.6–10.5)
CHLORIDE SERPL-SCNC: 100 MMOL/L (ref 98–107)
CO2 SERPL-SCNC: 25.8 MMOL/L (ref 22–29)
CREAT BLD-MCNC: 1.3 MG/DL (ref 0.76–1.27)
DEPRECATED RDW RBC AUTO: 47.6 FL (ref 37–54)
ERYTHROCYTE [DISTWIDTH] IN BLOOD BY AUTOMATED COUNT: 14.5 % (ref 12.3–15.4)
GFR SERPL CREATININE-BSD FRML MDRD: 52 ML/MIN/1.73
GLOBULIN UR ELPH-MCNC: 3.7 GM/DL
GLUCOSE BLD-MCNC: 259 MG/DL (ref 65–99)
HCT VFR BLD AUTO: 30 % (ref 37.5–51)
HGB BLD-MCNC: 10.2 G/DL (ref 13–17.7)
MCH RBC QN AUTO: 30.8 PG (ref 26.6–33)
MCHC RBC AUTO-ENTMCNC: 34 G/DL (ref 31.5–35.7)
MCV RBC AUTO: 90.6 FL (ref 79–97)
PLATELET # BLD AUTO: 205 10*3/MM3 (ref 140–450)
PMV BLD AUTO: 11.3 FL (ref 6–12)
POTASSIUM BLD-SCNC: 4.3 MMOL/L (ref 3.5–5.2)
PROT SERPL-MCNC: 6.6 G/DL (ref 6–8.5)
RBC # BLD AUTO: 3.31 10*6/MM3 (ref 4.14–5.8)
SODIUM BLD-SCNC: 134 MMOL/L (ref 136–145)
WBC NRBC COR # BLD: 3.55 10*3/MM3 (ref 3.4–10.8)

## 2020-05-08 PROCEDURE — 80053 COMPREHEN METABOLIC PANEL: CPT | Performed by: FAMILY MEDICINE

## 2020-05-08 PROCEDURE — 85027 COMPLETE CBC AUTOMATED: CPT | Performed by: FAMILY MEDICINE

## 2020-05-09 ENCOUNTER — READMISSION MANAGEMENT (OUTPATIENT)
Dept: CALL CENTER | Facility: HOSPITAL | Age: 85
End: 2020-05-09

## 2020-05-09 NOTE — OUTREACH NOTE
Sepsis Week 1 Survey      Responses   [facility]   COVID-19 Test Status  Negative   Does the patient have one of the following disease processes/diagnoses(primary or secondary)?  Sepsis   Is there a successful TCM telephone encounter documented?  No   Week 1 attempt successful?  Yes   Call start time  1157   Call end time  1201   Meds reviewed with patient/caregiver?  Yes   Is the patient having any side effects they believe may be caused by any medication additions or changes?  No   Does the patient have all medications related to this admission filled (includes all antibiotics, inhalers, nebulizers,steroids,etc.)  Yes   Is the patient taking all medications as directed (includes completed medication regime)?  Yes   Does the patient have a primary care provider?   Yes   Comments regarding PCP  PATIENT HAS AN APPOINTMENT WITH DR. BARROSO ON MONDAY 5/11   Does the patient have an appointment with their PCP within 7 days of discharge?  Greater than 7 days   What is preventing the patient from scheduling follow up appointments within 7 days of discharge?  Earlier appointment not available   Nursing Interventions  Verified appointment date/time/provider   Has the patient kept scheduled appointments due by today?  N/A   What is the Home health agency?   [facility]   Has home health visited the patient within 72 hours of discharge?  Yes   Home health comments  PATIENT STATES HIS PICC LINE WAS REMOVED YESTERDAY   Pulse Ox monitoring  Intermittent   Did the patient receive a copy of their discharge instructions?  Yes   Nursing interventions  Reviewed instructions with patient   What is the patient's perception of their health status since discharge?  Improving   Nursing interventions  Nurse provided patient education   Is the patient/caregiver able to teach back Sepsis?  S - Shivering,fever or very cold, E - Extreme pain or generalized discomfort (worst  ever,especially abdomen), S - Sleepy, difficult to arouse,confused, P - Pale or discolored skin, I -   I feel like I might die-a feeling of hopelessness, S - Short of breath   Is patient/caregiver able to teach back steps to recovery at home?  Set small, achievable goals for return to baseline health, Rest and regain strength, Make a list of questions for PCP appoinment   Is the patient/caregiver able to teach back signs and symptoms of worsening condition:  Fever, Hyperthermia, Rapid heart rate (>90), Shortness of breath/rapid respiratory rate, Altered mental status(confusion/coma), Edema, High blood glucose without diabetes   Is the patient/caregiver able to teach back the hierarchy of who to call/visit for symptoms/problems? PCP, Specialist, Home health nurse, Urgent Care, ED, 911  Yes   Additional teach back comments  PATIENT STATES HE IS DOING WELL, BUT IS JUST A LITTLE WEAK STILL AND NEEDS TO GET HIS ENERGY BACK.    Week 1 call completed?  Yes          Grace Wilkes LPN

## 2020-05-11 ENCOUNTER — OFFICE VISIT (OUTPATIENT)
Dept: FAMILY MEDICINE CLINIC | Facility: CLINIC | Age: 85
End: 2020-05-11

## 2020-05-11 ENCOUNTER — TELEPHONE (OUTPATIENT)
Dept: CARDIOLOGY | Facility: CLINIC | Age: 85
End: 2020-05-11

## 2020-05-11 DIAGNOSIS — I48.20 CHRONIC ATRIAL FIBRILLATION (HCC): ICD-10-CM

## 2020-05-11 DIAGNOSIS — R63.4 WEIGHT LOSS: ICD-10-CM

## 2020-05-11 DIAGNOSIS — R31.0 GROSS HEMATURIA: ICD-10-CM

## 2020-05-11 DIAGNOSIS — Z09 HOSPITAL DISCHARGE FOLLOW-UP: Primary | ICD-10-CM

## 2020-05-11 PROCEDURE — 99495 TRANSJ CARE MGMT MOD F2F 14D: CPT | Performed by: FAMILY MEDICINE

## 2020-05-11 RX ORDER — EPINEPHRINE 0.15 MG/.3ML
INJECTION INTRAMUSCULAR AS NEEDED
COMMUNITY
Start: 2020-05-01 | End: 2021-01-21

## 2020-05-11 NOTE — TELEPHONE ENCOUNTER
Dr. Mahoney Left msg saying she needs your input on this pt's anticoag therapy for A-fib.  He is currently on Xarelto 20 MG/d but is only taking it @ every 3-4 days because he has piedad ureter stents & gross hematuria that's quite upsetting to him.  He waits til the urine is clear and will take it again.  Dr. Mahoney wonders if switching to Eliquis 2.5 MG bid would be better given his kidney function and age?    She said to call her on her cell @ [phone]    Thanks,  Sera

## 2020-05-11 NOTE — PROGRESS NOTES
Subjective   Home Wynn is a 88 y.o. male.     Chief Complaint   Patient presents with   • Follow-up   • Blood in Urine   • Weight Loss        History of Present Illness  On 4/1/2020 patient had inguinal lymph node resection and EGD.  He had prior ultrasound-guided biopsy of the inguinal lymph nodes that was nondiagnostic.  His CT scan with urology showed prominent bilateral groin lymph nodes new from 6/2019.  Pathology on these lymph nodes were benign.  EGD was performed related to his feeling poorly, early satiety, weight loss of 20 pounds, epigastric pain with nausea and vomiting for about the last 1 year.  His EGD was overall benign except for noted esophageal ulcer and focal peptic duodenitis for which she was started on Protonix.    In mid April patient called his GI office and reported having attacks where the stomach was hurting right below the lower rib cage, was hard as a rock, and he had been dry heaving.  He had labs that showed mild stable anemia but continued uptrending of liver enzymes.  Liver ultrasound performed and overall liver looked normal, gallbladder is absent, there was an apparent renal calculus in the right kidney.  He had a CT scan of the abdomen and pelvis the following day which showed interval biliary dilatation to the level of the ampulla.  There is also a hyperdensity at the ampulla thought to be a stone or sludge.  His lipase was 599.  He was then admitted for biliary obstruction 4/23-5/3/20.   He had MRI of the abdomen with contrast showing probable tiny obstructing gallstones in the distal common bile duct and mild intrahepatic and extrahepatic bile duct dilatation.  He had sphincterotomy performed on ERCP and a stone 1.2 cm in diameter was removed.  He had follow-up EGD. He also had positive blood cultures in the hospital managed by ID. He went home with PICC on ceftriaxone and flagyl for two weeks.     He has bilateral ureteral stents. They are going to be removed and  replaced in next couple of weeks. He really does not want them. The reason for that has to do with notable hematuria.   He says he bleeds a lot. Blood in the urine.  If he cuts out his xarelto then he hardly bleeds at all.   Told him to take again. If he takes his xarelto he will bleed for a couple days. He took last xarelto 5/6. He will re-dose when stops bleeding but has not taken it again. He says there is bright red blood in the toilet first void after taking the medication. This upsets him. He does not want to continue to do this. He understands the xarelto keeps him from having a problems related to his a fib.      The following portions of the patient's history were reviewed and updated as appropriate: allergies, current medications, past medical history, past social history, past surgical history and problem list.    Review of Systems   Constitutional: Negative for activity change, appetite change and unexpected weight change.   Respiratory: Negative for shortness of breath.    Cardiovascular: Negative for chest pain and leg swelling.   Gastrointestinal: Negative for abdominal pain, constipation and diarrhea.   Genitourinary: Positive for hematuria.       Objective   There were no vitals taken for this visit.  Physical Exam  Not available phone visit.   Assessment/Plan   Home was seen today for follow-up, blood in urine and weight loss.    Diagnoses and all orders for this visit:    Hospital discharge follow-up    Gross hematuria    Weight loss    Chronic atrial fibrillation        He is overall feeling well. Not having the abdominal pain, no trouble with BMs.   We discussed he has some dilatation of the right collecting system and without a stent holding it open could worsen and cause kidney failure. Likely it is that he needs to continue with the stenting and I can discuss with his cardiologist with regard to trying alternative to the xarelto, not sure lower doses of the xarelto provide the same  protection in a fib. Could consider low dose of the eliquis given pt age and kidney function.   This may improve the bleeding.     Encouraged nutrition. Recommended shakes, glucerna, in between meals for a period of time to build nutrition more quickly.   We discussed his lab results, went through imaging and pathology on his colonoscopy.     I called and left a message on the voicemail with Dr. Tripathi's medical assistant. I addressed the concern of the AC and irregular treatment asking for Dr. Tripathi's input for alternatives with lower dosing of the Xarelto or trying low dose of eliquis for treating. The stents are going to be an ongoing therapy.     I reviewed the procedure notes, notes, reports, labs, and imaging studies from pt's recent admission.   Current outpatient and discharge medications have been reconciled for the patient.  Reviewed by: Yodit Mahoney MD      Patient gave consent today for a telehealth telephone visit as following recommendations of our governor and CDC during the COVID-19 pandemic.      20 min was spent in discussion with pt and greater than 50% of that time was spent counseling.

## 2020-05-12 ENCOUNTER — READMISSION MANAGEMENT (OUTPATIENT)
Dept: CALL CENTER | Facility: HOSPITAL | Age: 85
End: 2020-05-12

## 2020-05-12 NOTE — TELEPHONE ENCOUNTER
I called spoke with Dr. Mahoney.  We are going to switch him to 2.5 of Eliquis.  I explained to her that with his creatinine sort of borderline even though he has advanced age I would probably ultimately go up to 5 mg twice daily.  However we are going to start at 2.5 per my recommendations to see if he has any more hematuria.  Is going to have his stents changed out at the end of the month.  I think if he tolerates the 2.5 twice daily for 2 weeks I would increase his dose to 5 mg.  Obviously we have to alter that plan based on clinical course and how he responds.

## 2020-05-12 NOTE — OUTREACH NOTE
Sepsis Week 2 Survey      Responses   [facility]   COVID-19 Test Status  Negative   Does the patient have one of the following disease processes/diagnoses(primary or secondary)?  Sepsis   Week 2 attempt successful?  Yes   Call start time  1513   Call end time  1515   Discharge diagnosis  acute on chronic pancreatitis secondary to biliary obstruction with sepsis,    underwent ERCP with sphincterotomy and balloon stone extraction    Meds reviewed with patient/caregiver?  Yes   Is the patient having any side effects they believe may be caused by any medication additions or changes?  No   Does the patient have all medications related to this admission filled (includes all antibiotics, inhalers, nebulizers,steroids,etc.)  Yes   Is the patient taking all medications as directed (includes completed medication regime)?  Yes   Does the patient have a primary care provider?   Yes   Does the patient have an appointment with their PCP within 7 days of discharge?  Greater than 7 days   Has the patient kept scheduled appointments due by today?  Yes   Did the patient receive a copy of their discharge instructions?  Yes   What is the patient's perception of their health status since discharge?  Improving   Is the patient/caregiver able to teach back Sepsis?  S - Shivering,fever or very cold, E - Extreme pain or generalized discomfort (worst ever,especially abdomen), S - Sleepy, difficult to arouse,confused, P - Pale or discolored skin, I -   I feel like I might die-a feeling of hopelessness, S - Short of breath   Nursing interventions  Nurse provided reassurance to patient   Is patient/caregiver able to teach back steps to recovery at home?  Set small, achievable goals for return to baseline health, Rest and regain strength, Make a list of questions for PCP appoinment   Is the patient/caregiver able to teach back signs and symptoms of worsening condition:  Fever, Hyperthermia, Rapid heart  "rate (>90), Shortness of breath/rapid respiratory rate, Altered mental status(confusion/coma), Edema, High blood glucose without diabetes   Is the patient/caregiver able to teach back the hierarchy of who to call/visit for symptoms/problems? PCP, Specialist, Home health nurse, Urgent Care, ED, 911  Yes   Additional teach back comments  Patient states he is doing \"Ok\".  Still having the weakness.  He is has followed up with his doctor   Week 2 call completed?  Yes   Wrap up additional comments  No questions or needs           Shirley Corona LPN  "

## 2020-05-13 NOTE — PROGRESS NOTES
Spoke with pt and explained the conversation with Dr. Tripathi regarding his anticoagulation.   We will d/c the xarelto at this time because he only takes it intermittently. We will change to low dose of the eliquis. As long as he tolerates this we will increase to the 5 mg which is thought to be the more appropriate dosing with his GFR, however if he has any bleeding at this dose we will go back down to the 2.5 mg dosing. He understands this medication is twice daily. I will send to his [place].

## 2020-05-15 ENCOUNTER — READMISSION MANAGEMENT (OUTPATIENT)
Dept: CALL CENTER | Facility: HOSPITAL | Age: 85
End: 2020-05-15

## 2020-05-15 NOTE — OUTREACH NOTE
Sepsis Week 2 Survey      Responses   [facility] patient discharged from?  [place]   COVID-19 Test Status  Negative   Does the patient have one of the following disease processes/diagnoses(primary or secondary)?  Sepsis   Week 2 attempt successful?  No   Unsuccessful attempts  Attempt 1          Ainsley Fletcher RN

## 2020-05-19 ENCOUNTER — TELEPHONE (OUTPATIENT)
Dept: FAMILY MEDICINE CLINIC | Facility: CLINIC | Age: 85
End: 2020-05-19

## 2020-05-19 NOTE — TELEPHONE ENCOUNTER
PATIENT STATES: that he is penis is bleeding again and he needs to know what should he do? he thinks its the change of meds please advise     PATIENT CAN BE REACHED ON: [phone]

## 2020-05-20 NOTE — TELEPHONE ENCOUNTER
PT CALLED BACK AND IS REQUESTING A CALL BACK FROM DR BARROSO. HE IS BLEEDING AGAIN AND WOULD LIKE SOME ADVICE.    CALL BACK # [phone]

## 2020-05-21 ENCOUNTER — TELEPHONE (OUTPATIENT)
Dept: FAMILY MEDICINE CLINIC | Facility: CLINIC | Age: 85
End: 2020-05-21

## 2020-05-21 NOTE — TELEPHONE ENCOUNTER
Called and spoke to pt. He reported 5 hours after taking the 2.5 mg Eliquis, first time, he had blood in the urine. Not going to take anymore. He is going to try to have stents out and not replaced. Said no problem with blood in the urine prior to the stents.   We discussed imaging findings and why no stent may be problematic.   I will let his cardiologist know. Will also send note to urologist to make aware of the episodes of hematuria to see if no stent placement risk v benefit.            PT CALLED IN STATING THAT HE HAS BEEN BLEEDING FROM TUBES IN HIS KIDNEYS WHEN HE TAKES HIS MEDICINE.  PT STATES THAT WHEN HE DOES NOT TAKE THE MEDICINE HE DOES NOT BLEED.  PT STATES THAT HE WILL HAVE HIS STINT TAKEN OUT NEXT WEEK AND DOES NOT WISH TO HAVE IT REPLACED BECAUSE HE SAID HE FEELS THEM.  PT IS REQUESTING A CALL BACK WITH ADVICE ON WHAT HE SHOULD DO.      PT CALL BACK  [phone]  PHARMACY CONFIRMED

## 2020-05-26 ENCOUNTER — READMISSION MANAGEMENT (OUTPATIENT)
Dept: CALL CENTER | Facility: HOSPITAL | Age: 85
End: 2020-05-26

## 2020-05-26 NOTE — OUTREACH NOTE
Sepsis Week 4 Survey      Responses   [facility] patient discharged from?  [place]   COVID-19 Test Status  Negative   Does the patient have one of the following disease processes/diagnoses(primary or secondary)?  Sepsis   Week 4 attempt successful?  No          Isabel Collado RN

## 2020-05-28 ENCOUNTER — EPISODE CHANGES (OUTPATIENT)
Dept: CASE MANAGEMENT | Facility: OTHER | Age: 85
End: 2020-05-28

## 2020-05-29 ENCOUNTER — PATIENT OUTREACH (OUTPATIENT)
Dept: CASE MANAGEMENT | Facility: OTHER | Age: 85
End: 2020-05-29

## 2020-05-29 NOTE — OUTREACH NOTE
Care Coordination Assessment    Documented/Reviewed By:  Hanh Ricardo RN Date/time:  5/29/2020  4:38 PM   Assessment completed with:  patient  Living arrangement:  spouse  Support system:  family  Type of residence:  private residence  Home care services:  No  Equipment used at home:  none  Communication device:  No  Bed or wheelchair confined:  No  Inadequate nutrition:  Yes (Comment: patient continuing to loose weight)  Medication adherence problem:  Yes (Comment: stopped Eliquis due to bleeding from his penis. )  Experiencing side effects from current medications:  Yes (Comment: Bleeding when taking Eliquis. Patient has stopped at this time. )  History of fall(s) in last 6 months:  No  Difficulty keeping appointments:  No  Family aware of the patient's advance care planning wishes:  Yes

## 2020-05-29 NOTE — OUTREACH NOTE
Care Plan Note      Responses   Lifestyle Goals  Routine follow-up with doctor(s), Eat a healthy diet, Medication management, Plan meals   Barriers  Disease education   Self Management  Medication Adherence   Suggested Appointments  Other (See Comment) [Follow up with Maru regarding weight loss and loose stools. ]   Annual Wellness Visit:   Patient Will Schedule [Encouraged patient to schedule to review current porblems with PCP. ]   Specific Disease Process Teaching  -- [Weight Loss - Eat frequent meals with suppliments. Bleeding with Eliquis - follow up with cardiology and urology. ]   Does patient have depression diagnosis?  No   Advanced Directives:  Patient Has   Ed Visits past 12 months:  3 or more [4]   Hospitalizations past 12 months  1   Discharge destination:  Home   Medication Adherence  Medications understood, Confusion - does not uderstand care plan [Patient has stopped Eliquis due to bleeding. ]   Goal Progress  Not Making Progress Toward Goal(s)   Readiness Scale  5   Confidence Scale  5   Health Literacy  Fair        The main concerns and/or symptoms the patient would like to address are: Spoke with patient regarding his wellness and health after his admission with sepsis. Patient states he is still weak. He is also not producing formed stool. He has frequent, small, loose bowel movements since he left the hospital. Patient also concerned about loosing weight. Patient states he used to be 205 and is now down to 170. Wife states patient has lost his appetite. Patient is eating frequent small meals and is supplementing with Glucerna shakes.    Education/instruction provided by Care Coordinator: Introduced self, explained ACM RN role and provided contact information. Reviewed follow up. Patient scheduled to follow up with Dr. Mahoney in July. Patient reviewed his bleeding and stopping Eliquis with Dr. Mahoney, but has not heard back from her regarding her outreach to Urology and to Cardiology on the matter.  Patient sates he is awaiting a call from Urology to schedule a time to remove the stents. Patient states they were placed in February and they need to be removed around 90 days after insertion. Recommended patient call Dr. Mahoney for an update on her outreach to patient's other physicians. Also recommended patient schedule an Annual Wellness Visit to review his concerns of weakness, weight loss, and bleeding when taking blood thinners. Patient appreciative of the call. Further outreach scheduled. Advanced directives active. MyChart pending.     Follow Up Outreach Due: 2 weeks    Hanh Ricardo RN  Ambulatory     5/29/2020, 16:44

## 2020-06-12 ENCOUNTER — PATIENT OUTREACH (OUTPATIENT)
Dept: CASE MANAGEMENT | Facility: OTHER | Age: 85
End: 2020-06-12

## 2020-06-12 NOTE — OUTREACH NOTE
Patient Outreach Note    Spoke with patient regarding his health and wellness. Patient has follow up with Dr. Amber Gonzalez regarding his Renal Stents. Patient plans to remove the stents and resume Eliquis for his heart. Patient also has a follow up with Dr. Mahoney scheduled on 7/21. Reviewed AWV with patient and recommended patient schedule this appointment. Patient verbalized understanding. Patient reported he is no longer loosing weight and is regaining his energy. Mychart declined. AD on filed. AWV reviewed. No further needs or concerns identified. Patient has strong sense of health management. No further ACM outreach scheduled at this time.     Hanh Ricardo RN  Ambulatory     6/12/2020, 17:56

## 2020-06-15 RX ORDER — METOPROLOL SUCCINATE 25 MG/1
TABLET, EXTENDED RELEASE ORAL
Qty: 45 TABLET | Refills: 1 | Status: SHIPPED | OUTPATIENT
Start: 2020-06-15 | End: 2020-12-28 | Stop reason: ALTCHOICE

## 2020-07-13 ENCOUNTER — TRANSCRIBE ORDERS (OUTPATIENT)
Dept: PREADMISSION TESTING | Facility: HOSPITAL | Age: 85
End: 2020-07-13

## 2020-07-13 DIAGNOSIS — Z01.818 OTHER SPECIFIED PRE-OPERATIVE EXAMINATION: Primary | ICD-10-CM

## 2020-07-16 ENCOUNTER — APPOINTMENT (OUTPATIENT)
Dept: PREADMISSION TESTING | Facility: HOSPITAL | Age: 85
End: 2020-07-16

## 2020-07-16 VITALS
HEART RATE: 76 BPM | DIASTOLIC BLOOD PRESSURE: 67 MMHG | WEIGHT: 177 LBS | RESPIRATION RATE: 20 BRPM | SYSTOLIC BLOOD PRESSURE: 158 MMHG | TEMPERATURE: 98.5 F | BODY MASS INDEX: 25.34 KG/M2 | OXYGEN SATURATION: 95 % | HEIGHT: 70 IN

## 2020-07-16 LAB
ANION GAP SERPL CALCULATED.3IONS-SCNC: 7.8 MMOL/L (ref 5–15)
BUN SERPL-MCNC: 23 MG/DL (ref 8–23)
BUN/CREAT SERPL: 18.4 (ref 7–25)
CALCIUM SPEC-SCNC: 9.7 MG/DL (ref 8.6–10.5)
CHLORIDE SERPL-SCNC: 97 MMOL/L (ref 98–107)
CO2 SERPL-SCNC: 27.2 MMOL/L (ref 22–29)
CREAT SERPL-MCNC: 1.25 MG/DL (ref 0.76–1.27)
DEPRECATED RDW RBC AUTO: 47.8 FL (ref 37–54)
ERYTHROCYTE [DISTWIDTH] IN BLOOD BY AUTOMATED COUNT: 14.3 % (ref 12.3–15.4)
GFR SERPL CREATININE-BSD FRML MDRD: 55 ML/MIN/1.73
GLUCOSE SERPL-MCNC: 266 MG/DL (ref 65–99)
HCT VFR BLD AUTO: 39.8 % (ref 37.5–51)
HGB BLD-MCNC: 13.2 G/DL (ref 13–17.7)
MCH RBC QN AUTO: 30.3 PG (ref 26.6–33)
MCHC RBC AUTO-ENTMCNC: 33.2 G/DL (ref 31.5–35.7)
MCV RBC AUTO: 91.3 FL (ref 79–97)
PLATELET # BLD AUTO: 153 10*3/MM3 (ref 140–450)
PMV BLD AUTO: 10.8 FL (ref 6–12)
POTASSIUM SERPL-SCNC: 5.2 MMOL/L (ref 3.5–5.2)
RBC # BLD AUTO: 4.36 10*6/MM3 (ref 4.14–5.8)
SODIUM SERPL-SCNC: 132 MMOL/L (ref 136–145)
WBC # BLD AUTO: 4.07 10*3/MM3 (ref 3.4–10.8)

## 2020-07-16 PROCEDURE — 85027 COMPLETE CBC AUTOMATED: CPT | Performed by: UROLOGY

## 2020-07-16 PROCEDURE — 36415 COLL VENOUS BLD VENIPUNCTURE: CPT

## 2020-07-16 PROCEDURE — 93010 ELECTROCARDIOGRAM REPORT: CPT | Performed by: INTERNAL MEDICINE

## 2020-07-16 PROCEDURE — 93005 ELECTROCARDIOGRAM TRACING: CPT

## 2020-07-16 PROCEDURE — 80048 BASIC METABOLIC PNL TOTAL CA: CPT | Performed by: UROLOGY

## 2020-07-16 RX ORDER — ASPIRIN 81 MG/1
81 TABLET ORAL DAILY
COMMUNITY
End: 2020-08-20

## 2020-07-16 NOTE — DISCHARGE INSTRUCTIONS
Take the following medications the morning of surgery with a small sip of water:    METOPROLOL     ARRIVE TO MAIN SURGERY AT 5:30 AM ON 7/23/2020      General Instructions:  • Do not eat or drink anything after midnight the night before surgery.  • Infants may have breast milk up to four hours before surgery.  • Infants drinking formula may drink formula up to six hours before surgery.   • Patients who avoid smoking, chewing tobacco and alcohol for 4 weeks prior to surgery have a reduced risk of post-operative complications.  Quit smoking as many days before surgery as you can.  • Do not smoke, use chewing tobacco or drink alcohol the day of surgery.   • If applicable bring your C-PAP/ BI-PAP machine.  • Bring any papers given to you in the doctor’s office.  • Wear clean comfortable clothes.  • Do not wear contact lenses, false eyelashes or make-up.  Bring a case for your glasses.   • Bring crutches or walker if applicable.  • Remove all piercings.  Leave jewelry and any other valuables at home.  • Hair extensions with metal clips must be removed prior to surgery.  • The Pre-Admission Testing nurse will instruct you to bring medications if unable to obtain an accurate list in Pre-Admission Testing.            Preventing a Surgical Site Infection:  • For 2 to 3 days before surgery, avoid shaving with a razor because the razor can irritate skin and make it easier to develop an infection.    • Any areas of open skin can increase the risk of a post-operative wound infection by allowing bacteria to enter and travel throughout the body.  Notify your surgeon if you have any skin wounds / rashes even if it is not near the expected surgical site.  The area will need assessed to determine if surgery should be delayed until it is healed.  • The night prior to surgery sleep in a clean bed with clean clothing.  Do not allow pets to sleep with you.  • Shower on the morning of surgery using a fresh bar of anti-bacterial soap (such  as Dial) and clean washcloth.  Dry with a clean towel and dress in clean clothing.  • Ask your surgeon if you will be receiving antibiotics prior to surgery.  • Make sure you, your family, and all healthcare providers clean their hands with soap and water or an alcohol based hand  before caring for you or your wound.    Day of surgery:  Your arrival time is approximately two hours before your scheduled surgery time.  Upon arrival, a Pre-op nurse and Anesthesiologist will review your health history, obtain vital signs, and answer questions you may have.  The only belongings needed at this time will be your home medications and if applicable your C-PAP/BI-PAP machine.  If you are staying overnight your family can leave the rest of your belongings in the car and bring them to your room later.  A Pre-op nurse will start an IV and you may receive medication in preparation for surgery, including something to help you relax.  Your family will be able to see you in the Pre-op area.  Two visitors at a time will be allowed in the Pre-op room.  While you are in surgery your family should notify the waiting room  if they leave the waiting room area and provide a contact phone number.    Please be aware that surgery does come with discomfort.  We want to make every effort to control your discomfort so please discuss any uncontrolled symptoms with your nurse.   Your doctor will most likely have prescribed pain medications.      If you are going home after surgery you will receive individualized written care instructions before being discharged.  A responsible adult must drive you to and from the hospital on the day of your surgery and stay with you for 24 hours.    If you are staying overnight following surgery, you will be transported to your hospital room following the recovery period.  [facility] has all private rooms.    If you have any questions please call Pre-Admission Testing at  [phone].  Deductibles and co-payments are collected on the day of service. Please be prepared to pay the required co-pay, deductible or deposit on the day of service as defined by your plan.    Patient Education for Self-Quarantine Process    Following your COVID testing, we strongly recommend that you do not leave your home after you have been tested for COVID except to get medical care. This includes not going to work, school or to public areas.  If this is not possible for you to do please limit your activities to only required outings.  Be sure to wear a mask when you are with other people, practice social distancing and wash your hands frequently.      The following items provide additional details to keep you safe.  • Wash your hands with soap and water frequently for at least 20 seconds.   • Avoid touching your eyes, nose and mouth with unwashed hands.  • Do not share anything - utensils, towels, food from the same bowl.   • Have your own utensils, drinking glass, dishes, towels and bedding.   • Do not have visitors.   • Do use FaceTime to stay in touch with family and friends.  • You should stay in a specific room away from others if possible.   • Stay at least 6 feet away from others in the home if you cannot have a dedicated room to yourself.   • Do not snuggle with your pet. While the CDC says there is no evidence that pets can spread COVID-19 or be infected from humans, it is probably best to avoid “petting, snuggling, being kissed or licked and sharing food (during self-quarantine)”, according to the CDC.   • Sanitize household surfaces daily. Include all high touch areas (door handles, light switches, phones, countertops, etc.)  • Do not share a bathroom with others, if possible.   • Wear a mask around others in your home if you are unable to stay in a separate room or 6 feet apart. If  you are unable to wear a mask, have your family member wear a mask if they must be within 6 feet of you.   Call  your surgeon immediately if you experience any of the following symptoms:  • Sore Throat  • Shortness of Breath or difficulty breathing  • Cough  • Chills  • Body soreness or muscle pain  • Headache  • Fever  • New loss of taste or smell  • Do not arrive for your surgery ill.  Your procedure will need to be rescheduled to another time.  You will need to call your physician before the day of surgery to avoid any unnecessary exposure to hospital staff as well as other patients.

## 2020-07-21 ENCOUNTER — OFFICE VISIT (OUTPATIENT)
Dept: FAMILY MEDICINE CLINIC | Facility: CLINIC | Age: 85
End: 2020-07-21

## 2020-07-21 ENCOUNTER — LAB (OUTPATIENT)
Dept: LAB | Facility: HOSPITAL | Age: 85
End: 2020-07-21

## 2020-07-21 VITALS
BODY MASS INDEX: 25.18 KG/M2 | TEMPERATURE: 97.2 F | HEIGHT: 70 IN | SYSTOLIC BLOOD PRESSURE: 142 MMHG | DIASTOLIC BLOOD PRESSURE: 60 MMHG | WEIGHT: 175.9 LBS | HEART RATE: 52 BPM | OXYGEN SATURATION: 97 % | RESPIRATION RATE: 12 BRPM

## 2020-07-21 DIAGNOSIS — E11.9 TYPE 2 DIABETES MELLITUS WITHOUT COMPLICATION, WITH LONG-TERM CURRENT USE OF INSULIN (HCC): Primary | ICD-10-CM

## 2020-07-21 DIAGNOSIS — Z01.818 OTHER SPECIFIED PRE-OPERATIVE EXAMINATION: ICD-10-CM

## 2020-07-21 DIAGNOSIS — Z79.4 TYPE 2 DIABETES MELLITUS WITHOUT COMPLICATION, WITH LONG-TERM CURRENT USE OF INSULIN (HCC): Primary | ICD-10-CM

## 2020-07-21 LAB — HBA1C MFR BLD: 8.15 % (ref 4.8–5.6)

## 2020-07-21 PROCEDURE — 99213 OFFICE O/P EST LOW 20 MIN: CPT | Performed by: FAMILY MEDICINE

## 2020-07-21 PROCEDURE — C9803 HOPD COVID-19 SPEC COLLECT: HCPCS

## 2020-07-21 PROCEDURE — U0004 COV-19 TEST NON-CDC HGH THRU: HCPCS

## 2020-07-22 LAB
REF LAB TEST METHOD: NORMAL
SARS-COV-2 RNA RESP QL NAA+PROBE: NOT DETECTED

## 2020-07-23 ENCOUNTER — APPOINTMENT (OUTPATIENT)
Dept: GENERAL RADIOLOGY | Facility: HOSPITAL | Age: 85
End: 2020-07-23

## 2020-07-23 ENCOUNTER — ANESTHESIA (OUTPATIENT)
Dept: PERIOP | Facility: HOSPITAL | Age: 85
End: 2020-07-23

## 2020-07-23 ENCOUNTER — HOSPITAL ENCOUNTER (OUTPATIENT)
Facility: HOSPITAL | Age: 85
Setting detail: HOSPITAL OUTPATIENT SURGERY
Discharge: HOME | End: 2020-07-23
Attending: UROLOGY | Admitting: UROLOGY

## 2020-07-23 ENCOUNTER — ANESTHESIA EVENT (OUTPATIENT)
Dept: PERIOP | Facility: HOSPITAL | Age: 85
End: 2020-07-23

## 2020-07-23 VITALS
RESPIRATION RATE: 16 BRPM | OXYGEN SATURATION: 96 % | WEIGHT: 175.19 LBS | HEIGHT: 70 IN | DIASTOLIC BLOOD PRESSURE: 89 MMHG | SYSTOLIC BLOOD PRESSURE: 139 MMHG | HEART RATE: 74 BPM | BODY MASS INDEX: 25.08 KG/M2 | TEMPERATURE: 98 F

## 2020-07-23 LAB
GLUCOSE BLDC GLUCOMTR-MCNC: 156 MG/DL (ref 70–130)
GLUCOSE BLDC GLUCOMTR-MCNC: 162 MG/DL (ref 70–130)

## 2020-07-23 PROCEDURE — 25010000002 PROPOFOL 10 MG/ML EMULSION: Performed by: NURSE ANESTHETIST, CERTIFIED REGISTERED

## 2020-07-23 PROCEDURE — C1769 GUIDE WIRE: HCPCS | Performed by: UROLOGY

## 2020-07-23 PROCEDURE — 25010000002 ONDANSETRON PER 1 MG: Performed by: NURSE ANESTHETIST, CERTIFIED REGISTERED

## 2020-07-23 PROCEDURE — 25010000003 CEFAZOLIN 1-4 GM/50ML-% SOLUTION: Performed by: UROLOGY

## 2020-07-23 PROCEDURE — C2617 STENT, NON-COR, TEM W/O DEL: HCPCS | Performed by: UROLOGY

## 2020-07-23 PROCEDURE — 74420 UROGRAPHY RTRGR +-KUB: CPT

## 2020-07-23 PROCEDURE — C1758 CATHETER, URETERAL: HCPCS | Performed by: UROLOGY

## 2020-07-23 PROCEDURE — 82962 GLUCOSE BLOOD TEST: CPT

## 2020-07-23 PROCEDURE — 0 IOVERSOL 64 % SOLUTION: Performed by: UROLOGY

## 2020-07-23 DEVICE — URETERAL STENT
Type: IMPLANTABLE DEVICE | Site: URETER | Status: FUNCTIONAL
Brand: CONTOUR™

## 2020-07-23 RX ORDER — ONDANSETRON 2 MG/ML
4 INJECTION INTRAMUSCULAR; INTRAVENOUS ONCE AS NEEDED
Status: DISCONTINUED | OUTPATIENT
Start: 2020-07-23 | End: 2020-07-23 | Stop reason: HOSPADM

## 2020-07-23 RX ORDER — PROMETHAZINE HYDROCHLORIDE 25 MG/1
25 TABLET ORAL ONCE AS NEEDED
Status: DISCONTINUED | OUTPATIENT
Start: 2020-07-23 | End: 2020-07-23 | Stop reason: HOSPADM

## 2020-07-23 RX ORDER — FLUMAZENIL 0.1 MG/ML
0.2 INJECTION INTRAVENOUS AS NEEDED
Status: DISCONTINUED | OUTPATIENT
Start: 2020-07-23 | End: 2020-07-23 | Stop reason: HOSPADM

## 2020-07-23 RX ORDER — EPHEDRINE SULFATE 50 MG/ML
5 INJECTION, SOLUTION INTRAVENOUS ONCE AS NEEDED
Status: DISCONTINUED | OUTPATIENT
Start: 2020-07-23 | End: 2020-07-23 | Stop reason: HOSPADM

## 2020-07-23 RX ORDER — FENTANYL CITRATE 50 UG/ML
50 INJECTION, SOLUTION INTRAMUSCULAR; INTRAVENOUS
Status: DISCONTINUED | OUTPATIENT
Start: 2020-07-23 | End: 2020-07-23 | Stop reason: HOSPADM

## 2020-07-23 RX ORDER — PROPOFOL 10 MG/ML
VIAL (ML) INTRAVENOUS AS NEEDED
Status: DISCONTINUED | OUTPATIENT
Start: 2020-07-23 | End: 2020-07-23 | Stop reason: SURG

## 2020-07-23 RX ORDER — FAMOTIDINE 10 MG/ML
20 INJECTION, SOLUTION INTRAVENOUS ONCE
Status: COMPLETED | OUTPATIENT
Start: 2020-07-23 | End: 2020-07-23

## 2020-07-23 RX ORDER — CEFAZOLIN SODIUM 1 G/50ML
1 INJECTION, SOLUTION INTRAVENOUS ONCE
Status: COMPLETED | OUTPATIENT
Start: 2020-07-23 | End: 2020-07-23

## 2020-07-23 RX ORDER — SODIUM CHLORIDE 0.9 % (FLUSH) 0.9 %
3 SYRINGE (ML) INJECTION EVERY 12 HOURS SCHEDULED
Status: DISCONTINUED | OUTPATIENT
Start: 2020-07-23 | End: 2020-07-23 | Stop reason: HOSPADM

## 2020-07-23 RX ORDER — OXYCODONE AND ACETAMINOPHEN 7.5; 325 MG/1; MG/1
1 TABLET ORAL ONCE AS NEEDED
Status: DISCONTINUED | OUTPATIENT
Start: 2020-07-23 | End: 2020-07-23 | Stop reason: HOSPADM

## 2020-07-23 RX ORDER — LIDOCAINE HYDROCHLORIDE 20 MG/ML
INJECTION, SOLUTION INFILTRATION; PERINEURAL AS NEEDED
Status: DISCONTINUED | OUTPATIENT
Start: 2020-07-23 | End: 2020-07-23 | Stop reason: SURG

## 2020-07-23 RX ORDER — DIPHENHYDRAMINE HCL 25 MG
25 CAPSULE ORAL
Status: DISCONTINUED | OUTPATIENT
Start: 2020-07-23 | End: 2020-07-23 | Stop reason: HOSPADM

## 2020-07-23 RX ORDER — LIDOCAINE HYDROCHLORIDE 10 MG/ML
0.5 INJECTION, SOLUTION EPIDURAL; INFILTRATION; INTRACAUDAL; PERINEURAL ONCE AS NEEDED
Status: DISCONTINUED | OUTPATIENT
Start: 2020-07-23 | End: 2020-07-23 | Stop reason: HOSPADM

## 2020-07-23 RX ORDER — HYDROMORPHONE HYDROCHLORIDE 1 MG/ML
0.25 INJECTION, SOLUTION INTRAMUSCULAR; INTRAVENOUS; SUBCUTANEOUS
Status: DISCONTINUED | OUTPATIENT
Start: 2020-07-23 | End: 2020-07-23 | Stop reason: HOSPADM

## 2020-07-23 RX ORDER — HYDROCODONE BITARTRATE AND ACETAMINOPHEN 7.5; 325 MG/1; MG/1
1 TABLET ORAL ONCE AS NEEDED
Status: DISCONTINUED | OUTPATIENT
Start: 2020-07-23 | End: 2020-07-23 | Stop reason: HOSPADM

## 2020-07-23 RX ORDER — SODIUM CHLORIDE, SODIUM LACTATE, POTASSIUM CHLORIDE, CALCIUM CHLORIDE 600; 310; 30; 20 MG/100ML; MG/100ML; MG/100ML; MG/100ML
9 INJECTION, SOLUTION INTRAVENOUS CONTINUOUS
Status: DISCONTINUED | OUTPATIENT
Start: 2020-07-23 | End: 2020-07-23 | Stop reason: HOSPADM

## 2020-07-23 RX ORDER — NALOXONE HCL 0.4 MG/ML
0.2 VIAL (ML) INJECTION AS NEEDED
Status: DISCONTINUED | OUTPATIENT
Start: 2020-07-23 | End: 2020-07-23 | Stop reason: HOSPADM

## 2020-07-23 RX ORDER — PROMETHAZINE HYDROCHLORIDE 25 MG/1
25 SUPPOSITORY RECTAL ONCE AS NEEDED
Status: DISCONTINUED | OUTPATIENT
Start: 2020-07-23 | End: 2020-07-23 | Stop reason: HOSPADM

## 2020-07-23 RX ORDER — PROMETHAZINE HYDROCHLORIDE 25 MG/ML
12.5 INJECTION, SOLUTION INTRAMUSCULAR; INTRAVENOUS ONCE AS NEEDED
Status: DISCONTINUED | OUTPATIENT
Start: 2020-07-23 | End: 2020-07-23 | Stop reason: HOSPADM

## 2020-07-23 RX ORDER — DIPHENHYDRAMINE HYDROCHLORIDE 50 MG/ML
12.5 INJECTION INTRAMUSCULAR; INTRAVENOUS
Status: DISCONTINUED | OUTPATIENT
Start: 2020-07-23 | End: 2020-07-23 | Stop reason: HOSPADM

## 2020-07-23 RX ORDER — PROMETHAZINE HYDROCHLORIDE 25 MG/ML
6.25 INJECTION, SOLUTION INTRAMUSCULAR; INTRAVENOUS
Status: DISCONTINUED | OUTPATIENT
Start: 2020-07-23 | End: 2020-07-23 | Stop reason: HOSPADM

## 2020-07-23 RX ORDER — MIDAZOLAM HYDROCHLORIDE 1 MG/ML
1 INJECTION INTRAMUSCULAR; INTRAVENOUS
Status: DISCONTINUED | OUTPATIENT
Start: 2020-07-23 | End: 2020-07-23 | Stop reason: HOSPADM

## 2020-07-23 RX ORDER — ACETAMINOPHEN 325 MG/1
650 TABLET ORAL ONCE AS NEEDED
Status: DISCONTINUED | OUTPATIENT
Start: 2020-07-23 | End: 2020-07-23 | Stop reason: HOSPADM

## 2020-07-23 RX ORDER — HYDRALAZINE HYDROCHLORIDE 20 MG/ML
5 INJECTION INTRAMUSCULAR; INTRAVENOUS
Status: DISCONTINUED | OUTPATIENT
Start: 2020-07-23 | End: 2020-07-23 | Stop reason: HOSPADM

## 2020-07-23 RX ORDER — GLYCOPYRROLATE 0.2 MG/ML
INJECTION INTRAMUSCULAR; INTRAVENOUS AS NEEDED
Status: DISCONTINUED | OUTPATIENT
Start: 2020-07-23 | End: 2020-07-23 | Stop reason: SURG

## 2020-07-23 RX ORDER — LABETALOL HYDROCHLORIDE 5 MG/ML
5 INJECTION, SOLUTION INTRAVENOUS
Status: DISCONTINUED | OUTPATIENT
Start: 2020-07-23 | End: 2020-07-23 | Stop reason: HOSPADM

## 2020-07-23 RX ORDER — ONDANSETRON 2 MG/ML
INJECTION INTRAMUSCULAR; INTRAVENOUS AS NEEDED
Status: DISCONTINUED | OUTPATIENT
Start: 2020-07-23 | End: 2020-07-23 | Stop reason: SURG

## 2020-07-23 RX ORDER — SODIUM CHLORIDE 0.9 % (FLUSH) 0.9 %
3-10 SYRINGE (ML) INJECTION AS NEEDED
Status: DISCONTINUED | OUTPATIENT
Start: 2020-07-23 | End: 2020-07-23 | Stop reason: HOSPADM

## 2020-07-23 RX ADMIN — SODIUM CHLORIDE, POTASSIUM CHLORIDE, SODIUM LACTATE AND CALCIUM CHLORIDE 9 ML/HR: 600; 310; 30; 20 INJECTION, SOLUTION INTRAVENOUS at 06:48

## 2020-07-23 RX ADMIN — ONDANSETRON HYDROCHLORIDE 4 MG: 2 SOLUTION INTRAMUSCULAR; INTRAVENOUS at 07:31

## 2020-07-23 RX ADMIN — PROPOFOL 130 MG: 10 INJECTION, EMULSION INTRAVENOUS at 07:33

## 2020-07-23 RX ADMIN — LIDOCAINE HYDROCHLORIDE 30 MG: 20 INJECTION, SOLUTION INFILTRATION; PERINEURAL at 07:33

## 2020-07-23 RX ADMIN — CEFAZOLIN SODIUM 1 G: 1 INJECTION, SOLUTION INTRAVENOUS at 07:36

## 2020-07-23 RX ADMIN — FAMOTIDINE 20 MG: 10 INJECTION, SOLUTION INTRAVENOUS at 06:49

## 2020-07-23 RX ADMIN — GLYCOPYRROLATE 0.1 MG: 0.2 INJECTION INTRAMUSCULAR; INTRAVENOUS at 07:31

## 2020-07-23 NOTE — H&P
*   2020 - Ceasar Nj MD (Signed-Off)      Patient: ALEM BELLAMY (Male) Date: 2020 at 8:30AM     : 1931 (88) PCP: MILO BARROSO MD (BH) (FAX: [phone])     Account: [id number] Referring: MILO BARROSO MD (BH) (FAX: [phone])         Subjective    Chief Complaint  TALK ABOUT TAKING STENTS OUT  History of Present Illness   Bilateral Hydronephrosis.: CT (2019 @ [facility]) - new mild left hydroureteronephrosis with an abrupt transition in caliber within the pelvis adjacent to the bifurcation of the left common iliac artery. no renal or ureteral stones. may be seconarty to a ureteral stricture or a nonradiopaque stone.   pt was at [facility] for abdominal pain still having abdominal pain. pt states having increase in frequency. states this been ongoing for a while. nocturia x2-4. no dysuria. no gross hematuria  no prior hx of stones. no hx of smoking.  CT (2020 @ ) - Multiple enlarged lymph nodes. NEw right hydro. Worrisome for Ca.  Right Stent Placement (2020)   LEft Stent Exchange and Right Stent Placement (2020)   CT (2020) - No adenopathy. Bilateral ureteral stents.   OV (2020) - Patient is here today becasue he wants his stents out. He says that when he takes his Eliquis he has gross hematuria. He also complains of pain and burning with urination.He was evaled by oncology and was found to have no cancer. I reviewed information above on 2020.   BPH: ALEM is a 88 year old, White, Male who presents today with symptoms consistent with BPH. Nocturia x 1-2. having urgency and some UI. takes a long time to empty out. having some weaker flow a little burning. no gorss hematuria sx slowly getting worse over the last several months looks like opening is getting smaller. I reviewed information above on 2020.   Elevated PSA: No further PSA testing due to age. I reviewed information above on 2020.  History  Past Medical History: Erection Problems; Heart Disease Flu vaccine  Pneumococcal vaccine   Surgical History: tonsil and adnoids (1942); blood clots (1992&996); TUNA (2006); gallbladder (2010); A-FIB (2011); throat and nose (1988); hernia (2002); colonoscopy (2011); UTI (2009); lymphnode removed; COLONOSCOPY   Hospitalization History: pancreatitis (2018); PANCREATITIS (05/2020); SAME AS SURGERIES   Social History: Current smoking status: Never smoker. Alcohol consumption status: never. ALEM denies the use of recreational drugs. The patient is sexually active. Does patient have an advanced care plan? Yes - Plan not documented. Does patient have power of  or surrogate decision maker? Yes Son- Gonzalo Wynn.   Family Medical History:   Brother has diabetes.   Brother is alive (DIABETES).   Complete PFSH reviewed from 04/20/2020, and there are no changes. Complete PFSH reviewed on 03/16/2020 no changes except SX.  Other Treating Clinicians:   Primary Care Physician: Dr. Yodit Mahoney  Review of Systems   Constitutional: No Fever.   Immunologic: No Latex Allergy.   Neurological: No Decreased Sensation.   Gastrointestinal: No Constipation.   Respiratory: Positive Shortness of Breath, Current tobacco non-user.   Ear/Nose/Throat/Mouth: No Hearing Loss.   Hematologic: No Easy Bruising.   Musculoskeletal: No new bone pain.   Cardiovascular: No Chest Pain.   Genitourinary: No Leaking Urine or Wetting himself.   Pain: STENT.   BMI: BMI is abnormal - high.   Current Meds: Documented.  Allergies   No Known Drug Allergies  Current Medications   Eliquis oral - ORAL   Lantus subcutaneous solution - SOLUTION SUBCUTANEOUS   pantoprazole 40 mg intravenous solution - Recon Soln INTRAVENOUS DRIP   PreserVision AREDS   Toprol XL oral - ORAL    Objective    Vitals - 8:25 AM   Height: 5 ft 10 in   Weight: 170 lbs   BMI: 24.39   BSA: 1.95  Physical Exam   Constitutional: General appearance is normal. Vitals: Reviewed. Ht-5 Ft.10.00 In. Wt-170 Lbs. BP-.   Psychiatric: Orientation normal. Mood and affect  normal.   Eyes: Pupils/irises normal. Exterior inspection conjunctivae and lids normal.   ENT: Hearing normal. Exterior inspection of ears and nose normal.   Skin: Inspection normal. Palpation normal.   Musculoskeletal: Gait normal. Inspection of nails and digits reveal no abnormalities.   Respiratory: Normal effort.   Cardiovascular: No edema in extremities.    Assessment      Plan    Counseling  Today I discussed the following with ALEM:   General Issues:   Bilateral Hydroneohrosis - Undiagnosed melignancy VS RP fibrosis. Message left for Dr. Nagy (Oncologist) to call me back. Patient needs cysto and stent change. If patient is not Dx with cancer may need ureterolysis.   BPH - Observation.   Elevated PSA - No further PSA testing.

## 2020-07-23 NOTE — ANESTHESIA PROCEDURE NOTES
Airway  Urgency: elective    Date/Time: 7/23/2020 7:34 AM  Airway not difficult    General Information and Staff    Patient location during procedure: OR  Anesthesiologist: Jim Choudhary MD  CRNA: Olga Yang CRNA    Indications and Patient Condition  Indications for airway management: airway protection    Preoxygenated: yes  Mask difficulty assessment: 0 - not attempted    Final Airway Details  Final airway type: supraglottic airway      Successful airway: unique  Size 5    Number of attempts at approach: 1  Assessment: lips, teeth, and gum same as pre-op and atraumatic intubation

## 2020-07-23 NOTE — BRIEF OP NOTE
CYSTOSCOPY URETERAL CATHETER/STENT INSERTION  Progress Note    Home [facility]ynn  7/23/2020    Pre-op Diagnosis:   ** bilat hydro       Post-Op Diagnosis Codes:   **same    Procedure/CPT® Codes:      Procedure(s):  CYSTOSCOPY WITH BILATERAL STENT EXCHANGE    Surgeon(s):  Ceasar Nj MD    Anesthesia: General    Staff:   Circulator: Shin Youngblood RN  Scrub Person: Dwight Michel    Estimated Blood Loss: none    Urine Voided: * No values recorded between 7/23/2020  7:27 AM and 7/23/2020  8:05 AM *    Specimens:                None          Drains:   Ureteral Drain/Stent Left ureter 6 Fr. (Active)       Ureteral Drain/Stent Right ureter 6 Fr. (Active)       Findings: bilat hydro    Complications: **none      Ceasar Nj MD     Date: 7/23/2020  Time: 08:10

## 2020-07-23 NOTE — OP NOTE
PREOPERATIVE DIAGNOSIS:  Bilateral hydronephrosis.     POSTOPERATIVE DIAGNOSIS:   Bilateral hydronephrosis.    PROCEDURES PERFORMED:  Cystoscopy, bilateral retrograde pyelography, bilateral stent removal and replacement.      SURGEON:  Ceasar Nj MD    COMPLICATIONS:  None.     CONSULTATIONS:  None.    ANESTHESIA:  General.    INDICATIONS FOR THE PROCEDURE:  This patient has bilateral hydronephrosis due to bilateral ureteral obstruction at the level of the iliac vessels.  He presents now for stent removal, replacement and retrograde pyelography.  He has not been diagnosed with malignancy yet.      DESCRIPTION OF PROCEDURE:  The patient was brought into the operating room and placed on the operating table in the supine position.  General anesthesia was induced.  Cystoscopy revealed bilateral stents with a small amount of calcification that were removed.  Bilateral stents were replaced after bilateral retrograde pyelograms were performed. Both stents were 6 [place] 26 cm stents.      The bilateral retrograde pyelograms revealed obstruction at the level of the ureteral crossing of the iliac vessels, roughly 3 cm from the UVJ bilaterally.  The obstructed segment was short bilaterally.  After confirming stent placement the bladder was drained.  The patient was awakened and taken to the recovery room in good and stable condition.  There were no complications.

## 2020-07-23 NOTE — ANESTHESIA POSTPROCEDURE EVALUATION
Patient: Home Wynn    Procedure Summary     Date:  07/23/20 Room / Location:  [facility] OR  / [facility] MAIN OR    Anesthesia Start:  0728 Anesthesia Stop:  0810    Procedure:  CYSTOSCOPY WITH BILATERAL STENT EXCHANGE (N/A ) Diagnosis:      Surgeon:  Ceasar Nj MD Provider:  Jim Choudhary MD    Anesthesia Type:  general ASA Status:  3          Anesthesia Type: general    Vitals  Vitals Value Taken Time   /83 7/23/2020  8:30 AM   Temp 36.7 °C (98 °F) 7/23/2020  8:30 AM   Pulse 71 7/23/2020  8:40 AM   Resp 16 7/23/2020  8:30 AM   SpO2 98 % 7/23/2020  8:41 AM   Vitals shown include unvalidated device data.        Post Anesthesia Care and Evaluation    Patient location during evaluation: PACU  Patient participation: complete - patient participated  Level of consciousness: awake and alert  Pain management: adequate  Airway patency: patent  Anesthetic complications: No anesthetic complications    Cardiovascular status: acceptable  Respiratory status: acceptable  Hydration status: acceptable    Comments: --------------------            07/23/20               0900     --------------------   BP:       139/89     Pulse:      74       Resp:       16       Temp:                SpO2:      96%      --------------------

## 2020-07-23 NOTE — ANESTHESIA PREPROCEDURE EVALUATION
Anesthesia Evaluation     Patient summary reviewed and Nursing notes reviewed   NPO Solid Status: > 8 hours  NPO Liquid Status: > 4 hours           Airway   Mallampati: II  Neck ROM: full  No difficulty expected  Dental    (+) partials and upper dentures    Pulmonary     breath sounds clear to auscultation  (+) shortness of breath, sleep apnea,     ROS comment: Mass vallecula    ?                    Cardiovascular     Rhythm: regular    (+) hypertension, dysrhythmias Atrial Fib, CHF , DVT, hyperlipidemia,       Neuro/Psych  (+) dizziness/light headedness, syncope, numbness,     GI/Hepatic/Renal/Endo    (+)  GI bleeding , renal disease, diabetes mellitus,     Musculoskeletal     Abdominal    Substance History      OB/GYN          Other   arthritis,                      Anesthesia Plan    ASA 3     general     intravenous induction     Anesthetic plan, all risks, benefits, and alternatives have been provided, discussed and informed consent has been obtained with: patient.

## 2020-07-29 ENCOUNTER — TELEPHONE (OUTPATIENT)
Dept: FAMILY MEDICINE CLINIC | Facility: CLINIC | Age: 85
End: 2020-07-29

## 2020-08-20 ENCOUNTER — OFFICE VISIT (OUTPATIENT)
Dept: CARDIOLOGY | Facility: CLINIC | Age: 85
End: 2020-08-20

## 2020-08-20 VITALS
DIASTOLIC BLOOD PRESSURE: 70 MMHG | BODY MASS INDEX: 25.48 KG/M2 | SYSTOLIC BLOOD PRESSURE: 120 MMHG | HEART RATE: 78 BPM | HEIGHT: 70 IN | WEIGHT: 178 LBS

## 2020-08-20 DIAGNOSIS — I10 ESSENTIAL HYPERTENSION: ICD-10-CM

## 2020-08-20 DIAGNOSIS — I48.20 CHRONIC ATRIAL FIBRILLATION (HCC): Primary | ICD-10-CM

## 2020-08-20 PROCEDURE — 93000 ELECTROCARDIOGRAM COMPLETE: CPT | Performed by: INTERNAL MEDICINE

## 2020-08-20 PROCEDURE — 99213 OFFICE O/P EST LOW 20 MIN: CPT | Performed by: INTERNAL MEDICINE

## 2020-08-20 NOTE — PROGRESS NOTES
Subjective:     Encounter Date:08/20/20        Patient ID: Home Wynn is a 88 y.o. male.    Chief Complaint:  Atrial Fibrillation   Presents for follow-up visit. Symptoms are negative for bradycardia, dizziness, hypertension, hypotension, pacemaker problem, tachycardia and weakness. The symptoms have been stable. Past medical history includes atrial fibrillation.   Hypertension   This is a chronic problem. The problem is controlled. Associated symptoms include malaise/fatigue.   Cardiomyopathy   This is a chronic problem. The current episode started more than 1 month ago. The problem has been rapidly improving. Associated symptoms include chills. Pertinent negatives include no weakness.       88-year-old gentleman who presents today for reevaluation.  Patient is back to baseline doing well.  He said recently he had developed a stone causing pancreatitis.  After that was removed he says he is felt the best he has in a year and a half.  Unfortunately he is not been able to take anticoagulation due to recurrent hematuria.  He has some stents placed they recurrently bleed.  We will try Xarelto and Eliquis even at lower dosages and he continues to bleed.    Review of Systems   Constitution: Positive for chills, decreased appetite and malaise/fatigue.   Respiratory: Positive for snoring.    Neurological: Negative for dizziness and weakness.   All other systems reviewed and are negative.        ECG 12 Lead  Date/Time: 8/20/2020 12:52 PM  Performed by: Aaron Tripathi MD  Authorized by: Aaron Tripathi MD   Comparison: compared with previous ECG from 7/16/2020  Similar to previous ECG  Rhythm: atrial fibrillation  Other findings: left ventricular hypertrophy with strain    Clinical impression: abnormal EKG               Objective:     Physical Exam   Constitutional: He is oriented to person, place, and time. He appears well-developed.   HENT:   Head: Normocephalic.   Eyes: Conjunctivae are normal.   Neck:  Normal range of motion.   Cardiovascular: Normal rate and normal heart sounds. An irregularly irregular rhythm present.   Pulmonary/Chest: Breath sounds normal.   Abdominal: Soft. Bowel sounds are normal.   Musculoskeletal: Normal range of motion. He exhibits no edema.   Neurological: He is alert and oriented to person, place, and time.   Skin: Skin is warm and dry.   Psychiatric: He has a normal mood and affect. His behavior is normal.   Vitals reviewed.      Lab Review:       Assessment:          Diagnosis Plan   1. Chronic atrial fibrillation     2. Essential hypertension            Plan:       1.  Chronic atrial fibrillation stable.  Unfortunately patient cannot take anticoagulation.  We discussed trying a baby aspirin.  Although current recommendations do not suggest that he is not able to take recurrent recommended anticoagulation.  We again discussed the issues associated with not taking the anticoagulation clinic stroke however it is noted he has no choice at this point  2.  Hypertension blood pressure is good  3.  Cardiomyopathy.  Patient is been stable with no signs of heart failure doing well.  4.  Continue same follow-up 1 year sooner if issues    Atrial Fibrillation and Atrial Flutter  Assessment  • The patient has paroxysmal atrial fibrillation  • This is non-valvular in etiology  • The patient's CHADS2-VASc score is 2  • A [id number] score of 2 or more is considered a high risk for a thromboembolic event  • Warfarin not prescribed for medical reasons  • Rivaroxaban prescribed    Plan  • Continue in atrial fibrillation with rate control  • Continue rivaroxaban for antithrombotic therapy, bleeding issues discussed  • Add beta blocker for rate control

## 2020-09-08 ENCOUNTER — TELEPHONE (OUTPATIENT)
Dept: CARDIOLOGY | Facility: CLINIC | Age: 85
End: 2020-09-08

## 2020-09-08 NOTE — TELEPHONE ENCOUNTER
Pt needs clearance to have his uretal stents changed at the end of October.        Shireen @ Dr. Nj's office    Fax # [phone]    Phone # [phone]      LOV 08-     Scan on 8/20/2020 by Aaron Tripathi MD: [facility] EKG 08/20/2020    Thanks,  Sera

## 2020-10-02 RX ORDER — PEN NEEDLE, DIABETIC 31 G X1/4"
NEEDLE, DISPOSABLE MISCELLANEOUS
Qty: 100 EACH | Refills: 0 | Status: SHIPPED | OUTPATIENT
Start: 2020-10-02 | End: 2021-01-21

## 2020-10-26 ENCOUNTER — APPOINTMENT (OUTPATIENT)
Dept: PREADMISSION TESTING | Facility: HOSPITAL | Age: 85
End: 2020-10-26

## 2020-10-26 VITALS
SYSTOLIC BLOOD PRESSURE: 136 MMHG | OXYGEN SATURATION: 97 % | HEART RATE: 78 BPM | DIASTOLIC BLOOD PRESSURE: 72 MMHG | HEIGHT: 71 IN | RESPIRATION RATE: 18 BRPM | WEIGHT: 182 LBS | TEMPERATURE: 97.3 F | BODY MASS INDEX: 25.48 KG/M2

## 2020-10-26 LAB
ANION GAP SERPL CALCULATED.3IONS-SCNC: 8.6 MMOL/L (ref 5–15)
BUN SERPL-MCNC: 23 MG/DL (ref 8–23)
BUN/CREAT SERPL: 16.1 (ref 7–25)
CALCIUM SPEC-SCNC: 10.2 MG/DL (ref 8.6–10.5)
CHLORIDE SERPL-SCNC: 103 MMOL/L (ref 98–107)
CO2 SERPL-SCNC: 27.4 MMOL/L (ref 22–29)
CREAT SERPL-MCNC: 1.43 MG/DL (ref 0.76–1.27)
DEPRECATED RDW RBC AUTO: 48.2 FL (ref 37–54)
ERYTHROCYTE [DISTWIDTH] IN BLOOD BY AUTOMATED COUNT: 13.5 % (ref 12.3–15.4)
GFR SERPL CREATININE-BSD FRML MDRD: 47 ML/MIN/1.73
GLUCOSE SERPL-MCNC: 209 MG/DL (ref 65–99)
HCT VFR BLD AUTO: 41.1 % (ref 37.5–51)
HGB BLD-MCNC: 13.7 G/DL (ref 13–17.7)
MCH RBC QN AUTO: 31.9 PG (ref 26.6–33)
MCHC RBC AUTO-ENTMCNC: 33.3 G/DL (ref 31.5–35.7)
MCV RBC AUTO: 95.6 FL (ref 79–97)
PLATELET # BLD AUTO: 171 10*3/MM3 (ref 140–450)
PMV BLD AUTO: 11.1 FL (ref 6–12)
POTASSIUM SERPL-SCNC: 5.2 MMOL/L (ref 3.5–5.2)
RBC # BLD AUTO: 4.3 10*6/MM3 (ref 4.14–5.8)
SODIUM SERPL-SCNC: 139 MMOL/L (ref 136–145)
WBC # BLD AUTO: 5.35 10*3/MM3 (ref 3.4–10.8)

## 2020-10-26 PROCEDURE — U0004 COV-19 TEST NON-CDC HGH THRU: HCPCS | Performed by: NURSE PRACTITIONER

## 2020-10-26 PROCEDURE — C9803 HOPD COVID-19 SPEC COLLECT: HCPCS

## 2020-10-26 PROCEDURE — 36415 COLL VENOUS BLD VENIPUNCTURE: CPT

## 2020-10-26 PROCEDURE — 80048 BASIC METABOLIC PNL TOTAL CA: CPT | Performed by: UROLOGY

## 2020-10-26 PROCEDURE — 85027 COMPLETE CBC AUTOMATED: CPT | Performed by: UROLOGY

## 2020-10-26 RX ORDER — INSULIN GLARGINE 100 [IU]/ML
12 INJECTION, SOLUTION SUBCUTANEOUS NIGHTLY
COMMUNITY
End: 2021-03-22

## 2020-10-26 NOTE — DISCHARGE INSTRUCTIONS
ARRIVE DAY OF SURGERY AT 11:00 AM TO MAIN SURGERY        Take the following medications the morning of surgery with a small sip of water: NONE       If you are on prescription narcotic pain medication to control your pain you may also take that medication the morning of surgery.    General Instructions:  • Do not eat or drink anything after midnight the night before surgery.  • Infants may have breast milk up to four hours before surgery.  • Infants drinking formula may drink formula up to six hours before surgery.   • Patients who avoid smoking, chewing tobacco and alcohol for 4 weeks prior to surgery have a reduced risk of post-operative complications.  Quit smoking as many days before surgery as you can.  • Do not smoke, use chewing tobacco or drink alcohol the day of surgery.   • If applicable bring your C-PAP/ BI-PAP machine.  • Bring any papers given to you in the doctor’s office.  • Wear clean comfortable clothes.  • Do not wear contact lenses, false eyelashes or make-up.  Bring a case for your glasses.   • Bring crutches or walker if applicable.  • Remove all piercings.  Leave jewelry and any other valuables at home.  • Hair extensions with metal clips must be removed prior to surgery.  • The Pre-Admission Testing nurse will instruct you to bring medications if unable to obtain an accurate list in Pre-Admission Testing.            Preventing a Surgical Site Infection:  • For 2 to 3 days before surgery, avoid shaving with a razor because the razor can irritate skin and make it easier to develop an infection.    • Any areas of open skin can increase the risk of a post-operative wound infection by allowing bacteria to enter and travel throughout the body.  Notify your surgeon if you have any skin wounds / rashes even if it is not near the expected surgical site.  The area will need assessed to determine if surgery should be delayed until it is healed.  • The night prior to surgery shower using a fresh bar of  anti-bacterial soap (such as Dial) and clean washcloth.  Sleep in a clean bed with clean clothing.  Do not allow pets to sleep with you.  • Shower on the morning of surgery using a fresh bar of anti-bacterial soap (such as Dial) and clean washcloth.  Dry with a clean towel and dress in clean clothing.  • Ask your surgeon if you will be receiving antibiotics prior to surgery.  • Make sure you, your family, and all healthcare providers clean their hands with soap and water or an alcohol based hand  before caring for you or your wound.    Day of surgery:  Your arrival time is approximately two hours before your scheduled surgery time.  Upon arrival, a Pre-op nurse and Anesthesiologist will review your health history, obtain vital signs, and answer questions you may have.  The only belongings needed at this time will be your home medications and if applicable your C-PAP/BI-PAP machine.  If you are staying overnight your family can leave the rest of your belongings in the car and bring them to your room later.  A Pre-op nurse will start an IV and you may receive medication in preparation for surgery, including something to help you relax.  While you are in surgery your family should notify the waiting room  if they leave the waiting room area and provide a contact phone number.    Please be aware that surgery does come with discomfort.  We want to make every effort to control your discomfort so please discuss any uncontrolled symptoms with your nurse.   Your doctor will most likely have prescribed pain medications.      If you are going home after surgery you will receive individualized written care instructions before being discharged.  A responsible adult must drive you to and from the hospital on the day of your surgery and stay with you for 24 hours.    If you are staying overnight following surgery, you will be transported to your hospital room following the recovery period.  [facility]  [place] has all private rooms.    If you have any questions please call Pre-Admission Testing at [phone].  Deductibles and co-payments are collected on the day of service. Please be prepared to pay the required co-pay, deductible or deposit on the day of service as defined by your plan.    Patient Education for Self-Quarantine Process    Following your COVID testing, we strongly recommend that you do not leave your home after you have been tested for COVID except to get medical care. This includes not going to work, school or to public areas.  If this is not possible for you to do please limit your activities to only required outings.  Be sure to wear a mask when you are with other people, practice social distancing and wash your hands frequently.      The following items provide additional details to keep you safe.  • Wash your hands with soap and water frequently for at least 20 seconds.   • Avoid touching your eyes, nose and mouth with unwashed hands.  • Do not share anything - utensils, towels, food from the same bowl.   • Have your own utensils, drinking glass, dishes, towels and bedding.   • Do not have visitors.   • Do use FaceTime to stay in touch with family and friends.  • You should stay in a specific room away from others if possible.   • Stay at least 6 feet away from others in the home if you cannot have a dedicated room to yourself.   • Do not snuggle with your pet. While the CDC says there is no evidence that pets can spread COVID-19 or be infected from humans, it is probably best to avoid “petting, snuggling, being kissed or licked and sharing food (during self-quarantine)”, according to the CDC.   • Sanitize household surfaces daily. Include all high touch areas (door handles, light switches, phones, countertops, etc.)  • Do not share a bathroom with others, if possible.   • Wear a mask around others in your home if you are unable to stay in a separate room or 6 feet apart. If  you are  unable to wear a mask, have your family member wear a mask if they must be within 6 feet of you.   Call your surgeon immediately if you experience any of the following symptoms:  • Sore Throat  • Shortness of Breath or difficulty breathing  • Cough  • Chills  • Body soreness or muscle pain  • Headache  • Fever  • New loss of taste or smell  • Do not arrive for your surgery ill.  Your procedure will need to be rescheduled to another time.  You will need to call your physician before the day of surgery to avoid any unnecessary exposure to hospital staff as well as other patients.

## 2020-10-27 LAB — SARS-COV-2 RNA RESP QL NAA+PROBE: NOT DETECTED

## 2020-10-28 ENCOUNTER — HOSPITAL ENCOUNTER (OUTPATIENT)
Facility: HOSPITAL | Age: 85
Setting detail: HOSPITAL OUTPATIENT SURGERY
Discharge: HOME | End: 2020-10-28
Attending: UROLOGY | Admitting: UROLOGY

## 2020-10-28 ENCOUNTER — APPOINTMENT (OUTPATIENT)
Dept: GENERAL RADIOLOGY | Facility: HOSPITAL | Age: 85
End: 2020-10-28

## 2020-10-28 ENCOUNTER — ANESTHESIA EVENT (OUTPATIENT)
Dept: PERIOP | Facility: HOSPITAL | Age: 85
End: 2020-10-28

## 2020-10-28 ENCOUNTER — ANESTHESIA (OUTPATIENT)
Dept: PERIOP | Facility: HOSPITAL | Age: 85
End: 2020-10-28

## 2020-10-28 VITALS
SYSTOLIC BLOOD PRESSURE: 151 MMHG | RESPIRATION RATE: 16 BRPM | DIASTOLIC BLOOD PRESSURE: 79 MMHG | TEMPERATURE: 97.7 F | BODY MASS INDEX: 25.49 KG/M2 | HEIGHT: 71 IN | OXYGEN SATURATION: 92 % | WEIGHT: 182.1 LBS | HEART RATE: 60 BPM

## 2020-10-28 LAB — GLUCOSE BLDC GLUCOMTR-MCNC: 169 MG/DL (ref 70–130)

## 2020-10-28 PROCEDURE — 74018 RADEX ABDOMEN 1 VIEW: CPT

## 2020-10-28 PROCEDURE — 25010000002 ONDANSETRON PER 1 MG: Performed by: NURSE ANESTHETIST, CERTIFIED REGISTERED

## 2020-10-28 PROCEDURE — C1769 GUIDE WIRE: HCPCS | Performed by: UROLOGY

## 2020-10-28 PROCEDURE — 0 IOTHALAMATE 60 % SOLUTION: Performed by: UROLOGY

## 2020-10-28 PROCEDURE — 76000 FLUOROSCOPY <1 HR PHYS/QHP: CPT

## 2020-10-28 PROCEDURE — C2617 STENT, NON-COR, TEM W/O DEL: HCPCS | Performed by: UROLOGY

## 2020-10-28 PROCEDURE — 25010000002 CEFAZOLIN 1-4 GM/50ML-% SOLUTION: Performed by: UROLOGY

## 2020-10-28 PROCEDURE — 82962 GLUCOSE BLOOD TEST: CPT

## 2020-10-28 PROCEDURE — 25010000002 PROPOFOL 10 MG/ML EMULSION: Performed by: NURSE ANESTHETIST, CERTIFIED REGISTERED

## 2020-10-28 PROCEDURE — C1758 CATHETER, URETERAL: HCPCS | Performed by: UROLOGY

## 2020-10-28 DEVICE — STNT DBL/J URETRL CLSD/TP 6X26CM: Type: IMPLANTABLE DEVICE | Site: URETER | Status: FUNCTIONAL

## 2020-10-28 RX ORDER — PHENAZOPYRIDINE HYDROCHLORIDE 200 MG/1
200 TABLET, FILM COATED ORAL 3 TIMES DAILY PRN
Qty: 20 TABLET | Refills: 0 | Status: SHIPPED | OUTPATIENT
Start: 2020-10-28 | End: 2020-11-06

## 2020-10-28 RX ORDER — CEFAZOLIN SODIUM 1 G/50ML
1 INJECTION, SOLUTION INTRAVENOUS ONCE
Status: COMPLETED | OUTPATIENT
Start: 2020-10-28 | End: 2020-10-28

## 2020-10-28 RX ORDER — SODIUM CHLORIDE 0.9 % (FLUSH) 0.9 %
3 SYRINGE (ML) INJECTION EVERY 12 HOURS SCHEDULED
Status: DISCONTINUED | OUTPATIENT
Start: 2020-10-28 | End: 2020-10-28 | Stop reason: HOSPADM

## 2020-10-28 RX ORDER — HYDROMORPHONE HYDROCHLORIDE 1 MG/ML
0.5 INJECTION, SOLUTION INTRAMUSCULAR; INTRAVENOUS; SUBCUTANEOUS
Status: DISCONTINUED | OUTPATIENT
Start: 2020-10-28 | End: 2020-10-28 | Stop reason: HOSPADM

## 2020-10-28 RX ORDER — MAGNESIUM HYDROXIDE 1200 MG/15ML
LIQUID ORAL AS NEEDED
Status: DISCONTINUED | OUTPATIENT
Start: 2020-10-28 | End: 2020-10-28 | Stop reason: HOSPADM

## 2020-10-28 RX ORDER — OXYCODONE AND ACETAMINOPHEN 7.5; 325 MG/1; MG/1
1 TABLET ORAL ONCE AS NEEDED
Status: DISCONTINUED | OUTPATIENT
Start: 2020-10-28 | End: 2020-10-28 | Stop reason: HOSPADM

## 2020-10-28 RX ORDER — DIPHENHYDRAMINE HYDROCHLORIDE 50 MG/ML
12.5 INJECTION INTRAMUSCULAR; INTRAVENOUS
Status: DISCONTINUED | OUTPATIENT
Start: 2020-10-28 | End: 2020-10-28 | Stop reason: HOSPADM

## 2020-10-28 RX ORDER — HYDROCODONE BITARTRATE AND ACETAMINOPHEN 7.5; 325 MG/1; MG/1
1 TABLET ORAL ONCE AS NEEDED
Status: DISCONTINUED | OUTPATIENT
Start: 2020-10-28 | End: 2020-10-28 | Stop reason: HOSPADM

## 2020-10-28 RX ORDER — LIDOCAINE HYDROCHLORIDE 10 MG/ML
0.5 INJECTION, SOLUTION EPIDURAL; INFILTRATION; INTRACAUDAL; PERINEURAL ONCE AS NEEDED
Status: DISCONTINUED | OUTPATIENT
Start: 2020-10-28 | End: 2020-10-28 | Stop reason: HOSPADM

## 2020-10-28 RX ORDER — PROPOFOL 10 MG/ML
VIAL (ML) INTRAVENOUS AS NEEDED
Status: DISCONTINUED | OUTPATIENT
Start: 2020-10-28 | End: 2020-10-28 | Stop reason: SURG

## 2020-10-28 RX ORDER — NALOXONE HCL 0.4 MG/ML
0.2 VIAL (ML) INJECTION AS NEEDED
Status: DISCONTINUED | OUTPATIENT
Start: 2020-10-28 | End: 2020-10-28 | Stop reason: HOSPADM

## 2020-10-28 RX ORDER — MIDAZOLAM HYDROCHLORIDE 1 MG/ML
1 INJECTION INTRAMUSCULAR; INTRAVENOUS
Status: DISCONTINUED | OUTPATIENT
Start: 2020-10-28 | End: 2020-10-28 | Stop reason: HOSPADM

## 2020-10-28 RX ORDER — FLUMAZENIL 0.1 MG/ML
0.2 INJECTION INTRAVENOUS AS NEEDED
Status: DISCONTINUED | OUTPATIENT
Start: 2020-10-28 | End: 2020-10-28 | Stop reason: HOSPADM

## 2020-10-28 RX ORDER — FENTANYL CITRATE 50 UG/ML
50 INJECTION, SOLUTION INTRAMUSCULAR; INTRAVENOUS
Status: DISCONTINUED | OUTPATIENT
Start: 2020-10-28 | End: 2020-10-28 | Stop reason: HOSPADM

## 2020-10-28 RX ORDER — SODIUM CHLORIDE, SODIUM LACTATE, POTASSIUM CHLORIDE, CALCIUM CHLORIDE 600; 310; 30; 20 MG/100ML; MG/100ML; MG/100ML; MG/100ML
9 INJECTION, SOLUTION INTRAVENOUS CONTINUOUS
Status: DISCONTINUED | OUTPATIENT
Start: 2020-10-28 | End: 2020-10-28 | Stop reason: HOSPADM

## 2020-10-28 RX ORDER — SODIUM CHLORIDE 0.9 % (FLUSH) 0.9 %
3-10 SYRINGE (ML) INJECTION AS NEEDED
Status: DISCONTINUED | OUTPATIENT
Start: 2020-10-28 | End: 2020-10-28 | Stop reason: HOSPADM

## 2020-10-28 RX ORDER — ONDANSETRON 2 MG/ML
INJECTION INTRAMUSCULAR; INTRAVENOUS AS NEEDED
Status: DISCONTINUED | OUTPATIENT
Start: 2020-10-28 | End: 2020-10-28 | Stop reason: SURG

## 2020-10-28 RX ORDER — PROMETHAZINE HYDROCHLORIDE 25 MG/1
25 TABLET ORAL ONCE AS NEEDED
Status: DISCONTINUED | OUTPATIENT
Start: 2020-10-28 | End: 2020-10-28 | Stop reason: HOSPADM

## 2020-10-28 RX ORDER — DIPHENHYDRAMINE HCL 25 MG
25 CAPSULE ORAL
Status: DISCONTINUED | OUTPATIENT
Start: 2020-10-28 | End: 2020-10-28 | Stop reason: HOSPADM

## 2020-10-28 RX ORDER — LIDOCAINE HYDROCHLORIDE 20 MG/ML
INJECTION, SOLUTION INFILTRATION; PERINEURAL AS NEEDED
Status: DISCONTINUED | OUTPATIENT
Start: 2020-10-28 | End: 2020-10-28 | Stop reason: SURG

## 2020-10-28 RX ORDER — LABETALOL HYDROCHLORIDE 5 MG/ML
5 INJECTION, SOLUTION INTRAVENOUS
Status: DISCONTINUED | OUTPATIENT
Start: 2020-10-28 | End: 2020-10-28 | Stop reason: HOSPADM

## 2020-10-28 RX ORDER — PROMETHAZINE HYDROCHLORIDE 25 MG/1
25 SUPPOSITORY RECTAL ONCE AS NEEDED
Status: DISCONTINUED | OUTPATIENT
Start: 2020-10-28 | End: 2020-10-28 | Stop reason: HOSPADM

## 2020-10-28 RX ORDER — ONDANSETRON 2 MG/ML
4 INJECTION INTRAMUSCULAR; INTRAVENOUS ONCE AS NEEDED
Status: DISCONTINUED | OUTPATIENT
Start: 2020-10-28 | End: 2020-10-28 | Stop reason: HOSPADM

## 2020-10-28 RX ORDER — EPHEDRINE SULFATE 50 MG/ML
5 INJECTION, SOLUTION INTRAVENOUS ONCE AS NEEDED
Status: DISCONTINUED | OUTPATIENT
Start: 2020-10-28 | End: 2020-10-28 | Stop reason: HOSPADM

## 2020-10-28 RX ORDER — FAMOTIDINE 10 MG/ML
20 INJECTION, SOLUTION INTRAVENOUS ONCE
Status: COMPLETED | OUTPATIENT
Start: 2020-10-28 | End: 2020-10-28

## 2020-10-28 RX ADMIN — CEFAZOLIN SODIUM 1 G: 1 INJECTION, SOLUTION INTRAVENOUS at 12:59

## 2020-10-28 RX ADMIN — SODIUM CHLORIDE, POTASSIUM CHLORIDE, SODIUM LACTATE AND CALCIUM CHLORIDE 9 ML/HR: 600; 310; 30; 20 INJECTION, SOLUTION INTRAVENOUS at 12:27

## 2020-10-28 RX ADMIN — ONDANSETRON HYDROCHLORIDE 4 MG: 2 SOLUTION INTRAMUSCULAR; INTRAVENOUS at 13:15

## 2020-10-28 RX ADMIN — FAMOTIDINE 20 MG: 10 INJECTION INTRAVENOUS at 12:27

## 2020-10-28 RX ADMIN — PROPOFOL 100 MG: 10 INJECTION, EMULSION INTRAVENOUS at 13:04

## 2020-10-28 RX ADMIN — LIDOCAINE HYDROCHLORIDE 100 MG: 20 INJECTION, SOLUTION INFILTRATION; PERINEURAL at 13:04

## 2020-10-28 NOTE — ANESTHESIA POSTPROCEDURE EVALUATION
Patient: Home Wynn    Procedure Summary     Date: 10/28/20 Room / Location: [facility] OR 01 / [facility] MAIN OR    Anesthesia Start: 1259 Anesthesia Stop: 1346    Procedure: CYSTO AND BILATERAL STENT CHANGE (Bilateral ) Diagnosis:     Surgeon: Ceasar Nj MD Provider: Kirill Gibbs MD    Anesthesia Type: general ASA Status: 4          Anesthesia Type: general    Vitals  Vitals Value Taken Time   /83 10/28/20 1400   Temp 36.5 °C (97.7 °F) 10/28/20 1407   Pulse 72 10/28/20 1413   Resp 16 10/28/20 1407   SpO2 98 % 10/28/20 1413   Vitals shown include unvalidated device data.        Post Anesthesia Care and Evaluation    Patient location during evaluation: PACU  Patient participation: complete - patient participated  Level of consciousness: awake and alert  Pain management: adequate  Airway patency: patent  Anesthetic complications: No anesthetic complications    Cardiovascular status: acceptable  Respiratory status: acceptable  Hydration status: acceptable    Comments: --------------------            10/28/20               1420     --------------------   BP:       151/79     Pulse:      60       Resp:       16       Temp:                SpO2:      92%      --------------------

## 2020-10-28 NOTE — ADDENDUM NOTE
Addendum  created 10/28/20 1744 by Kirill Gibbs MD    Attestation recorded in Intraprocedure, Intraprocedure Attestations filed

## 2020-10-28 NOTE — ANESTHESIA PROCEDURE NOTES
Airway  Urgency: elective    Date/Time: 10/28/2020 1:06 PM    General Information and Staff    Patient location during procedure: OR  Anesthesiologist: Kirill Gibbs MD  CRNA: Lino Pitt CRNA    Indications and Patient Condition  Indications for airway management: airway protection    Preoxygenated: yes  MILS maintained throughout  Mask difficulty assessment: 1 - vent by mask    Final Airway Details  Final airway type: supraglottic airway      Successful airway: unique  Size 5    Number of attempts at approach: 1  Assessment: lips, teeth, and gum same as pre-op and atraumatic intubation

## 2020-10-28 NOTE — ANESTHESIA PREPROCEDURE EVALUATION
Anesthesia Evaluation     Patient summary reviewed and Nursing notes reviewed                Airway   Mallampati: II  Neck ROM: limited  Dental      Pulmonary    (+) COPD, home oxygen, shortness of breath, sleep apnea,   Cardiovascular     ECG reviewed  Patient on routine beta blocker  Rhythm: irregular  Rate: normal    (+) hypertension, valvular problems/murmurs MR and TI, dysrhythmias Atrial Fib, Paroxysmal Atrial Fib, CHF , hyperlipidemia,       Neuro/Psych  (+) dizziness/light headedness, syncope, numbness,     GI/Hepatic/Renal/Endo    (+)  GI bleeding , renal disease CRI, diabetes mellitus type 2 using insulin,     Musculoskeletal     Abdominal    Substance History - negative use     OB/GYN negative ob/gyn ROS         Other   arthritis,                      Anesthesia Plan    ASA 4     general   (EF around 20%    LMA)  intravenous induction     Anesthetic plan, all risks, benefits, and alternatives have been provided, discussed and informed consent has been obtained with: patient.

## 2020-11-06 ENCOUNTER — OFFICE VISIT (OUTPATIENT)
Dept: FAMILY MEDICINE CLINIC | Facility: CLINIC | Age: 85
End: 2020-11-06

## 2020-11-06 VITALS
DIASTOLIC BLOOD PRESSURE: 88 MMHG | WEIGHT: 181.6 LBS | HEART RATE: 68 BPM | BODY MASS INDEX: 25.42 KG/M2 | OXYGEN SATURATION: 99 % | TEMPERATURE: 96.6 F | RESPIRATION RATE: 16 BRPM | HEIGHT: 71 IN | SYSTOLIC BLOOD PRESSURE: 138 MMHG

## 2020-11-06 DIAGNOSIS — E11.9 TYPE 2 DIABETES MELLITUS WITHOUT COMPLICATION, WITH LONG-TERM CURRENT USE OF INSULIN (HCC): Primary | ICD-10-CM

## 2020-11-06 DIAGNOSIS — Z79.4 TYPE 2 DIABETES MELLITUS WITHOUT COMPLICATION, WITH LONG-TERM CURRENT USE OF INSULIN (HCC): Primary | ICD-10-CM

## 2020-11-06 PROCEDURE — 99213 OFFICE O/P EST LOW 20 MIN: CPT | Performed by: FAMILY MEDICINE

## 2020-11-06 RX ORDER — HYDROCODONE BITARTRATE AND ACETAMINOPHEN 5; 325 MG/1; MG/1
TABLET ORAL
COMMUNITY
Start: 2020-10-30 | End: 2020-11-06

## 2020-11-07 LAB
BUN SERPL-MCNC: 31 MG/DL (ref 8–23)
BUN/CREAT SERPL: 21.8 (ref 7–25)
CALCIUM SERPL-MCNC: 9.6 MG/DL (ref 8.6–10.5)
CHLORIDE SERPL-SCNC: 102 MMOL/L (ref 98–107)
CO2 SERPL-SCNC: 27.6 MMOL/L (ref 22–29)
CREAT SERPL-MCNC: 1.42 MG/DL (ref 0.76–1.27)
GLUCOSE SERPL-MCNC: 188 MG/DL (ref 65–99)
HBA1C MFR BLD: 7.8 % (ref 4.8–5.6)
POTASSIUM SERPL-SCNC: 5 MMOL/L (ref 3.5–5.2)
SODIUM SERPL-SCNC: 137 MMOL/L (ref 136–145)

## 2020-12-29 RX ORDER — METOPROLOL SUCCINATE 25 MG/1
TABLET, EXTENDED RELEASE ORAL
Qty: 45 TABLET | Refills: 3 | Status: SHIPPED | OUTPATIENT
Start: 2020-12-29 | End: 2021-01-01

## 2020-12-30 ENCOUNTER — TELEPHONE (OUTPATIENT)
Dept: FAMILY MEDICINE CLINIC | Facility: CLINIC | Age: 85
End: 2020-12-30

## 2020-12-30 NOTE — TELEPHONE ENCOUNTER
PT IS CALLING IN REQUESTING A CALL BACK FROM DR BARROSO.  PT STATES THAT HE NEEDS A SECOND OPINION ABOUT SOME CARE THAT HE IS RECEIVING.    PT CALL BACK  [phone]

## 2020-12-30 NOTE — TELEPHONE ENCOUNTER
Called pt to advise you are not in the office at this time. Pt understood. He states he had uterine stints put in where he is having bleeding. Upon speaking with urologist he states they are blowing him off like it is normal for him to bleed. He state he does not think he should be bleeding as much as he does, but did not specify how often or amount he was bleeding. He stats he would like to get  A second opinion from another neurologist. Please advise your thoughts. Thank you.

## 2021-01-01 ENCOUNTER — OFFICE VISIT (OUTPATIENT)
Dept: CARDIOLOGY | Facility: CLINIC | Age: 86
End: 2021-01-01

## 2021-01-01 ENCOUNTER — OFFICE VISIT (OUTPATIENT)
Dept: FAMILY MEDICINE CLINIC | Facility: CLINIC | Age: 86
End: 2021-01-01

## 2021-01-01 ENCOUNTER — APPOINTMENT (OUTPATIENT)
Dept: GENERAL RADIOLOGY | Facility: HOSPITAL | Age: 86
End: 2021-01-01

## 2021-01-01 ENCOUNTER — HOSPITAL ENCOUNTER (OUTPATIENT)
Dept: CARDIOLOGY | Facility: HOSPITAL | Age: 86
Discharge: HOME | End: 2021-09-14

## 2021-01-01 ENCOUNTER — TELEPHONE (OUTPATIENT)
Dept: CARDIOLOGY | Facility: CLINIC | Age: 86
End: 2021-01-01

## 2021-01-01 ENCOUNTER — ANESTHESIA (OUTPATIENT)
Dept: PERIOP | Facility: HOSPITAL | Age: 86
End: 2021-01-01

## 2021-01-01 ENCOUNTER — APPOINTMENT (OUTPATIENT)
Dept: CT IMAGING | Facility: HOSPITAL | Age: 86
End: 2021-01-01

## 2021-01-01 ENCOUNTER — TELEPHONE (OUTPATIENT)
Dept: FAMILY MEDICINE CLINIC | Facility: CLINIC | Age: 86
End: 2021-01-01

## 2021-01-01 ENCOUNTER — TRANSCRIBE ORDERS (OUTPATIENT)
Dept: ADMINISTRATIVE | Facility: HOSPITAL | Age: 86
End: 2021-01-01

## 2021-01-01 ENCOUNTER — TRANSITIONAL CARE MANAGEMENT TELEPHONE ENCOUNTER (OUTPATIENT)
Dept: CALL CENTER | Facility: HOSPITAL | Age: 86
End: 2021-01-01

## 2021-01-01 ENCOUNTER — HOSPITAL ENCOUNTER (OUTPATIENT)
Facility: HOSPITAL | Age: 86
Setting detail: HOSPITAL OUTPATIENT SURGERY
End: 2021-01-01
Attending: UROLOGY | Admitting: UROLOGY

## 2021-01-01 ENCOUNTER — HOSPITAL ENCOUNTER (INPATIENT)
Facility: HOSPITAL | Age: 86
LOS: 2 days | Discharge: HOME | End: 2021-12-09
Attending: EMERGENCY MEDICINE | Admitting: INTERNAL MEDICINE

## 2021-01-01 ENCOUNTER — PATIENT OUTREACH (OUTPATIENT)
Dept: CASE MANAGEMENT | Facility: OTHER | Age: 86
End: 2021-01-01

## 2021-01-01 ENCOUNTER — ANESTHESIA EVENT (OUTPATIENT)
Dept: PERIOP | Facility: HOSPITAL | Age: 86
End: 2021-01-01

## 2021-01-01 ENCOUNTER — LAB (OUTPATIENT)
Dept: LAB | Facility: HOSPITAL | Age: 86
End: 2021-01-01

## 2021-01-01 ENCOUNTER — PRE-ADMISSION TESTING (OUTPATIENT)
Dept: PREADMISSION TESTING | Facility: HOSPITAL | Age: 86
End: 2021-01-01

## 2021-01-01 ENCOUNTER — READMISSION MANAGEMENT (OUTPATIENT)
Dept: CALL CENTER | Facility: HOSPITAL | Age: 86
End: 2021-01-01

## 2021-01-01 ENCOUNTER — HOSPITAL ENCOUNTER (EMERGENCY)
Facility: HOSPITAL | Age: 86
Discharge: HOME | End: 2021-08-09
Attending: EMERGENCY MEDICINE | Admitting: EMERGENCY MEDICINE

## 2021-01-01 ENCOUNTER — HOSPITAL ENCOUNTER (OUTPATIENT)
Dept: RESPIRATORY THERAPY | Facility: HOSPITAL | Age: 86
Discharge: HOME | End: 2021-11-18
Admitting: INTERNAL MEDICINE

## 2021-01-01 ENCOUNTER — HOSPITAL ENCOUNTER (OUTPATIENT)
Dept: CT IMAGING | Facility: HOSPITAL | Age: 86
Discharge: HOME | End: 2021-09-14

## 2021-01-01 ENCOUNTER — HOSPITAL ENCOUNTER (OUTPATIENT)
Facility: HOSPITAL | Age: 86
Setting detail: HOSPITAL OUTPATIENT SURGERY
Discharge: HOME | End: 2021-08-19
Attending: UROLOGY | Admitting: UROLOGY

## 2021-01-01 VITALS
HEIGHT: 71 IN | RESPIRATION RATE: 18 BRPM | BODY MASS INDEX: 25.48 KG/M2 | HEART RATE: 88 BPM | OXYGEN SATURATION: 91 % | WEIGHT: 182 LBS | TEMPERATURE: 97.5 F | DIASTOLIC BLOOD PRESSURE: 85 MMHG | SYSTOLIC BLOOD PRESSURE: 120 MMHG

## 2021-01-01 VITALS
RESPIRATION RATE: 16 BRPM | HEART RATE: 66 BPM | SYSTOLIC BLOOD PRESSURE: 135 MMHG | DIASTOLIC BLOOD PRESSURE: 79 MMHG | WEIGHT: 183.2 LBS | TEMPERATURE: 97.6 F | OXYGEN SATURATION: 96 % | HEIGHT: 71 IN | BODY MASS INDEX: 25.65 KG/M2

## 2021-01-01 VITALS
SYSTOLIC BLOOD PRESSURE: 130 MMHG | HEIGHT: 71 IN | DIASTOLIC BLOOD PRESSURE: 72 MMHG | WEIGHT: 182 LBS | HEART RATE: 86 BPM | BODY MASS INDEX: 25.48 KG/M2 | TEMPERATURE: 97.9 F | RESPIRATION RATE: 16 BRPM | OXYGEN SATURATION: 97 %

## 2021-01-01 VITALS
BODY MASS INDEX: 25.87 KG/M2 | WEIGHT: 184.8 LBS | RESPIRATION RATE: 17 BRPM | SYSTOLIC BLOOD PRESSURE: 130 MMHG | OXYGEN SATURATION: 95 % | TEMPERATURE: 97.4 F | HEART RATE: 95 BPM | DIASTOLIC BLOOD PRESSURE: 72 MMHG | HEIGHT: 71 IN

## 2021-01-01 VITALS
DIASTOLIC BLOOD PRESSURE: 70 MMHG | BODY MASS INDEX: 25.56 KG/M2 | SYSTOLIC BLOOD PRESSURE: 130 MMHG | HEIGHT: 71 IN | HEART RATE: 83 BPM | WEIGHT: 182.6 LBS

## 2021-01-01 VITALS
BODY MASS INDEX: 25.34 KG/M2 | WEIGHT: 181 LBS | HEIGHT: 71 IN | OXYGEN SATURATION: 98 % | DIASTOLIC BLOOD PRESSURE: 68 MMHG | HEART RATE: 77 BPM | SYSTOLIC BLOOD PRESSURE: 132 MMHG

## 2021-01-01 VITALS
BODY MASS INDEX: 25.55 KG/M2 | HEART RATE: 82 BPM | SYSTOLIC BLOOD PRESSURE: 128 MMHG | DIASTOLIC BLOOD PRESSURE: 84 MMHG | HEIGHT: 71 IN | OXYGEN SATURATION: 98 %

## 2021-01-01 VITALS
DIASTOLIC BLOOD PRESSURE: 82 MMHG | TEMPERATURE: 97.1 F | SYSTOLIC BLOOD PRESSURE: 136 MMHG | BODY MASS INDEX: 25.9 KG/M2 | HEIGHT: 71 IN | RESPIRATION RATE: 16 BRPM | OXYGEN SATURATION: 99 % | HEART RATE: 74 BPM | WEIGHT: 185 LBS

## 2021-01-01 VITALS
BODY MASS INDEX: 25.58 KG/M2 | OXYGEN SATURATION: 98 % | HEART RATE: 56 BPM | TEMPERATURE: 96.9 F | RESPIRATION RATE: 16 BRPM | HEIGHT: 71 IN | DIASTOLIC BLOOD PRESSURE: 62 MMHG | SYSTOLIC BLOOD PRESSURE: 132 MMHG | WEIGHT: 182.7 LBS

## 2021-01-01 VITALS
DIASTOLIC BLOOD PRESSURE: 80 MMHG | OXYGEN SATURATION: 97 % | SYSTOLIC BLOOD PRESSURE: 126 MMHG | HEART RATE: 96 BPM | TEMPERATURE: 97.5 F

## 2021-01-01 VITALS
DIASTOLIC BLOOD PRESSURE: 80 MMHG | WEIGHT: 175 LBS | SYSTOLIC BLOOD PRESSURE: 115 MMHG | HEIGHT: 71 IN | OXYGEN SATURATION: 95 % | HEART RATE: 84 BPM | BODY MASS INDEX: 24.5 KG/M2

## 2021-01-01 VITALS
RESPIRATION RATE: 18 BRPM | BODY MASS INDEX: 26.18 KG/M2 | HEIGHT: 71 IN | DIASTOLIC BLOOD PRESSURE: 106 MMHG | OXYGEN SATURATION: 98 % | WEIGHT: 187 LBS | SYSTOLIC BLOOD PRESSURE: 132 MMHG | TEMPERATURE: 97.2 F | HEART RATE: 103 BPM

## 2021-01-01 VITALS
BODY MASS INDEX: 25.48 KG/M2 | HEART RATE: 94 BPM | OXYGEN SATURATION: 96 % | SYSTOLIC BLOOD PRESSURE: 124 MMHG | HEIGHT: 71 IN | DIASTOLIC BLOOD PRESSURE: 88 MMHG

## 2021-01-01 VITALS
TEMPERATURE: 98.1 F | RESPIRATION RATE: 17 BRPM | SYSTOLIC BLOOD PRESSURE: 110 MMHG | DIASTOLIC BLOOD PRESSURE: 60 MMHG | BODY MASS INDEX: 24.22 KG/M2 | HEART RATE: 81 BPM | OXYGEN SATURATION: 98 % | WEIGHT: 173 LBS | HEIGHT: 71 IN

## 2021-01-01 DIAGNOSIS — I48.20 CHRONIC ATRIAL FIBRILLATION (HCC): ICD-10-CM

## 2021-01-01 DIAGNOSIS — I50.41 ACUTE COMBINED SYSTOLIC AND DIASTOLIC CONGESTIVE HEART FAILURE (HCC): Primary | ICD-10-CM

## 2021-01-01 DIAGNOSIS — I50.42 CHRONIC COMBINED SYSTOLIC AND DIASTOLIC CONGESTIVE HEART FAILURE: ICD-10-CM

## 2021-01-01 DIAGNOSIS — R06.00 PND (PAROXYSMAL NOCTURNAL DYSPNEA): ICD-10-CM

## 2021-01-01 DIAGNOSIS — R06.02 SHORTNESS OF BREATH: ICD-10-CM

## 2021-01-01 DIAGNOSIS — I50.42 CHRONIC COMBINED SYSTOLIC AND DIASTOLIC CONGESTIVE HEART FAILURE (HCC): Primary | ICD-10-CM

## 2021-01-01 DIAGNOSIS — I48.20 CHRONIC ATRIAL FIBRILLATION (HCC): Primary | ICD-10-CM

## 2021-01-01 DIAGNOSIS — R06.02 SHORTNESS OF BREATH: Primary | ICD-10-CM

## 2021-01-01 DIAGNOSIS — I50.42 CHRONIC COMBINED SYSTOLIC AND DIASTOLIC CONGESTIVE HEART FAILURE (HCC): ICD-10-CM

## 2021-01-01 DIAGNOSIS — E11.22 TYPE 2 DIABETES MELLITUS WITH STAGE 3 CHRONIC KIDNEY DISEASE, WITH LONG-TERM CURRENT USE OF INSULIN, UNSPECIFIED WHETHER STAGE 3A OR 3B CKD: ICD-10-CM

## 2021-01-01 DIAGNOSIS — I10 ESSENTIAL HYPERTENSION: ICD-10-CM

## 2021-01-01 DIAGNOSIS — R06.09 DYSPNEA ON EXERTION: Primary | ICD-10-CM

## 2021-01-01 DIAGNOSIS — Z01.818 OTHER SPECIFIED PRE-OPERATIVE EXAMINATION: Primary | ICD-10-CM

## 2021-01-01 DIAGNOSIS — R06.02 SHORT OF BREATH ON EXERTION: ICD-10-CM

## 2021-01-01 DIAGNOSIS — I50.9 ACUTE ON CHRONIC CONGESTIVE HEART FAILURE, UNSPECIFIED HEART FAILURE TYPE (HCC): Primary | ICD-10-CM

## 2021-01-01 DIAGNOSIS — I48.21 PERMANENT ATRIAL FIBRILLATION (HCC): ICD-10-CM

## 2021-01-01 DIAGNOSIS — Z20.822 EXPOSURE TO COVID-19 VIRUS: Primary | ICD-10-CM

## 2021-01-01 DIAGNOSIS — Z09 HOSPITAL DISCHARGE FOLLOW-UP: Primary | ICD-10-CM

## 2021-01-01 DIAGNOSIS — I50.43 ACUTE ON CHRONIC COMBINED SYSTOLIC AND DIASTOLIC CONGESTIVE HEART FAILURE (HCC): Primary | ICD-10-CM

## 2021-01-01 DIAGNOSIS — R06.09 DYSPNEA ON EXERTION: ICD-10-CM

## 2021-01-01 DIAGNOSIS — N18.30 TYPE 2 DIABETES MELLITUS WITH STAGE 3 CHRONIC KIDNEY DISEASE, WITH LONG-TERM CURRENT USE OF INSULIN, UNSPECIFIED WHETHER STAGE 3A OR 3B CKD: ICD-10-CM

## 2021-01-01 DIAGNOSIS — Z79.4 TYPE 2 DIABETES MELLITUS WITH STAGE 3 CHRONIC KIDNEY DISEASE, WITH LONG-TERM CURRENT USE OF INSULIN, UNSPECIFIED WHETHER STAGE 3A OR 3B CKD: ICD-10-CM

## 2021-01-01 DIAGNOSIS — R79.89 POSITIVE D DIMER: ICD-10-CM

## 2021-01-01 DIAGNOSIS — I10 PRIMARY HYPERTENSION: ICD-10-CM

## 2021-01-01 DIAGNOSIS — R06.02 SOB (SHORTNESS OF BREATH): ICD-10-CM

## 2021-01-01 LAB
ALBUMIN SERPL-MCNC: 3.1 G/DL (ref 3.5–5.2)
ALBUMIN SERPL-MCNC: 4 G/DL (ref 3.5–5.2)
ALBUMIN SERPL-MCNC: 4.2 G/DL (ref 3.5–5.2)
ALBUMIN SERPL-MCNC: 4.2 G/DL (ref 3.5–5.2)
ALBUMIN SERPL-MCNC: 4.3 G/DL (ref 3.5–5.2)
ALBUMIN SERPL-MCNC: 4.3 G/DL (ref 3.6–4.6)
ALBUMIN/GLOB SERPL: 0.9 G/DL
ALBUMIN/GLOB SERPL: 1.2 G/DL
ALBUMIN/GLOB SERPL: 1.2 G/DL
ALBUMIN/GLOB SERPL: 1.3 G/DL
ALBUMIN/GLOB SERPL: 1.3 {RATIO} (ref 1.2–2.2)
ALBUMIN/GLOB SERPL: 1.5 G/DL
ALP SERPL-CCNC: 66 U/L (ref 39–117)
ALP SERPL-CCNC: 70 U/L (ref 39–117)
ALP SERPL-CCNC: 73 U/L (ref 39–117)
ALP SERPL-CCNC: 79 IU/L (ref 44–121)
ALP SERPL-CCNC: 84 U/L (ref 39–117)
ALP SERPL-CCNC: 87 U/L (ref 39–117)
ALT SERPL W P-5'-P-CCNC: 10 U/L (ref 1–41)
ALT SERPL W P-5'-P-CCNC: 11 U/L (ref 1–41)
ALT SERPL W P-5'-P-CCNC: 11 U/L (ref 1–41)
ALT SERPL W P-5'-P-CCNC: 13 U/L (ref 1–41)
ALT SERPL W P-5'-P-CCNC: 9 U/L (ref 1–41)
ALT SERPL-CCNC: 8 IU/L (ref 0–44)
ANION GAP SERPL CALCULATED.3IONS-SCNC: 10.1 MMOL/L (ref 5–15)
ANION GAP SERPL CALCULATED.3IONS-SCNC: 10.7 MMOL/L (ref 5–15)
ANION GAP SERPL CALCULATED.3IONS-SCNC: 10.9 MMOL/L (ref 5–15)
ANION GAP SERPL CALCULATED.3IONS-SCNC: 10.9 MMOL/L (ref 5–15)
ANION GAP SERPL CALCULATED.3IONS-SCNC: 13.8 MMOL/L (ref 5–15)
ANION GAP SERPL CALCULATED.3IONS-SCNC: 8.2 MMOL/L (ref 5–15)
ANION GAP SERPL CALCULATED.3IONS-SCNC: 9.7 MMOL/L (ref 5–15)
ASCENDING AORTA: 3.1 CM
AST SERPL-CCNC: 14 U/L (ref 1–40)
AST SERPL-CCNC: 15 IU/L (ref 0–40)
AST SERPL-CCNC: 15 U/L (ref 1–40)
AST SERPL-CCNC: 17 U/L (ref 1–40)
AST SERPL-CCNC: 17 U/L (ref 1–40)
AST SERPL-CCNC: 20 U/L (ref 1–40)
BASOPHILS # BLD AUTO: 0 X10E3/UL (ref 0–0.2)
BASOPHILS # BLD AUTO: 0.02 10*3/MM3 (ref 0–0.2)
BASOPHILS # BLD AUTO: 0.03 10*3/MM3 (ref 0–0.2)
BASOPHILS NFR BLD AUTO: 0 %
BASOPHILS NFR BLD AUTO: 0.3 % (ref 0–1.5)
BASOPHILS NFR BLD AUTO: 0.4 % (ref 0–1.5)
BASOPHILS NFR BLD AUTO: 0.5 % (ref 0–1.5)
BASOPHILS NFR BLD AUTO: 0.5 % (ref 0–1.5)
BH CV ECHO MEAS - ACS: 2 CM
BH CV ECHO MEAS - AI DEC SLOPE: 95.4 CM/SEC^2
BH CV ECHO MEAS - AI MAX PG: 24.7 MMHG
BH CV ECHO MEAS - AI MAX VEL: 248.4 CM/SEC
BH CV ECHO MEAS - AI P1/2T: 762.5 MSEC
BH CV ECHO MEAS - AO ARCH DIAM (PROXIMAL TRANS.): 2.7 CM
BH CV ECHO MEAS - AO MAX PG (FULL): 2 MMHG
BH CV ECHO MEAS - AO MAX PG: 4.5 MMHG
BH CV ECHO MEAS - AO MEAN PG (FULL): 1.1 MMHG
BH CV ECHO MEAS - AO MEAN PG: 2.4 MMHG
BH CV ECHO MEAS - AO ROOT AREA (BSA CORRECTED): 1.5
BH CV ECHO MEAS - AO ROOT AREA: 7.7 CM^2
BH CV ECHO MEAS - AO ROOT DIAM: 3.1 CM
BH CV ECHO MEAS - AO V2 MAX: 106.4 CM/SEC
BH CV ECHO MEAS - AO V2 MEAN: 71.4 CM/SEC
BH CV ECHO MEAS - AO V2 VTI: 18.1 CM
BH CV ECHO MEAS - ASC AORTA: 3.1 CM
BH CV ECHO MEAS - AVA(I,A): 3.8 CM^2
BH CV ECHO MEAS - AVA(I,D): 3.8 CM^2
BH CV ECHO MEAS - AVA(V,A): 3.4 CM^2
BH CV ECHO MEAS - AVA(V,D): 3.4 CM^2
BH CV ECHO MEAS - BSA(HAYCOCK): 2 M^2
BH CV ECHO MEAS - BSA: 2 M^2
BH CV ECHO MEAS - BZI_BMI: 25.2 KILOGRAMS/M^2
BH CV ECHO MEAS - BZI_METRIC_HEIGHT: 180.3 CM
BH CV ECHO MEAS - BZI_METRIC_WEIGHT: 82.1 KG
BH CV ECHO MEAS - EDV(MOD-SP2): 231 ML
BH CV ECHO MEAS - EDV(MOD-SP4): 289 ML
BH CV ECHO MEAS - EDV(TEICH): 164.4 ML
BH CV ECHO MEAS - EF(CUBED): 20 %
BH CV ECHO MEAS - EF(MOD-BP): 22.1 %
BH CV ECHO MEAS - EF(MOD-SP2): 21.6 %
BH CV ECHO MEAS - EF(MOD-SP4): 23.9 %
BH CV ECHO MEAS - EF(TEICH): 15.7 %
BH CV ECHO MEAS - ESV(MOD-SP2): 181 ML
BH CV ECHO MEAS - ESV(MOD-SP4): 220 ML
BH CV ECHO MEAS - ESV(TEICH): 138.6 ML
BH CV ECHO MEAS - FS: 7.2 %
BH CV ECHO MEAS - IVS/LVPW: 0.98
BH CV ECHO MEAS - IVSD: 1.1 CM
BH CV ECHO MEAS - LAT PEAK E' VEL: 11.9 CM/SEC
BH CV ECHO MEAS - LV DIASTOLIC VOL/BSA (35-75): 143 ML/M^2
BH CV ECHO MEAS - LV MASS(C)D: 277.5 GRAMS
BH CV ECHO MEAS - LV MASS(C)DI: 137.3 GRAMS/M^2
BH CV ECHO MEAS - LV MAX PG: 2.5 MMHG
BH CV ECHO MEAS - LV MEAN PG: 1.3 MMHG
BH CV ECHO MEAS - LV SYSTOLIC VOL/BSA (12-30): 108.8 ML/M^2
BH CV ECHO MEAS - LV V1 MAX: 79.7 CM/SEC
BH CV ECHO MEAS - LV V1 MEAN: 52.1 CM/SEC
BH CV ECHO MEAS - LV V1 VTI: 15.1 CM
BH CV ECHO MEAS - LVIDD: 5.8 CM
BH CV ECHO MEAS - LVIDS: 5.4 CM
BH CV ECHO MEAS - LVLD AP2: 8.3 CM
BH CV ECHO MEAS - LVLD AP4: 8.7 CM
BH CV ECHO MEAS - LVLS AP2: 7.5 CM
BH CV ECHO MEAS - LVLS AP4: 7.8 CM
BH CV ECHO MEAS - LVOT AREA (M): 4.5 CM^2
BH CV ECHO MEAS - LVOT AREA: 4.6 CM^2
BH CV ECHO MEAS - LVOT DIAM: 2.4 CM
BH CV ECHO MEAS - LVPWD: 1.2 CM
BH CV ECHO MEAS - MED PEAK E' VEL: 6.7 CM/SEC
BH CV ECHO MEAS - MR MAX PG: 81.5 MMHG
BH CV ECHO MEAS - MR MAX VEL: 451.5 CM/SEC
BH CV ECHO MEAS - MR MEAN PG: 53.7 MMHG
BH CV ECHO MEAS - MR MEAN VEL: 349.4 CM/SEC
BH CV ECHO MEAS - MR VTI: 139.3 CM
BH CV ECHO MEAS - MV DEC SLOPE: 340 CM/SEC^2
BH CV ECHO MEAS - MV DEC TIME: 0.2 SEC
BH CV ECHO MEAS - MV E MAX VEL: 85.7 CM/SEC
BH CV ECHO MEAS - MV MAX PG: 2.7 MMHG
BH CV ECHO MEAS - MV MEAN PG: 1.2 MMHG
BH CV ECHO MEAS - MV P1/2T MAX VEL: 83.4 CM/SEC
BH CV ECHO MEAS - MV P1/2T: 71.8 MSEC
BH CV ECHO MEAS - MV V2 MAX: 82.4 CM/SEC
BH CV ECHO MEAS - MV V2 MEAN: 50.6 CM/SEC
BH CV ECHO MEAS - MV V2 VTI: 15.2 CM
BH CV ECHO MEAS - MVA P1/2T LCG: 2.6 CM^2
BH CV ECHO MEAS - MVA(P1/2T): 3.1 CM^2
BH CV ECHO MEAS - MVA(VTI): 4.5 CM^2
BH CV ECHO MEAS - PA ACC TIME: 0.07 SEC
BH CV ECHO MEAS - PA MAX PG (FULL): 1.6 MMHG
BH CV ECHO MEAS - PA MAX PG: 3.2 MMHG
BH CV ECHO MEAS - PA PR(ACCEL): 45.7 MMHG
BH CV ECHO MEAS - PA V2 MAX: 90 CM/SEC
BH CV ECHO MEAS - PI END-D VEL: 79.9 CM/SEC
BH CV ECHO MEAS - PVA(V,A): 2.6 CM^2
BH CV ECHO MEAS - PVA(V,D): 2.6 CM^2
BH CV ECHO MEAS - QP/QS: 0.68
BH CV ECHO MEAS - RAP SYSTOLE: 8 MMHG
BH CV ECHO MEAS - RV MAX PG: 1.6 MMHG
BH CV ECHO MEAS - RV MEAN PG: 0.75 MMHG
BH CV ECHO MEAS - RV V1 MAX: 63.4 CM/SEC
BH CV ECHO MEAS - RV V1 MEAN: 39.9 CM/SEC
BH CV ECHO MEAS - RV V1 VTI: 12.7 CM
BH CV ECHO MEAS - RVOT AREA: 3.7 CM^2
BH CV ECHO MEAS - RVOT DIAM: 2.2 CM
BH CV ECHO MEAS - RVSP: 55 MMHG
BH CV ECHO MEAS - SI(AO): 68.8 ML/M^2
BH CV ECHO MEAS - SI(CUBED): 19 ML/M^2
BH CV ECHO MEAS - SI(LVOT): 34.2 ML/M^2
BH CV ECHO MEAS - SI(MOD-SP2): 24.7 ML/M^2
BH CV ECHO MEAS - SI(MOD-SP4): 34.1 ML/M^2
BH CV ECHO MEAS - SI(TEICH): 12.8 ML/M^2
BH CV ECHO MEAS - SV(AO): 139 ML
BH CV ECHO MEAS - SV(CUBED): 38.3 ML
BH CV ECHO MEAS - SV(LVOT): 69.1 ML
BH CV ECHO MEAS - SV(MOD-SP2): 50 ML
BH CV ECHO MEAS - SV(MOD-SP4): 69 ML
BH CV ECHO MEAS - SV(RVOT): 47.1 ML
BH CV ECHO MEAS - SV(TEICH): 25.9 ML
BH CV ECHO MEAS - TR MAX VEL: 342.9 CM/SEC
BH CV ECHO MEASUREMENTS AVERAGE E/E' RATIO: 9.22
BH CV XLRA - RV BASE: 4.3 CM
BH CV XLRA - RV LENGTH: 7.1 CM
BH CV XLRA - RV MID: 3.5 CM
BH CV XLRA - TDI S': 13.4 CM/SEC
BILIRUB SERPL-MCNC: 0.8 MG/DL (ref 0–1.2)
BILIRUB SERPL-MCNC: 0.9 MG/DL (ref 0–1.2)
BILIRUB SERPL-MCNC: 1.1 MG/DL (ref 0–1.2)
BILIRUB SERPL-MCNC: 1.1 MG/DL (ref 0–1.2)
BUN SERPL-MCNC: 22 MG/DL (ref 8–23)
BUN SERPL-MCNC: 23 MG/DL (ref 8–23)
BUN SERPL-MCNC: 23 MG/DL (ref 8–27)
BUN SERPL-MCNC: 25 MG/DL (ref 8–23)
BUN SERPL-MCNC: 28 MG/DL (ref 8–23)
BUN SERPL-MCNC: 28 MG/DL (ref 8–23)
BUN SERPL-MCNC: 30 MG/DL (ref 8–23)
BUN/CREAT SERPL: 16 (ref 10–24)
BUN/CREAT SERPL: 16.8 (ref 7–25)
BUN/CREAT SERPL: 17.1 (ref 7–25)
BUN/CREAT SERPL: 17.1 (ref 7–25)
BUN/CREAT SERPL: 18.1 (ref 7–25)
BUN/CREAT SERPL: 19 (ref 7–25)
BUN/CREAT SERPL: 19.1 (ref 7–25)
BUN/CREAT SERPL: 19.2 (ref 7–25)
BUN/CREAT SERPL: 22.9 (ref 7–25)
CALCIUM SERPL-MCNC: 10 MG/DL (ref 8.6–10.5)
CALCIUM SERPL-MCNC: 9.7 MG/DL (ref 8.6–10.2)
CALCIUM SPEC-SCNC: 9.1 MG/DL (ref 8.2–9.6)
CALCIUM SPEC-SCNC: 9.4 MG/DL (ref 8.2–9.6)
CALCIUM SPEC-SCNC: 9.4 MG/DL (ref 8.2–9.6)
CALCIUM SPEC-SCNC: 9.5 MG/DL (ref 8.2–9.6)
CALCIUM SPEC-SCNC: 9.5 MG/DL (ref 8.6–10.5)
CALCIUM SPEC-SCNC: 9.6 MG/DL (ref 8.6–10.5)
CALCIUM SPEC-SCNC: 9.9 MG/DL (ref 8.2–9.6)
CHLORIDE SERPL-SCNC: 100 MMOL/L (ref 98–107)
CHLORIDE SERPL-SCNC: 100 MMOL/L (ref 98–107)
CHLORIDE SERPL-SCNC: 101 MMOL/L (ref 96–106)
CHLORIDE SERPL-SCNC: 102 MMOL/L (ref 98–107)
CHLORIDE SERPL-SCNC: 102 MMOL/L (ref 98–107)
CHLORIDE SERPL-SCNC: 103 MMOL/L (ref 98–107)
CHLORIDE SERPL-SCNC: 104 MMOL/L (ref 98–107)
CHLORIDE SERPL-SCNC: 98 MMOL/L (ref 98–107)
CHLORIDE SERPL-SCNC: 98 MMOL/L (ref 98–107)
CO2 SERPL-SCNC: 23.2 MMOL/L (ref 22–29)
CO2 SERPL-SCNC: 25 MMOL/L (ref 20–29)
CO2 SERPL-SCNC: 25.1 MMOL/L (ref 22–29)
CO2 SERPL-SCNC: 26.9 MMOL/L (ref 22–29)
CO2 SERPL-SCNC: 27.3 MMOL/L (ref 22–29)
CO2 SERPL-SCNC: 27.8 MMOL/L (ref 22–29)
CO2 SERPL-SCNC: 28.3 MMOL/L (ref 22–29)
CO2 SERPL-SCNC: 29.8 MMOL/L (ref 22–29)
CO2 SERPL-SCNC: 34.1 MMOL/L (ref 22–29)
CREAT SERPL-MCNC: 1.29 MG/DL (ref 0.76–1.27)
CREAT SERPL-MCNC: 1.31 MG/DL (ref 0.76–1.27)
CREAT SERPL-MCNC: 1.37 MG/DL (ref 0.76–1.27)
CREAT SERPL-MCNC: 1.38 MG/DL (ref 0.76–1.27)
CREAT SERPL-MCNC: 1.41 MG/DL (ref 0.76–1.27)
CREAT SERPL-MCNC: 1.46 MG/DL (ref 0.76–1.27)
CREAT SERPL-MCNC: 1.57 MG/DL (ref 0.76–1.27)
CREAT SERPL-MCNC: 1.58 MG/DL (ref 0.76–1.27)
CREAT SERPL-MCNC: 1.64 MG/DL (ref 0.76–1.27)
D DIMER PPP FEU-MCNC: 0.94 MCGFEU/ML (ref 0–0.49)
DEPRECATED RDW RBC AUTO: 45.7 FL (ref 37–54)
DEPRECATED RDW RBC AUTO: 46 FL (ref 37–54)
DEPRECATED RDW RBC AUTO: 46.5 FL (ref 37–54)
DEPRECATED RDW RBC AUTO: 47.1 FL (ref 37–54)
DEPRECATED RDW RBC AUTO: 47.2 FL (ref 37–54)
DEPRECATED RDW RBC AUTO: 49 FL (ref 37–54)
DEPRECATED RDW RBC AUTO: 50.7 FL (ref 37–54)
EOSINOPHIL # BLD AUTO: 0 X10E3/UL (ref 0–0.4)
EOSINOPHIL # BLD AUTO: 0.03 10*3/MM3 (ref 0–0.4)
EOSINOPHIL # BLD AUTO: 0.04 10*3/MM3 (ref 0–0.4)
EOSINOPHIL NFR BLD AUTO: 0.7 % (ref 0.3–6.2)
EOSINOPHIL NFR BLD AUTO: 0.8 % (ref 0.3–6.2)
EOSINOPHIL NFR BLD AUTO: 1 %
ERYTHROCYTE [DISTWIDTH] IN BLOOD BY AUTOMATED COUNT: 13.2 % (ref 12.3–15.4)
ERYTHROCYTE [DISTWIDTH] IN BLOOD BY AUTOMATED COUNT: 13.5 % (ref 11.6–15.4)
ERYTHROCYTE [DISTWIDTH] IN BLOOD BY AUTOMATED COUNT: 13.5 % (ref 12.3–15.4)
ERYTHROCYTE [DISTWIDTH] IN BLOOD BY AUTOMATED COUNT: 13.6 % (ref 12.3–15.4)
ERYTHROCYTE [DISTWIDTH] IN BLOOD BY AUTOMATED COUNT: 13.8 % (ref 12.3–15.4)
ERYTHROCYTE [DISTWIDTH] IN BLOOD BY AUTOMATED COUNT: 14.1 % (ref 12.3–15.4)
GFR SERPL CREATININE-BSD FRML MDRD: 40 ML/MIN/1.73
GFR SERPL CREATININE-BSD FRML MDRD: 42 ML/MIN/1.73
GFR SERPL CREATININE-BSD FRML MDRD: 45 ML/MIN/1.73
GFR SERPL CREATININE-BSD FRML MDRD: 48 ML/MIN/1.73
GFR SERPL CREATININE-BSD FRML MDRD: 49 ML/MIN/1.73
GFR SERPL CREATININE-BSD FRML MDRD: 51 ML/MIN/1.73
GFR SERPL CREATININE-BSD FRML MDRD: 52 ML/MIN/1.73
GLOBULIN SER CALC-MCNC: 3.2 G/DL (ref 1.5–4.5)
GLOBULIN UR ELPH-MCNC: 2.8 GM/DL
GLOBULIN UR ELPH-MCNC: 3.2 GM/DL
GLOBULIN UR ELPH-MCNC: 3.3 GM/DL
GLOBULIN UR ELPH-MCNC: 3.4 GM/DL
GLOBULIN UR ELPH-MCNC: 3.5 GM/DL
GLUCOSE BLDC GLUCOMTR-MCNC: 123 MG/DL (ref 70–130)
GLUCOSE BLDC GLUCOMTR-MCNC: 136 MG/DL (ref 70–130)
GLUCOSE BLDC GLUCOMTR-MCNC: 156 MG/DL (ref 70–130)
GLUCOSE BLDC GLUCOMTR-MCNC: 165 MG/DL (ref 70–130)
GLUCOSE BLDC GLUCOMTR-MCNC: 168 MG/DL (ref 70–130)
GLUCOSE BLDC GLUCOMTR-MCNC: 172 MG/DL (ref 70–130)
GLUCOSE BLDC GLUCOMTR-MCNC: 173 MG/DL (ref 70–130)
GLUCOSE BLDC GLUCOMTR-MCNC: 178 MG/DL (ref 70–130)
GLUCOSE BLDC GLUCOMTR-MCNC: 191 MG/DL (ref 70–130)
GLUCOSE BLDC GLUCOMTR-MCNC: 209 MG/DL (ref 70–130)
GLUCOSE BLDC GLUCOMTR-MCNC: 238 MG/DL (ref 70–130)
GLUCOSE BLDC GLUCOMTR-MCNC: 281 MG/DL (ref 70–130)
GLUCOSE BLDC GLUCOMTR-MCNC: 303 MG/DL (ref 70–130)
GLUCOSE BLDC GLUCOMTR-MCNC: 316 MG/DL (ref 70–130)
GLUCOSE SERPL-MCNC: 160 MG/DL (ref 65–99)
GLUCOSE SERPL-MCNC: 166 MG/DL (ref 65–99)
GLUCOSE SERPL-MCNC: 182 MG/DL (ref 65–99)
GLUCOSE SERPL-MCNC: 193 MG/DL (ref 65–99)
GLUCOSE SERPL-MCNC: 234 MG/DL (ref 65–99)
GLUCOSE SERPL-MCNC: 249 MG/DL (ref 65–99)
GLUCOSE SERPL-MCNC: 256 MG/DL (ref 65–99)
GLUCOSE SERPL-MCNC: 258 MG/DL (ref 65–99)
GLUCOSE SERPL-MCNC: 364 MG/DL (ref 65–99)
HBA1C MFR BLD: 9.5 % (ref 4.8–5.6)
HCT VFR BLD AUTO: 39.1 % (ref 37.5–51)
HCT VFR BLD AUTO: 40.6 % (ref 37.5–51)
HCT VFR BLD AUTO: 41.3 % (ref 37.5–51)
HCT VFR BLD AUTO: 43.2 % (ref 37.5–51)
HCT VFR BLD AUTO: 43.3 % (ref 37.5–51)
HCT VFR BLD AUTO: 44.5 % (ref 37.5–51)
HCT VFR BLD AUTO: 44.9 % (ref 37.5–51)
HCT VFR BLD AUTO: 45 % (ref 37.5–51)
HGB BLD-MCNC: 13.1 G/DL (ref 13–17.7)
HGB BLD-MCNC: 13.3 G/DL (ref 13–17.7)
HGB BLD-MCNC: 13.9 G/DL (ref 13–17.7)
HGB BLD-MCNC: 13.9 G/DL (ref 13–17.7)
HGB BLD-MCNC: 14 G/DL (ref 13–17.7)
HGB BLD-MCNC: 14.4 G/DL (ref 13–17.7)
HGB BLD-MCNC: 14.4 G/DL (ref 13–17.7)
HGB BLD-MCNC: 14.8 G/DL (ref 13–17.7)
HOLD SPECIMEN: NORMAL
HOLD SPECIMEN: NORMAL
IMM GRANULOCYTES # BLD AUTO: 0 X10E3/UL (ref 0–0.1)
IMM GRANULOCYTES # BLD AUTO: 0.03 10*3/MM3 (ref 0–0.05)
IMM GRANULOCYTES # BLD AUTO: 0.05 10*3/MM3 (ref 0–0.05)
IMM GRANULOCYTES # BLD AUTO: 0.06 10*3/MM3 (ref 0–0.05)
IMM GRANULOCYTES # BLD AUTO: 0.07 10*3/MM3 (ref 0–0.05)
IMM GRANULOCYTES NFR BLD AUTO: 0 %
IMM GRANULOCYTES NFR BLD AUTO: 0.7 % (ref 0–0.5)
IMM GRANULOCYTES NFR BLD AUTO: 0.9 % (ref 0–0.5)
IMM GRANULOCYTES NFR BLD AUTO: 1 % (ref 0–0.5)
IMM GRANULOCYTES NFR BLD AUTO: 1.2 % (ref 0–0.5)
LABCORP SARS-COV-2, NAA 2 DAY TAT: NORMAL
LEFT ATRIUM VOLUME INDEX: 62 ML/M2
LYMPHOCYTES # BLD AUTO: 0.64 10*3/MM3 (ref 0.7–3.1)
LYMPHOCYTES # BLD AUTO: 0.76 10*3/MM3 (ref 0.7–3.1)
LYMPHOCYTES # BLD AUTO: 0.9 X10E3/UL (ref 0.7–3.1)
LYMPHOCYTES # BLD AUTO: 0.94 10*3/MM3 (ref 0.7–3.1)
LYMPHOCYTES # BLD AUTO: 1.04 10*3/MM3 (ref 0.7–3.1)
LYMPHOCYTES NFR BLD AUTO: 14 %
LYMPHOCYTES NFR BLD AUTO: 14.3 % (ref 19.6–45.3)
LYMPHOCYTES NFR BLD AUTO: 14.7 % (ref 19.6–45.3)
LYMPHOCYTES NFR BLD AUTO: 15.6 % (ref 19.6–45.3)
LYMPHOCYTES NFR BLD AUTO: 16.9 % (ref 19.6–45.3)
MAGNESIUM SERPL-MCNC: 2 MG/DL (ref 1.6–2.4)
MAXIMAL PREDICTED HEART RATE: 131 BPM
MCH RBC QN AUTO: 30.5 PG (ref 26.6–33)
MCH RBC QN AUTO: 30.9 PG (ref 26.6–33)
MCH RBC QN AUTO: 30.9 PG (ref 26.6–33)
MCH RBC QN AUTO: 31 PG (ref 26.6–33)
MCH RBC QN AUTO: 31 PG (ref 26.6–33)
MCH RBC QN AUTO: 31.1 PG (ref 26.6–33)
MCH RBC QN AUTO: 31.1 PG (ref 26.6–33)
MCH RBC QN AUTO: 31.5 PG (ref 26.6–33)
MCHC RBC AUTO-ENTMCNC: 31.5 G/DL (ref 31.5–35.7)
MCHC RBC AUTO-ENTMCNC: 32 G/DL (ref 31.5–35.7)
MCHC RBC AUTO-ENTMCNC: 32.2 G/DL (ref 31.5–35.7)
MCHC RBC AUTO-ENTMCNC: 32.3 G/DL (ref 31.5–35.7)
MCHC RBC AUTO-ENTMCNC: 33 G/DL (ref 31.5–35.7)
MCHC RBC AUTO-ENTMCNC: 33.3 G/DL (ref 31.5–35.7)
MCHC RBC AUTO-ENTMCNC: 33.7 G/DL (ref 31.5–35.7)
MCHC RBC AUTO-ENTMCNC: 34 G/DL (ref 31.5–35.7)
MCV RBC AUTO: 91.1 FL (ref 79–97)
MCV RBC AUTO: 92.4 FL (ref 79–97)
MCV RBC AUTO: 93.7 FL (ref 79–97)
MCV RBC AUTO: 95 FL (ref 79–97)
MCV RBC AUTO: 95.8 FL (ref 79–97)
MCV RBC AUTO: 96.6 FL (ref 79–97)
MCV RBC AUTO: 96.8 FL (ref 79–97)
MCV RBC AUTO: 96.9 FL (ref 79–97)
MONOCYTES # BLD AUTO: 0.39 10*3/MM3 (ref 0.1–0.9)
MONOCYTES # BLD AUTO: 0.5 X10E3/UL (ref 0.1–0.9)
MONOCYTES # BLD AUTO: 0.52 10*3/MM3 (ref 0.1–0.9)
MONOCYTES # BLD AUTO: 0.57 10*3/MM3 (ref 0.1–0.9)
MONOCYTES # BLD AUTO: 0.59 10*3/MM3 (ref 0.1–0.9)
MONOCYTES NFR BLD AUTO: 10.7 % (ref 5–12)
MONOCYTES NFR BLD AUTO: 8 %
MONOCYTES NFR BLD AUTO: 8.5 % (ref 5–12)
MONOCYTES NFR BLD AUTO: 9 % (ref 5–12)
MONOCYTES NFR BLD AUTO: 9.8 % (ref 5–12)
NEUTROPHILS # BLD AUTO: 4.6 X10E3/UL (ref 1.4–7)
NEUTROPHILS NFR BLD AUTO: 3.24 10*3/MM3 (ref 1.7–7)
NEUTROPHILS NFR BLD AUTO: 3.88 10*3/MM3 (ref 1.7–7)
NEUTROPHILS NFR BLD AUTO: 4.37 10*3/MM3 (ref 1.7–7)
NEUTROPHILS NFR BLD AUTO: 4.46 10*3/MM3 (ref 1.7–7)
NEUTROPHILS NFR BLD AUTO: 72.4 % (ref 42.7–76)
NEUTROPHILS NFR BLD AUTO: 72.4 % (ref 42.7–76)
NEUTROPHILS NFR BLD AUTO: 72.9 % (ref 42.7–76)
NEUTROPHILS NFR BLD AUTO: 74.4 % (ref 42.7–76)
NEUTROPHILS NFR BLD AUTO: 77 %
NRBC BLD AUTO-RTO: 0 /100 WBC (ref 0–0.2)
NT-PROBNP SERPL-MCNC: 5150 PG/ML (ref 0–1800)
NT-PROBNP SERPL-MCNC: ABNORMAL PG/ML (ref 0–1800)
NT-PROBNP SERPL-MCNC: ABNORMAL PG/ML (ref 0–1800)
PLATELET # BLD AUTO: 158 10*3/MM3 (ref 140–450)
PLATELET # BLD AUTO: 160 10*3/MM3 (ref 140–450)
PLATELET # BLD AUTO: 164 10*3/MM3 (ref 140–450)
PLATELET # BLD AUTO: 170 10*3/MM3 (ref 140–450)
PLATELET # BLD AUTO: 170 10*3/MM3 (ref 140–450)
PLATELET # BLD AUTO: 175 X10E3/UL (ref 150–450)
PLATELET # BLD AUTO: 176 10*3/MM3 (ref 140–450)
PLATELET # BLD AUTO: 177 10*3/MM3 (ref 140–450)
PMV BLD AUTO: 11.3 FL (ref 6–12)
PMV BLD AUTO: 11.6 FL (ref 6–12)
PMV BLD AUTO: 11.6 FL (ref 6–12)
PMV BLD AUTO: 11.7 FL (ref 6–12)
PMV BLD AUTO: 11.7 FL (ref 6–12)
PMV BLD AUTO: 11.8 FL (ref 6–12)
PMV BLD AUTO: 11.8 FL (ref 6–12)
POTASSIUM SERPL-SCNC: 3.6 MMOL/L (ref 3.5–5.2)
POTASSIUM SERPL-SCNC: 4.4 MMOL/L (ref 3.5–5.2)
POTASSIUM SERPL-SCNC: 4.5 MMOL/L (ref 3.5–5.2)
POTASSIUM SERPL-SCNC: 4.5 MMOL/L (ref 3.5–5.2)
POTASSIUM SERPL-SCNC: 4.6 MMOL/L (ref 3.5–5.2)
POTASSIUM SERPL-SCNC: 4.6 MMOL/L (ref 3.5–5.2)
POTASSIUM SERPL-SCNC: 4.7 MMOL/L (ref 3.5–5.2)
POTASSIUM SERPL-SCNC: 4.9 MMOL/L (ref 3.5–5.2)
POTASSIUM SERPL-SCNC: 5 MMOL/L (ref 3.5–5.2)
PROT SERPL-MCNC: 6.4 G/DL (ref 6–8.5)
PROT SERPL-MCNC: 7 G/DL (ref 6–8.5)
PROT SERPL-MCNC: 7.2 G/DL (ref 6–8.5)
PROT SERPL-MCNC: 7.5 G/DL (ref 6–8.5)
PROT SERPL-MCNC: 7.6 G/DL (ref 6–8.5)
PROT SERPL-MCNC: 7.8 G/DL (ref 6–8.5)
QT INTERVAL: 409 MS
QT INTERVAL: 425 MS
RBC # BLD AUTO: 4.24 10*6/MM3 (ref 4.14–5.8)
RBC # BLD AUTO: 4.29 10*6/MM3 (ref 4.14–5.8)
RBC # BLD AUTO: 4.47 10*6/MM3 (ref 4.14–5.8)
RBC # BLD AUTO: 4.47 10*6/MM3 (ref 4.14–5.8)
RBC # BLD AUTO: 4.57 X10E6/UL (ref 4.14–5.8)
RBC # BLD AUTO: 4.59 10*6/MM3 (ref 4.14–5.8)
RBC # BLD AUTO: 4.65 10*6/MM3 (ref 4.14–5.8)
RBC # BLD AUTO: 4.79 10*6/MM3 (ref 4.14–5.8)
SARS-COV-2 ORF1AB RESP QL NAA+PROBE: NOT DETECTED
SARS-COV-2 RNA RESP QL NAA+PROBE: NOT DETECTED
SARS-COV-2 RNA RESP QL NAA+PROBE: NOT DETECTED
SINUS: 3.5 CM
SODIUM SERPL-SCNC: 136 MMOL/L (ref 136–145)
SODIUM SERPL-SCNC: 137 MMOL/L (ref 136–145)
SODIUM SERPL-SCNC: 137 MMOL/L (ref 136–145)
SODIUM SERPL-SCNC: 138 MMOL/L (ref 136–145)
SODIUM SERPL-SCNC: 139 MMOL/L (ref 136–145)
SODIUM SERPL-SCNC: 140 MMOL/L (ref 136–145)
SODIUM SERPL-SCNC: 141 MMOL/L (ref 134–144)
SODIUM SERPL-SCNC: 142 MMOL/L (ref 136–145)
SODIUM SERPL-SCNC: 143 MMOL/L (ref 136–145)
STJ: 2.7 CM
STRESS TARGET HR: 111 BPM
TROPONIN T SERPL-MCNC: 0.01 NG/ML (ref 0–0.03)
TROPONIN T SERPL-MCNC: <0.01 NG/ML (ref 0–0.03)
TROPONIN T SERPL-MCNC: <0.01 NG/ML (ref 0–0.03)
TSH SERPL DL<=0.005 MIU/L-ACNC: 2.46 UIU/ML (ref 0.45–4.5)
WBC # BLD AUTO: 4.35 10*3/MM3 (ref 3.4–10.8)
WBC # BLD AUTO: 5.32 10*3/MM3 (ref 3.4–10.8)
WBC # BLD AUTO: 6.1 X10E3/UL (ref 3.4–10.8)
WBC NRBC COR # BLD: 4.55 10*3/MM3 (ref 3.4–10.8)
WBC NRBC COR # BLD: 4.65 10*3/MM3 (ref 3.4–10.8)
WBC NRBC COR # BLD: 5.2 10*3/MM3 (ref 3.4–10.8)
WBC NRBC COR # BLD: 6.03 10*3/MM3 (ref 3.4–10.8)
WBC NRBC COR # BLD: 6.15 10*3/MM3 (ref 3.4–10.8)
WHOLE BLOOD HOLD SPECIMEN: NORMAL
WHOLE BLOOD HOLD SPECIMEN: NORMAL

## 2021-01-01 PROCEDURE — 83880 ASSAY OF NATRIURETIC PEPTIDE: CPT

## 2021-01-01 PROCEDURE — 25010000002 PHENYLEPHRINE 10 MG/ML SOLUTION: Performed by: ANESTHESIOLOGY

## 2021-01-01 PROCEDURE — U0004 COV-19 TEST NON-CDC HGH THRU: HCPCS

## 2021-01-01 PROCEDURE — 71275 CT ANGIOGRAPHY CHEST: CPT

## 2021-01-01 PROCEDURE — 93010 ELECTROCARDIOGRAM REPORT: CPT | Performed by: INTERNAL MEDICINE

## 2021-01-01 PROCEDURE — 99214 OFFICE O/P EST MOD 30 MIN: CPT | Performed by: NURSE PRACTITIONER

## 2021-01-01 PROCEDURE — 25010000002 PROPOFOL 10 MG/ML EMULSION: Performed by: ANESTHESIOLOGY

## 2021-01-01 PROCEDURE — 82962 GLUCOSE BLOOD TEST: CPT

## 2021-01-01 PROCEDURE — 71045 X-RAY EXAM CHEST 1 VIEW: CPT

## 2021-01-01 PROCEDURE — 93005 ELECTROCARDIOGRAM TRACING: CPT | Performed by: EMERGENCY MEDICINE

## 2021-01-01 PROCEDURE — U0003 INFECTIOUS AGENT DETECTION BY NUCLEIC ACID (DNA OR RNA); SEVERE ACUTE RESPIRATORY SYNDROME CORONAVIRUS 2 (SARS-COV-2) (CORONAVIRUS DISEASE [COVID-19]), AMPLIFIED PROBE TECHNIQUE, MAKING USE OF HIGH THROUGHPUT TECHNOLOGIES AS DESCRIBED BY CMS-2020-01-R: HCPCS | Performed by: EMERGENCY MEDICINE

## 2021-01-01 PROCEDURE — 25010000002 ONDANSETRON PER 1 MG: Performed by: ANESTHESIOLOGY

## 2021-01-01 PROCEDURE — C1758 CATHETER, URETERAL: HCPCS | Performed by: UROLOGY

## 2021-01-01 PROCEDURE — 25010000002 DEXAMETHASONE SODIUM PHOSPHATE 10 MG/ML SOLUTION: Performed by: ANESTHESIOLOGY

## 2021-01-01 PROCEDURE — 85379 FIBRIN DEGRADATION QUANT: CPT | Performed by: NURSE PRACTITIONER

## 2021-01-01 PROCEDURE — 25010000002 FUROSEMIDE PER 20 MG: Performed by: INTERNAL MEDICINE

## 2021-01-01 PROCEDURE — C2617 STENT, NON-COR, TEM W/O DEL: HCPCS | Performed by: UROLOGY

## 2021-01-01 PROCEDURE — 1111F DSCHRG MED/CURRENT MED MERGE: CPT | Performed by: FAMILY MEDICINE

## 2021-01-01 PROCEDURE — 99213 OFFICE O/P EST LOW 20 MIN: CPT | Performed by: FAMILY MEDICINE

## 2021-01-01 PROCEDURE — C9803 HOPD COVID-19 SPEC COLLECT: HCPCS

## 2021-01-01 PROCEDURE — G0378 HOSPITAL OBSERVATION PER HR: HCPCS

## 2021-01-01 PROCEDURE — 99214 OFFICE O/P EST MOD 30 MIN: CPT | Performed by: INTERNAL MEDICINE

## 2021-01-01 PROCEDURE — 99213 OFFICE O/P EST LOW 20 MIN: CPT | Performed by: NURSE PRACTITIONER

## 2021-01-01 PROCEDURE — 63710000001 INSULIN GLARGINE PER 5 UNITS: Performed by: INTERNAL MEDICINE

## 2021-01-01 PROCEDURE — 85027 COMPLETE CBC AUTOMATED: CPT | Performed by: INTERNAL MEDICINE

## 2021-01-01 PROCEDURE — U0004 COV-19 TEST NON-CDC HGH THRU: HCPCS | Performed by: NURSE PRACTITIONER

## 2021-01-01 PROCEDURE — 25010000002 DEXAMETHASONE PER 1 MG: Performed by: ANESTHESIOLOGY

## 2021-01-01 PROCEDURE — 93356 MYOCRD STRAIN IMG SPCKL TRCK: CPT

## 2021-01-01 PROCEDURE — 36415 COLL VENOUS BLD VENIPUNCTURE: CPT

## 2021-01-01 PROCEDURE — 0 IOTHALAMATE 60 % SOLUTION: Performed by: UROLOGY

## 2021-01-01 PROCEDURE — 74018 RADEX ABDOMEN 1 VIEW: CPT

## 2021-01-01 PROCEDURE — 80048 BASIC METABOLIC PNL TOTAL CA: CPT

## 2021-01-01 PROCEDURE — C1769 GUIDE WIRE: HCPCS | Performed by: UROLOGY

## 2021-01-01 PROCEDURE — 84484 ASSAY OF TROPONIN QUANT: CPT | Performed by: INTERNAL MEDICINE

## 2021-01-01 PROCEDURE — 99232 SBSQ HOSP IP/OBS MODERATE 35: CPT | Performed by: NURSE PRACTITIONER

## 2021-01-01 PROCEDURE — 93000 ELECTROCARDIOGRAM COMPLETE: CPT | Performed by: INTERNAL MEDICINE

## 2021-01-01 PROCEDURE — 84484 ASSAY OF TROPONIN QUANT: CPT | Performed by: EMERGENCY MEDICINE

## 2021-01-01 PROCEDURE — 85025 COMPLETE CBC W/AUTO DIFF WBC: CPT | Performed by: EMERGENCY MEDICINE

## 2021-01-01 PROCEDURE — 80053 COMPREHEN METABOLIC PANEL: CPT | Performed by: INTERNAL MEDICINE

## 2021-01-01 PROCEDURE — 80048 BASIC METABOLIC PNL TOTAL CA: CPT | Performed by: INTERNAL MEDICINE

## 2021-01-01 PROCEDURE — 83880 ASSAY OF NATRIURETIC PEPTIDE: CPT | Performed by: EMERGENCY MEDICINE

## 2021-01-01 PROCEDURE — 85025 COMPLETE CBC W/AUTO DIFF WBC: CPT

## 2021-01-01 PROCEDURE — 80053 COMPREHEN METABOLIC PANEL: CPT | Performed by: EMERGENCY MEDICINE

## 2021-01-01 PROCEDURE — 25010000002 PERFLUTREN (DEFINITY) 8.476 MG IN SODIUM CHLORIDE (PF) 0.9 % 10 ML INJECTION: Performed by: NURSE PRACTITIONER

## 2021-01-01 PROCEDURE — 25010000002 FENTANYL CITRATE (PF) 50 MCG/ML SOLUTION: Performed by: ANESTHESIOLOGY

## 2021-01-01 PROCEDURE — 63710000001 INSULIN LISPRO (HUMAN) PER 5 UNITS: Performed by: INTERNAL MEDICINE

## 2021-01-01 PROCEDURE — 99283 EMERGENCY DEPT VISIT LOW MDM: CPT

## 2021-01-01 PROCEDURE — 99222 1ST HOSP IP/OBS MODERATE 55: CPT | Performed by: INTERNAL MEDICINE

## 2021-01-01 PROCEDURE — U0005 INFEC AGEN DETEC AMPLI PROBE: HCPCS

## 2021-01-01 PROCEDURE — 94060 EVALUATION OF WHEEZING: CPT

## 2021-01-01 PROCEDURE — 99284 EMERGENCY DEPT VISIT MOD MDM: CPT

## 2021-01-01 PROCEDURE — 83735 ASSAY OF MAGNESIUM: CPT | Performed by: EMERGENCY MEDICINE

## 2021-01-01 PROCEDURE — 25010000002 ENOXAPARIN PER 10 MG: Performed by: INTERNAL MEDICINE

## 2021-01-01 PROCEDURE — 0T788DZ DILATION OF BILATERAL URETERS WITH INTRALUMINAL DEVICE, VIA NATURAL OR ARTIFICIAL OPENING ENDOSCOPIC: ICD-10-PCS | Performed by: UROLOGY

## 2021-01-01 PROCEDURE — 96374 THER/PROPH/DIAG INJ IV PUSH: CPT

## 2021-01-01 PROCEDURE — 99496 TRANSJ CARE MGMT HIGH F2F 7D: CPT | Performed by: FAMILY MEDICINE

## 2021-01-01 PROCEDURE — 93356 MYOCRD STRAIN IMG SPCKL TRCK: CPT | Performed by: INTERNAL MEDICINE

## 2021-01-01 PROCEDURE — 93306 TTE W/DOPPLER COMPLETE: CPT

## 2021-01-01 PROCEDURE — 83036 HEMOGLOBIN GLYCOSYLATED A1C: CPT | Performed by: INTERNAL MEDICINE

## 2021-01-01 PROCEDURE — 74018 RADEX ABDOMEN 1 VIEW: CPT | Performed by: UROLOGY

## 2021-01-01 PROCEDURE — 99214 OFFICE O/P EST MOD 30 MIN: CPT | Performed by: FAMILY MEDICINE

## 2021-01-01 PROCEDURE — U0005 INFEC AGEN DETEC AMPLI PROBE: HCPCS | Performed by: NURSE PRACTITIONER

## 2021-01-01 PROCEDURE — 80053 COMPREHEN METABOLIC PANEL: CPT | Performed by: NURSE PRACTITIONER

## 2021-01-01 PROCEDURE — 80053 COMPREHEN METABOLIC PANEL: CPT

## 2021-01-01 PROCEDURE — 93000 ELECTROCARDIOGRAM COMPLETE: CPT | Performed by: NURSE PRACTITIONER

## 2021-01-01 PROCEDURE — 85027 COMPLETE CBC AUTOMATED: CPT | Performed by: NURSE PRACTITIONER

## 2021-01-01 PROCEDURE — 0TP98DZ REMOVAL OF INTRALUMINAL DEVICE FROM URETER, VIA NATURAL OR ARTIFICIAL OPENING ENDOSCOPIC: ICD-10-PCS | Performed by: UROLOGY

## 2021-01-01 PROCEDURE — 0 IOPAMIDOL PER 1 ML: Performed by: NURSE PRACTITIONER

## 2021-01-01 PROCEDURE — 25010000002 FUROSEMIDE PER 20 MG: Performed by: EMERGENCY MEDICINE

## 2021-01-01 PROCEDURE — 85025 COMPLETE CBC W/AUTO DIFF WBC: CPT | Performed by: INTERNAL MEDICINE

## 2021-01-01 PROCEDURE — 84484 ASSAY OF TROPONIN QUANT: CPT

## 2021-01-01 PROCEDURE — 76000 FLUOROSCOPY <1 HR PHYS/QHP: CPT | Performed by: UROLOGY

## 2021-01-01 PROCEDURE — 99215 OFFICE O/P EST HI 40 MIN: CPT | Performed by: INTERNAL MEDICINE

## 2021-01-01 PROCEDURE — U0005 INFEC AGEN DETEC AMPLI PROBE: HCPCS | Performed by: EMERGENCY MEDICINE

## 2021-01-01 PROCEDURE — 93306 TTE W/DOPPLER COMPLETE: CPT | Performed by: INTERNAL MEDICINE

## 2021-01-01 PROCEDURE — 83880 ASSAY OF NATRIURETIC PEPTIDE: CPT | Performed by: NURSE PRACTITIONER

## 2021-01-01 PROCEDURE — 93005 ELECTROCARDIOGRAM TRACING: CPT

## 2021-01-01 PROCEDURE — 76000 FLUOROSCOPY <1 HR PHYS/QHP: CPT

## 2021-01-01 PROCEDURE — 25010000002 CEFAZOLIN 1-4 GM/50ML-% SOLUTION: Performed by: UROLOGY

## 2021-01-01 PROCEDURE — 25010000003 CEFAZOLIN IN DEXTROSE 2-4 GM/100ML-% SOLUTION: Performed by: UROLOGY

## 2021-01-01 PROCEDURE — 85025 COMPLETE CBC W/AUTO DIFF WBC: CPT | Performed by: NURSE PRACTITIONER

## 2021-01-01 PROCEDURE — 85027 COMPLETE CBC AUTOMATED: CPT

## 2021-01-01 DEVICE — URETERAL STENT
Type: IMPLANTABLE DEVICE | Site: URETER | Status: FUNCTIONAL
Brand: CONTOUR™

## 2021-01-01 RX ORDER — HYDROCODONE BITARTRATE AND ACETAMINOPHEN 5; 325 MG/1; MG/1
1 TABLET ORAL ONCE AS NEEDED
Status: DISCONTINUED | OUTPATIENT
Start: 2021-01-01 | End: 2021-01-01 | Stop reason: HOSPADM

## 2021-01-01 RX ORDER — ONDANSETRON 2 MG/ML
INJECTION INTRAMUSCULAR; INTRAVENOUS AS NEEDED
Status: DISCONTINUED | OUTPATIENT
Start: 2021-01-01 | End: 2021-01-01 | Stop reason: SURG

## 2021-01-01 RX ORDER — PROPOFOL 10 MG/ML
VIAL (ML) INTRAVENOUS AS NEEDED
Status: DISCONTINUED | OUTPATIENT
Start: 2021-01-01 | End: 2021-01-01 | Stop reason: SURG

## 2021-01-01 RX ORDER — INSULIN GLARGINE 100 [IU]/ML
12 INJECTION, SOLUTION SUBCUTANEOUS NIGHTLY
Status: DISCONTINUED | OUTPATIENT
Start: 2021-01-01 | End: 2021-01-01 | Stop reason: HOSPADM

## 2021-01-01 RX ORDER — LIDOCAINE HYDROCHLORIDE 20 MG/ML
INJECTION, SOLUTION INFILTRATION; PERINEURAL AS NEEDED
Status: DISCONTINUED | OUTPATIENT
Start: 2021-01-01 | End: 2021-01-01 | Stop reason: SURG

## 2021-01-01 RX ORDER — MAGNESIUM HYDROXIDE 1200 MG/15ML
LIQUID ORAL AS NEEDED
Status: DISCONTINUED | OUTPATIENT
Start: 2021-01-01 | End: 2021-01-01 | Stop reason: HOSPADM

## 2021-01-01 RX ORDER — PROMETHAZINE HYDROCHLORIDE 25 MG/1
25 SUPPOSITORY RECTAL ONCE AS NEEDED
Status: DISCONTINUED | OUTPATIENT
Start: 2021-01-01 | End: 2021-01-01 | Stop reason: HOSPADM

## 2021-01-01 RX ORDER — DIPHENHYDRAMINE HYDROCHLORIDE 50 MG/ML
6.25 INJECTION INTRAMUSCULAR; INTRAVENOUS
Status: DISCONTINUED | OUTPATIENT
Start: 2021-01-01 | End: 2021-01-01 | Stop reason: HOSPADM

## 2021-01-01 RX ORDER — SODIUM CHLORIDE 0.9 % (FLUSH) 0.9 %
10 SYRINGE (ML) INJECTION EVERY 12 HOURS SCHEDULED
Status: DISCONTINUED | OUTPATIENT
Start: 2021-01-01 | End: 2021-01-01 | Stop reason: HOSPADM

## 2021-01-01 RX ORDER — PHENYLEPHRINE HYDROCHLORIDE 10 MG/ML
INJECTION INTRAVENOUS AS NEEDED
Status: DISCONTINUED | OUTPATIENT
Start: 2021-01-01 | End: 2021-01-01 | Stop reason: SURG

## 2021-01-01 RX ORDER — FENTANYL CITRATE 50 UG/ML
25 INJECTION, SOLUTION INTRAMUSCULAR; INTRAVENOUS
Status: DISCONTINUED | OUTPATIENT
Start: 2021-01-01 | End: 2021-01-01 | Stop reason: HOSPADM

## 2021-01-01 RX ORDER — ONDANSETRON 2 MG/ML
4 INJECTION INTRAMUSCULAR; INTRAVENOUS EVERY 6 HOURS PRN
Status: DISCONTINUED | OUTPATIENT
Start: 2021-01-01 | End: 2021-01-01 | Stop reason: HOSPADM

## 2021-01-01 RX ORDER — DEXAMETHASONE SODIUM PHOSPHATE 10 MG/ML
INJECTION, SOLUTION INTRAMUSCULAR; INTRAVENOUS AS NEEDED
Status: DISCONTINUED | OUTPATIENT
Start: 2021-01-01 | End: 2021-01-01 | Stop reason: SURG

## 2021-01-01 RX ORDER — ONDANSETRON 2 MG/ML
4 INJECTION INTRAMUSCULAR; INTRAVENOUS ONCE AS NEEDED
Status: COMPLETED | OUTPATIENT
Start: 2021-01-01 | End: 2021-01-01

## 2021-01-01 RX ORDER — PROMETHAZINE HYDROCHLORIDE 25 MG/1
25 TABLET ORAL ONCE AS NEEDED
Status: DISCONTINUED | OUTPATIENT
Start: 2021-01-01 | End: 2021-01-01 | Stop reason: HOSPADM

## 2021-01-01 RX ORDER — EPHEDRINE SULFATE 50 MG/ML
5 INJECTION, SOLUTION INTRAVENOUS ONCE AS NEEDED
Status: DISCONTINUED | OUTPATIENT
Start: 2021-01-01 | End: 2021-01-01 | Stop reason: HOSPADM

## 2021-01-01 RX ORDER — METOPROLOL SUCCINATE 25 MG/1
12.5 TABLET, EXTENDED RELEASE ORAL NIGHTLY
Status: DISCONTINUED | OUTPATIENT
Start: 2021-01-01 | End: 2021-01-01 | Stop reason: HOSPADM

## 2021-01-01 RX ORDER — HYDRALAZINE HYDROCHLORIDE 20 MG/ML
5 INJECTION INTRAMUSCULAR; INTRAVENOUS
Status: DISCONTINUED | OUTPATIENT
Start: 2021-01-01 | End: 2021-01-01 | Stop reason: HOSPADM

## 2021-01-01 RX ORDER — CEFAZOLIN SODIUM 1 G/50ML
1 INJECTION, SOLUTION INTRAVENOUS ONCE
Status: COMPLETED | OUTPATIENT
Start: 2021-01-01 | End: 2021-01-01

## 2021-01-01 RX ORDER — FUROSEMIDE 40 MG/1
40 TABLET ORAL DAILY
Qty: 30 TABLET | Refills: 0 | Status: SHIPPED | OUTPATIENT
Start: 2021-01-01 | End: 2022-01-01 | Stop reason: SDUPTHER

## 2021-01-01 RX ORDER — FUROSEMIDE 10 MG/ML
80 INJECTION INTRAMUSCULAR; INTRAVENOUS
Status: DISCONTINUED | OUTPATIENT
Start: 2021-01-01 | End: 2021-01-01

## 2021-01-01 RX ORDER — FUROSEMIDE 20 MG/1
20 TABLET ORAL DAILY
Qty: 90 TABLET | Refills: 3 | Status: SHIPPED | OUTPATIENT
Start: 2021-01-01 | End: 2021-01-01 | Stop reason: HOSPADM

## 2021-01-01 RX ORDER — SODIUM CHLORIDE 0.9 % (FLUSH) 0.9 %
3-10 SYRINGE (ML) INJECTION AS NEEDED
Status: DISCONTINUED | OUTPATIENT
Start: 2021-01-01 | End: 2021-01-01 | Stop reason: HOSPADM

## 2021-01-01 RX ORDER — FLUMAZENIL 0.1 MG/ML
0.2 INJECTION INTRAVENOUS AS NEEDED
Status: DISCONTINUED | OUTPATIENT
Start: 2021-01-01 | End: 2021-01-01 | Stop reason: HOSPADM

## 2021-01-01 RX ORDER — MULTIPLE VITAMINS W/ MINERALS TAB 9MG-400MCG
1 TAB ORAL DAILY
Status: DISCONTINUED | OUTPATIENT
Start: 2021-01-01 | End: 2021-01-01 | Stop reason: HOSPADM

## 2021-01-01 RX ORDER — FUROSEMIDE 20 MG/1
TABLET ORAL
Qty: 30 TABLET | Refills: 0 | Status: SHIPPED | OUTPATIENT
Start: 2021-01-01 | End: 2021-01-01 | Stop reason: SDUPTHER

## 2021-01-01 RX ORDER — ONDANSETRON 2 MG/ML
4 INJECTION INTRAMUSCULAR; INTRAVENOUS ONCE AS NEEDED
Status: DISCONTINUED | OUTPATIENT
Start: 2021-01-01 | End: 2021-01-01 | Stop reason: HOSPADM

## 2021-01-01 RX ORDER — SODIUM CHLORIDE, SODIUM LACTATE, POTASSIUM CHLORIDE, CALCIUM CHLORIDE 600; 310; 30; 20 MG/100ML; MG/100ML; MG/100ML; MG/100ML
9 INJECTION, SOLUTION INTRAVENOUS CONTINUOUS
Status: DISCONTINUED | OUTPATIENT
Start: 2021-01-01 | End: 2021-01-01 | Stop reason: HOSPADM

## 2021-01-01 RX ORDER — DEXTROSE MONOHYDRATE 25 G/50ML
25 INJECTION, SOLUTION INTRAVENOUS
Status: DISCONTINUED | OUTPATIENT
Start: 2021-01-01 | End: 2021-01-01 | Stop reason: HOSPADM

## 2021-01-01 RX ORDER — ACETAMINOPHEN 160 MG/5ML
650 SOLUTION ORAL EVERY 4 HOURS PRN
Status: DISCONTINUED | OUTPATIENT
Start: 2021-01-01 | End: 2021-01-01 | Stop reason: HOSPADM

## 2021-01-01 RX ORDER — ACETAMINOPHEN 325 MG/1
650 TABLET ORAL EVERY 4 HOURS PRN
Status: DISCONTINUED | OUTPATIENT
Start: 2021-01-01 | End: 2021-01-01 | Stop reason: HOSPADM

## 2021-01-01 RX ORDER — SODIUM CHLORIDE 0.9 % (FLUSH) 0.9 %
10 SYRINGE (ML) INJECTION AS NEEDED
Status: DISCONTINUED | OUTPATIENT
Start: 2021-01-01 | End: 2021-01-01 | Stop reason: HOSPADM

## 2021-01-01 RX ORDER — ASPIRIN 81 MG/1
81 TABLET ORAL DAILY
Status: DISCONTINUED | OUTPATIENT
Start: 2021-01-01 | End: 2021-01-01 | Stop reason: HOSPADM

## 2021-01-01 RX ORDER — METOPROLOL SUCCINATE 25 MG/1
TABLET, EXTENDED RELEASE ORAL
Qty: 45 TABLET | Refills: 3 | Status: ON HOLD | OUTPATIENT
Start: 2021-01-01 | End: 2022-01-01 | Stop reason: SDUPTHER

## 2021-01-01 RX ORDER — HYDROMORPHONE HYDROCHLORIDE 1 MG/ML
0.2 INJECTION, SOLUTION INTRAMUSCULAR; INTRAVENOUS; SUBCUTANEOUS
Status: DISCONTINUED | OUTPATIENT
Start: 2021-01-01 | End: 2021-01-01 | Stop reason: HOSPADM

## 2021-01-01 RX ORDER — DIPHENHYDRAMINE HYDROCHLORIDE 50 MG/ML
12.5 INJECTION INTRAMUSCULAR; INTRAVENOUS
Status: DISCONTINUED | OUTPATIENT
Start: 2021-01-01 | End: 2021-01-01 | Stop reason: HOSPADM

## 2021-01-01 RX ORDER — LIDOCAINE HYDROCHLORIDE 10 MG/ML
0.5 INJECTION, SOLUTION EPIDURAL; INFILTRATION; INTRACAUDAL; PERINEURAL ONCE AS NEEDED
Status: DISCONTINUED | OUTPATIENT
Start: 2021-01-01 | End: 2021-01-01 | Stop reason: HOSPADM

## 2021-01-01 RX ORDER — NALOXONE HCL 0.4 MG/ML
0.2 VIAL (ML) INJECTION AS NEEDED
Status: DISCONTINUED | OUTPATIENT
Start: 2021-01-01 | End: 2021-01-01 | Stop reason: HOSPADM

## 2021-01-01 RX ORDER — ACETAMINOPHEN 650 MG/1
650 SUPPOSITORY RECTAL EVERY 4 HOURS PRN
Status: DISCONTINUED | OUTPATIENT
Start: 2021-01-01 | End: 2021-01-01 | Stop reason: HOSPADM

## 2021-01-01 RX ORDER — NICOTINE POLACRILEX 4 MG
15 LOZENGE BUCCAL
Status: DISCONTINUED | OUTPATIENT
Start: 2021-01-01 | End: 2021-01-01 | Stop reason: HOSPADM

## 2021-01-01 RX ORDER — UREA 10 %
3 LOTION (ML) TOPICAL NIGHTLY
Status: DISCONTINUED | OUTPATIENT
Start: 2021-01-01 | End: 2021-01-01 | Stop reason: HOSPADM

## 2021-01-01 RX ORDER — EPHEDRINE SULFATE 50 MG/ML
INJECTION, SOLUTION INTRAVENOUS AS NEEDED
Status: DISCONTINUED | OUTPATIENT
Start: 2021-01-01 | End: 2021-01-01 | Stop reason: SURG

## 2021-01-01 RX ORDER — OXYCODONE AND ACETAMINOPHEN 7.5; 325 MG/1; MG/1
1 TABLET ORAL EVERY 4 HOURS PRN
Status: DISCONTINUED | OUTPATIENT
Start: 2021-01-01 | End: 2021-01-01 | Stop reason: HOSPADM

## 2021-01-01 RX ORDER — LABETALOL HYDROCHLORIDE 5 MG/ML
5 INJECTION, SOLUTION INTRAVENOUS
Status: DISCONTINUED | OUTPATIENT
Start: 2021-01-01 | End: 2021-01-01 | Stop reason: HOSPADM

## 2021-01-01 RX ORDER — INSULIN LISPRO 100 [IU]/ML
0-9 INJECTION, SOLUTION INTRAVENOUS; SUBCUTANEOUS
Status: DISCONTINUED | OUTPATIENT
Start: 2021-01-01 | End: 2021-01-01 | Stop reason: HOSPADM

## 2021-01-01 RX ORDER — ASPIRIN 81 MG/1
81 TABLET ORAL DAILY
Status: ON HOLD | COMMUNITY
End: 2022-01-01 | Stop reason: SDUPTHER

## 2021-01-01 RX ORDER — SODIUM CHLORIDE 0.9 % (FLUSH) 0.9 %
3 SYRINGE (ML) INJECTION EVERY 12 HOURS SCHEDULED
Status: DISCONTINUED | OUTPATIENT
Start: 2021-01-01 | End: 2021-01-01 | Stop reason: HOSPADM

## 2021-01-01 RX ORDER — SODIUM CHLORIDE, SODIUM LACTATE, POTASSIUM CHLORIDE, CALCIUM CHLORIDE 600; 310; 30; 20 MG/100ML; MG/100ML; MG/100ML; MG/100ML
INJECTION, SOLUTION INTRAVENOUS CONTINUOUS PRN
Status: DISCONTINUED | OUTPATIENT
Start: 2021-01-01 | End: 2021-01-01 | Stop reason: SURG

## 2021-01-01 RX ORDER — NITROGLYCERIN 0.4 MG/1
0.4 TABLET SUBLINGUAL
Status: DISCONTINUED | OUTPATIENT
Start: 2021-01-01 | End: 2021-01-01 | Stop reason: HOSPADM

## 2021-01-01 RX ORDER — NALOXONE HCL 0.4 MG/ML
0.4 VIAL (ML) INJECTION AS NEEDED
Status: DISCONTINUED | OUTPATIENT
Start: 2021-01-01 | End: 2021-01-01 | Stop reason: HOSPADM

## 2021-01-01 RX ORDER — FAMOTIDINE 10 MG/ML
20 INJECTION, SOLUTION INTRAVENOUS ONCE
Status: COMPLETED | OUTPATIENT
Start: 2021-01-01 | End: 2021-01-01

## 2021-01-01 RX ORDER — FUROSEMIDE 10 MG/ML
60 INJECTION INTRAMUSCULAR; INTRAVENOUS ONCE
Status: COMPLETED | OUTPATIENT
Start: 2021-01-01 | End: 2021-01-01

## 2021-01-01 RX ORDER — METOPROLOL SUCCINATE 25 MG/1
25 TABLET, EXTENDED RELEASE ORAL ONCE
Status: COMPLETED | OUTPATIENT
Start: 2021-01-01 | End: 2021-01-01

## 2021-01-01 RX ORDER — HYDRALAZINE HYDROCHLORIDE 20 MG/ML
10 INJECTION INTRAMUSCULAR; INTRAVENOUS
Status: DISCONTINUED | OUTPATIENT
Start: 2021-01-01 | End: 2021-01-01 | Stop reason: HOSPADM

## 2021-01-01 RX ORDER — METOPROLOL SUCCINATE 25 MG/1
12.5 TABLET, EXTENDED RELEASE ORAL NIGHTLY
Qty: 45 TABLET | Refills: 3 | Status: SHIPPED | OUTPATIENT
Start: 2021-01-01 | End: 2021-01-01

## 2021-01-01 RX ORDER — FUROSEMIDE 40 MG/1
40 TABLET ORAL DAILY
Status: DISCONTINUED | OUTPATIENT
Start: 2021-01-01 | End: 2021-01-01 | Stop reason: HOSPADM

## 2021-01-01 RX ORDER — OXYCODONE HYDROCHLORIDE AND ACETAMINOPHEN 5; 325 MG/1; MG/1
1 TABLET ORAL ONCE AS NEEDED
Status: DISCONTINUED | OUTPATIENT
Start: 2021-01-01 | End: 2021-01-01 | Stop reason: HOSPADM

## 2021-01-01 RX ORDER — CEFAZOLIN SODIUM 2 G/100ML
2 INJECTION, SOLUTION INTRAVENOUS ONCE
Status: COMPLETED | OUTPATIENT
Start: 2021-01-01 | End: 2021-01-01

## 2021-01-01 RX ORDER — DEXAMETHASONE SODIUM PHOSPHATE 10 MG/ML
INJECTION INTRAMUSCULAR; INTRAVENOUS AS NEEDED
Status: DISCONTINUED | OUTPATIENT
Start: 2021-01-01 | End: 2021-01-01 | Stop reason: SURG

## 2021-01-01 RX ORDER — DIPHENHYDRAMINE HCL 25 MG
25 CAPSULE ORAL
Status: DISCONTINUED | OUTPATIENT
Start: 2021-01-01 | End: 2021-01-01 | Stop reason: HOSPADM

## 2021-01-01 RX ORDER — FENTANYL CITRATE 50 UG/ML
50 INJECTION, SOLUTION INTRAMUSCULAR; INTRAVENOUS
Status: DISCONTINUED | OUTPATIENT
Start: 2021-01-01 | End: 2021-01-01 | Stop reason: HOSPADM

## 2021-01-01 RX ORDER — ACETAMINOPHEN 325 MG/1
650 TABLET ORAL ONCE AS NEEDED
Status: DISCONTINUED | OUTPATIENT
Start: 2021-01-01 | End: 2021-01-01 | Stop reason: HOSPADM

## 2021-01-01 RX ORDER — FUROSEMIDE 40 MG/1
40 TABLET ORAL DAILY
Status: DISCONTINUED | OUTPATIENT
Start: 2021-01-01 | End: 2021-01-01

## 2021-01-01 RX ORDER — ALBUTEROL SULFATE 2.5 MG/3ML
2.5 SOLUTION RESPIRATORY (INHALATION) ONCE AS NEEDED
Status: COMPLETED | OUTPATIENT
Start: 2021-01-01 | End: 2021-01-01

## 2021-01-01 RX ORDER — HYDROMORPHONE HYDROCHLORIDE 1 MG/ML
0.4 INJECTION, SOLUTION INTRAMUSCULAR; INTRAVENOUS; SUBCUTANEOUS
Status: DISCONTINUED | OUTPATIENT
Start: 2021-01-01 | End: 2021-01-01 | Stop reason: HOSPADM

## 2021-01-01 RX ORDER — FUROSEMIDE 40 MG/1
20 TABLET ORAL DAILY
Qty: 30 TABLET | Refills: 0 | Status: SHIPPED | OUTPATIENT
Start: 2021-01-01 | End: 2021-01-01

## 2021-01-01 RX ORDER — FUROSEMIDE 10 MG/ML
80 INJECTION INTRAMUSCULAR; INTRAVENOUS ONCE
Status: COMPLETED | OUTPATIENT
Start: 2021-01-01 | End: 2021-01-01

## 2021-01-01 RX ADMIN — ONDANSETRON 4 MG: 2 INJECTION INTRAMUSCULAR; INTRAVENOUS at 10:17

## 2021-01-01 RX ADMIN — LIDOCAINE HYDROCHLORIDE 80 MG: 20 INJECTION, SOLUTION INFILTRATION; PERINEURAL at 09:59

## 2021-01-01 RX ADMIN — Medication 3 MG: at 20:31

## 2021-01-01 RX ADMIN — CEFAZOLIN SODIUM 1 G: 1 INJECTION, SOLUTION INTRAVENOUS at 11:15

## 2021-01-01 RX ADMIN — ENOXAPARIN SODIUM 30 MG: 30 INJECTION SUBCUTANEOUS at 10:22

## 2021-01-01 RX ADMIN — SODIUM CHLORIDE, PRESERVATIVE FREE 10 ML: 5 INJECTION INTRAVENOUS at 13:50

## 2021-01-01 RX ADMIN — PERFLUTREN 1.5 ML: 6.52 INJECTION, SUSPENSION INTRAVENOUS at 11:51

## 2021-01-01 RX ADMIN — SODIUM CHLORIDE, POTASSIUM CHLORIDE, SODIUM LACTATE AND CALCIUM CHLORIDE 9 ML/HR: 600; 310; 30; 20 INJECTION, SOLUTION INTRAVENOUS at 09:29

## 2021-01-01 RX ADMIN — DEXAMETHASONE SODIUM PHOSPHATE 6 MG: 10 INJECTION, SOLUTION INTRAMUSCULAR; INTRAVENOUS at 10:04

## 2021-01-01 RX ADMIN — LIDOCAINE HYDROCHLORIDE 80 MG: 20 INJECTION, SOLUTION INFILTRATION; PERINEURAL at 11:27

## 2021-01-01 RX ADMIN — INSULIN LISPRO 2 UNITS: 100 INJECTION, SOLUTION INTRAVENOUS; SUBCUTANEOUS at 12:54

## 2021-01-01 RX ADMIN — ENOXAPARIN SODIUM 30 MG: 30 INJECTION SUBCUTANEOUS at 08:58

## 2021-01-01 RX ADMIN — FUROSEMIDE 80 MG: 10 INJECTION, SOLUTION INTRAMUSCULAR; INTRAVENOUS at 17:42

## 2021-01-01 RX ADMIN — PROPOFOL 50 MG: 10 INJECTION, EMULSION INTRAVENOUS at 09:59

## 2021-01-01 RX ADMIN — PROPOFOL 50 MG: 10 INJECTION, EMULSION INTRAVENOUS at 11:27

## 2021-01-01 RX ADMIN — SODIUM CHLORIDE, PRESERVATIVE FREE 10 ML: 5 INJECTION INTRAVENOUS at 16:12

## 2021-01-01 RX ADMIN — EPHEDRINE SULFATE 5 MG: 50 INJECTION INTRAVENOUS at 11:27

## 2021-01-01 RX ADMIN — SODIUM CHLORIDE, POTASSIUM CHLORIDE, SODIUM LACTATE AND CALCIUM CHLORIDE 9 ML/HR: 600; 310; 30; 20 INJECTION, SOLUTION INTRAVENOUS at 11:04

## 2021-01-01 RX ADMIN — FUROSEMIDE 80 MG: 10 INJECTION, SOLUTION INTRAMUSCULAR; INTRAVENOUS at 21:43

## 2021-01-01 RX ADMIN — INSULIN GLARGINE 12 UNITS: 100 INJECTION, SOLUTION SUBCUTANEOUS at 20:31

## 2021-01-01 RX ADMIN — SODIUM CHLORIDE, POTASSIUM CHLORIDE, SODIUM LACTATE AND CALCIUM CHLORIDE: 600; 310; 30; 20 INJECTION, SOLUTION INTRAVENOUS at 11:23

## 2021-01-01 RX ADMIN — SODIUM CHLORIDE, PRESERVATIVE FREE 10 ML: 5 INJECTION INTRAVENOUS at 09:00

## 2021-01-01 RX ADMIN — ALBUTEROL SULFATE 2.5 MG: 2.5 SOLUTION RESPIRATORY (INHALATION) at 15:45

## 2021-01-01 RX ADMIN — SODIUM CHLORIDE, PRESERVATIVE FREE 10 ML: 5 INJECTION INTRAVENOUS at 21:43

## 2021-01-01 RX ADMIN — FAMOTIDINE 20 MG: 10 INJECTION INTRAVENOUS at 11:06

## 2021-01-01 RX ADMIN — METOPROLOL SUCCINATE 25 MG: 25 TABLET, EXTENDED RELEASE ORAL at 09:19

## 2021-01-01 RX ADMIN — METOPROLOL SUCCINATE 12.5 MG: 25 TABLET, EXTENDED RELEASE ORAL at 20:31

## 2021-01-01 RX ADMIN — METOPROLOL SUCCINATE 12.5 MG: 25 TABLET, EXTENDED RELEASE ORAL at 19:51

## 2021-01-01 RX ADMIN — ONDANSETRON 4 MG: 2 INJECTION INTRAMUSCULAR; INTRAVENOUS at 11:43

## 2021-01-01 RX ADMIN — FUROSEMIDE 80 MG: 10 INJECTION, SOLUTION INTRAMUSCULAR; INTRAVENOUS at 08:58

## 2021-01-01 RX ADMIN — ONDANSETRON 4 MG: 2 INJECTION INTRAMUSCULAR; INTRAVENOUS at 11:03

## 2021-01-01 RX ADMIN — PROPOFOL 20 MG: 10 INJECTION, EMULSION INTRAVENOUS at 11:28

## 2021-01-01 RX ADMIN — FUROSEMIDE 60 MG: 20 INJECTION, SOLUTION INTRAMUSCULAR; INTRAVENOUS at 18:25

## 2021-01-01 RX ADMIN — FUROSEMIDE 40 MG: 40 TABLET ORAL at 13:50

## 2021-01-01 RX ADMIN — SODIUM CHLORIDE, PRESERVATIVE FREE 10 ML: 5 INJECTION INTRAVENOUS at 20:31

## 2021-01-01 RX ADMIN — SODIUM CHLORIDE, PRESERVATIVE FREE 10 ML: 5 INJECTION INTRAVENOUS at 19:52

## 2021-01-01 RX ADMIN — INSULIN GLARGINE 12 UNITS: 100 INJECTION, SOLUTION SUBCUTANEOUS at 20:48

## 2021-01-01 RX ADMIN — METOPROLOL SUCCINATE 12.5 MG: 25 TABLET, EXTENDED RELEASE ORAL at 20:24

## 2021-01-01 RX ADMIN — FUROSEMIDE 80 MG: 10 INJECTION, SOLUTION INTRAMUSCULAR; INTRAVENOUS at 08:37

## 2021-01-01 RX ADMIN — SODIUM CHLORIDE, PRESERVATIVE FREE 10 ML: 5 INJECTION INTRAVENOUS at 10:23

## 2021-01-01 RX ADMIN — INSULIN LISPRO 6 UNITS: 100 INJECTION, SOLUTION INTRAVENOUS; SUBCUTANEOUS at 12:42

## 2021-01-01 RX ADMIN — IOPAMIDOL 85 ML: 755 INJECTION, SOLUTION INTRAVENOUS at 15:05

## 2021-01-01 RX ADMIN — INSULIN GLARGINE 12 UNITS: 100 INJECTION, SOLUTION SUBCUTANEOUS at 20:24

## 2021-01-01 RX ADMIN — MULTIPLE VITAMINS W/ MINERALS TAB 1 TABLET: TAB at 20:31

## 2021-01-01 RX ADMIN — INSULIN LISPRO 2 UNITS: 100 INJECTION, SOLUTION INTRAVENOUS; SUBCUTANEOUS at 17:42

## 2021-01-01 RX ADMIN — INSULIN LISPRO 2 UNITS: 100 INJECTION, SOLUTION INTRAVENOUS; SUBCUTANEOUS at 08:58

## 2021-01-01 RX ADMIN — DEXAMETHASONE SODIUM PHOSPHATE 8 MG: 10 INJECTION INTRAMUSCULAR; INTRAVENOUS at 11:34

## 2021-01-01 RX ADMIN — FENTANYL CITRATE 25 MCG: 50 INJECTION INTRAMUSCULAR; INTRAVENOUS at 12:46

## 2021-01-01 RX ADMIN — PROPOFOL 100 MG: 10 INJECTION, EMULSION INTRAVENOUS at 10:00

## 2021-01-01 RX ADMIN — PHENYLEPHRINE HYDROCHLORIDE 100 MCG: 10 INJECTION, SOLUTION INTRAVENOUS at 11:27

## 2021-01-01 RX ADMIN — FUROSEMIDE 80 MG: 10 INJECTION, SOLUTION INTRAMUSCULAR; INTRAVENOUS at 16:44

## 2021-01-01 RX ADMIN — CEFAZOLIN SODIUM 2 G: 2 INJECTION, SOLUTION INTRAVENOUS at 09:49

## 2021-01-01 RX ADMIN — FAMOTIDINE 20 MG: 10 INJECTION INTRAVENOUS at 09:30

## 2021-01-07 DIAGNOSIS — N13.30 BILATERAL HYDRONEPHROSIS: Primary | ICD-10-CM

## 2021-01-07 DIAGNOSIS — N13.5 URETERAL STRICTURE: ICD-10-CM

## 2021-01-07 NOTE — TELEPHONE ENCOUNTER
I spoke with this pt. He is very bothered by the ongoing hematuria. Says it can vary in intensity but most days at some point her has blood with urine, dark, dark red.   He is unable to take the AC for his a fib related to more persistent and worsening bleeding symptoms. In addition to this being mentally distressing he has discomfort and pressure in the pelvis where he says the stents are located. Had them out previously and had improvement of symptoms but needs stents replaced related to rising Cr without the therapy.   He would like to seek a second opinion in the treatment and so I have referred him to [facility]. His next stent exchange is scheduled for 2/22. Can we get him into [facility] urology before that date?

## 2021-01-18 ENCOUNTER — TRANSCRIBE ORDERS (OUTPATIENT)
Dept: PREADMISSION TESTING | Facility: HOSPITAL | Age: 86
End: 2021-01-18

## 2021-01-18 DIAGNOSIS — Z01.818 OTHER SPECIFIED PRE-OPERATIVE EXAMINATION: Primary | ICD-10-CM

## 2021-01-21 ENCOUNTER — APPOINTMENT (OUTPATIENT)
Dept: PREADMISSION TESTING | Facility: HOSPITAL | Age: 86
End: 2021-01-21

## 2021-01-21 ENCOUNTER — TELEPHONE (OUTPATIENT)
Dept: FAMILY MEDICINE CLINIC | Facility: CLINIC | Age: 86
End: 2021-01-21

## 2021-01-21 VITALS
OXYGEN SATURATION: 99 % | HEART RATE: 75 BPM | RESPIRATION RATE: 20 BRPM | HEIGHT: 70 IN | TEMPERATURE: 98 F | DIASTOLIC BLOOD PRESSURE: 79 MMHG | BODY MASS INDEX: 26.63 KG/M2 | SYSTOLIC BLOOD PRESSURE: 148 MMHG | WEIGHT: 186 LBS

## 2021-01-21 LAB
ANION GAP SERPL CALCULATED.3IONS-SCNC: 11.3 MMOL/L (ref 5–15)
BUN SERPL-MCNC: 22 MG/DL (ref 8–23)
BUN/CREAT SERPL: 17.3 (ref 7–25)
CALCIUM SPEC-SCNC: 9.6 MG/DL (ref 8.6–10.5)
CHLORIDE SERPL-SCNC: 103 MMOL/L (ref 98–107)
CO2 SERPL-SCNC: 26.7 MMOL/L (ref 22–29)
CREAT SERPL-MCNC: 1.27 MG/DL (ref 0.76–1.27)
DEPRECATED RDW RBC AUTO: 47.1 FL (ref 37–54)
ERYTHROCYTE [DISTWIDTH] IN BLOOD BY AUTOMATED COUNT: 14 % (ref 12.3–15.4)
GFR SERPL CREATININE-BSD FRML MDRD: 53 ML/MIN/1.73
GLUCOSE SERPL-MCNC: 283 MG/DL (ref 65–99)
HCT VFR BLD AUTO: 37.7 % (ref 37.5–51)
HGB BLD-MCNC: 12.3 G/DL (ref 13–17.7)
MCH RBC QN AUTO: 30.4 PG (ref 26.6–33)
MCHC RBC AUTO-ENTMCNC: 32.6 G/DL (ref 31.5–35.7)
MCV RBC AUTO: 93.3 FL (ref 79–97)
PLATELET # BLD AUTO: 171 10*3/MM3 (ref 140–450)
PMV BLD AUTO: 11 FL (ref 6–12)
POTASSIUM SERPL-SCNC: 5.3 MMOL/L (ref 3.5–5.2)
RBC # BLD AUTO: 4.04 10*6/MM3 (ref 4.14–5.8)
SODIUM SERPL-SCNC: 141 MMOL/L (ref 136–145)
WBC # BLD AUTO: 4.24 10*3/MM3 (ref 3.4–10.8)

## 2021-01-21 PROCEDURE — 36415 COLL VENOUS BLD VENIPUNCTURE: CPT

## 2021-01-21 PROCEDURE — 85027 COMPLETE CBC AUTOMATED: CPT

## 2021-01-21 PROCEDURE — 80048 BASIC METABOLIC PNL TOTAL CA: CPT

## 2021-01-21 RX ORDER — PEN NEEDLE, DIABETIC 31 G X1/4"
NEEDLE, DISPOSABLE MISCELLANEOUS
Qty: 100 EACH | Refills: 0 | Status: SHIPPED | OUTPATIENT
Start: 2021-01-21 | End: 2021-04-19

## 2021-01-21 NOTE — DISCHARGE INSTRUCTIONS
Take the following medications the morning of surgery with a small sip of water:    If  you are on prescription narcotic pain medication to control your pain you may also take that medication the morning of surgery.    General Instructions:  • Do not eat or drink anything after midnight the night before surgery.  • Infants may have breast milk up to four hours before surgery.  • Infants drinking formula may drink formula up to six hours before surgery.   • Patients who avoid smoking, chewing tobacco and alcohol for 4 weeks prior to surgery have a reduced risk of post-operative complications.  Quit smoking as many days before surgery as you can.  • Do not smoke, use chewing tobacco or drink alcohol the day of surgery.   • If applicable bring your C-PAP/ BI-PAP machine.  • Bring any papers given to you in the doctor’s office.  • Wear clean comfortable clothes.  • Do not wear contact lenses, false eyelashes or make-up.  Bring a case for your glasses.   • Bring crutches or walker if applicable.  • Remove all piercings.  Leave jewelry and any other valuables at home.  • Hair extensions with metal clips must be removed prior to surgery.  • The Pre-Admission Testing nurse will instruct you to bring medications if unable to obtain an accurate list in Pre-Admission Testing.        If you were given a blood bank ID arm band remember to bring it with you the day of surgery.    Preventing a Surgical Site Infection:  • For 2 to 3 days before surgery, avoid shaving with a razor because the razor can irritate skin and make it easier to develop an infection.    • Any areas of open skin can increase the risk of a post-operative wound infection by allowing bacteria to enter and travel throughout the body.  Notify your surgeon if you have any skin wounds / rashes even if it is not near the expected surgical site.  The area will need assessed to determine if surgery should be delayed until it is healed.  • The night prior to surgery  shower using a fresh bar of anti-bacterial soap (such as Dial) and clean washcloth.  Sleep in a clean bed with clean clothing.  Do not allow pets to sleep with you.  • Shower on the morning of surgery using a fresh bar of anti-bacterial soap (such as Dial) and clean washcloth.  Dry with a clean towel and dress in clean clothing.  • Ask your surgeon if you will be receiving antibiotics prior to surgery.  • Make sure you, your family, and all healthcare providers clean their hands with soap and water or an alcohol based hand  before caring for you or your wound.    Day of surgery:1/28/2021  0830  Your arrival time is approximately two hours before your scheduled surgery time.  Upon arrival, a Pre-op nurse and Anesthesiologist will review your health history, obtain vital signs, and answer questions you may have.  The only belongings needed at this time will be your home medications and if applicable your C-PAP/BI-PAP machine.  A Pre-op nurse will start an IV and you may receive medication in preparation for surgery, including something to help you relax.      Please be aware that surgery does come with discomfort.  We want to make every effort to control your discomfort so please discuss any uncontrolled symptoms with your nurse.   Your doctor will most likely have prescribed pain medications.      If you are going home after surgery you will receive individualized written care instructions before being discharged.  A responsible adult must drive you to and from the hospital on the day of your surgery and stay with you for 24 hours.  Discharge prescriptions can be filled by the hospital pharmacy during regular pharmacy hours.  If you are having surgery late in the day/evening your prescription may be e-prescribed to your pharmacy.  Please verify your pharmacy hours or chose a 24 hour pharmacy to avoid not having access to your prescription because your pharmacy has closed for the day.    If you are staying  overnight following surgery, you will be transported to your hospital room following the recovery period.  [facility] has all private rooms.    If you have any questions please call Pre-Admission Testing at [phone].  Deductibles and co-payments are collected on the day of service. Please be prepared to pay the required co-pay, deductible or deposit on the day of service as defined by your plan.    Patient Education for Self-Quarantine Process    Following your COVID testing, we strongly recommend that you do not leave your home after you have been tested for COVID except to get medical care. This includes not going to work, school or to public areas.  If this is not possible for you to do please limit your activities to only required outings.  Be sure to wear a mask when you are with other people, practice social distancing and wash your hands frequently.      The following items provide additional details to keep you safe.  • Wash your hands with soap and water frequently for at least 20 seconds.   • Avoid touching your eyes, nose and mouth with unwashed hands.  • Do not share anything - utensils, towels, food from the same bowl.   • Have your own utensils, drinking glass, dishes, towels and bedding.   • Do not have visitors.   • Do use FaceTime to stay in touch with family and friends.  • You should stay in a specific room away from others if possible.   • Stay at least 6 feet away from others in the home if you cannot have a dedicated room to yourself.   • Do not snuggle with your pet. While the CDC says there is no evidence that pets can spread COVID-19 or be infected from humans, it is probably best to avoid “petting, snuggling, being kissed or licked and sharing food (during self-quarantine)”, according to the CDC.   • Sanitize household surfaces daily. Include all high touch areas (door handles, light switches, phones, countertops, etc.)  • Do not share a bathroom with others, if  possible.   • Wear a mask around others in your home if you are unable to stay in a separate room or 6 feet apart. If  you are unable to wear a mask, have your family member wear a mask if they must be within 6 feet of you.   Call your surgeon immediately if you experience any of the following symptoms:  • Sore Throat  • Shortness of Breath or difficulty breathing  • Cough  • Chills  • Body soreness or muscle pain  • Headache  • Fever  • New loss of taste or smell  • Do not arrive for your surgery ill.  Your procedure will need to be rescheduled to another time.  You will need to call your physician before the day of surgery to avoid any unnecessary exposure to hospital staff as well as other patients.

## 2021-01-22 NOTE — TELEPHONE ENCOUNTER
I called patient to see how things went with his evaluation with [facility] urology for his bilateral hydronephrosis.  He saw the nurse practitioner there who works with Dr. Natarajan and they advised him to continue with the stenting given his condition.  They reassured him on the bleeding and did note that Dr. Natarajan will discuss the patient's case with Dr. Nj.  Patient reported the nurse practitioner said that the patient would get a call with regard to their discussion.  Patient has not called their office and has not gotten a call from their office with regard to this discussion.  He does feel reassured about having to have the procedure and that the stenting is the right thing for him.  He is also reassured despite the bleeding that he is okay.  He met with [facility] urology 1 week ago.  He is scheduled to have repeat stenting in 1 week from today.  He is going to get his Covid vaccine a couple of days prior to the stenting.

## 2021-01-26 ENCOUNTER — IMMUNIZATION (OUTPATIENT)
Dept: VACCINE CLINIC | Facility: HOSPITAL | Age: 86
End: 2021-01-26

## 2021-01-26 ENCOUNTER — LAB (OUTPATIENT)
Dept: LAB | Facility: HOSPITAL | Age: 86
End: 2021-01-26

## 2021-01-26 DIAGNOSIS — Z01.818 OTHER SPECIFIED PRE-OPERATIVE EXAMINATION: ICD-10-CM

## 2021-01-26 PROCEDURE — 0001A: CPT | Performed by: INTERNAL MEDICINE

## 2021-01-26 PROCEDURE — C9803 HOPD COVID-19 SPEC COLLECT: HCPCS

## 2021-01-26 PROCEDURE — 91300 HC SARSCOV02 VAC 30MCG/0.3ML IM: CPT | Performed by: INTERNAL MEDICINE

## 2021-01-26 PROCEDURE — U0004 COV-19 TEST NON-CDC HGH THRU: HCPCS

## 2021-01-27 LAB — SARS-COV-2 RNA RESP QL NAA+PROBE: NOT DETECTED

## 2021-01-28 ENCOUNTER — HOSPITAL ENCOUNTER (OUTPATIENT)
Facility: HOSPITAL | Age: 86
Setting detail: HOSPITAL OUTPATIENT SURGERY
Discharge: HOME | End: 2021-01-28
Attending: UROLOGY | Admitting: UROLOGY

## 2021-01-28 ENCOUNTER — APPOINTMENT (OUTPATIENT)
Dept: GENERAL RADIOLOGY | Facility: HOSPITAL | Age: 86
End: 2021-01-28

## 2021-01-28 ENCOUNTER — ANESTHESIA (OUTPATIENT)
Dept: PERIOP | Facility: HOSPITAL | Age: 86
End: 2021-01-28

## 2021-01-28 ENCOUNTER — ANESTHESIA EVENT (OUTPATIENT)
Dept: PERIOP | Facility: HOSPITAL | Age: 86
End: 2021-01-28

## 2021-01-28 VITALS
BODY MASS INDEX: 26.04 KG/M2 | HEART RATE: 74 BPM | OXYGEN SATURATION: 100 % | TEMPERATURE: 98.2 F | SYSTOLIC BLOOD PRESSURE: 144 MMHG | RESPIRATION RATE: 16 BRPM | HEIGHT: 71 IN | WEIGHT: 186 LBS | DIASTOLIC BLOOD PRESSURE: 72 MMHG

## 2021-01-28 LAB
GLUCOSE BLDC GLUCOMTR-MCNC: 173 MG/DL (ref 70–130)
GLUCOSE BLDC GLUCOMTR-MCNC: 182 MG/DL (ref 70–130)

## 2021-01-28 PROCEDURE — 25010000002 DEXAMETHASONE PER 1 MG: Performed by: NURSE ANESTHETIST, CERTIFIED REGISTERED

## 2021-01-28 PROCEDURE — 25010000002 CEFAZOLIN 1-4 GM/50ML-% SOLUTION: Performed by: UROLOGY

## 2021-01-28 PROCEDURE — 25010000002 PROPOFOL 10 MG/ML EMULSION: Performed by: NURSE ANESTHETIST, CERTIFIED REGISTERED

## 2021-01-28 PROCEDURE — 25010000002 FENTANYL CITRATE (PF) 100 MCG/2ML SOLUTION: Performed by: NURSE ANESTHETIST, CERTIFIED REGISTERED

## 2021-01-28 PROCEDURE — C1769 GUIDE WIRE: HCPCS | Performed by: UROLOGY

## 2021-01-28 PROCEDURE — 76000 FLUOROSCOPY <1 HR PHYS/QHP: CPT

## 2021-01-28 PROCEDURE — C2617 STENT, NON-COR, TEM W/O DEL: HCPCS | Performed by: UROLOGY

## 2021-01-28 PROCEDURE — 82962 GLUCOSE BLOOD TEST: CPT

## 2021-01-28 PROCEDURE — C1758 CATHETER, URETERAL: HCPCS | Performed by: UROLOGY

## 2021-01-28 PROCEDURE — 74018 RADEX ABDOMEN 1 VIEW: CPT

## 2021-01-28 PROCEDURE — 25010000002 ONDANSETRON PER 1 MG: Performed by: NURSE ANESTHETIST, CERTIFIED REGISTERED

## 2021-01-28 DEVICE — URETERAL STENT
Type: IMPLANTABLE DEVICE | Site: URETER | Status: FUNCTIONAL
Brand: CONTOUR™

## 2021-01-28 RX ORDER — DIPHENHYDRAMINE HYDROCHLORIDE 50 MG/ML
12.5 INJECTION INTRAMUSCULAR; INTRAVENOUS
Status: DISCONTINUED | OUTPATIENT
Start: 2021-01-28 | End: 2021-01-28 | Stop reason: HOSPADM

## 2021-01-28 RX ORDER — EPHEDRINE SULFATE 50 MG/ML
5 INJECTION, SOLUTION INTRAVENOUS ONCE AS NEEDED
Status: DISCONTINUED | OUTPATIENT
Start: 2021-01-28 | End: 2021-01-28 | Stop reason: HOSPADM

## 2021-01-28 RX ORDER — FENTANYL CITRATE 50 UG/ML
50 INJECTION, SOLUTION INTRAMUSCULAR; INTRAVENOUS
Status: DISCONTINUED | OUTPATIENT
Start: 2021-01-28 | End: 2021-01-28 | Stop reason: HOSPADM

## 2021-01-28 RX ORDER — HYDROMORPHONE HYDROCHLORIDE 1 MG/ML
0.5 INJECTION, SOLUTION INTRAMUSCULAR; INTRAVENOUS; SUBCUTANEOUS
Status: DISCONTINUED | OUTPATIENT
Start: 2021-01-28 | End: 2021-01-28 | Stop reason: HOSPADM

## 2021-01-28 RX ORDER — FENTANYL CITRATE 50 UG/ML
INJECTION, SOLUTION INTRAMUSCULAR; INTRAVENOUS AS NEEDED
Status: DISCONTINUED | OUTPATIENT
Start: 2021-01-28 | End: 2021-01-28 | Stop reason: SURG

## 2021-01-28 RX ORDER — ONDANSETRON 2 MG/ML
INJECTION INTRAMUSCULAR; INTRAVENOUS AS NEEDED
Status: DISCONTINUED | OUTPATIENT
Start: 2021-01-28 | End: 2021-01-28 | Stop reason: SURG

## 2021-01-28 RX ORDER — MIDAZOLAM HYDROCHLORIDE 1 MG/ML
0.5 INJECTION INTRAMUSCULAR; INTRAVENOUS
Status: DISCONTINUED | OUTPATIENT
Start: 2021-01-28 | End: 2021-01-28 | Stop reason: HOSPADM

## 2021-01-28 RX ORDER — FLUMAZENIL 0.1 MG/ML
0.2 INJECTION INTRAVENOUS AS NEEDED
Status: DISCONTINUED | OUTPATIENT
Start: 2021-01-28 | End: 2021-01-28 | Stop reason: HOSPADM

## 2021-01-28 RX ORDER — GLYCOPYRROLATE 0.2 MG/ML
INJECTION INTRAMUSCULAR; INTRAVENOUS AS NEEDED
Status: DISCONTINUED | OUTPATIENT
Start: 2021-01-28 | End: 2021-01-28 | Stop reason: SURG

## 2021-01-28 RX ORDER — MAGNESIUM HYDROXIDE 1200 MG/15ML
LIQUID ORAL AS NEEDED
Status: DISCONTINUED | OUTPATIENT
Start: 2021-01-28 | End: 2021-01-28 | Stop reason: HOSPADM

## 2021-01-28 RX ORDER — ONDANSETRON 2 MG/ML
4 INJECTION INTRAMUSCULAR; INTRAVENOUS ONCE AS NEEDED
Status: DISCONTINUED | OUTPATIENT
Start: 2021-01-28 | End: 2021-01-28 | Stop reason: HOSPADM

## 2021-01-28 RX ORDER — CEFAZOLIN SODIUM 1 G/50ML
1 INJECTION, SOLUTION INTRAVENOUS ONCE
Status: COMPLETED | OUTPATIENT
Start: 2021-01-28 | End: 2021-01-28

## 2021-01-28 RX ORDER — DIPHENHYDRAMINE HCL 25 MG
25 CAPSULE ORAL
Status: DISCONTINUED | OUTPATIENT
Start: 2021-01-28 | End: 2021-01-28 | Stop reason: HOSPADM

## 2021-01-28 RX ORDER — LIDOCAINE HYDROCHLORIDE 10 MG/ML
0.5 INJECTION, SOLUTION EPIDURAL; INFILTRATION; INTRACAUDAL; PERINEURAL ONCE AS NEEDED
Status: DISCONTINUED | OUTPATIENT
Start: 2021-01-28 | End: 2021-01-28 | Stop reason: HOSPADM

## 2021-01-28 RX ORDER — OXYCODONE AND ACETAMINOPHEN 7.5; 325 MG/1; MG/1
1 TABLET ORAL ONCE AS NEEDED
Status: DISCONTINUED | OUTPATIENT
Start: 2021-01-28 | End: 2021-01-28 | Stop reason: HOSPADM

## 2021-01-28 RX ORDER — PROPOFOL 10 MG/ML
VIAL (ML) INTRAVENOUS AS NEEDED
Status: DISCONTINUED | OUTPATIENT
Start: 2021-01-28 | End: 2021-01-28 | Stop reason: SURG

## 2021-01-28 RX ORDER — LABETALOL HYDROCHLORIDE 5 MG/ML
10 INJECTION, SOLUTION INTRAVENOUS EVERY 6 HOURS PRN
Status: DISCONTINUED | OUTPATIENT
Start: 2021-01-28 | End: 2021-01-28 | Stop reason: HOSPADM

## 2021-01-28 RX ORDER — HYDROCODONE BITARTRATE AND ACETAMINOPHEN 7.5; 325 MG/1; MG/1
1 TABLET ORAL ONCE AS NEEDED
Status: DISCONTINUED | OUTPATIENT
Start: 2021-01-28 | End: 2021-01-28 | Stop reason: HOSPADM

## 2021-01-28 RX ORDER — SODIUM CHLORIDE 0.9 % (FLUSH) 0.9 %
3 SYRINGE (ML) INJECTION EVERY 12 HOURS SCHEDULED
Status: DISCONTINUED | OUTPATIENT
Start: 2021-01-28 | End: 2021-01-28 | Stop reason: HOSPADM

## 2021-01-28 RX ORDER — SODIUM CHLORIDE 0.9 % (FLUSH) 0.9 %
3-10 SYRINGE (ML) INJECTION AS NEEDED
Status: DISCONTINUED | OUTPATIENT
Start: 2021-01-28 | End: 2021-01-28 | Stop reason: HOSPADM

## 2021-01-28 RX ORDER — SODIUM CHLORIDE, SODIUM LACTATE, POTASSIUM CHLORIDE, CALCIUM CHLORIDE 600; 310; 30; 20 MG/100ML; MG/100ML; MG/100ML; MG/100ML
9 INJECTION, SOLUTION INTRAVENOUS CONTINUOUS
Status: DISCONTINUED | OUTPATIENT
Start: 2021-01-28 | End: 2021-01-28 | Stop reason: HOSPADM

## 2021-01-28 RX ORDER — MIDAZOLAM HYDROCHLORIDE 1 MG/ML
1 INJECTION INTRAMUSCULAR; INTRAVENOUS
Status: DISCONTINUED | OUTPATIENT
Start: 2021-01-28 | End: 2021-01-28 | Stop reason: HOSPADM

## 2021-01-28 RX ORDER — FAMOTIDINE 10 MG/ML
20 INJECTION, SOLUTION INTRAVENOUS ONCE
Status: COMPLETED | OUTPATIENT
Start: 2021-01-28 | End: 2021-01-28

## 2021-01-28 RX ORDER — NALOXONE HCL 0.4 MG/ML
0.2 VIAL (ML) INJECTION AS NEEDED
Status: DISCONTINUED | OUTPATIENT
Start: 2021-01-28 | End: 2021-01-28 | Stop reason: HOSPADM

## 2021-01-28 RX ORDER — PROMETHAZINE HYDROCHLORIDE 25 MG/1
25 TABLET ORAL ONCE AS NEEDED
Status: DISCONTINUED | OUTPATIENT
Start: 2021-01-28 | End: 2021-01-28 | Stop reason: HOSPADM

## 2021-01-28 RX ORDER — DEXAMETHASONE SODIUM PHOSPHATE 10 MG/ML
INJECTION INTRAMUSCULAR; INTRAVENOUS AS NEEDED
Status: DISCONTINUED | OUTPATIENT
Start: 2021-01-28 | End: 2021-01-28 | Stop reason: SURG

## 2021-01-28 RX ORDER — PROMETHAZINE HYDROCHLORIDE 25 MG/1
25 SUPPOSITORY RECTAL ONCE AS NEEDED
Status: DISCONTINUED | OUTPATIENT
Start: 2021-01-28 | End: 2021-01-28 | Stop reason: HOSPADM

## 2021-01-28 RX ORDER — LIDOCAINE HYDROCHLORIDE 20 MG/ML
INJECTION, SOLUTION INFILTRATION; PERINEURAL AS NEEDED
Status: DISCONTINUED | OUTPATIENT
Start: 2021-01-28 | End: 2021-01-28 | Stop reason: SURG

## 2021-01-28 RX ORDER — LABETALOL HYDROCHLORIDE 5 MG/ML
5 INJECTION, SOLUTION INTRAVENOUS
Status: DISCONTINUED | OUTPATIENT
Start: 2021-01-28 | End: 2021-01-28 | Stop reason: HOSPADM

## 2021-01-28 RX ADMIN — GLYCOPYRROLATE 0.1 MG: 0.2 INJECTION INTRAMUSCULAR; INTRAVENOUS at 11:32

## 2021-01-28 RX ADMIN — SODIUM CHLORIDE, POTASSIUM CHLORIDE, SODIUM LACTATE AND CALCIUM CHLORIDE 9 ML/HR: 600; 310; 30; 20 INJECTION, SOLUTION INTRAVENOUS at 09:39

## 2021-01-28 RX ADMIN — ONDANSETRON HYDROCHLORIDE 4 MG: 2 SOLUTION INTRAMUSCULAR; INTRAVENOUS at 11:39

## 2021-01-28 RX ADMIN — FENTANYL CITRATE 50 MCG: 50 INJECTION INTRAMUSCULAR; INTRAVENOUS at 11:42

## 2021-01-28 RX ADMIN — DEXAMETHASONE SODIUM PHOSPHATE 8 MG: 10 INJECTION INTRAMUSCULAR; INTRAVENOUS at 11:39

## 2021-01-28 RX ADMIN — PROPOFOL 80 MG: 10 INJECTION, EMULSION INTRAVENOUS at 11:34

## 2021-01-28 RX ADMIN — CEFAZOLIN SODIUM 1 G: 1 INJECTION, SOLUTION INTRAVENOUS at 11:32

## 2021-01-28 RX ADMIN — FAMOTIDINE 20 MG: 10 INJECTION INTRAVENOUS at 09:39

## 2021-01-28 RX ADMIN — LIDOCAINE HYDROCHLORIDE 80 MG: 20 INJECTION, SOLUTION INFILTRATION; PERINEURAL at 11:34

## 2021-01-28 NOTE — ANESTHESIA PREPROCEDURE EVALUATION
Anesthesia Evaluation     Patient summary reviewed and Nursing notes reviewed                Airway   Mallampati: II  Neck ROM: limited  Dental      Pulmonary    (+) COPD, home oxygen, shortness of breath, sleep apnea,   Cardiovascular     ECG reviewed  Patient on routine beta blocker  Rhythm: irregular  Rate: normal    (+) hypertension, valvular problems/murmurs MR and TI, dysrhythmias Atrial Fib, Paroxysmal Atrial Fib, CHF , hyperlipidemia,       Neuro/Psych  (+) dizziness/light headedness, syncope, numbness,     GI/Hepatic/Renal/Endo    (+)  GI bleeding , renal disease CRI, diabetes mellitus () type 2 using insulin,     Musculoskeletal     Abdominal    Substance History - negative use     OB/GYN negative ob/gyn ROS         Other   arthritis,                      Anesthesia Plan    ASA 4     general   (EF around 20%    Previous LMA 5)  intravenous induction     Anesthetic plan, all risks, benefits, and alternatives have been provided, discussed and informed consent has been obtained with: patient.

## 2021-01-28 NOTE — ANESTHESIA PROCEDURE NOTES
Airway  Urgency: elective    Date/Time: 1/28/2021 11:36 AM    General Information and Staff    Patient location during procedure: OR  Anesthesiologist: Sofy Loaiza MD  CRNA: Jennifer Barrientos CRNA    Indications and Patient Condition  Indications for airway management: airway protection    Preoxygenated: yes  Mask difficulty assessment: 0 - not attempted    Final Airway Details  Final airway type: supraglottic airway      Successful airway: flexible  Size 5    Number of attempts at approach: 1  Assessment: lips, teeth, and gum same as pre-op and atraumatic intubation

## 2021-01-28 NOTE — ANESTHESIA POSTPROCEDURE EVALUATION
Patient: Home Wynn    Procedure Summary     Date: 01/28/21 Room / Location: [facility] OR 01 / [facility] MAIN OR    Anesthesia Start: 1132 Anesthesia Stop: 1159    Procedure: CYSTOSCOPY BILATERAL STENT CHANGE (Bilateral ) Diagnosis:     Surgeon: Ceasar Nj MD Provider: Sofy Loaiza MD    Anesthesia Type: general ASA Status: 4          Anesthesia Type: general    Vitals  Vitals Value Taken Time   /98 01/28/21 1228   Temp 36.9 °C (98.4 °F) 01/28/21 1200   Pulse 82 01/28/21 1231   Resp 12 01/28/21 1210   SpO2 100 % 01/28/21 1231   Vitals shown include unvalidated device data.        Post Anesthesia Care and Evaluation    Patient location during evaluation: PACU  Patient participation: complete - patient participated  Level of consciousness: awake and alert  Pain management: adequate  Airway patency: patent  Anesthetic complications: No anesthetic complications    Cardiovascular status: acceptable  Respiratory status: acceptable  Hydration status: acceptable    Comments: --------------------            01/28/21               1215     --------------------   BP:       129/75     Pulse:      82       Resp:       14       Temp:                SpO2:      99%      --------------------

## 2021-02-16 ENCOUNTER — IMMUNIZATION (OUTPATIENT)
Dept: VACCINE CLINIC | Facility: HOSPITAL | Age: 86
End: 2021-02-16

## 2021-02-16 PROCEDURE — 91300 HC SARSCOV02 VAC 30MCG/0.3ML IM: CPT | Performed by: INTERNAL MEDICINE

## 2021-02-16 PROCEDURE — 0002A: CPT | Performed by: INTERNAL MEDICINE

## 2021-02-23 ENCOUNTER — OFFICE VISIT (OUTPATIENT)
Dept: FAMILY MEDICINE CLINIC | Facility: CLINIC | Age: 86
End: 2021-02-23

## 2021-02-23 VITALS
WEIGHT: 184.1 LBS | TEMPERATURE: 96.8 F | RESPIRATION RATE: 15 BRPM | OXYGEN SATURATION: 98 % | SYSTOLIC BLOOD PRESSURE: 122 MMHG | DIASTOLIC BLOOD PRESSURE: 68 MMHG | BODY MASS INDEX: 25.77 KG/M2 | HEART RATE: 69 BPM | HEIGHT: 71 IN

## 2021-02-23 DIAGNOSIS — Z79.4 TYPE 2 DIABETES MELLITUS WITHOUT COMPLICATION, WITH LONG-TERM CURRENT USE OF INSULIN (HCC): ICD-10-CM

## 2021-02-23 DIAGNOSIS — I10 ESSENTIAL HYPERTENSION: ICD-10-CM

## 2021-02-23 DIAGNOSIS — Z00.00 MEDICARE ANNUAL WELLNESS VISIT, SUBSEQUENT: Primary | ICD-10-CM

## 2021-02-23 DIAGNOSIS — E11.9 TYPE 2 DIABETES MELLITUS WITHOUT COMPLICATION, WITH LONG-TERM CURRENT USE OF INSULIN (HCC): ICD-10-CM

## 2021-02-23 DIAGNOSIS — E78.5 HYPERLIPIDEMIA, UNSPECIFIED HYPERLIPIDEMIA TYPE: ICD-10-CM

## 2021-02-23 PROCEDURE — G0439 PPPS, SUBSEQ VISIT: HCPCS | Performed by: FAMILY MEDICINE

## 2021-02-24 LAB
BUN SERPL-MCNC: 26 MG/DL (ref 8–23)
BUN/CREAT SERPL: 22.8 (ref 7–25)
CALCIUM SERPL-MCNC: 9.8 MG/DL (ref 8.6–10.5)
CHLORIDE SERPL-SCNC: 102 MMOL/L (ref 98–107)
CHOLEST SERPL-MCNC: 189 MG/DL (ref 0–200)
CO2 SERPL-SCNC: 29.1 MMOL/L (ref 22–29)
CREAT SERPL-MCNC: 1.14 MG/DL (ref 0.76–1.27)
GLUCOSE SERPL-MCNC: 222 MG/DL (ref 65–99)
HBA1C MFR BLD: 8.9 % (ref 4.8–5.6)
HDLC SERPL-MCNC: 49 MG/DL (ref 40–60)
LDLC SERPL CALC-MCNC: 126 MG/DL (ref 0–100)
POTASSIUM SERPL-SCNC: 5.3 MMOL/L (ref 3.5–5.2)
SODIUM SERPL-SCNC: 139 MMOL/L (ref 136–145)
TRIGL SERPL-MCNC: 73 MG/DL (ref 0–150)
VLDLC SERPL CALC-MCNC: 14 MG/DL (ref 5–40)

## 2021-03-22 RX ORDER — INSULIN GLARGINE 100 [IU]/ML
INJECTION, SOLUTION SUBCUTANEOUS
Qty: 15 ML | Refills: 3 | Status: SHIPPED | OUTPATIENT
Start: 2021-03-22 | End: 2021-06-24

## 2021-04-19 RX ORDER — PEN NEEDLE, DIABETIC 31 G X1/4"
NEEDLE, DISPOSABLE MISCELLANEOUS
Qty: 100 EACH | Refills: 2 | Status: SHIPPED | OUTPATIENT
Start: 2021-04-19 | End: 2022-01-01

## 2021-04-22 ENCOUNTER — TRANSCRIBE ORDERS (OUTPATIENT)
Dept: PREADMISSION TESTING | Facility: HOSPITAL | Age: 86
End: 2021-04-22

## 2021-04-22 DIAGNOSIS — Z01.818 OTHER SPECIFIED PRE-OPERATIVE EXAMINATION: Primary | ICD-10-CM

## 2021-04-29 ENCOUNTER — PRE-ADMISSION TESTING (OUTPATIENT)
Dept: PREADMISSION TESTING | Facility: HOSPITAL | Age: 86
End: 2021-04-29

## 2021-04-29 VITALS
SYSTOLIC BLOOD PRESSURE: 147 MMHG | TEMPERATURE: 98 F | OXYGEN SATURATION: 99 % | RESPIRATION RATE: 16 BRPM | HEART RATE: 65 BPM | DIASTOLIC BLOOD PRESSURE: 74 MMHG

## 2021-04-29 LAB
ANION GAP SERPL CALCULATED.3IONS-SCNC: 7 MMOL/L (ref 5–15)
BUN SERPL-MCNC: 24 MG/DL (ref 8–23)
BUN/CREAT SERPL: 19.4 (ref 7–25)
CALCIUM SPEC-SCNC: 10 MG/DL (ref 8.6–10.5)
CHLORIDE SERPL-SCNC: 107 MMOL/L (ref 98–107)
CO2 SERPL-SCNC: 26 MMOL/L (ref 22–29)
CREAT SERPL-MCNC: 1.24 MG/DL (ref 0.76–1.27)
DEPRECATED RDW RBC AUTO: 47.4 FL (ref 37–54)
ERYTHROCYTE [DISTWIDTH] IN BLOOD BY AUTOMATED COUNT: 13.6 % (ref 12.3–15.4)
GFR SERPL CREATININE-BSD FRML MDRD: 55 ML/MIN/1.73
GLUCOSE SERPL-MCNC: 357 MG/DL (ref 65–99)
HCT VFR BLD AUTO: 42.3 % (ref 37.5–51)
HGB BLD-MCNC: 13.8 G/DL (ref 13–17.7)
MCH RBC QN AUTO: 31.2 PG (ref 26.6–33)
MCHC RBC AUTO-ENTMCNC: 32.6 G/DL (ref 31.5–35.7)
MCV RBC AUTO: 95.5 FL (ref 79–97)
PLATELET # BLD AUTO: 180 10*3/MM3 (ref 140–450)
PMV BLD AUTO: 11.1 FL (ref 6–12)
POTASSIUM SERPL-SCNC: 5.4 MMOL/L (ref 3.5–5.2)
RBC # BLD AUTO: 4.43 10*6/MM3 (ref 4.14–5.8)
SODIUM SERPL-SCNC: 140 MMOL/L (ref 136–145)
WBC # BLD AUTO: 4.31 10*3/MM3 (ref 3.4–10.8)

## 2021-04-29 PROCEDURE — 80048 BASIC METABOLIC PNL TOTAL CA: CPT

## 2021-04-29 PROCEDURE — 93005 ELECTROCARDIOGRAM TRACING: CPT

## 2021-04-29 PROCEDURE — 36415 COLL VENOUS BLD VENIPUNCTURE: CPT

## 2021-04-29 PROCEDURE — 85027 COMPLETE CBC AUTOMATED: CPT

## 2021-04-29 PROCEDURE — 93010 ELECTROCARDIOGRAM REPORT: CPT | Performed by: INTERNAL MEDICINE

## 2021-04-29 RX ORDER — ASPIRIN 81 MG/1
81 TABLET, CHEWABLE ORAL DAILY
COMMUNITY
End: 2021-01-01

## 2021-04-30 LAB — QT INTERVAL: 442 MS

## 2021-05-04 ENCOUNTER — LAB (OUTPATIENT)
Dept: LAB | Facility: HOSPITAL | Age: 86
End: 2021-05-04

## 2021-05-04 DIAGNOSIS — Z01.818 OTHER SPECIFIED PRE-OPERATIVE EXAMINATION: ICD-10-CM

## 2021-05-04 PROCEDURE — C9803 HOPD COVID-19 SPEC COLLECT: HCPCS

## 2021-05-04 PROCEDURE — U0005 INFEC AGEN DETEC AMPLI PROBE: HCPCS

## 2021-05-04 PROCEDURE — U0004 COV-19 TEST NON-CDC HGH THRU: HCPCS

## 2021-05-05 ENCOUNTER — TELEPHONE (OUTPATIENT)
Dept: CARDIOLOGY | Facility: CLINIC | Age: 86
End: 2021-05-05

## 2021-05-05 LAB — SARS-COV-2 RNA RESP QL NAA+PROBE: NOT DETECTED

## 2021-05-05 NOTE — TELEPHONE ENCOUNTER
Dr. Nj's office is requesting surgery clearance for a cystoscopy and  piedad stent change tomorrow.    LOV 08/20/2020    Fax Dr. Nj    Scan on 4/29/2021 1208 by [facility], [place]: ECG 12-LEAD

## 2021-05-06 ENCOUNTER — ANESTHESIA EVENT (OUTPATIENT)
Dept: PERIOP | Facility: HOSPITAL | Age: 86
End: 2021-05-06

## 2021-05-06 ENCOUNTER — HOSPITAL ENCOUNTER (OUTPATIENT)
Facility: HOSPITAL | Age: 86
Setting detail: HOSPITAL OUTPATIENT SURGERY
Discharge: HOME | End: 2021-05-06
Attending: UROLOGY | Admitting: UROLOGY

## 2021-05-06 ENCOUNTER — ANESTHESIA (OUTPATIENT)
Dept: PERIOP | Facility: HOSPITAL | Age: 86
End: 2021-05-06

## 2021-05-06 ENCOUNTER — APPOINTMENT (OUTPATIENT)
Dept: GENERAL RADIOLOGY | Facility: HOSPITAL | Age: 86
End: 2021-05-06

## 2021-05-06 VITALS
WEIGHT: 186.2 LBS | HEART RATE: 70 BPM | SYSTOLIC BLOOD PRESSURE: 136 MMHG | TEMPERATURE: 98.4 F | RESPIRATION RATE: 16 BRPM | DIASTOLIC BLOOD PRESSURE: 83 MMHG | HEIGHT: 70 IN | OXYGEN SATURATION: 98 % | BODY MASS INDEX: 26.66 KG/M2

## 2021-05-06 DIAGNOSIS — Z96.0 STATUS POST PLACEMENT OF URETERAL STENT: Primary | ICD-10-CM

## 2021-05-06 LAB
GLUCOSE BLDC GLUCOMTR-MCNC: 199 MG/DL (ref 70–130)
GLUCOSE BLDC GLUCOMTR-MCNC: 203 MG/DL (ref 70–130)
GLUCOSE BLDC GLUCOMTR-MCNC: 237 MG/DL (ref 70–130)
POTASSIUM SERPL-SCNC: 4.9 MMOL/L (ref 3.5–5.2)

## 2021-05-06 PROCEDURE — C1769 GUIDE WIRE: HCPCS | Performed by: UROLOGY

## 2021-05-06 PROCEDURE — 25010000002 PROPOFOL 10 MG/ML EMULSION: Performed by: NURSE ANESTHETIST, CERTIFIED REGISTERED

## 2021-05-06 PROCEDURE — C2617 STENT, NON-COR, TEM W/O DEL: HCPCS | Performed by: UROLOGY

## 2021-05-06 PROCEDURE — C1758 CATHETER, URETERAL: HCPCS | Performed by: UROLOGY

## 2021-05-06 PROCEDURE — 74018 RADEX ABDOMEN 1 VIEW: CPT

## 2021-05-06 PROCEDURE — 25010000002 CEFAZOLIN 1-4 GM/50ML-% SOLUTION: Performed by: NURSE ANESTHETIST, CERTIFIED REGISTERED

## 2021-05-06 PROCEDURE — 25010000002 DEXAMETHASONE PER 1 MG: Performed by: NURSE ANESTHETIST, CERTIFIED REGISTERED

## 2021-05-06 PROCEDURE — 76000 FLUOROSCOPY <1 HR PHYS/QHP: CPT

## 2021-05-06 PROCEDURE — 25010000002 FENTANYL CITRATE (PF) 100 MCG/2ML SOLUTION: Performed by: NURSE ANESTHETIST, CERTIFIED REGISTERED

## 2021-05-06 PROCEDURE — 25010000002 CEFAZOLIN 1-4 GM/50ML-% SOLUTION: Performed by: UROLOGY

## 2021-05-06 PROCEDURE — 82962 GLUCOSE BLOOD TEST: CPT

## 2021-05-06 PROCEDURE — 84132 ASSAY OF SERUM POTASSIUM: CPT | Performed by: UROLOGY

## 2021-05-06 DEVICE — URETERAL STENT
Type: IMPLANTABLE DEVICE | Site: URETER | Status: FUNCTIONAL
Brand: CONTOUR™

## 2021-05-06 RX ORDER — SODIUM CHLORIDE 0.9 % (FLUSH) 0.9 %
3 SYRINGE (ML) INJECTION EVERY 12 HOURS SCHEDULED
Status: DISCONTINUED | OUTPATIENT
Start: 2021-05-06 | End: 2021-05-06 | Stop reason: HOSPADM

## 2021-05-06 RX ORDER — FENTANYL CITRATE 50 UG/ML
50 INJECTION, SOLUTION INTRAMUSCULAR; INTRAVENOUS
Status: DISCONTINUED | OUTPATIENT
Start: 2021-05-06 | End: 2021-05-06 | Stop reason: HOSPADM

## 2021-05-06 RX ORDER — PROMETHAZINE HYDROCHLORIDE 25 MG/1
25 TABLET ORAL ONCE AS NEEDED
Status: DISCONTINUED | OUTPATIENT
Start: 2021-05-06 | End: 2021-05-06 | Stop reason: HOSPADM

## 2021-05-06 RX ORDER — LIDOCAINE HYDROCHLORIDE 10 MG/ML
0.5 INJECTION, SOLUTION EPIDURAL; INFILTRATION; INTRACAUDAL; PERINEURAL ONCE AS NEEDED
Status: DISCONTINUED | OUTPATIENT
Start: 2021-05-06 | End: 2021-05-06 | Stop reason: HOSPADM

## 2021-05-06 RX ORDER — DEXAMETHASONE SODIUM PHOSPHATE 10 MG/ML
INJECTION INTRAMUSCULAR; INTRAVENOUS AS NEEDED
Status: DISCONTINUED | OUTPATIENT
Start: 2021-05-06 | End: 2021-05-06 | Stop reason: SURG

## 2021-05-06 RX ORDER — EPHEDRINE SULFATE 50 MG/ML
5 INJECTION, SOLUTION INTRAVENOUS ONCE AS NEEDED
Status: DISCONTINUED | OUTPATIENT
Start: 2021-05-06 | End: 2021-05-06 | Stop reason: HOSPADM

## 2021-05-06 RX ORDER — NALOXONE HCL 0.4 MG/ML
0.2 VIAL (ML) INJECTION AS NEEDED
Status: DISCONTINUED | OUTPATIENT
Start: 2021-05-06 | End: 2021-05-06 | Stop reason: HOSPADM

## 2021-05-06 RX ORDER — PROPOFOL 10 MG/ML
VIAL (ML) INTRAVENOUS AS NEEDED
Status: DISCONTINUED | OUTPATIENT
Start: 2021-05-06 | End: 2021-05-06 | Stop reason: SURG

## 2021-05-06 RX ORDER — HYDROCODONE BITARTRATE AND ACETAMINOPHEN 7.5; 325 MG/1; MG/1
1 TABLET ORAL ONCE AS NEEDED
Status: DISCONTINUED | OUTPATIENT
Start: 2021-05-06 | End: 2021-05-06 | Stop reason: HOSPADM

## 2021-05-06 RX ORDER — CEFAZOLIN SODIUM 1 G/50ML
1 INJECTION, SOLUTION INTRAVENOUS ONCE
Status: COMPLETED | OUTPATIENT
Start: 2021-05-06 | End: 2021-05-06

## 2021-05-06 RX ORDER — SODIUM CHLORIDE 9 MG/ML
9 INJECTION, SOLUTION INTRAVENOUS CONTINUOUS PRN
Status: DISCONTINUED | OUTPATIENT
Start: 2021-05-06 | End: 2021-05-06 | Stop reason: HOSPADM

## 2021-05-06 RX ORDER — LABETALOL HYDROCHLORIDE 5 MG/ML
5 INJECTION, SOLUTION INTRAVENOUS
Status: DISCONTINUED | OUTPATIENT
Start: 2021-05-06 | End: 2021-05-06 | Stop reason: HOSPADM

## 2021-05-06 RX ORDER — DIPHENHYDRAMINE HYDROCHLORIDE 50 MG/ML
12.5 INJECTION INTRAMUSCULAR; INTRAVENOUS
Status: DISCONTINUED | OUTPATIENT
Start: 2021-05-06 | End: 2021-05-06 | Stop reason: HOSPADM

## 2021-05-06 RX ORDER — OXYCODONE AND ACETAMINOPHEN 7.5; 325 MG/1; MG/1
1 TABLET ORAL ONCE AS NEEDED
Status: DISCONTINUED | OUTPATIENT
Start: 2021-05-06 | End: 2021-05-06 | Stop reason: HOSPADM

## 2021-05-06 RX ORDER — SODIUM CHLORIDE 9 MG/ML
INJECTION, SOLUTION INTRAVENOUS CONTINUOUS PRN
Status: DISCONTINUED | OUTPATIENT
Start: 2021-05-06 | End: 2021-05-06 | Stop reason: SURG

## 2021-05-06 RX ORDER — SODIUM CHLORIDE 0.9 % (FLUSH) 0.9 %
3-10 SYRINGE (ML) INJECTION AS NEEDED
Status: DISCONTINUED | OUTPATIENT
Start: 2021-05-06 | End: 2021-05-06 | Stop reason: HOSPADM

## 2021-05-06 RX ORDER — HYDROMORPHONE HYDROCHLORIDE 1 MG/ML
0.5 INJECTION, SOLUTION INTRAMUSCULAR; INTRAVENOUS; SUBCUTANEOUS
Status: DISCONTINUED | OUTPATIENT
Start: 2021-05-06 | End: 2021-05-06 | Stop reason: HOSPADM

## 2021-05-06 RX ORDER — DIPHENHYDRAMINE HCL 25 MG
25 CAPSULE ORAL
Status: DISCONTINUED | OUTPATIENT
Start: 2021-05-06 | End: 2021-05-06 | Stop reason: HOSPADM

## 2021-05-06 RX ORDER — FLUMAZENIL 0.1 MG/ML
0.2 INJECTION INTRAVENOUS AS NEEDED
Status: DISCONTINUED | OUTPATIENT
Start: 2021-05-06 | End: 2021-05-06 | Stop reason: HOSPADM

## 2021-05-06 RX ORDER — FENTANYL CITRATE 50 UG/ML
INJECTION, SOLUTION INTRAMUSCULAR; INTRAVENOUS AS NEEDED
Status: DISCONTINUED | OUTPATIENT
Start: 2021-05-06 | End: 2021-05-06 | Stop reason: SURG

## 2021-05-06 RX ORDER — SODIUM CHLORIDE, SODIUM LACTATE, POTASSIUM CHLORIDE, CALCIUM CHLORIDE 600; 310; 30; 20 MG/100ML; MG/100ML; MG/100ML; MG/100ML
INJECTION, SOLUTION INTRAVENOUS CONTINUOUS PRN
Status: DISCONTINUED | OUTPATIENT
Start: 2021-05-06 | End: 2021-05-06

## 2021-05-06 RX ORDER — PROMETHAZINE HYDROCHLORIDE 25 MG/1
25 SUPPOSITORY RECTAL ONCE AS NEEDED
Status: DISCONTINUED | OUTPATIENT
Start: 2021-05-06 | End: 2021-05-06 | Stop reason: HOSPADM

## 2021-05-06 RX ORDER — MAGNESIUM HYDROXIDE 1200 MG/15ML
LIQUID ORAL AS NEEDED
Status: DISCONTINUED | OUTPATIENT
Start: 2021-05-06 | End: 2021-05-06 | Stop reason: HOSPADM

## 2021-05-06 RX ORDER — ONDANSETRON 2 MG/ML
4 INJECTION INTRAMUSCULAR; INTRAVENOUS ONCE AS NEEDED
Status: DISCONTINUED | OUTPATIENT
Start: 2021-05-06 | End: 2021-05-06 | Stop reason: HOSPADM

## 2021-05-06 RX ORDER — CEFAZOLIN SODIUM 1 G/50ML
INJECTION, SOLUTION INTRAVENOUS AS NEEDED
Status: DISCONTINUED | OUTPATIENT
Start: 2021-05-06 | End: 2021-05-06 | Stop reason: SURG

## 2021-05-06 RX ORDER — PHENAZOPYRIDINE HYDROCHLORIDE 200 MG/1
200 TABLET, FILM COATED ORAL 3 TIMES DAILY PRN
Qty: 16 TABLET | Refills: 0 | Status: SHIPPED | OUTPATIENT
Start: 2021-05-06 | End: 2021-06-24

## 2021-05-06 RX ORDER — FAMOTIDINE 10 MG/ML
20 INJECTION, SOLUTION INTRAVENOUS ONCE
Status: COMPLETED | OUTPATIENT
Start: 2021-05-06 | End: 2021-05-06

## 2021-05-06 RX ADMIN — FAMOTIDINE 20 MG: 10 INJECTION INTRAVENOUS at 10:38

## 2021-05-06 RX ADMIN — SODIUM CHLORIDE 9 ML/HR: 9 INJECTION, SOLUTION INTRAVENOUS at 09:56

## 2021-05-06 RX ADMIN — CEFAZOLIN SODIUM 1 G: 1 INJECTION, SOLUTION INTRAVENOUS at 11:12

## 2021-05-06 RX ADMIN — DEXAMETHASONE SODIUM PHOSPHATE 8 MG: 10 INJECTION INTRAMUSCULAR; INTRAVENOUS at 11:28

## 2021-05-06 RX ADMIN — PROPOFOL 130 MG: 10 INJECTION, EMULSION INTRAVENOUS at 11:13

## 2021-05-06 RX ADMIN — CEFAZOLIN SODIUM 1 G: 1 INJECTION, SOLUTION INTRAVENOUS at 11:26

## 2021-05-06 RX ADMIN — SODIUM CHLORIDE: 9 INJECTION, SOLUTION INTRAVENOUS at 10:53

## 2021-05-06 RX ADMIN — FENTANYL CITRATE 25 MCG: 50 INJECTION INTRAMUSCULAR; INTRAVENOUS at 11:13

## 2021-05-06 NOTE — ANESTHESIA POSTPROCEDURE EVALUATION
"Patient: Home Wynn    Procedure Summary     Date: 05/06/21 Room / Location: [facility] OR 01 / [facility] MAIN OR    Anesthesia Start: 1115 Anesthesia Stop: 1148    Procedure: CYSTOSCOPY AND BILATERAL STENT CHANGE (N/A ) Diagnosis:     Surgeons: Ceasar Nj MD Provider: Ruthann Crain MD    Anesthesia Type: general ASA Status: 4          Anesthesia Type: general    Vitals  Vitals Value Taken Time   /75 05/06/21 1230   Temp 36.9 °C (98.4 °F) 05/06/21 1145   Pulse 72 05/06/21 1237   Resp 16 05/06/21 1230   SpO2 98 % 05/06/21 1237   Vitals shown include unvalidated device data.        Post Anesthesia Care and Evaluation    Patient location during evaluation: bedside  Patient participation: complete - patient participated  Level of consciousness: awake  Pain management: adequate  Airway patency: patent  Anesthetic complications: No anesthetic complications    Cardiovascular status: acceptable  Respiratory status: acceptable  Hydration status: acceptable    Comments: */83   Pulse 70   Temp 36.9 °C (98.4 °F) (Oral)   Resp 16   Ht 177.8 cm (70\")   Wt 84.5 kg (186 lb 3.2 oz)   SpO2 98%   BMI 26.72 kg/m²         "

## 2021-05-06 NOTE — ANESTHESIA PROCEDURE NOTES
Airway  Urgency: elective    Date/Time: 5/6/2021 11:24 AM  Airway not difficult    General Information and Staff    Patient location during procedure: OR  Anesthesiologist: Ruthann Crain MD  CRNA: Dwight Collazo CRNA    Indications and Patient Condition  Indications for airway management: airway protection    Preoxygenated: yes  MILS not maintained throughout  Mask difficulty assessment: 1 - vent by mask    Final Airway Details  Final airway type: supraglottic airway      Successful airway: unique and LMA  Size 5    Number of attempts at approach: 1  Assessment: lips, teeth, and gum same as pre-op    Additional Comments  Pre O2, SIAI

## 2021-05-06 NOTE — ANESTHESIA PREPROCEDURE EVALUATION
Anesthesia Evaluation     Patient summary reviewed   no history of anesthetic complications:  NPO Solid Status: > 8 hours  NPO Liquid Status: > 2 hours           Airway   Mallampati: II  Neck ROM: limited  Dental    (+) poor dentition        Pulmonary     breath sounds clear to auscultation  (+) COPD, home oxygen (2L at night), shortness of breath, sleep apnea,   (-) not a smoker  Cardiovascular     ECG reviewed  Patient on routine beta blocker and Beta blocker not taken-may be given intraoperatively  Rhythm: irregular  Rate: normal    (+) hypertension, valvular problems/murmurs MR and TI, dysrhythmias Atrial Fib, Paroxysmal Atrial Fib, CHF Systolic <55%, hyperlipidemia,   (-) past MI      Neuro/Psych  (+) dizziness/light headedness, syncope, numbness,     (-) seizures, CVA  GI/Hepatic/Renal/Endo    (+)  GI bleeding , renal disease (4/29 K 5.4, redraw pending near baseline) CRI, diabetes mellitus () type 2 using insulin,   (-)  obesity, liver disease    Musculoskeletal     (-) neck stiffness  Abdominal    Substance History - negative use     OB/GYN negative ob/gyn ROS         Other   arthritis,      ROS/Med Hx Other: Surgical Clearance on chart                  Anesthesia Plan    ASA 4     general   (EF around 20%    Previous LMA 5)  intravenous induction     Anesthetic plan, all risks, benefits, and alternatives have been provided, discussed and informed consent has been obtained with: patient.    Plan discussed with CRNA.      
cbc, bmp done

## 2021-06-24 ENCOUNTER — OFFICE VISIT (OUTPATIENT)
Dept: FAMILY MEDICINE CLINIC | Facility: CLINIC | Age: 86
End: 2021-06-24

## 2021-06-24 VITALS
HEIGHT: 70 IN | DIASTOLIC BLOOD PRESSURE: 64 MMHG | SYSTOLIC BLOOD PRESSURE: 120 MMHG | HEART RATE: 63 BPM | BODY MASS INDEX: 26.31 KG/M2 | WEIGHT: 183.8 LBS | OXYGEN SATURATION: 98 % | TEMPERATURE: 96.9 F | RESPIRATION RATE: 17 BRPM

## 2021-06-24 DIAGNOSIS — Z79.4 TYPE 2 DIABETES MELLITUS WITHOUT COMPLICATION, WITH LONG-TERM CURRENT USE OF INSULIN (HCC): Primary | ICD-10-CM

## 2021-06-24 DIAGNOSIS — J01.00 ACUTE NON-RECURRENT MAXILLARY SINUSITIS: ICD-10-CM

## 2021-06-24 DIAGNOSIS — E11.9 TYPE 2 DIABETES MELLITUS WITHOUT COMPLICATION, WITH LONG-TERM CURRENT USE OF INSULIN (HCC): Primary | ICD-10-CM

## 2021-06-24 PROCEDURE — 99214 OFFICE O/P EST MOD 30 MIN: CPT | Performed by: FAMILY MEDICINE

## 2021-06-24 RX ORDER — INSULIN GLARGINE 100 [IU]/ML
12 INJECTION, SOLUTION SUBCUTANEOUS DAILY
Qty: 12 ML | Refills: 1 | Status: ON HOLD
Start: 2021-06-24 | End: 2022-01-01 | Stop reason: SDUPTHER

## 2021-06-24 RX ORDER — AMOXICILLIN 875 MG/1
875 TABLET, COATED ORAL 2 TIMES DAILY
Qty: 14 TABLET | Refills: 0 | Status: SHIPPED | OUTPATIENT
Start: 2021-06-24 | End: 2021-01-01 | Stop reason: ALTCHOICE

## 2021-06-24 NOTE — PROGRESS NOTES
"Chief Complaint  Diabetes (4 MFU) and Knee Pain (left )    Subjective          Home Wynn presents to [facility]  History of Present Illness  DM  He has stability in the weight.   He is not taking AC, no blood in the urine. Says he is \"still living and not on the expensive pills.\"   He is doing some yard work just resting often.   Has eye exam set for August.   He does shots in his arm and belly.     Woke up great than two weeks ago with stomach ache, running nose and coughing.  Says two weeks ago he caught a cold and can't get rid of it.   Says he doesn't know if he has much allergies.   Says left side of the nose stays clogged and can get a lot of mucous out.   He is also coughing. Says cough syrup has never helped.   Says taking OTC pills and nose sprays and nothing is helping.   Says during the day the nose does not usually run and only a little cough. Then around 6 pm he starts coughing much more.   He was vaccinated to COVID 19 back in Mid Feb.   Could not tolerate his O2 some nights because of the congestion. Had a lot of fatigue and feeling poorly    Has a problem with the right knee. He says it hurts and gets stiff.   Says he has had shot in the other knee and said it worked really well. Said he got the shot 5-6 years ago he thinks.   When he gets ready to take off walking some time his foot gets stuck. Doesn't swell.     Objective   Vital Signs:   /64 (BP Location: Right arm, Patient Position: Sitting, Cuff Size: Adult)   Pulse 63   Temp 96.9 °F (36.1 °C) (Infrared)   Resp 17   Ht 177.8 cm (70\")   Wt 83.4 kg (183 lb 12.8 oz)   SpO2 98%   BMI 26.37 kg/m²     Physical Exam  Vitals reviewed.   Constitutional:       General: He is not in acute distress.  Eyes:      General: No scleral icterus.        Right eye: No discharge.         Left eye: No discharge.      Conjunctiva/sclera: Conjunctivae normal.   Cardiovascular:      Rate and Rhythm: Normal rate and " regular rhythm.      Heart sounds: Normal heart sounds. No murmur heard.   No friction rub. No gallop.    Pulmonary:      Effort: Pulmonary effort is normal. No respiratory distress.      Breath sounds: Normal breath sounds. No wheezing.   Abdominal:      Palpations: Abdomen is soft.      Tenderness: There is no abdominal tenderness.   Neurological:      Mental Status: He is alert and oriented to person, place, and time.   Psychiatric:         Behavior: Behavior normal.        Result Review :                 Assessment and Plan    Diagnoses and all orders for this visit:    1. Type 2 diabetes mellitus without complication, with long-term current use of insulin (CMS/[facility]) (Primary)  -     CBC & Differential  -     Comprehensive Metabolic Panel  -     Hemoglobin A1c  -     Protein / Creatinine Ratio, Urine - Urine, Clean Catch    2. Acute non-recurrent maxillary sinusitis    Other orders  -     Insulin Glargine (Lantus SoloStar) 100 UNIT/ML injection pen; Inject 12 Units under the skin into the appropriate area as directed Daily.  Dispense: 12 mL; Refill: 1  -     amoxicillin (AMOXIL) 875 MG tablet; Take 1 tablet by mouth 2 (Two) Times a Day.  Dispense: 14 tablet; Refill: 0        Follow Up   No follow-ups on file.  Patient was given instructions and counseling regarding his condition or for health maintenance advice. Please see specific information pulled into the AVS if appropriate.     His weight is fairly stable.   He is eating well and active outside with resting.   Says his lantus he takes nightly 12 units, down from 18 units.   He is checking his sugars mostly morning fasting and usually around 200.   We may increase the insulin a little. We talked about his last A1c at 8.9%. that is about as high as we want it to go. Better if it were close to 8 but does not have to be down to 6.5% when he is turning 89 end of the year. More risk for hypoglycemia.     For his ongoing sinus symptoms and cough we will do  antibiotic. He has sinusitis symptoms and has been sick for over two weeks. He is feeling some better but the nasal discharge and coughing is still significant, effecting him at night and sleep. He has used over the counters with little to no relief.

## 2021-06-25 LAB
ALBUMIN SERPL-MCNC: 5 G/DL (ref 3.5–5.2)
ALBUMIN/GLOB SERPL: 2 G/DL
ALP SERPL-CCNC: 84 U/L (ref 39–117)
ALT SERPL-CCNC: 9 U/L (ref 1–41)
AST SERPL-CCNC: 13 U/L (ref 1–40)
BASOPHILS # BLD AUTO: 0.02 10*3/MM3 (ref 0–0.2)
BASOPHILS NFR BLD AUTO: 0.3 % (ref 0–1.5)
BILIRUB SERPL-MCNC: 0.8 MG/DL (ref 0–1.2)
BUN SERPL-MCNC: 21 MG/DL (ref 8–23)
BUN/CREAT SERPL: 16.4 (ref 7–25)
CALCIUM SERPL-MCNC: 10.2 MG/DL (ref 8.6–10.5)
CHLORIDE SERPL-SCNC: 101 MMOL/L (ref 98–107)
CO2 SERPL-SCNC: 29.5 MMOL/L (ref 22–29)
CREAT SERPL-MCNC: 1.28 MG/DL (ref 0.76–1.27)
CREAT UR-MCNC: 99.5 MG/DL
EOSINOPHIL # BLD AUTO: 0.04 10*3/MM3 (ref 0–0.4)
EOSINOPHIL NFR BLD AUTO: 0.6 % (ref 0.3–6.2)
ERYTHROCYTE [DISTWIDTH] IN BLOOD BY AUTOMATED COUNT: 13.3 % (ref 12.3–15.4)
GLOBULIN SER CALC-MCNC: 2.5 GM/DL
GLUCOSE SERPL-MCNC: 269 MG/DL (ref 65–99)
HBA1C MFR BLD: 9.8 % (ref 4.8–5.6)
HCT VFR BLD AUTO: 46.1 % (ref 37.5–51)
HGB BLD-MCNC: 14.7 G/DL (ref 13–17.7)
IMM GRANULOCYTES # BLD AUTO: 0.06 10*3/MM3 (ref 0–0.05)
IMM GRANULOCYTES NFR BLD AUTO: 0.9 % (ref 0–0.5)
LYMPHOCYTES # BLD AUTO: 1.18 10*3/MM3 (ref 0.7–3.1)
LYMPHOCYTES NFR BLD AUTO: 18.6 % (ref 19.6–45.3)
MCH RBC QN AUTO: 31.4 PG (ref 26.6–33)
MCHC RBC AUTO-ENTMCNC: 31.9 G/DL (ref 31.5–35.7)
MCV RBC AUTO: 98.5 FL (ref 79–97)
MONOCYTES # BLD AUTO: 0.73 10*3/MM3 (ref 0.1–0.9)
MONOCYTES NFR BLD AUTO: 11.5 % (ref 5–12)
NEUTROPHILS # BLD AUTO: 4.3 10*3/MM3 (ref 1.7–7)
NEUTROPHILS NFR BLD AUTO: 68.1 % (ref 42.7–76)
NRBC BLD AUTO-RTO: 0 /100 WBC (ref 0–0.2)
PLATELET # BLD AUTO: 209 10*3/MM3 (ref 140–450)
POTASSIUM SERPL-SCNC: 5.5 MMOL/L (ref 3.5–5.2)
PROT SERPL-MCNC: 7.5 G/DL (ref 6–8.5)
PROT UR-MCNC: 102 MG/DL
PROT/CREAT UR: 1025.1 MG/G CREA (ref 0–200)
RBC # BLD AUTO: 4.68 10*6/MM3 (ref 4.14–5.8)
SODIUM SERPL-SCNC: 139 MMOL/L (ref 136–145)
WBC # BLD AUTO: 6.33 10*3/MM3 (ref 3.4–10.8)

## 2021-08-09 NOTE — ED PROVIDER NOTES
EMERGENCY DEPARTMENT ENCOUNTER    Room Number:  30/30  Date of encounter:  8/9/2021  PCP: Yodit Mahoney MD  Historian: Patient, wife      HPI:  Chief Complaint: Shortness of breath, weight gain, leg swelling  A complete HPI/ROS/PMH/PSH/SH/FH are unobtainable due to: None    Context: Home Wynn is a 89 y.o. male who presents to the ED via private vehicle for evaluation for 1 week of increasing shortness of breath with exertion and at rest, 7 to 8 pound weight gain, peripheral edema.  Patient states that he took a dose of Lasix today but he does not typically take Lasix on a daily basis due to underlying kidney disease.  Patient wears as needed nighttime 2 L nasal cannula.  Does not describe any significant orthopnea.  Has not had any fevers, chills, chest pain, lightheadedness, dizziness, nausea, vomiting, diarrhea.      MEDICAL RECORD REVIEW    Stress test with myocardial perfusion December 13, 2019    · Myocardial perfusion imaging indicates a normal myocardial perfusion study with no evidence of ischemia.  · Left ventricular ejection fraction is severely reduced (Calculated EF = 29%).  · There is global hypokinesis and paradoxical septal motion.    PAST MEDICAL HISTORY  Active Ambulatory Problems     Diagnosis Date Noted   • Diabetes mellitus (CMS/HCC) 04/20/2016   • Hyperlipidemia 04/20/2016   • Hypertension 04/20/2016   • Osteoarthritis of knee 04/20/2016   • Chronic atrial fibrillation 04/20/2016   • Peripheral neuropathy 04/20/2016   • Cervical spinal stenosis 07/11/2017   • Swelling 07/11/2017   • Skin infection 07/11/2017   • Cervical spondylosis without myelopathy 10/20/2017   • Nocturnal hypoxia 11/15/2019   • Inguinal lymphadenopathy 02/19/2020   • Thrombocytopenia (CMS/HCC) 02/19/2020   • Early satiety 03/30/2020   • Abnormal weight loss 03/30/2020   • Biliary obstruction 04/23/2020   • Lactic acidosis 04/23/2020   • Chronic pancreatitis (CMS/HCC) 04/23/2020   • Acute on chronic pancreatitis  (CMS/[facility]) 04/23/2020   • NATHANIEL (acute kidney injury) (CMS/[facility]) 04/23/2020   • Hematuria 04/23/2020   • Status post placement of ureteral stent 04/23/2020   • Elevated LFTs 04/23/2020   • Positive blood culture 04/24/2020   • E coli bacteremia 04/25/2020   • Ascending cholangitis 04/26/2020   • Melena 04/23/2020   • Acute blood loss anemia 04/29/2020   • Chronic anticoagulation 04/29/2020   • GI bleed 04/29/2020     Resolved Ambulatory Problems     Diagnosis Date Noted   • Atrial fibrillation (CMS/[facility]) 04/20/2016   • Uncontrolled type 2 diabetes mellitus (CMS/[facility]) 04/20/2016   • Neck pain 08/24/2016   • Bronchitis 12/14/2016   • Cellulitis of right lower extremity 07/11/2017   • Flu-like symptoms 11/14/2017   • Cough 12/07/2017   • Body aches 12/07/2017   • Acute non-recurrent maxillary sinusitis 12/07/2017   • Abnormal chest x-ray 12/12/2017   • Pneumonia due to infectious organism 12/19/2017   • Acute pancreatitis 12/27/2018     Past Medical History:   Diagnosis Date   • Acute renal failure (CMS/[facility])    • Bilateral hydronephrosis    • CHF (congestive heart failure) (CMS/[facility]) 12/2019   • Chronic kidney disease    • Chronic systolic heart failure (CMS/[facility])    • Colon polyps    • COVID-19 vaccine series completed    • Generalized weakness    • History of DVT of lower extremity 1992   • History of pulmonary embolism    • Insulin dependent diabetes mellitus    • Malaise and fatigue    • Multiple falls 2012   • Myalgia    • NICM (nonischemic cardiomyopathy) (CMS/[facility])    • PAF (paroxysmal atrial fibrillation) (CMS/[facility])    • Pancreatitis    • Right leg weakness    • Sleep apnea    • SOB (shortness of breath)    • Syncope    • Type 2 diabetes mellitus (CMS/[facility])    • Vallecular mass    • Vertigo          PAST SURGICAL HISTORY  Past Surgical History:   Procedure Laterality Date   • AXILLARY LYMPH NODE BIOPSY/EXCISION Right 4/1/2020    Procedure: right inguinal lymph node excision;  Surgeon: Barbara Maria MD;  Location: [facility]  MAIN OR;  Service: General;  Laterality: Right;   • CATARACT EXTRACTION     • CHOLECYSTECTOMY N/A 2010    Dr. Maria   • COLONOSCOPY N/A 04/13/2011    Normal-Dr. Barry Wilson   • COLONOSCOPY N/A 5/1/2020    Procedure: COLONOSCOPY TO CECUM AND TI WITH COLD SNARE POLYPECTOMIES;  Surgeon: Julio Cesar Figueroa MD;  Location: [facility] ENDOSCOPY;  Service: Gastroenterology;  Laterality: N/A;  PRE: GI BLEED  POST: POLYPS AND HEMORRHOIDS   • CYSTOSCOPY Left 12/3/2019    Procedure: CYSTOSCOPY WITH PYELOGRAM URETEROSCOPY WITH LEFT STENT AND DILATION OF URETERAL STRICTURE;  Surgeon: Ceasar Nj MD;  Location: [facility] MAIN OR;  Service: Urology   • CYSTOSCOPY W/ URETERAL STENT PLACEMENT Bilateral 2/27/2020    Procedure: CYSTOSCOPY RIGHT STENT PLACEMENT AND LEFT STENT CHANGE NO STRINGS;  Surgeon: Ceasar Nj MD;  Location: [facility] MAIN OR;  Service: Urology;  Laterality: Bilateral;   • CYSTOSCOPY W/ URETERAL STENT PLACEMENT N/A 7/23/2020    Procedure: CYSTOSCOPY WITH BILATERAL STENT EXCHANGE;  Surgeon: Ceasar Nj MD;  Location: [facility] MAIN OR;  Service: Urology;  Laterality: N/A;   • CYSTOSCOPY W/ URETERAL STENT PLACEMENT Bilateral 10/28/2020    Procedure: CYSTO AND BILATERAL STENT CHANGE;  Surgeon: Ceasar Nj MD;  Location: [facility] MAIN OR;  Service: Urology;  Laterality: Bilateral;   • CYSTOSCOPY W/ URETERAL STENT PLACEMENT Bilateral 1/28/2021    Procedure: CYSTOSCOPY BILATERAL STENT CHANGE;  Surgeon: Ceasar Nj MD;  Location: [facility] MAIN OR;  Service: Urology;  Laterality: Bilateral;   • CYSTOSCOPY W/ URETERAL STENT PLACEMENT N/A 5/6/2021    Procedure: CYSTOSCOPY AND BILATERAL STENT CHANGE;  Surgeon: Ceasar Nj MD;  Location: [facility] MAIN OR;  Service: Urology;  Laterality: N/A;   • ENDOSCOPY N/A 4/1/2020    Procedure: ESOPHAGOGASTRODUODENOSCOPY WITH BIOPSY;  Surgeon: Barbara Maria MD;  Location: [facility] MAIN OR;  Service: General;  Laterality: N/A;   • ENDOSCOPY N/A 4/30/2020     Procedure: ESOPHAGOGASTRODUODENOSCOPY WITH ERCP SCOPE;  Surgeon: Julio Cesar Figueroa MD;  Location: [facility] ENDOSCOPY;  Service: Gastroenterology;  Laterality: N/A;  PRE GI BLEED  POST HIATAL HERNIA, SCHOTSKE RING   • ERCP N/A 4/24/2020    Procedure: ENDOSCOPIC RETROGRADE CHOLANGIOPANCREATOGRAPHY with sphincerotomy with  balloon exrtraction;  Surgeon: Julio Cesar Figueroa MD;  Location: [facility] ENDOSCOPY;  Service: Gastroenterology;  Laterality: N/A;  pre: common bile duct stones  post: common bile duct stones   • HERNIA REPAIR N/A 2003    left inguinal   • PROSTATE SURGERY     • TONSILLECTOMY AND ADENOIDECTOMY     • US GUIDED LYMPH NODE BIOPSY  2/24/2020   • UVULECTOMY     • UVULECTOMY           FAMILY HISTORY  Family History   Problem Relation Age of Onset   • Cancer Mother    • Heart disease Mother    • Stroke Father    • Bleeding Disorder Father    • Cancer Sister    • Heart disease Sister    • Malig Hyperthermia Neg Hx          SOCIAL HISTORY  Social History     Socioeconomic History   • Marital status:      Spouse name: Marisol   • Number of children: 2   • Years of education: High school   • Highest education level: Not on file   Tobacco Use   • Smoking status: Never Smoker   • Smokeless tobacco: Never Used   • Tobacco comment: caffine use 2 cups daily   Vaping Use   • Vaping Use: Never used   Substance and Sexual Activity   • Alcohol use: No   • Drug use: Never   • Sexual activity: Defer         ALLERGIES  Patient has no known allergies.        REVIEW OF SYSTEMS  Review of Systems     All systems reviewed and negative except for those discussed in HPI.       PHYSICAL EXAM    I have reviewed the triage vital signs and nursing notes.    ED Triage Vitals   Temp Heart Rate Resp BP SpO2   08/09/21 1502 08/09/21 1502 08/09/21 1502 08/09/21 1548 08/09/21 1502   97.2 °F (36.2 °C) 80 18 132/80 98 %      Temp src Heart Rate Source Patient Position BP Location FiO2 (%)   08/09/21 1502 08/09/21 1502 -- -- --    Tympanic Monitor          Physical Exam  General: Awake, alert, no acute distress  HEENT: Mucous membranes moist, atraumatic, EOMI  Neck: Full ROM  Pulm: Symmetric chest rise, nonlabored, lungs diminished in bases bilaterally, right greater than left without overt wheezes/rales/rhonchi  Cardiovascular: Regular rate and rhythm, intact distal pulses, 1+ pink edema bilateral lower extremities below the knees  GI: Soft, nontender, nondistended, no rebound, no guarding, bowel sounds present  MSK: Full ROM, no deformity  Skin: Warm, dry  Neuro: Alert and oriented x 3, GCS 15, moving all extremities, no focal deficits  Psych: Calm, cooperative      Surgical mask, protective eye goggles, and gloves used during this encounter. Patient in surgical mask.      LAB RESULTS  Recent Results (from the past 24 hour(s))   ECG 12 Lead    Collection Time: 08/09/21  4:03 PM   Result Value Ref Range    QT Interval 409 ms   BNP    Collection Time: 08/09/21  4:28 PM    Specimen: Blood   Result Value Ref Range    proBNP 11,315.0 (H) 0.0-1,800.0 pg/mL   Comprehensive Metabolic Panel    Collection Time: 08/09/21  4:28 PM    Specimen: Blood   Result Value Ref Range    Glucose 234 (H) 65 - 99 mg/dL    BUN 22 8 - 23 mg/dL    Creatinine 1.29 (H) 0.76 - 1.27 mg/dL    Sodium 140 136 - 145 mmol/L    Potassium 4.7 3.5 - 5.2 mmol/L    Chloride 104 98 - 107 mmol/L    CO2 25.1 22.0 - 29.0 mmol/L    Calcium 9.6 8.6 - 10.5 mg/dL    Total Protein 7.0 6.0 - 8.5 g/dL    Albumin 4.20 3.50 - 5.20 g/dL    ALT (SGPT) 13 1 - 41 U/L    AST (SGOT) 20 1 - 40 U/L    Alkaline Phosphatase 66 39 - 117 U/L    Total Bilirubin 0.9 0.0 - 1.2 mg/dL    eGFR Non African Amer 52 (L) >60 mL/min/1.73    Globulin 2.8 gm/dL    A/G Ratio 1.5 g/dL    BUN/Creatinine Ratio 17.1 7.0 - 25.0    Anion Gap 10.9 5.0 - 15.0 mmol/L   Magnesium    Collection Time: 08/09/21  4:28 PM    Specimen: Blood   Result Value Ref Range    Magnesium 2.0 1.6 - 2.4 mg/dL   Troponin    Collection Time:  08/09/21  4:28 PM    Specimen: Blood   Result Value Ref Range    Troponin T <0.010 0.000 - 0.030 ng/mL   CBC Auto Differential    Collection Time: 08/09/21  5:39 PM    Specimen: Blood   Result Value Ref Range    WBC 5.32 3.40 - 10.80 10*3/mm3    RBC 4.24 4.14 - 5.80 10*6/mm3    Hemoglobin 13.1 13.0 - 17.7 g/dL    Hematocrit 40.6 37.5 - 51.0 %    MCV 95.8 79.0 - 97.0 fL    MCH 30.9 26.6 - 33.0 pg    MCHC 32.3 31.5 - 35.7 g/dL    RDW 13.5 12.3 - 15.4 %    RDW-SD 46.5 37.0 - 54.0 fl    MPV 11.3 6.0 - 12.0 fL    Platelets 158 140 - 450 10*3/mm3    Neutrophil % 72.9 42.7 - 76.0 %    Lymphocyte % 14.3 (L) 19.6 - 45.3 %    Monocyte % 10.7 5.0 - 12.0 %    Eosinophil % 0.8 0.3 - 6.2 %    Basophil % 0.4 0.0 - 1.5 %    Immature Grans % 0.9 (H) 0.0 - 0.5 %    Neutrophils, Absolute 3.88 1.70 - 7.00 10*3/mm3    Lymphocytes, Absolute 0.76 0.70 - 3.10 10*3/mm3    Monocytes, Absolute 0.57 0.10 - 0.90 10*3/mm3    Eosinophils, Absolute 0.04 0.00 - 0.40 10*3/mm3    Basophils, Absolute 0.02 0.00 - 0.20 10*3/mm3    Immature Grans, Absolute 0.05 0.00 - 0.05 10*3/mm3    nRBC 0.0 0.0 - 0.2 /100 WBC       Ordered the above labs and independently reviewed the results.        RADIOLOGY  XR Chest 1 View    Result Date: 8/9/2021  XR CHEST 1 VW-  HISTORY: Male who is 89 years-old,  short of breath  TECHNIQUE: Frontal view of the chest  COMPARISON: 04/28/2020  FINDINGS: The heart is enlarged. Aorta is tortuous, calcified. Pulmonary vasculature is unremarkable. No focal pulmonary consolidation, pleural effusion, or pneumothorax. No acute osseous process.      No focal pulmonary consolidation. Cardiomegaly. Tortuous aorta.  This report was finalized on 8/9/2021 4:20 PM by Dr. Av Sharp M.D.        I ordered the above noted radiological studies. Reviewed by me.  See dictation for official radiology interpretation.      PROCEDURES    Procedures  EKG    EKG Time: 1603  Rhythm/Rate: Atrial fibrillation with rate of 100  Left axis deviation with  left bundle branch block  QTC of 528  No acute ischemic changes  No STEMI     Interpreted Contemporaneously by me, independently viewed  Increased rate as compared to April 29, 2021      MEDICATIONS GIVEN IN ER    Medications   sodium chloride 0.9 % flush 10 mL (10 mL Intravenous Given 8/9/21 1612)   furosemide (LASIX) injection 60 mg (60 mg Intravenous Given 8/9/21 1825)         PROGRESS, DATA ANALYSIS, CONSULTS, AND MEDICAL DECISION MAKING    All labs have been independently reviewed by me.  All radiology studies have been reviewed by me and discussed with radiologist dictating the report.   EKG's independently viewed and interpreted by me.  Discussion below represents my analysis of pertinent findings related to patient's condition, differential diagnosis, treatment plan and final disposition.    Initial concern for possible CHF exacerbation, renal failure, electrolyte abnormality, among others.    Work-up today shows probably a mild CHF exacerbation, no hypoxia at rest or with exertion.  No increased work of breathing.  Overall well-appearing without significant/concerning hypertension.  I think at this point he can be given a dose of IV Lasix and started on Lasix for couple of weeks, he does have a cardiology follow-up in 2 weeks already scheduled.  Advised him to touch base with the office tomorrow to see if they want to see him any sooner.  ED return for worsening symptoms as needed.    ED Course as of Aug 09 2031   Mon Aug 09, 2021   1644 [id number] one year ago   proBNP(!): 11,315.0 [DC]   1710 Stable, chronic   Creatinine(!): 1.29 [DC]   1710 Troponin T: <0.010 [DC]   1747 WBC: 5.32 [DC]   1747 Hemoglobin: 13.1 [DC]      ED Course User Index  [DC] Antonio Rand MD       AS OF 20:31 EDT VITALS:    BP - (!) 132/106  HR - 103  TEMP - 97.2 °F (36.2 °C) (Tympanic)  02 SATS - 98%        DIAGNOSIS  Final diagnoses:   Acute on chronic congestive heart failure, unspecified heart failure type (CMS/HCC)          DISPOSITION  DISCHARGE    Patient discharged in stable condition.    Reviewed implications of results, diagnosis, meds, responsibility to follow up, warning signs and symptoms of possible worsening, potential complications and reasons to return to ER.    Patient/Family voiced understanding of above instructions.    Discussed plan for discharge, as there is no emergent indication for admission. Patient referred to primary care provider for BP management due to today's BP. Pt/family is agreeable and understands need for follow up and repeat testing.  Pt is aware that discharge does not mean that nothing is wrong but it indicates no emergency is present that requires admission and they must continue care with follow-up as given below or physician of their choice.     FOLLOW-UP  [facility] Emergency Department  [address]  [place] [facility] [id number] [phone]    As needed, If symptoms worsen    Yodit Mahoney MD  [address]  [facility] [address]  [facility] [id number] [phone]    Schedule an appointment as soon as possible for a visit   As needed    [facility] CARDIOLOGY  [address]  [place] [id number] [phone]  Call on 8/10/2021  for follow up appointment         Medication List      Changed    furosemide 40 MG tablet  Commonly known as: Lasix  Take 0.5 tablets by mouth Daily.  What changed:   · medication strength  · when to take this  · reasons to take this     metoprolol succinate XL 25 MG 24 hr tablet  Commonly known as: TOPROL-XL  TAKE 0.5 TABLET BY MOUTH DAILY  What changed: See the new instructions.           Where to Get Your Medications      These medications were sent to [address], [place] - [address] [facility] [address] [facility] [phone] [facility] [phone]   [address] [facility] [address]    Phone: [phone]   · furosemide 40 MG tablet                    Antonio Rand,  MD  08/09/21 7309

## 2021-08-09 NOTE — DISCHARGE INSTRUCTIONS
Take medications as prescribed, follow-up with your cardiologist as discussed, ED return for worsening symptoms as needed.

## 2021-08-09 NOTE — ED TRIAGE NOTES
Pt is soa and reports he has gained 8 lbs in 2 days    Patient was placed in face mask during first look triage.  Patient was wearing a face mask throughout encounter.  I wore personal protective equipment throughout the encounter.  Hand hygiene was performed before and after patient encounter.

## 2021-08-09 NOTE — ED NOTES
Pt to ED with c/o worsening SOA, weight gain of 7-8 lbs in one week, dry cough, states he has prn lasix at home, took today d/t increased ankle and foot edema. Pt wears 2L nc at home at night only. Denies CP.     Pt wearing face mask during their stay in ER. This RN wore face googles, mask and gloves while providing patient care.        Evi Alegria, RN  08/09/21 1817

## 2021-08-11 NOTE — TELEPHONE ENCOUNTER
Dr. Tripathi,    Mr. Wynn went to the ED on 8/9 with SOA and lower extremity edema. He was found to be in Mild CHF exacerbation and given IV lasix.  He went home on PO lasix 20mg daily.. He is also on 12.5mg metoprolol daily.    Patient states that his weight is back to his normal weight. He does have some lower leg edema but none in his feet or ankles at this time. Complains of SOA with exertion and feel dizzy and lightheaded, especially with standing. He doesn't have a means of checking vitals at home. He wear O2 2lpm/NC at night.    I added him to Adrianna Gonzalez's schedule on Friday. He [place] you in [place].    Thank you,  Isabel Saleem RN  [facility]  Triage

## 2021-08-11 NOTE — OUTREACH NOTE
Ambulatory Case Management Note    General & Health Literacy Assessment    Questions/Answers      Most Recent Value   Assessment Completed With  Patient   Living Arrangement  Spouse   Type of Residence  Private Residence   Home Care Services  No   Equiptment Used at Home  Cane, Wheelchair, Walker   Bed or Wheelchair Confined  No   Difficulty Keeping Appointments  No   Alevism or Spiritual Beliefs that Impact Treatment  No   Chronic Pain  No   Health Literacy  Good        Care Evaluation    Questions/Answers      Most Recent Value   Suggested Appointments  Other (See Comment) [Patient has call out to cardiology for dizziness. Patient also has cardiology appointment scheduled for 8/13/21. ]   Annual Wellness Visit:   Patient Has Completed   Advanced Directives:  Patient Has   Medication Adherence  Medications understood [Patient states he is taking his Lasix 0.5 tablet, 20 mg, daily, per ED. ]   Healthy Lifestyle (Self-Efficacy)  recognizes when to contact medical assistance, recognizes when to stop activity, self-reports important symptoms to medical professional [Patient is using walker and caution with ambulation due to new dizziness. Patient has already reported to Cardiology and awaiting a return call today. ]      Patient Outreach    Introduced self, explained ACM RN role and provided contact information. Spoke with patient regarding ED visit and shortness of air. Patient states he is taking his Lasix 0.5 tab (20 mg) daily as directed, but is experiencing dizziness. Patient states he is better today, but has a call out to cardiology for further recommendations. Patient has a cardiology appointment scheduled for 8/13/21. PCP notified of patient's addition to High Risk Care Management. Follow up scheduled tomorrow to review cardiology instructions. ACM reviewed falls precautions. Patient states he has family to assist with ambulation and he has a walker, cane, and wheel chair for ambulation if needed.     There  are no recently modified care plans to display for this patient.      Hanh Ricardo RN  Ambulatory Case Management    8/11/2021, 13:00 EDT

## 2021-08-12 NOTE — PROGRESS NOTES
Date of Office Visit: 2021  Encounter Provider: India Gonzalez, DEBRA, APRN  Place of Service: [facility] CARDIOLOGY  Patient Name: Home Wynn  :[id number]        Subjective:     Chief Complaint:  Persistent atrial fibrillation, nonischemic cardiomyopathy, acute on chronic CHF      History of Present Illness:  Home Wynn is a 89 y.o. male patient of Dr. Tripathi.  This patient is new to me and I have reviewed his records.    Patient has a history of persistent atrial fibrillation, hypertension, nonischemic cardiomyopathy, diabetes, hematuria, chronic anemia, valvular regurgitation, chronic pancreatitis.    Patient has a history of chronic atrial fibrillation.  Unfortunately he was not felt to be an anticoagulation candidate and instead was maintained on low-dose aspirin 81 mg daily.  He had an echocardiogram 2019 showing significantly decreased EF of 19% with hypokinesis of the LV wall and moderate to severely dilated LV cavity, severely dilated left and right atrial cavities, moderately dilated RV cavity, moderate mitral regurgitation, moderate tricuspid regurgitation with severely elevated RVSP, moderate pulmonic regurgitation.  PET stress test showed normal myocardial perfusion imaging without evidence of ischemia.  EF was felt to be 29% on stress test.  24-hour Holter showed atrial fibrillation with frequent PVCs.  Rate control is adequate.  He was unable to tolerate Xarelto due to hematuria.  He was seen in the hospital in 2020 at which point he was cleared from a cardiac standpoint to proceed with ERCP with sphincterotomy and stone extraction. He was last seen in the office 2020 by Dr. Tripathi at which point he was felt to be stable from a cardiac standpoint and blood pressure is well controlled.  He was not having signs/symptoms of heart failure.  Surgery clearance was requested 2021 for cystoscopy and bilateral stent change and he was cleared to  proceed.    Patient was seen in the ER 8/9/2021 with shortness of breath and lower extremity edema and felt to have mild CHF exacerbation and given IV Lasix and sent home on p.o. Lasix and continued on p.o. metoprolol.  proBNP was 11,315 versus 9912 the year before.  CMP showed creatinine of 1.29 with potassium of 4.7 and EGFR of 52 and sodium of 140 and glucose of 234.  Magnesium was within normal limits.  Troponin was negative.  CBC was unremarkable.  He called and reported that weight was back to normal.  He was still feeling short of breath and felt lightheaded with standing.  He was not able to check blood pressure at home.  He was scheduled to see APRN for further evaluation.        Patient presents to office today for follow-up appointment.  Patient reports that he developed increased dyspnea about a week ago, primarily orthopnea.  He reports he also realized his weight was up 8 pounds and went to the ER and was also having significant lower extremity edema.  He was given dose of IV diuretic and started on 20 mg p.o. daily Lasix and reports that weight returned to baseline pf 180lb and swelling completely resolved.  He still gets some mild occasional shortness of breath but much improved.  He actually stopped the Lasix a couple days ago because he did not feel he needed it anymore and since weight was back to baseline as well he had resolved.  He initially reported that he was eating a low-salt diet however upon further discussion he reports he is making food such as pork roast with salty seasonings.  Also eating canned goods his wife reports he does not like her food because it is too healthy.  We had a long detailed discussion about low-sodium diet and checking daily weights and monitoring of symptoms.  He denies any chest pain or discomfort, palpitations, racing heartbeat sensation, dizziness, syncope, near syncope, falls, or abnormal bleeding.  Reports he has chronic fatigue that is unchanged.  He appears  euvolemic on exam today in the office.             Past Medical History:   Diagnosis Date   • Acute pancreatitis 12/27/2018   • Acute renal failure (CMS/HCC)    • Atrial fibrillation (CMS/HCC)    • Bilateral hydronephrosis    • CHF (congestive heart failure) (CMS/HCC) 12/2019   • Chronic kidney disease     renal failure   • Chronic systolic heart failure (CMS/HCC)    • Colon polyps    • COVID-19 vaccine series completed     PFIZER   • Generalized weakness    • Hematuria    • History of DVT of lower extremity 1992   • History of pulmonary embolism    • Hyperlipidemia    • Hypertension    • Inguinal lymphadenopathy    • Insulin dependent diabetes mellitus    • Malaise and fatigue    • Multiple falls 2012   • Myalgia    • NICM (nonischemic cardiomyopathy) (CMS/HCC)    • Osteoarthritis of knee    • PAF (paroxysmal atrial fibrillation) (CMS/HCC)    • Pancreatitis    • Peripheral neuropathy     GREAT TOE RIGHT FOOT   • Right leg weakness    • Sleep apnea     2L O2 PER NC AT NIGHT   • SOB (shortness of breath)    • Syncope    • Type 2 diabetes mellitus (CMS/HCC)    • Vallecular mass    • Vertigo      Past Surgical History:   Procedure Laterality Date   • AXILLARY LYMPH NODE BIOPSY/EXCISION Right 4/1/2020    Procedure: right inguinal lymph node excision;  Surgeon: Barbara Maria MD;  Location: [facility];  Service: General;  Laterality: Right;   • CATARACT EXTRACTION     • CHOLECYSTECTOMY N/A 2010    Dr. Maria   • COLONOSCOPY N/A 04/13/2011    Normal-Dr. Barry Wilson   • COLONOSCOPY N/A 5/1/2020    Procedure: COLONOSCOPY TO CECUM AND TI WITH COLD SNARE POLYPECTOMIES;  Surgeon: Julio Cesar Figueroa MD;  Location: [facility] ENDOSCOPY;  Service: Gastroenterology;  Laterality: N/A;  PRE: GI BLEED  POST: POLYPS AND HEMORRHOIDS   • CYSTOSCOPY Left 12/3/2019    Procedure: CYSTOSCOPY WITH PYELOGRAM URETEROSCOPY WITH LEFT STENT AND DILATION OF URETERAL STRICTURE;  Surgeon: Ceasar Nj MD;  Location: [facility] OR;   Service: Urology   • CYSTOSCOPY W/ URETERAL STENT PLACEMENT Bilateral 2/27/2020    Procedure: CYSTOSCOPY RIGHT STENT PLACEMENT AND LEFT STENT CHANGE NO STRINGS;  Surgeon: Ceasar Nj MD;  Location: [facility] MAIN OR;  Service: Urology;  Laterality: Bilateral;   • CYSTOSCOPY W/ URETERAL STENT PLACEMENT N/A 7/23/2020    Procedure: CYSTOSCOPY WITH BILATERAL STENT EXCHANGE;  Surgeon: Ceasar Nj MD;  Location: [facility] MAIN OR;  Service: Urology;  Laterality: N/A;   • CYSTOSCOPY W/ URETERAL STENT PLACEMENT Bilateral 10/28/2020    Procedure: CYSTO AND BILATERAL STENT CHANGE;  Surgeon: Ceasar Nj MD;  Location: [facility] MAIN OR;  Service: Urology;  Laterality: Bilateral;   • CYSTOSCOPY W/ URETERAL STENT PLACEMENT Bilateral 1/28/2021    Procedure: CYSTOSCOPY BILATERAL STENT CHANGE;  Surgeon: Ceasar Nj MD;  Location: [facility] MAIN OR;  Service: Urology;  Laterality: Bilateral;   • CYSTOSCOPY W/ URETERAL STENT PLACEMENT N/A 5/6/2021    Procedure: CYSTOSCOPY AND BILATERAL STENT CHANGE;  Surgeon: Ceasar Nj MD;  Location: [facility] MAIN OR;  Service: Urology;  Laterality: N/A;   • ENDOSCOPY N/A 4/1/2020    Procedure: ESOPHAGOGASTRODUODENOSCOPY WITH BIOPSY;  Surgeon: Barbara Maria MD;  Location: [facility] MAIN OR;  Service: General;  Laterality: N/A;   • ENDOSCOPY N/A 4/30/2020    Procedure: ESOPHAGOGASTRODUODENOSCOPY WITH ERCP SCOPE;  Surgeon: Julio Cesar Figueroa MD;  Location: [facility] ENDOSCOPY;  Service: Gastroenterology;  Laterality: N/A;  PRE GI BLEED  POST HIATAL HERNIA, SCHOTSKE RING   • ERCP N/A 4/24/2020    Procedure: ENDOSCOPIC RETROGRADE CHOLANGIOPANCREATOGRAPHY with sphincerotomy with  balloon exrtraction;  Surgeon: Julio Cesar Figueroa MD;  Location: [facility] ENDOSCOPY;  Service: Gastroenterology;  Laterality: N/A;  pre: common bile duct stones  post: common bile duct stones   • HERNIA REPAIR N/A 2003    left inguinal   • PROSTATE SURGERY     • TONSILLECTOMY AND ADENOIDECTOMY     • US GUIDED  LYMPH NODE BIOPSY  2/24/2020   • UVULECTOMY     • UVULECTOMY       Outpatient Medications Prior to Visit   Medication Sig Dispense Refill   • aspirin 81 MG chewable tablet Chew 81 mg Daily. TO HOLD PRIOR TO SURGERY PER MD INSTRUCTIONS     • Insulin Glargine (Lantus SoloStar) 100 UNIT/ML injection pen Inject 12 Units under the skin into the appropriate area as directed Daily. 12 mL 1   • Insulin Pen Needle (Pen Needles) 31G X 6 MM misc USE DAILY AS DIRECTED 100 each 2   • metoprolol succinate XL (TOPROL-XL) 25 MG 24 hr tablet TAKE 0.5 TABLET BY MOUTH DAILY (Patient taking differently: 12.5MG DAILY) 45 tablet 3   • Multiple Vitamins-Minerals (PRESERVISION AREDS) capsule Take 2 capsules by mouth Daily. To stop before surgery PER MD INSTRUCTIONS     • O2 (OXYGEN) Inhale 2 L/min 1 (One) Time. Wears at night.     • amoxicillin (AMOXIL) 875 MG tablet Take 1 tablet by mouth 2 (Two) Times a Day. 14 tablet 0   • furosemide (Lasix) 40 MG tablet Take 0.5 tablets by mouth Daily. 30 tablet 0     No facility-administered medications prior to visit.       Allergies as of 08/13/2021   • (No Known Allergies)     Social History     Socioeconomic History   • Marital status:      Spouse name: Marisol   • Number of children: 2   • Years of education: High school   • Highest education level: Not on file   Tobacco Use   • Smoking status: Never Smoker   • Smokeless tobacco: Never Used   • Tobacco comment: caffine use 2 cups daily   Vaping Use   • Vaping Use: Never used   Substance and Sexual Activity   • Alcohol use: No   • Drug use: Never   • Sexual activity: Defer     Family History   Problem Relation Age of Onset   • Cancer Mother    • Heart disease Mother    • Stroke Father    • Bleeding Disorder Father    • Cancer Sister    • Heart disease Sister    • Malig Hyperthermia Neg Hx        Review of Systems   Constitutional: Negative for malaise/fatigue.   Cardiovascular:        SEE HPI   Respiratory: Negative for cough and wheezing.   "  Hematologic/Lymphatic: Negative for bleeding problem. Bruises/bleeds easily.   Musculoskeletal: Positive for joint pain. Negative for falls.   Gastrointestinal: Negative for melena.   Genitourinary: Negative for hematuria.   Psychiatric/Behavioral: Negative for altered mental status and substance abuse. The patient is not nervous/anxious.           Objective:     Vitals:    08/13/21 1036   BP: 130/70   BP Location: Left arm   Pulse: 83   Weight: 82.8 kg (182 lb 9.6 oz)   Height: 180.3 cm (71\")     Body mass index is 25.47 kg/m².      PHYSICAL EXAM:  Constitutional:       General: Not in acute distress.     Appearance: Well-developed. Not diaphoretic.   Eyes:      Pupils: Pupils are equal, round, and reactive to light.   HENT:      Head: Normocephalic and atraumatic.   Neck:      Vascular: No carotid bruit or JVD.   Pulmonary:      Effort: Pulmonary effort is normal. No respiratory distress.      Breath sounds: Normal breath sounds. No wheezing. No rales.   Cardiovascular:      Normal rate. Irregularly irregular rhythm.      Murmurs: There is no murmur.      No gallop. No click. No rub.   Pulses:     Intact distal pulses.   Edema:     Peripheral edema absent.   Abdominal:      General: Bowel sounds are normal. There is no distension.      Palpations: Abdomen is soft.   Musculoskeletal: Normal range of motion.         General: No tenderness or deformity.      Cervical back: Neck supple. Skin:     General: Skin is warm and dry.      Findings: No erythema or rash.   Neurological:      Mental Status: Alert and oriented to person, place, and time.   Psychiatric:         Behavior: Behavior normal.         Judgment: Judgment normal.             ECG 12 Lead    Date/Time: 8/13/2021 12:50 PM  Performed by: India Gonzalez DNP, APRN  Authorized by: India Gonzalez DNP, RIVAS   Comparison: compared with previous ECG from 8/9/2021  Rhythm: atrial fibrillation  Ectopy comments: PVCs  Rate: normal  BPM: 83  Conduction: left " bundle branch block  Comments: No significant changes from previous EKGs              Assessment:       Diagnosis Plan   1. Chronic combined systolic and diastolic congestive heart failure (CMS/HCC)     2. Essential hypertension  Basic Metabolic Panel   3. Chronic atrial fibrillation           Plan:     1. Chronic CHF: He reports his weight is back down to baseline of 180 pounds and swelling has completely resolved.  He appears euvolemic on exam.  He actually stopped the Lasix a couple of days ago because he felt it was making him a little lightheaded and did not feel that he needed it at this time.  Lightheadedness subsequently resolved.  At this point we will have him take 20 mg of Lasix once a day as needed for weight gain of 2 or more pounds in 1 day or 5 more pounds in a week or 4 swelling or shortness of breath.  Had a long detailed discussion about importance of checking daily weights and paying attention to symptoms and really working on a low-sodium diet.  Handouts given.  We will recheck BMP next week.  2. Nonischemic cardiomyopathy: Currently appears euvolemic and improved.  3. Persistent atrial fibrillation: Not felt to be an anticoagulation candidate given recurrent bleeding issues  4. History of frequent PVCs: Asymptomatic.  Continue beta-blocker.  5. Hypertension: Well-controlled in the office today  6. Diabetes    Plan of care discussed with Dr. Deuce MD.    Patient to keep August follow-up with Dr. Tripathi as scheduled or follow-up sooner if needed for any new, recurrent, or worsening symptoms or other issues or concerns.  Discussed in detail signs/symptoms that warrant sooner call or follow-up to the office or ER visit.        Records reviewed including not limited to 5/2020 hospital notes, 2019 echo, 2018 stress test, 2019 monitor study, 8/2021 CBC, CMP, proBNP, troponin, magnesium.           Your medication list          Accurate as of August 13, 2021 12:52 PM. If you have any questions, ask  your nurse or doctor.            CHANGE how you take these medications      Instructions Last Dose Given Next Dose Due   furosemide 20 MG tablet  Commonly known as: LASIX  What changed:   · medication strength  · how much to take  · how to take this  · when to take this  · additional instructions  Changed by: India Gonzalez DNP, APRN      Take 1 tablet a day as needed for weight gain, edema, or shortness of breath       metoprolol succinate XL 25 MG 24 hr tablet  Commonly known as: TOPROL-XL  What changed: See the new instructions.      TAKE 0.5 TABLET BY MOUTH DAILY          CONTINUE taking these medications      Instructions Last Dose Given Next Dose Due   aspirin 81 MG chewable tablet      Chew 81 mg Daily. TO HOLD PRIOR TO SURGERY PER MD INSTRUCTIONS       Lantus SoloStar 100 UNIT/ML injection pen  Generic drug: Insulin Glargine      Inject 12 Units under the skin into the appropriate area as directed Daily.       O2  Commonly known as: OXYGEN      Inhale 2 L/min 1 (One) Time. Wears at night.       Pen Needles 31G X 6 MM misc      USE DAILY AS DIRECTED       PreserVision AREDS capsule      Take 2 capsules by mouth Daily. To stop before surgery PER MD INSTRUCTIONS             Where to Get Your Medications      These medications were sent to [address] [place] [address] AT [facility] [phone] [facility] [phone] [place] [id number]    Phone: [phone]   · furosemide 20 MG tablet         The above medication changes may not have been made by this provider.  Patient's medication list was updated to reflect medications they are currently taking including medication changes made by other providers.            Thanks,    India Gonzalez DNP, APRN  08/13/2021         Dictated utilizing Dragon dictation

## 2021-08-13 PROBLEM — I50.43 ACUTE ON CHRONIC COMBINED SYSTOLIC AND DIASTOLIC CHF (CONGESTIVE HEART FAILURE) (HCC): Status: ACTIVE | Noted: 2021-01-01

## 2021-08-13 PROBLEM — I50.42 CHRONIC COMBINED SYSTOLIC AND DIASTOLIC CONGESTIVE HEART FAILURE (HCC): Status: ACTIVE | Noted: 2021-01-01

## 2021-08-13 NOTE — OUTREACH NOTE
Ambulatory Case Management Note    Care Evaluation    Questions/Answers      Most Recent Value   Suggested Appointments  Other (See Comment) [Patient completed cardiology appointment and has PCP scheduled next week and cardiology follow up scheduled in 2 weeks. ]   Other Patient Education/Resources   Nutrition/Diet   Nutrition/Diet Education Method  Verbal [Patient denied any questions regarding diet information provided at cardiology office. ]   Medication Adherence  Medications understood [Patient has resumed taking Lasix only as needed for weight gain. Patient weighing daily and knows his dry weight. ]   Healthy Lifestyle (Self-Efficacy)  self-monitors vital signs appropriately, correctly recognizes signs/symptoms of cardiac distress, recognizes when to contact medical assistance, self-reports important symptoms to medical professional      Patient Outreach    Introduced self, explained ACM RN role and provided contact information. Spoke with patient regarding health and wellness and recent cardiology visit. Patient states he is weighing daily and taking Lasix as needed for weight gain. Patient declines questions and needs regarding the low sodium diet. Patient concluded conversation. Follow up scheduled to review care gaps and SDOH.     There are no recently modified care plans to display for this patient.      Hanh Ricardo RN  Ambulatory Case Management    8/13/2021, 17:31 EDT

## 2021-08-17 NOTE — DISCHARGE INSTRUCTIONS
Take the following medications the morning of surgery with a small sip of water:    NONE    ARRIVE AT 8:00      If you are on prescription narcotic pain medication to control your pain you may also take that medication the morning of surgery.    General Instructions:  • Do not eat or drink anything after midnight the night before surgery.  • Infants may have breast milk up to four hours before surgery.  • Infants drinking formula may drink formula up to six hours before surgery.   • Patients who avoid smoking, chewing tobacco and alcohol for 4 weeks prior to surgery have a reduced risk of post-operative complications.  Quit smoking as many days before surgery as you can.  • Do not smoke, use chewing tobacco or drink alcohol the day of surgery.   • If applicable bring your C-PAP/ BI-PAP machine.  • Bring any papers given to you in the doctor’s office.  • Wear clean comfortable clothes.  • Do not wear contact lenses, false eyelashes or make-up.  Bring a case for your glasses.   • Bring crutches or walker if applicable.  • Remove all piercings.  Leave jewelry and any other valuables at home.  • Hair extensions with metal clips must be removed prior to surgery.  • The Pre-Admission Testing nurse will instruct you to bring medications if unable to obtain an accurate list in Pre-Admission Testing.        If you were given a blood bank ID arm band remember to bring it with you the day of surgery.    Preventing a Surgical Site Infection:  • For 2 to 3 days before surgery, avoid shaving with a razor because the razor can irritate skin and make it easier to develop an infection.    • Any areas of open skin can increase the risk of a post-operative wound infection by allowing bacteria to enter and travel throughout the body.  Notify your surgeon if you have any skin wounds / rashes even if it is not near the expected surgical site.  The area will need assessed to determine if surgery should be delayed until it is  healed.  • The night prior to surgery shower using a fresh bar of anti-bacterial soap (such as Dial) and clean washcloth.  Sleep in a clean bed with clean clothing.  Do not allow pets to sleep with you.  • Shower on the morning of surgery using a fresh bar of anti-bacterial soap (such as Dial) and clean washcloth.  Dry with a clean towel and dress in clean clothing.  • Ask your surgeon if you will be receiving antibiotics prior to surgery.  • Make sure you, your family, and all healthcare providers clean their hands with soap and water or an alcohol based hand  before caring for you or your wound.    Day of surgery:  Your arrival time is approximately two hours before your scheduled surgery time.  Upon arrival, a Pre-op nurse and Anesthesiologist will review your health history, obtain vital signs, and answer questions you may have.  The only belongings needed at this time will be your home medications and if applicable your C-PAP/BI-PAP machine.  A Pre-op nurse will start an IV and you may receive medication in preparation for surgery, including something to help you relax.      Please be aware that surgery does come with discomfort.  We want to make every effort to control your discomfort so please discuss any uncontrolled symptoms with your nurse.   Your doctor will most likely have prescribed pain medications.      If you are going home after surgery you will receive individualized written care instructions before being discharged.  A responsible adult must drive you to and from the hospital on the day of your surgery and stay with you for 24 hours.  Discharge prescriptions can be filled by the hospital pharmacy during regular pharmacy hours.  If you are having surgery late in the day/evening your prescription may be e-prescribed to your pharmacy.  Please verify your pharmacy hours or chose a 24 hour pharmacy to avoid not having access to your prescription because your pharmacy has closed for the  day.    If you are staying overnight following surgery, you will be transported to your hospital room following the recovery period.  [facility] has all private rooms.    If you have any questions please call Pre-Admission Testing at [phone].  Deductibles and co-payments are collected on the day of service. Please be prepared to pay the required co-pay, deductible or deposit on the day of service as defined by your plan.    Patient Education for Self-Quarantine Process    • Following your COVID testing, we strongly recommend that you wear a mask when you are with other people and practice social distancing.   • Limit your activities to only required outings.  • Wash your hands with soap and water frequently for at least 20 seconds.   • Avoid touching your eyes, nose and mouth with unwashed hands.  • Do not share anything - utensils, drinking glasses, food from the same bowl.   • Sanitize household surfaces daily. Include all high touch areas (door handles, light switches, phones, countertops, etc.)    Call your surgeon immediately if you experience any of the following symptoms:  • Sore Throat  • Shortness of Breath or difficulty breathing  • Cough  • Chills  • Body soreness or muscle pain  • Headache  • Fever  • New loss of taste or smell  • Do not arrive for your surgery ill.  Your procedure will need to be rescheduled to another time.  You will need to call your physician before the day of surgery to avoid any unnecessary exposure to hospital staff as well as other patients.

## 2021-08-19 NOTE — ANESTHESIA POSTPROCEDURE EVALUATION
"Patient: Home Wynn    Procedure Summary     Date: 08/19/21 Room / Location: [facility] OR 01 / [facility] MAIN OR    Anesthesia Start: 0952 Anesthesia Stop: 1026    Procedure: CYSTOSCOPY BILATERAL STENT CHANGE (Bilateral ) Diagnosis:     Surgeons: Ceasar Nj MD Provider: Benjamin Collazo MD    Anesthesia Type: general ASA Status: 4          Anesthesia Type: general    Vitals  Vitals Value Taken Time   /90 08/19/21 1116   Temp 36.4 °C (97.6 °F) 08/19/21 1110   Pulse 66 08/19/21 1119   Resp 16 08/19/21 1110   SpO2 96 % 08/19/21 1119   Vitals shown include unvalidated device data.        Post Anesthesia Care and Evaluation    Patient location during evaluation: PACU  Patient participation: complete - patient participated  Level of consciousness: awake and alert  Pain management: adequate  Airway patency: patent  Anesthetic complications: No anesthetic complications    Cardiovascular status: acceptable  Respiratory status: acceptable  Hydration status: acceptable    Comments: /87   Pulse 69   Temp 36.4 °C (97.6 °F) (Oral)   Resp 20   Ht 180.3 cm (71\")   Wt 83.1 kg (183 lb 3.2 oz)   SpO2 97%   BMI 25.55 kg/m²         "

## 2021-08-19 NOTE — H&P
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) - new mild left hydroureteronephrosis with an abrupt transition in caliber within the pelvis adjacent to the bifurcation of the left common iliac artery. no renal or ureteral stones. may be seconarty to a ureteral stricture or a nonradiopaque stone.   - CT (02/2020 @ ) - Multiple enlarged lymph nodes. NEw right hydro. Worrisome for Ca.  - Right Stent Placement (02/2020)   - Left Stent Exchange and Right Stent Placement (03/2020)   - CT (04/2020) - No adenopathy. Bilateral ureteral stents.   - Cystoscopy, bilateral retrograde pyelography, bilateral stent removal and replacement. 07/23/2020  - CT 09/2020 - nic stents in good posiition, periaotic spoft densiity decreased since feb abn lymph nodes decresed since feb. cardiomagly  - Cystoscopy, bilateral retrograde pyelography, bilateral stent removal and replacement.10/26/2020  - Cysto, Nic Stent Change 01/28/2021  - Cysto, Nic Stent removed and replaced 05/20/2021    Patient states that after this past stent change he has had no pain  Only one time of gross hematuria  Patient states that he has had some burning    (He also said that the last time he tried to get pyridium it was very expensive)   BPH: ALEM is a 89 year old, White, Male who presents today with symptoms consistent with BPH. Nocturia x 1-2. variable urg, freg, UI  Symptoms are mild, long-standing, and stable. No p or p.   Elevated PSA: No further PSA testing due to age.  History  Past Medical History: Erection Problems; Heart Disease Flu vaccine 2019 Pneumococcal vaccine COVID-19 vaccine   Surgical History: tonsil and adnoids (1942); blood clots (1992&996); TUNA (2006); gallbladder (2010); A-FIB (2011); throat and nose (1988); hernia (2002); colonoscopy (2011); UTI (2009); lymphnode removed; COLONOSCOPY   Hospitalization History: pancreatitis  (2018); PANCREATITIS (2020); SAME AS SURGERIES   Social History: Current smoking status: Never smoker. Alcohol consumption status: never. ALEM denies the use of recreational drugs. The patient is sexually active. Does patient have an advanced care plan? Yes - Plan not documented. Does patient have power of  or surrogate decision maker? Yes Son- Gonzalo Wynn.   Family Medical History:   Brother has diabetes.   Brother is alive (DIABETES).   Complete PFSH reviewed from 2021, and there are no changes.  Other Treating Clinicians:   Primary Care Physician: Dr. Yodit Mahoney  Allergies   No Known Drug Allergies  Current Medications   Lantus subcutaneous solution - SOLUTION SUBCUTANEOUS   Toprol XL oral - ORAL    Objective    Vitals - 11:43 AM   Height: 5 ft 10 in   Weight: 180 lbs   BMI: 25.82   BSA: 2.01   Blood Pressure:   Sittin/66 mmHg - Left Upper Extremity; 59 Beats/Min  Urinalysis Results   Procedure: Automated urinalysis with microscopy   Urinalysis:   Color: Isabelle   Clarity: Cloudy   Glucose: 500 mg/100mL   Bilirubin: Negative   Ketone: Trace 5   Specific Gravity: 1.025   Blood: large +++   pH: 6.0   Protein: 100 ++   Urobilinogens: 4 mg/100mL   Nitrates: Negative   Leukocytes: Trace  Physical Exam   Constitutional: General appearance is normal. Vitals: Reviewed. Ht-5 ft 10 in Wt-180 lbs BP-132/66.   Psychiatric: Orientation normal. Mood and affect normal.   Eyes: Pupils/irises normal. Exterior inspection conjunctivae and lids normal.   Male Genitourinary: Anus/perineum normal. Sphincter tone normal. Seminal vesicles normal. ARON normal. Prostate is enlarged. Prostate is smooth. Prostate is symmetric.   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   The risks and complications of therapy were discussed with the patient.   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     Elevated PSA - No further PSA testing.

## 2021-08-19 NOTE — ANESTHESIA PREPROCEDURE EVALUATION
Anesthesia Evaluation     Patient summary reviewed and Nursing notes reviewed   no history of anesthetic complications:  NPO Solid Status: > 8 hours  NPO Liquid Status: > 2 hours           Airway   Mallampati: II  TM distance: >3 FB  Neck ROM: full  no difficulty expected  Dental - normal exam   (+) upper dentures and lower dentures    Pulmonary - normal exam   (+) home oxygen, shortness of breath, sleep apnea on CPAP,   (-) COPD, asthma, not a smoker, lung cancer  Cardiovascular - normal exam  Exercise tolerance: good (4-7 METS)    ECG reviewed  Patient on routine beta blocker and Beta blocker given within 24 hours of surgery  Rhythm: regular  Rate: normal    (+) hypertension well controlled 2 medications or greater, dysrhythmias Atrial Fib, PVC, CHF Diastolic >=55% and Systolic <55%, hyperlipidemia,   (-) valvular problems/murmurs, past MI, CAD, angina, cardiac stents    ROS comment: TTE 12/2019:  ·Calculated EF = 19%.  ·The following left ventricular wall segments are hypokinetic: mid anterior, apical anterior, basal anterolateral, mid anterolateral, apical lateral, basal inferolateral, mid inferolateral, apical inferior, mid inferior, apical septal, basal inferoseptal, mid inferoseptal, apex hypokinetic, mid anteroseptal, basal anterior, basal inferior and basal inferoseptal.  ·The left ventricular cavity is moderate-to-severely dilated.  ·Left ventricular systolic function is severely decreased.  ·Left and right atrial cavity size is severely dilated.  ·Right ventricular cavity is moderately dilated.  ·Moderate mitral valve regurgitation is present  ·Moderate tricuspid valve regurgitation is present.  ·Calculated right ventricular systolic pressure from tricuspid regurgitation is 62 mmHg.  ·Moderate pulmonic valve regurgitation is present.    Neuro/Psych  (+) dizziness/light headedness, syncope, numbness,     (-) seizures, TIA, CVA  GI/Hepatic/Renal/Endo    (+)  GI bleeding resolved, renal disease CRI,  diabetes mellitus type 2 well controlled using insulin,   (-) hiatal hernia, GERD, PUD, hepatitis, liver disease, no thyroid disorder    Musculoskeletal     Abdominal  - normal exam   Substance History - negative use     OB/GYN negative ob/gyn ROS         Other   arthritis,                    Anesthesia Plan    ASA 4     general     intravenous induction     Anesthetic plan, all risks, benefits, and alternatives have been provided, discussed and informed consent has been obtained with: patient.

## 2021-08-19 NOTE — ANESTHESIA PROCEDURE NOTES
Airway  Urgency: elective    Date/Time: 8/19/2021 10:02 AM  Airway not difficult    General Information and Staff    Patient location during procedure: OR  Anesthesiologist: Benjamin Collazo MD    Indications and Patient Condition  Indications for airway management: airway protection    Preoxygenated: yes  Mask difficulty assessment: 1 - vent by mask    Final Airway Details  Final airway type: supraglottic airway      Successful airway: unique  Size 5    Number of attempts at approach: 1  Assessment: lips, teeth, and gum same as pre-op

## 2021-08-19 NOTE — OP NOTE
PREOPERATIVE DIAGNOSIS: Bilateral hydroureteronephrosis.     POSTOPERATIVE DIAGNOSIS: Bilateral hydroureteronephrosis.    PROCEDURES: Cystoscopy, bilateral stent placement, bilateral stent removal.     SURGEON: Ceasar Nj MD    COMPLICATIONS: None.    CONSULTATIONS: None.     ANESTHESIA: General.     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 He presents now for cystoscopy and bilateral stent change. I have discussed the pros, cons, risks and complications. He understands and wishes to proceed.    DESCRIPTION OF PROCEDURE: The patient was brought to the operating room and placed on the operating table. General anesthesia was induced. Bilateral stents were removed and replaced with 6-Yakut 26 cm double-J stents. The patient tolerated the procedure well. There were no complications. He was awakened and taken to the recovery room in good and stable condition.

## 2021-08-19 NOTE — BRIEF OP NOTE
CYSTOSCOPY URETERAL CATHETER/STENT INSERTION  Progress Note    Home Wynn  8/19/2021    Pre-op Diagnosis:   bilat hydro       Post-Op Diagnosis Codes:   same    Procedure/CPT® Codes:        Procedure(s):  CYSTOSCOPY BILATERAL STENT CHANGE    Surgeon(s):  Ceasar Nj MD    Anesthesia: General    Staff:   Circulator: Deepthi Schaeffer RN  Radiology Technologist: Abi Ahumada  Scrub Person: Kimberly Cedeño; Antonio Fountain         Estimated Blood Loss: none    Urine Voided: * No values recorded between 8/19/2021  9:52 AM and 8/19/2021 10:15 AM *    Specimens:                None          Drains:   Ureteral Drain/Stent Left ureter 6 Fr. (Active)       Ureteral Drain/Stent Right ureter 6 Fr. (Active)       Ureteral Drain/Stent Left ureter 6 Fr. (Active)       Ureteral Drain/Stent Right ureter 6 Fr. (Active)       Findings: b hydro    Complications: **0          Ceasar Nj MD     Date: 8/19/2021  Time: 10:17 EDT

## 2021-08-26 NOTE — OUTREACH NOTE
Ambulatory Case Management Note    Care Evaluation    Questions/Answers      Most Recent Value   Other Patient Education/Resources   MyChart   MyChart Education Method  Verbal [declined due to not using computers. ]   Medication Adherence  Medications understood [Patient instructed to take Lasix daily by cardiology today. Follow up scheduled to assess progress with daily diuretic. ]   Healthy Lifestyle (Self-Efficacy)  self-reports important symptoms to medical professional, self-monitors vital signs appropriately [Patient weighs daily. Patient takes BS intermittently. BS elevated at 180s at the moment. Weight stable at 180 as well. ]      SDOH updated and reviewed with the patient during this program:     Financial Resource Strain: Low Risk    • Difficulty of Paying Living Expenses: Not hard at all       Food Insecurity: No Food Insecurity   • Worried About Running Out of Food in the Last Year: Never true   • Ran Out of Food in the Last Year: Never true       Transportation Needs: No Transportation Needs   • Lack of Transportation (Medical): No   • Lack of Transportation (Non-Medical): No       Care Plan: Heart Failure   Updates made since 8/26/2021 12:00 AM      Problem: HEART FAILURE DIAGNOSIS KNOWLEDGE DEFICIT       Goal: Patient will verbalize understanding of how heart failure affects the body       Task: Educate patient on the mechanics of heart failure    Responsible User: Hanh Ricardo RN      Task: Educate patient on ejection fraction and its importance as it pertains to the heart failure diagnosis (HFrEF or HFpEF)    Responsible User: Hanh Ricardo RN      Task: Encourage patient to read the provided handouts on Heart Failure    Responsible User: Hanh Ricardo RN      Task: Visit the American Heart Association's website at https://www.heart.org/en/health-topics for more information about heart disease    Responsible User: Hanh Ricardo RN      Goal: Patient will verbalize understanding of health  maintenance       Task: Educate patient on importance of treating and maintaining other conditions Completed 8/26/2021   Responsible User: Hanh Ricardo RN   Note:    Reviewed importance of controlling diabetes along with heart failure.      Problem: FLUID RETENTION/OVERLOAD       Goal: Patient will be able to identify signs and symptoms of fluid retention       Task: Educate patient on daily weight tracking and signs of extremity edema Completed 8/26/2021   Responsible User: Hanh Ricardo RN      Task: Educate patient on fluid management & how too little or too much affects the heart    Responsible User: Hanh Ricardo RN      Task: Instruct patient to take weight every day and call provider for weight gain of 2 lbs overnight or 5 lbs in a week Completed 8/26/2021   Responsible User: Hanh Ricardo RN      Task: Instruct patient to do a daily self-check for symptoms of extra fluid (shortness of breath, weakness, trouble sleeping, body swelling, or dizziness)    Responsible User: Hanh Ricardo RN      Task: Educate patient on use of stoplight tool and when to call Cardiology or Primary Care Physician    Responsible User: Hanh Ricardo RN      Problem: SODIUM INTAKE       Goal: Patient will maintain management of sodium consumption       Task: Educate patient on daily sodium intake & how sodium affects the heart    Responsible User: Hanh Ricardo RN      Task: Instruct patient to eat less salt and watch fluids. No added salt or no more than 2 grams (2000mgs) of sodium or 2 liters of fluid in a 24-hour period, or follow provider's specific recommendations.    Responsible User: Hanh Ricardo RN      Task: Educate patient how to read a nutrition label pointing out servings and serving size    Responsible User: Hanh Ricardo RN      Task: Educate patient on sodium alternatives    Responsible User: Hanh Ricardo RN      Problem: HEART FAILURE MEDICATION ADHERENCE       Goal: Consistently take heart  failure medication as prescribed Completed 8/26/2021   Note:    Patient knows to take Lasix daily after Cardiology appointment today.        Task: Instruct patient to take all medications as prescribed to help reduce and control symptoms and to call provider for any side effects that prevent taking of medications Completed 8/26/2021   Responsible User: Hanh Ricardo RN      Task: Emphasize the importance of medication adherence Completed 8/26/2021   Responsible User: Hanh Ricardo RN      Task: Discuss barriers to medication adherence with patient Completed 8/26/2021   Responsible User: Hanh Ricardo RN      Task: Provide medication education on most common medications for heart failure Completed 8/26/2021   Responsible User: Hanh Ricardo RN      Task: Discuss ways to manage medications (organization & remembering). Assist patient in setting up daily reminders for meds. Completed 8/26/2021   Responsible User: Hanh Ricardo RN      Task: Educate on possible common side effects and importance of reporting them to care provider Completed 8/26/2021   Responsible User: Hanh Ricardo RN      Task: Provide education on frequency and refill details of meds and to call for refills 2 weeks before running out Completed 8/26/2021   Responsible User: Hanh Ricardo RN      Problem: EXERCISE REGIMEN       Goal: Patient will engage in physical activity safely       Task: Educate patient on benefits of exercise for heart failure    Responsible User: Hanh Ricardo RN      Task: Educate patient on maintaining safety during exercise activity    Responsible User: Hanh Ricardo RN      Task: Educate patient on sexual activity precautions and danger signs    Responsible User: Hanh Ricardo RN      Task: Educate regarding gradually increasing activity to 20 minutes a day    Responsible User: Hanh Ricardo RN      Task: Educate patient on maintaining safety during exercise activity; stop if shortness of breath,  dizziness, or chest pain develops    Responsible User: Hanh Ricardo RN      Problem: HEART FAILURE MAINTENANCE       Goal: Patient will not experience any symptoms of shortness of breath or body swelling over the next 3 months       Task: Work with provider to create an action plan for monitoring and managing CHF    Responsible User: Hanh Ricardo RN      Task: Schedule regular provider visits for close monitoring of CHF. If patient has not seen PCP within the past 6 months, schedule a follow up appointment    Responsible User: Hanh Ricardo RN      Task: Educate patient to monitor symptoms and weight changes, restrict sodium intake, take medications as prescribed, and stay physically active    Responsible User: Hanh Ricardo RN      Task: Educate patient on smoking cessation    Responsible User: Hanh Ricardo RN      Problem: HEART FAILURE PROGRESSION AND CARE NEEDS       Goal: Patient will verbalize understanding of heart failure progression       Task: Educate patient on stages of heart failure    Responsible User: Hanh Ricardo RN      Task: Educate patient regarding symptoms with end stage heart failure    Responsible User: Hanh Ricardo RN      Task: Educate patient on potential treatments for each stage    Responsible User: Hanh Ricardo RN      Task: Educate patient on trajectory of chronic illness    Responsible User: Hanh Ricardo RN      Task: Evaluate readiness for advanced care planning and Utilize ACP tools within Epic for discussion and documentation    Responsible User: Hanh Ricardo RN      Task: Educate on palliative care and hospice    Responsible User: Hanh Ricardo RN      Patient Outreach    Spoke with patient regarding health and wellness. No SDOH needs at this time. Patient declines MyChart. Patient adjusting how he takes his Lasix. Heart Failue Care plan initiated.    Hanh Ricardo RN  Ambulatory Case Management    8/26/2021, 15:43 EDT

## 2021-08-27 NOTE — PROGRESS NOTES
"      CARDIOLOGY    Aaron Tripathi MD    ENCOUNTER DATE:  08/26/2021    Home Wynn / 89 y.o. / male        CHIEF COMPLAINT / REASON FOR OFFICE VISIT     Chronic combined systolic and diastolic congestive heart miller (08/13/2021 Follow up)  Chronic atrial fibrillation    HISTORY OF PRESENT ILLNESS       HPI  Home Wynn is a 89 y.o. male who presents today for evaluation.  Says he is not doing well.  He actually brought a list of questions mostly with surrounding around him being short of breath and having some swelling.  He has not been taking his diuretic for what ever reason he said he want to talk to me first.  Says he is also noticed that his blood sugars have been running high.      The following portions of the patient's history were reviewed and updated as appropriate: allergies, current medications, past family history, past medical history, past social history, past surgical history and problem list.      VITAL SIGNS     Visit Vitals  /84 (BP Location: Left arm)   Pulse 82   Ht 180.3 cm (71\")   SpO2 98%   BMI 25.55 kg/m²         Wt Readings from Last 3 Encounters:   08/19/21 83.1 kg (183 lb 3.2 oz)   08/17/21 83.8 kg (184 lb 12.8 oz)   08/17/21 83.9 kg (185 lb)     Body mass index is 25.55 kg/m².      REVIEW OF SYSTEMS   Review of Systems   Constitutional: Positive for weight gain.   Respiratory: Positive for snoring.    Musculoskeletal: Positive for joint pain.   All other systems reviewed and are negative.          PHYSICAL EXAMINATION     Vitals reviewed.   Constitutional:       Appearance: Healthy appearance.   Pulmonary:      Effort: Pulmonary effort is normal.      Breath sounds: Examination of the right-lower field reveals rales. Examination of the left-lower field reveals rales. Rales present.   Cardiovascular:      Normal rate. Irregularly irregular rhythm. Normal S1. Normal S2.      Murmurs: There is no murmur.      No gallop. No click. No rub.   Pulses:     Intact distal pulses. "   Edema:     Peripheral edema present.     Ankle: bilateral 1+ edema of the ankle.     Feet: bilateral 1+ edema of the feet.  Neurological:      Mental Status: Alert and oriented to person, place and time.           REVIEWED DATA       ECG 12 Lead    Date/Time: 8/27/2021 9:24 AM  Performed by: Aaron Tripathi MD  Authorized by: Aaron Tripathi MD   Comparison: compared with previous ECG from 7/23/2020  Similar to previous ECG  Rhythm: atrial fibrillation    Clinical impression: abnormal EKG            Cardiac Procedures:  1.     Lipid Panel    Lipid Panel 2/23/21   Total Cholesterol 189   Triglycerides 73   HDL Cholesterol 49   VLDL Cholesterol 14   LDL Cholesterol  126 (A)   (A) Abnormal value       Comments are available for some flowsheets but are not being displayed.               ASSESSMENT & PLAN      Diagnosis Plan   1. Chronic atrial fibrillation     2. Chronic combined systolic and diastolic congestive heart failure (CMS/HCC)     3. Essential hypertension           SUMMARY/DISCUSSION  1. Chronic atrial fibrillation patient cannot take anticoagulation remains on a baby aspirin.  Patient's heart rate is acceptable.  2. Hypertension blood pressure is good.  3. Cardiomyopathy.  4. Ejection fraction was 19%.  Patient has not been taking his diuretic with that he has gone back into heart failure.  Therefore he has acute on chronic exacerbation of his CHF.  I told him to start taking his diuretic because obviously he needed it.  Told to take a double dose today and start taking it on a daily basis until his weight goes down the shortness of breath improved and the swelling resolves.  5. Moderate mitral regurgitation.  6. Moderate tricuspid regurgitation with significant pulmonary hypertension with an estimated pulmonary pressure 62 mmHg.  7. If patient does improve by Tuesday he was told to contact the office.    Time spent 45 minutes    MEDICATIONS         Discharge Medications          Accurate as of  August 26, 2021 11:59 PM. If you have any questions, ask your nurse or doctor.            Changes to Medications      Instructions Start Date   furosemide 20 MG tablet  Commonly known as: LASIX  What changed:   · how much to take  · how to take this  · when to take this   Take 1 tablet a day as needed for weight gain, edema, or shortness of breath      Lantus SoloStar 100 UNIT/ML injection pen  Generic drug: Insulin Glargine  What changed: when to take this   12 Units, Subcutaneous, Daily      metoprolol succinate XL 25 MG 24 hr tablet  Commonly known as: TOPROL-XL  What changed: See the new instructions.  Changed by: Aaron Tripathi MD   12.5 mg, Oral, Nightly         Continue These Medications      Instructions Start Date   aspirin 81 MG EC tablet   81 mg, Oral, Daily, PT HOLDING FOR SURGERY       O2  Commonly known as: OXYGEN   2 L/min, Inhalation, Nightly, Wears at night.      Pen Needles 31G X 6 MM misc   USE DAILY AS DIRECTED      PreserVision AREDS capsule   2 capsules, Oral, Daily, PT HOLDING FOR SURGERY                 **Dragon Disclaimer:   Much of this encounter note is an electronic transcription/translation of spoken language to printed text. The electronic translation of spoken language may permit erroneous, or at times, nonsensical words or phrases to be inadvertently transcribed. Although I have reviewed the note for such errors, some may still exist.

## 2021-09-08 NOTE — TELEPHONE ENCOUNTER
Dr. Tripathi,    Pt called the office today. He said you asked him to call back if his symptoms didn't improve. He said he still feels like he is gasping for air. Does he need an appt?    Thank you,    Adrianna Gómez RN  Triage [facility]

## 2021-09-09 NOTE — TELEPHONE ENCOUNTER
Added pt to [place]'s schedule on Tuesday, 9-14-21. Pt is aware.    Thank you,    Adrianna Gómez, RN  Triage [facility]

## 2021-09-10 NOTE — PATIENT INSTRUCTIONS
Heart Failure Exacerbation   Heart failure is a condition in which the heart does not fill up with enough blood, and therefore does not pump enough blood and oxygen to the body. When this happens, parts of the body do not get the blood and oxygen they need to function properly. This can cause symptoms such as breathing problems, fatigue, swelling, and confusion.  Heart failure exacerbation refers to heart failure symptoms that get worse. The symptoms may get worse suddenly or develop slowly over time. Heart failure exacerbation is a serious medical problem that should be treated right away.  What are the causes?  A heart failure exacerbation can be triggered by:  · Not taking your heart failure medicines correctly.  · Infections.  · Eating an unhealthy diet or a diet that is high in salt (sodium).  · Drinking too much fluid.  · Drinking alcohol.  · Taking illegal drugs, such as cocaine or methamphetamine.  · Not exercising.  Other causes include:  · Other heart conditions such as an irregular heartbeat (arrhythmia).  · Anemia.  · Other medical problems, such as kidney failure.  Sometimes the cause of the exacerbation is not known.  What are the signs or symptoms?  When heart failure symptoms suddenly or slowly get worse, this may be a sign of heart failure exacerbation. Symptoms of heart failure include:  · Breathing problems or shortness of breath.  · Chronic coughing or wheezing.  · Fatigue.  · Nausea or lack of appetite.  · Feeling light-headed.  · Confusion or memory loss.  · Increased heart rate or irregular heartbeat.  · Buildup of fluid in the legs, ankles, feet, or abdomen.  · Difficulty breathing when lying down.  How is this diagnosed?  This condition is diagnosed based on:  · Your symptoms and medical history.  · A physical exam.  You may also have tests, including:  · Electrocardiogram (ECG). This test measures the electrical activity of your heart.  · Echocardiogram. This test uses sound waves to take a  picture of your heart to see how well it works.  · Blood tests.  · Imaging tests, such as:  ? Chest X-ray.  ? MRI.  ? Ultrasound.  · Stress test. This test examines how well your heart functions when you exercise. Your heart is monitored while you exercise on a treadmill or exercise bike. If you cannot exercise, medicines may be used to increase your heartbeat in place of exercise.  · Cardiac catheterization. During this test, a thin, flexible tube (catheter) is inserted into a blood vessel and threaded up to your heart. This test allows your health care provider to check the arteries that lead to your heart (coronary arteries).  · Right heart catheterization. During this test, the pressure in your heart is measured.  How is this treated?  This condition may be treated by:  · Adjusting your heart medicines.  · Maintaining a healthy lifestyle. This includes:  ? Eating a heart-healthy diet that is low in sodium.  ? Not using any products that contain nicotine or tobacco, such as cigarettes and e-cigarettes.  ? Regular exercise.  ? Monitoring your fluid intake.  ? Monitoring your weight and reporting changes to your health care provider.  · Treating sleep apnea, if you have this condition.  · Surgery. This may include:  ? Implanting a device that helps both sides of your heart contract at the same time (cardiac resynchronization therapy device). This can help with heart function and relieve heart failure symptoms.  ? Implanting a device that can correct heart rhythm problems (implantable cardioverter defibrillator).  ? Connecting a device to your heart to help it pump blood (ventricular assist device).  ? Heart transplant.  Follow these instructions at home:  Medicines  · Take over-the-counter and prescription medicines only as told by your health care provider.  · Do not stop taking your medicines or change the amount you take. If you are having problems or side effects from your medicines, talk to your health care  provider.  · If you are having difficulty paying for your medicines, contact a  or your clinic. There are many programs to assist with medicine costs.  · Talk to your health care provider before starting any new medicines or supplements.  · Make sure your health care provider and pharmacist have a list of all the medicines you are taking.  Eating and drinking    · Avoid drinking alcohol.  · Eat a heart-healthy diet as told by your health care provider. This includes:  ? Plenty of fruits and vegetables.  ? Lean proteins.  ? Low-fat dairy.  ? Whole grains.  ? Foods that are low in sodium.    Activity    · Exercise regularly as told by your health care provider. Balance exercise with rest.  · Ask your health care provider what activities are safe for you. This includes sexual activity, exercise, and daily tasks at home or work.    Lifestyle  · Do not use any products that contain nicotine or tobacco, such as cigarettes and e-cigarettes. If you need help quitting, ask your health care provider.  · Maintain a healthy weight. Ask your health care provider what weight is healthy for you.  · Consider joining a patient support group. This can help with emotional problems you may have, such as stress and anxiety.  General instructions  · Talk to your health care provider about flu and pneumonia vaccines.  · Keep a list of medicines that you are taking. This may help in emergency situations.  · Keep all follow-up visits as told by your health care provider. This is important.  Contact a health care provider if:  · You have questions about your medicines or you miss a dose.  · You feel anxious, depressed, or stressed.  · You have swelling in your feet, ankles, legs, or abdomen.  · You have shortness of breath during activity or exercise.  · You have a cough.  · You have a fever.  · You have trouble sleeping.  · You gain 2-3 lb (1-1.4 kg) in 24 hours or 5 lb (2.3 kg) in a week.  Get help right away if:  · You have  chest pain.  · You have shortness of breath while resting.  · You have severe fatigue.  · You are confused.  · You have severe dizziness.  · You have a rapid or irregular heartbeat.  · You have nausea or you vomit.  · You have a cough that is worse at night or you cannot lie flat.  · You have a cough that will not go away.  · You have severe depression or sadness.  Summary  · When heart failure symptoms get worse, it is called heart failure exacerbation.  · Common causes of this condition include taking medicines incorrectly, infections, and drinking alcohol.  · This condition may be treated by adjusting medicines, maintaining a healthy lifestyle, or surgery.  · Do not stop taking your medicines or change the amount you take. If you are having problems or side effects from your medicines, talk to your health care provider.        Heart Failure Action Plan  A heart failure action plan helps you understand what to do when you have symptoms of heart failure. Follow the plan that was created by you and your health care provider. Review your plan each time you visit your health care provider.  Red zone  These signs and symptoms mean you should get medical help right away:  · You have trouble breathing when resting.  · You have a dry cough that is getting worse.  · You have swelling or pain in your legs or abdomen that is getting worse.  · You suddenly gain more than 2-3 lb (0.9-1.4 kg) in a day, or more than 5 lb (2.3 kg) in one week. This amount may be more or less depending on your condition.  · You have trouble staying awake or you feel confused.  · You have chest pain.  · You do not have an appetite.  · You pass out.  If you experience any of these symptoms:  · Call your local emergency services (911 in the [place] right away or seek help at the emergency department of the nearest hospital.  Yellow zone  These signs and symptoms mean your condition may be getting worse and you should make some changes:  · You have  trouble breathing when you are active or you need to sleep with extra pillows.  · You have swelling in your legs or abdomen.  · You gain 2-3 lb (0.9-1.4 kg) in one day, or 5 lb (2.3 kg) in one week. This amount may be more or less depending on your condition.  · You get tired easily.  · You have trouble sleeping.  · You have a dry cough.  If you experience any of these symptoms:  · Contact your health care provider within the next day.  · Your health care provider may adjust your medicines.  Green zone  These signs mean you are doing well and can continue what you are doing:  · You do not have shortness of breath.  · You have very little swelling or no new swelling.  · Your weight is stable (no gain or loss).  · You have a normal activity level.  · You do not have chest pain or any other new symptoms.  Follow these instructions at home:  · Take over-the-counter and prescription medicines only as told by your health care provider.  · Weigh yourself daily. Your target weight is __________ lb (__________ kg).  ? Call your health care provider if you gain more than __________ lb (__________ kg) in a day, or more than __________ lb (__________ kg) in one week.  · Eat a heart-healthy diet. Work with a diet and nutrition specialist (dietitian) to create an eating plan that is best for you.  · Keep all follow-up visits as told by your health care provider. This is important.  Where to find more information  · American Heart Association: www.heart.org  Summary  · Follow the action plan that was created by you and your health care provider.  · Get help right away if you have any symptoms in the Red zone.  This information is not intended to replace advice given to you by your health care provider. Make sure you discuss any questions you have with your health care provider.  Document Revised: 11/30/2018 Document Reviewed: 01/27/2018  Elsevier Patient Education © 2021 [employer]        Heart Failure Eating Plan  Heart  "failure, also called congestive heart failure, occurs when your heart does not pump blood well enough to meet your body's needs for oxygen-rich blood. Heart failure is a long-term (chronic) condition. Living with heart failure can be challenging. However, following your health care provider's instructions about a healthy lifestyle and working with a diet and nutrition specialist (dietitian) to choose the right foods may help to improve your symptoms.  What are tips for following this plan?  Reading food labels  · Check food labels for the amount of sodium per serving. Choose foods that have less than 140 mg (milligrams) of sodium in each serving.  · Check food labels for the number of calories per serving. This is important if you need to limit your daily calorie intake to lose weight.  · Check food labels for the serving size. If you eat more than one serving, you will be eating more sodium and calories than what is listed on the label.  · Look for foods that are labeled as \"sodium-free,\" \"very low sodium,\" or \"low sodium.\"  ? Foods labeled as \"reduced sodium\" or \"lightly salted\" may still have more sodium than what is recommended for you.  Cooking  · Avoid adding salt when cooking. Ask your health care provider or dietitian before using salt substitutes.  · Season food with salt-free seasonings, spices, or herbs. Check the label of seasoning mixes to make sure they do not contain salt.  · Cook with heart-healthy oils, such as olive, canola, soybean, or sunflower oil.  · Do not cox foods. Cook foods using low-fat methods, such as baking, boiling, grilling, and broiling.  · Limit unhealthy fats when cooking by:  ? Removing the skin from poultry, such as chicken.  ? Removing all visible fats from meats.  ? Skimming the fat off from stews, soups, and gravies before serving them.  Meal planning    · Limit your intake of:  ? Processed, canned, or pre-packaged foods.  ? Foods that are high in trans fat, such as fried " foods.  ? Sweets, desserts, sugary drinks, and other foods with added sugar.  ? Full-fat dairy products, such as whole milk.  · Eat a balanced diet that includes:  ? 4-5 servings of fruit each day and 4-5 servings of vegetables each day. At each meal, try to fill half of your plate with fruits and vegetables.  ? Up to 6-8 servings of whole grains each day.  ? Up to 2 servings of lean meat, poultry, or fish each day. One serving of meat is equal to 3 oz. This is about the same size as a deck of cards.  ? 2 servings of low-fat dairy each day.  ? Heart-healthy fats. Healthy fats called omega-3 fatty acids are found in foods such as flaxseed and cold-water fish like sardines, salmon, and mackerel.  · Aim to eat 25-35 g (grams) of fiber a day. Foods that are high in fiber include apples, broccoli, carrots, beans, peas, and whole grains.  · Do not add salt or condiments that contain salt (such as soy sauce) to foods before eating.  · When eating at a restaurant, ask that your food be prepared with less salt or no salt, if possible.  · Try to eat 2 or more vegetarian meals each week.  · Eat more home-cooked food and eat less restaurant, buffet, and fast food.    General information  · Do not eat more than 2,300 mg of salt (sodium) a day. The amount of sodium that is recommended for you may be lower, depending on your condition.  · Maintain a healthy body weight as directed. Ask your health care provider what a healthy weight is for you.  ? Check your weight every day.  ? Work with your health care provider and dietitian to make a plan that is right for you to lose weight or maintain your current weight.  · Limit how much fluid you drink. Ask your health care provider or dietitian how much fluid you can have each day.  · Limit or avoid alcohol as told by your health care provider or dietitian.  Recommended foods  The items listed may not be a complete list. Talk with your dietitian about what dietary choices are best for  you.  Fruits  All fresh, frozen, and canned fruits. Dried fruits, such as raisins, prunes, and cranberries.  Vegetables  All fresh vegetables. Vegetables that are frozen without sauce or added salt. Low-sodium or sodium-free canned vegetables.  Grains  Bread with less than 80 mg of sodium per slice. Whole-wheat pasta, quinoa, and brown rice. Oats and oatmeal. Barley. Millet. Grits and cream of wheat. Whole-grain and whole-wheat cold cereal.  Meats and other protein foods  Lean cuts of meat. Skinless chicken and turkey. Fish with high omega-3 fatty acids, such as salmon, sardines, and other cold-water fishes. Eggs. Dried beans, peas, and edamame. Unsalted nuts and nut butters.  Dairy  Low-fat or nonfat (skim) milk and dried milk. Rice milk, soy milk, and almond milk. Low-fat or nonfat yogurt. Small amounts of reduced-sodium block cheese. Low-sodium cottage cheese.  Fats and oils  Olive, canola, soybean, flaxseed, or sunflower oil. Avocado.  Sweets and desserts  Apple sauce. Granola bars. Sugar-free pudding and gelatin. Frozen fruit bars.  Seasoning and other foods  Fresh and dried herbs. Lemon or lime juice. Vinegar. Low-sodium ketchup. Salt-free marinades, salad dressings, sauces, and seasonings.  The items listed above may not be a complete list of foods and beverages you can eat. Contact a dietitian for more information.  Foods to avoid  The items listed may not be a complete list. Talk with your dietitian about what dietary choices are best for you.  Fruits  Fruits that are dried with sodium-containing preservatives.  Vegetables  Canned vegetables. Frozen vegetables with sauce or seasonings. Creamed vegetables. French fries. Onion rings. Pickled vegetables and sauerkraut.  Grains  Bread with more than 80 mg of sodium per slice. Hot or cold cereal with more than 140 mg sodium per serving. Salted pretzels and crackers. Pre-packaged breadcrumbs. Bagels, croissants, and biscuits.  Meats and other protein foods  Ribs  and chicken wings. Mullins, ham, pepperoni, bologna, salami, and packaged luncheon meats. Hot dogs, bratwurst, and sausage. Canned meat. Smoked meat and fish. Salted nuts and seeds.  Dairy  Whole milk, half-and-half, and cream. Buttermilk. Processed cheese, cheese spreads, and cheese curds. Regular cottage cheese. Feta cheese. Shredded cheese. String cheese.  Fats and oils  Butter, lard, shortening, ghee, and mullins fat. Canned and packaged gravies.  Seasoning and other foods  Onion salt, garlic salt, table salt, and sea salt. Marinades. Regular salad dressings. Relishes, pickles, and olives. Meat flavorings and tenderizers, and bouillon cubes. Horseradish, ketchup, and mustard. [facility] sauce. Teriyaki sauce, soy sauce (including reduced sodium). Hot sauce and Tabasco sauce. Steak sauce, fish sauce, oyster sauce, and cocktail sauce. Taco seasonings. Barbecue sauce. Tartar sauce.  The items listed above may not be a complete list of foods and beverages you should avoid. Contact a dietitian for more information.  Summary  · A heart failure eating plan includes changes that limit your intake of sodium and unhealthy fat, and it may help you lose weight or maintain a healthy weight. Your health care provider may also recommend limiting how much fluid you drink.  · Most people with heart failure should eat no more than 2,300 mg of salt (sodium) a day. The amount of sodium that is recommended for you may be lower, depending on your condition.  · Contact your health care provider or dietitian before making any major changes to your diet.  This information is not intended to replace advice given to you by your health care provider. Make sure you discuss any questions you have with your health care provider.  Document Revised: 02/13/2020 Document Reviewed: 05/04/2018  Elsevier Patient Education © 2021 [employer]      Heart Failure and Exercise  Heart failure is a condition in which the heart does not fill or pump  enough blood and oxygen to support your body and its functions. Heart failure is a long-term (chronic) condition. Living with heart failure can be challenging. However, following your health care provider's instructions about a healthy lifestyle may help improve your symptoms. This includes choosing the right exercise plan.  Doing daily physical activity is important after a diagnosis of heart failure. You may have some activity restrictions, so talk to your health care provider before doing any exercises.  What are the benefits of exercise?  Exercise may:  · Make your heart muscles stronger.  · Lower your blood pressure.  · Lower your cholesterol.  · Help you lose weight.  · Help your bones stay strong.  · Improve your blood circulation.  · Help your body use oxygen better. This relieves symptoms such as fatigue and shortness of breath.  · Help your mental health by lowering the risk of depression and other problems.  · Improve your quality of life.  · Decrease your chance of hospital admission for heart failure.  What is an exercise plan?  An exercise plan is a set of specific exercises and training activities. You will work with your health care provider to create the exercise plan that works for you. The plan may include:  · Different types of exercises and how to do them.  · Cardiac rehabilitation exercises. These are supervised programs that are designed to strengthen your heart.  What are strengthening exercises?  Strengthening exercises are a type of physical activity that involves using resistance to improve your muscle strength. Strengthening exercises usually have repetitive motions. These types of exercises can include:  · Lifting weights.  · Using weight machines.  · Using resistance tubes and bands.  · Using kettlebells.  · Using your body weight, such as doing push-ups or squats.  What are balance exercises?  Balance exercises are another type of physical activity. They strengthen the muscles of the  back, stomach, and pelvis (core muscles) and improve your balance. They can also lower your risk of falling. These types of exercises can include:  · Standing on one leg.  · Walking backward, sideways, and in a straight line.  · Standing up after sitting, without using your hands.  · Shifting your weight from one leg to the other.  · Lifting one leg in front of you.  · Doing john chi. This is a type of exercise that uses slow movements and deep breathing.  How can I increase my flexibility?  Having better flexibility can keep you from falling. It can also lengthen your muscles, improve your range of motion, and help your joints. You can increase your flexibility by:  · Doing john chi.  · Doing yoga.  · Stretching.  How much aerobic exercise should I get?    Aerobic exercises strengthen your breathing and circulation system and increase your body's use of oxygen. Examples of aerobic exercise include biking, walking, running, and swimming. Talk to your health care provider to find out how much aerobic exercise is safe for you.   To do these exercises:  · Start exercising slowly, limiting the amount of time at first. You may need to start with 5 minutes of aerobic exercise every day.  · Slowly add more minutes until you can safely do at least 30 minutes of exercise at least 4 days a week.  Summary  · Daily physical activity is important after a diagnosis of heart failure.  · Exercise can make your heart muscles stronger. It also offers other benefits that will improve your health.  · Talk to your health care provider before doing any exercises.  This information is not intended to replace advice given to you by your health care provider. Make sure you discuss any questions you have with your health care provider.  Document Revised: 05/04/2018 Document Reviewed: 05/01/2018  Elsevier Patient Education © 2021 [employer]      This information is not intended to replace advice given to you by your health care provider.  Make sure you discuss any questions you have with your health care provider.  Document Revised: 10/22/2020 Document Reviewed: 05/01/2018  Elsevier Patient Education © 2021 [employer]

## 2021-09-10 NOTE — OUTREACH NOTE
Ambulatory Case Management Note    Care Evaluation    Questions/Answers      Most Recent Value   Other Patient Education/Resources   -- [Patient agreeable to some heart failure education. Patient expresses concern that shortness of air persists even with monitoring weight, sodium, and taking Lasix daily. ]   Medication Adherence  Medications understood [Patient taking Lasix daily. Still experiencing shortness of air. ]   Healthy Lifestyle (Self-Efficacy)  self-monitors vital signs appropriately, recognizes when to contact medical assistance [Continues weighing daily. Patient actually noticed weight loss with the recent symptoms. ]   Patient Outreach Summary (AVS)  Send Outreach Summary      Patient Outreach    Spoke with patient regarding health and wellness. Patient is still experiencing shortness of breath and recent leg swelling. He has actually lost weight recently. Patient weighs daily and is taking medication as directed daily. Follow up with Cardiology on 9/14/21. ACM sending AWV summary and CHF education to patient via mail. Follow up in 2 weeks to review education, cardiology appointment recommendations, and further needs.     Care Plan: Heart Failure   Updates made since 9/10/2021 12:00 AM      Problem: HEART FAILURE DIAGNOSIS KNOWLEDGE DEFICIT       Goal: Patient will verbalize understanding of how heart failure affects the body       Task: Encourage patient to read the provided handouts on Heart Failure Completed 9/10/2021   Responsible User: Hanh Ricardo RN   Note:    Information sent to patient via mail.        Problem: FLUID RETENTION/OVERLOAD       Goal: Patient will be able to identify signs and symptoms of fluid retention       Task: Instruct patient to do a daily self-check for symptoms of extra fluid (shortness of breath, weakness, trouble sleeping, body swelling, or dizziness) Completed 9/10/2021   Responsible User: Hanh Ricardo RN      Problem: SODIUM INTAKE       Goal: Patient will  maintain management of sodium consumption       Task: Educate patient on daily sodium intake & how sodium affects the heart Completed 9/10/2021   Responsible User: Hanh Ricardo RN      Task: Instruct patient to eat less salt and watch fluids. No added salt or no more than 2 grams (2000mgs) of sodium or 2 liters of fluid in a 24-hour period, or follow provider's specific recommendations. Completed 9/10/2021   Responsible User: Hanh Ricardo RN      Problem: HEART FAILURE MAINTENANCE       Goal: Patient will not experience any symptoms of shortness of breath or body swelling over the next 3 months       Task: Work with provider to create an action plan for monitoring and managing CHF Completed 9/10/2021   Responsible User: Hanh Ricardo RN   Note:    Patient has follow up appointment with cardiology on 9/14/21     Task: Schedule regular provider visits for close monitoring of CHF. If patient has not seen PCP within the past 6 months, schedule a follow up appointment Completed 9/10/2021   Responsible User: Hanh Ricardo RN      Task: Educate patient to monitor symptoms and weight changes, restrict sodium intake, take medications as prescribed, and stay physically active Completed 9/10/2021   Responsible User: Hanh Ricardo, LIS Ricardo RN  Ambulatory Case Management    9/10/2021, 15:05 EDT

## 2021-09-14 NOTE — PROGRESS NOTES
"    CARDIOLOGY        Patient Name: Home Wynn  :[id number]  Age: 89 y.o.  Primary Cardiologist: Aaron Tripathi MD  Encounter Provider:  RIVAS Perez    Date of Service: 21          CHIEF COMPLAINT / REASON FOR OFFICE VISIT     Shortness of Breath (f/u for SOA) and Atrial Fibrillation      HISTORY OF PRESENT ILLNESS       HPI  Home Wynn is a 89 y.o. male who presents today for evaluation of dyspnea.     Pt has a  history significant for PAF, CHF, nonischemic cardiomyopathy, diabetes, CKD.    Patient reports that for the past several weeks he has had a sensation that he cannot get a deep breath.  Reports that these episodes occur very frequently throughout the day and last for about 10 to 15 minutes at a time.  Patient states that they will occur when he is both upright or when he is positioned flat.  He states that during the episodes his heart rate remained stable and he does not think that he is having episodes of atrial fibrillation.  Patient does wear nocturnal oxygen but denies any chronic lung diseases.  He denies any episodes of chest discomfort, palpitations, lightheadedness.  He does report worsening dyspnea with exertion as well as lower extremity edema.    The following portions of the patient's history were reviewed and updated as appropriate: allergies, current medications, past family history, past medical history, past social history, past surgical history and problem list.      VITAL SIGNS     Visit Vitals  /68 (BP Location: Left arm, Patient Position: Sitting, Cuff Size: Adult)   Pulse 77   Ht 180.3 cm (71\")   Wt 82.1 kg (181 lb)   SpO2 98%   BMI 25.24 kg/m²         Wt Readings from Last 3 Encounters:   21 82.1 kg (181 lb)   21 83.1 kg (183 lb 3.2 oz)   21 83.8 kg (184 lb 12.8 oz)     Body mass index is 25.24 kg/m².      REVIEW OF SYSTEMS   Review of Systems   Constitutional: Positive for malaise/fatigue. Negative for chills, fever, weight gain " and weight loss.   Cardiovascular: Positive for dyspnea on exertion and leg swelling.   Respiratory: Positive for shortness of breath. Negative for cough, snoring and wheezing.    Hematologic/Lymphatic: Negative for bleeding problem. Does not bruise/bleed easily.   Skin: Negative for color change.   Musculoskeletal: Negative for falls, joint pain and myalgias.   Gastrointestinal: Negative for melena.   Genitourinary: Negative for hematuria.   Neurological: Negative for excessive daytime sleepiness.   Psychiatric/Behavioral: Negative for depression. The patient is not nervous/anxious.            PHYSICAL EXAMINATION     Constitutional:       Appearance: Normal appearance. Well-developed.   Eyes:      Conjunctiva/sclera: Conjunctivae normal.   Neck:      Vascular: No carotid bruit.   Pulmonary:      Effort: Pulmonary effort is normal.      Breath sounds: Normal breath sounds.      Comments: Ambulating pulse ox 88% on room air.  98% after sitting for 2 minutes.  Cardiovascular:      Normal rate. Regular rhythm. Normal S1. Normal S2.      Murmurs: There is no murmur.      No gallop. No click. No rub.   Edema:     Peripheral edema absent.   Musculoskeletal: Normal range of motion. Skin:     General: Skin is warm and dry.   Neurological:      Mental Status: Alert and oriented to person, place, and time.      GCS: GCS eye subscore is 4. GCS verbal subscore is 5. GCS motor subscore is 6.   Psychiatric:         Speech: Speech normal.         Behavior: Behavior normal.         Thought Content: Thought content normal.         Judgment: Judgment normal.           REVIEWED DATA     Procedures    Cardiac Procedures:  1. 24-hour Holter 8/31/2017.  Sinus rhythm with frequent atrial ectopic beats accounting for 12.6% of overall beats.  No sustained V. tach or SVT.  No AV block or pauses greater than 2 seconds.  2. Echocardiogram 10/3/2017.  EF 44%.  Global hypokinesis.  Mild LVH.  Left atrial volume is severely increased.  RA E.   Mild calcification of aortic valve.  Mild aortic valve regurgitation.  Mild to moderate mitral valve regurgitation.  3. Myocardial perfusion stress test 10/3/2017.  Normal myocardial perfusion study without evidence of ischemia.  Enlarged RV size.  Enlarged LV cavity size.  Global hypokinesis.  EF 44%.  Impressions consistent with low risk study.  4. ZIO monitor 11/7/2017.  Slowest heart rate 36, maximum heart rate 143.  Aberrantly conducted rhythm.  5. 48-hour Holter 1/25/2018.  Basic rhythm is atrial fibrillation.  6. Echocardiogram 12/9/2019.  LVEF 19%.  Global hypokinesis.  LV cavity is moderate to severely dilated.  Left and right atrial cavity is severely dilated.  RV cavity is moderately dilated.  Moderate mitral valve regurgitation.  Moderate tricuspid valve regurgitation.  Calculated RVSP 62 mmHg.  7. Myocardial perfusion stress test 12/13/2019.  Normal myocardial perfusion study without evidence of ischemia.  Global hypokinesis and paradoxical septal motion.  8. 24-hour Holter 12/17/2019.  Predominant rhythm was atrial fibrillation.  Average heart rate 80.  PVCs occurred frequently.       Lipid Panel    Lipid Panel 2/23/21   Total Cholesterol 189   Triglycerides 73   HDL Cholesterol 49   VLDL Cholesterol 14   LDL Cholesterol  126 (A)   (A) Abnormal value       Comments are available for some flowsheets but are not being displayed.               ASSESSMENT & PLAN      Diagnosis Plan   1. Shortness of breath     2. Short of breath on exertion     3. Chronic atrial fibrillation     4. Chronic combined systolic and diastolic congestive heart failure (CMS/HCC)     5. Essential hypertension           SUMMARY/DISCUSSION  1. Dyspnea.  Patient has been experiencing episodes of shortness of breath that worsens with exertion.  Episodes of a sensation of not getting a deep breath occur for approximately 10 to 15 minutes at a time several times a day.  Episodes occur when he has both flat or upright.  He does not  believe that he is having bouts of atrial fibrillation.  Ambulating pulse ox in the office 88% when walking back from waiting room.  98% after ambulating for 2 minutes.  Patient does have history of systolic heart failure with LVEF of 19%.  With patient symptoms and episodes of hypoxia with ambulating I have recommended that we send patient to the [facility] for further evaluation.  Once in the [facility] we will obtain a CBC, CMP, BNP, D-dimer and an echocardiogram to further assess his symptoms.  2. Chronic atrial fibrillation.  Heart rate controlled in the 60s in clinic today.  Continue metoprolol succinate 12.5 mg/day.  Patient currently anticoagulated with aspirin.  3. Combined heart failure.  Most recent LVEF 19%.  Patient does have intermittent episodes of edema but appears euvolemic on exam today.  I recommended continuing furosemide 20 mg/day and metoprolol succinate 12.5 mg/day.  Labs and repeat echo as documented above.  4. Hypertension.  Blood pressure controlled at 132/68.        MEDICATIONS         Discharge Medications          Accurate as of September 14, 2021 10:44 AM. If you have any questions, ask your nurse or doctor.            Changes to Medications      Instructions Start Date   furosemide 20 MG tablet  Commonly known as: LASIX  What changed:   · how much to take  · how to take this  · when to take this   Take 1 tablet a day as needed for weight gain, edema, or shortness of breath      Lan[place]ieus SoloStar 100 UNIT/ML injection pen  Generic drug: Insulin Glargine  What changed: when to take this   12 Units, Subcutaneous, Daily         Continue These Medications      Instructions Start Date   aspirin 81 MG EC tablet   81 mg, Oral, Daily, PT HOLDING FOR SURGERY       metoprolol succinate XL 25 MG 24 hr tablet  Commonly known as: TOPROL-XL   12.5 mg, Oral, Nightly      O2  Commonly known as: OXYGEN   2 L/min, Inhalation, Nightly, Wears at night.      Pen Needles 31G X 6 MM misc   USE DAILY AS DIRECTED       PreserVision AREDS capsule   2 capsules, Oral, Daily, PT HOLDING FOR SURGERY                 **Dragon Disclaimer:   Much of this encounter note is an electronic transcription/translation of spoken language to printed text. The electronic translation of spoken language may permit erroneous, or at times, nonsensical words or phrases to be inadvertently transcribed. Although I have reviewed the note for such errors, some may still exist.

## 2021-09-14 NOTE — NURSING NOTE
Protective goggles and mask in place with all patient interactions today. Stat hold & call CTA chest. Patient may discharge home per Cheri Castrejon.Patient ambulated to main entrance for discharge.

## 2021-11-04 NOTE — PROGRESS NOTES
"      CARDIOLOGY    Aaron Tripathi MD    ENCOUNTER DATE:  11/04/2021    Home Wynn / 89 y.o. / male        CHIEF COMPLAINT / REASON FOR OFFICE VISIT     Shortness of Breath (09/14/2021 Follow up)  Chronic atrial fibrillation  Hypertension  Systolic and diastolic heart failure    HISTORY OF PRESENT ILLNESS       HPI  Home Wynn is a 89 y.o. male who presents today for reevaluation.  Patient has been very short of breath.  Patient actually had his diuretics increased last week because he was having some swelling.  He did say this helped a whole bunch but he still very short of breath.  He says he gets relief when he stands up and walks around.  This is obviously not consistent with CHF.      The following portions of the patient's history were reviewed and updated as appropriate: allergies, current medications, past family history, past medical history, past social history, past surgical history and problem list.      VITAL SIGNS     Visit Vitals  /88 (BP Location: Left arm)   Pulse 94   Ht 180.3 cm (71\")   SpO2 96%   BMI 25.48 kg/m²         Wt Readings from Last 3 Encounters:   10/25/21 82.9 kg (182 lb 11.2 oz)   09/14/21 82.1 kg (181 lb)   08/19/21 83.1 kg (183 lb 3.2 oz)     Body mass index is 25.48 kg/m².      REVIEW OF SYSTEMS   Review of Systems   All other systems reviewed and are negative.          PHYSICAL EXAMINATION     Vitals reviewed.   Constitutional:       Appearance: Healthy appearance.   Pulmonary:      Breath sounds: Decreased breath sounds present.   Cardiovascular:      Normal rate. Irregularly irregular rhythm. Normal S1. Normal S2.      Murmurs: There is no murmur.      No gallop. No click. No rub.   Pulses:     Intact distal pulses.   Edema:     Peripheral edema absent.           REVIEWED DATA     Procedures    Cardiac Procedures:  1.     Lipid Panel    Lipid Panel 2/23/21   Total Cholesterol 189   Triglycerides 73   HDL Cholesterol 49   VLDL Cholesterol 14   LDL Cholesterol  " 126 (A)   (A) Abnormal value       Comments are available for some flowsheets but are not being displayed.               ASSESSMENT & PLAN      Diagnosis Plan   1. Shortness of breath  Pulmonary Function Test   2. Chronic atrial fibrillation     3. Primary hypertension     4. Chronic combined systolic and diastolic congestive heart failure (HCC)           SUMMARY/DISCUSSION  1. Shortness of breath.  Patient did respond to the diuretics he is has less lower extremity.  On his physical exam today there was no appreciable swelling but he did have decreased air movement in his lungs.  The pulmonary doctor many years ago but he said he got rhythm did really do anything but look at the computer in clinic.  Done a long time since he had pulmonary function test.  Although I think there is a significant component of heart failure I think that has improved significantly.  2. Chronic A. fib  3. Hypertension blood pressures good  4. We will see what his pulmonary function test show and go from there.      MEDICATIONS         Discharge Medications          Accurate as of November 4, 2021 10:53 AM. If you have any questions, ask your nurse or doctor.            Changes to Medications      Instructions Start Date   furosemide 20 MG tablet  Commonly known as: LASIX  What changed: additional instructions   20 mg, Oral, Daily      Lantus SoloStar 100 UNIT/ML injection pen  Generic drug: Insulin Glargine  What changed: when to take this   12 Units, Subcutaneous, Daily         Continue These Medications      Instructions Start Date   aspirin 81 MG EC tablet   81 mg, Oral, Daily, PT HOLDING FOR SURGERY       Melatonin 3 MG capsule   3 mg, Oral, Nightly, Take 2 to 3 hours before bedtime      metoprolol succinate XL 25 MG 24 hr tablet  Commonly known as: TOPROL-XL   12.5 mg, Oral, Nightly      O2  Commonly known as: OXYGEN   2 L/min, Inhalation, Nightly, Wears at night.      Pen Needles 31G X 6 MM misc   USE DAILY AS DIRECTED       PreserVision AREDS capsule   2 capsules, Oral, Daily, PT HOLDING FOR SURGERY                 **Dragon Disclaimer:   Much of this encounter note is an electronic transcription/translation of spoken language to printed text. The electronic translation of spoken language may permit erroneous, or at times, nonsensical words or phrases to be inadvertently transcribed. Although I have reviewed the note for such errors, some may still exist.

## 2021-11-08 NOTE — OUTREACH NOTE
Ambulatory Case Management Note    Patient Outreach    Introduced self, explained ACM RN role and provided contact information. Spoke with patient regarding health and wellness. Patient states he is feeling better. His Lasix has been adjusted a few times. He is breathing better at night. He is closely follow up with cardiology. He is waiting for a call to schedule further testing. No other needs at this time. He has a Urology procedure scheduled on 12/9/21. AWV complete. AD filed. MyChart pending. Patient transitioned to monitoring status.     There are no recently modified care plans to display for this patient.      Hanh Ricardo RN  Ambulatory Case Management    11/8/2021, 16:24 EST

## 2021-12-02 NOTE — DISCHARGE INSTRUCTIONS
Take the following medications the morning of surgery with a small sip of water:    NONE    ARRIVE AT 0900.      If you are on prescription narcotic pain medication to control your pain you may also take that medication the morning of surgery.    General Instructions:  • Do not eat or drink anything after midnight the night before surgery.  • Infants may have breast milk up to four hours before surgery.  • Infants drinking formula may drink formula up to six hours before surgery.   • Patients who avoid smoking, chewing tobacco and alcohol for 4 weeks prior to surgery have a reduced risk of post-operative complications.  Quit smoking as many days before surgery as you can.  • Do not smoke, use chewing tobacco or drink alcohol the day of surgery.   • If applicable bring your C-PAP/ BI-PAP machine.  • Bring any papers given to you in the doctor’s office.  • Wear clean comfortable clothes.  • Do not wear contact lenses, false eyelashes or make-up.  Bring a case for your glasses.   • Bring crutches or walker if applicable.  • Remove all piercings.  Leave jewelry and any other valuables at home.  • Hair extensions with metal clips must be removed prior to surgery.  • The Pre-Admission Testing nurse will instruct you to bring medications if unable to obtain an accurate list in Pre-Admission Testing.        If you were given a blood bank ID arm band remember to bring it with you the day of surgery.    Preventing a Surgical Site Infection:  • For 2 to 3 days before surgery, avoid shaving with a razor because the razor can irritate skin and make it easier to develop an infection.    • Any areas of open skin can increase the risk of a post-operative wound infection by allowing bacteria to enter and travel throughout the body.  Notify your surgeon if you have any skin wounds / rashes even if it is not near the expected surgical site.  The area will need assessed to determine if surgery should be delayed until it is  healed.  • The night prior to surgery shower using a fresh bar of anti-bacterial soap (such as Dial) and clean washcloth.  Sleep in a clean bed with clean clothing.  Do not allow pets to sleep with you.  • Shower on the morning of surgery using a fresh bar of anti-bacterial soap (such as Dial) and clean washcloth.  Dry with a clean towel and dress in clean clothing.  • Ask your surgeon if you will be receiving antibiotics prior to surgery.  • Make sure you, your family, and all healthcare providers clean their hands with soap and water or an alcohol based hand  before caring for you or your wound.    Day of surgery:  Your arrival time is approximately two hours before your scheduled surgery time.  Upon arrival, a Pre-op nurse and Anesthesiologist will review your health history, obtain vital signs, and answer questions you may have.  The only belongings needed at this time will be your home medications and if applicable your C-PAP/BI-PAP machine.  A Pre-op nurse will start an IV and you may receive medication in preparation for surgery, including something to help you relax.      Please be aware that surgery does come with discomfort.  We want to make every effort to control your discomfort so please discuss any uncontrolled symptoms with your nurse.   Your doctor will most likely have prescribed pain medications.      If you are going home after surgery you will receive individualized written care instructions before being discharged.  A responsible adult must drive you to and from the hospital on the day of your surgery and stay with you for 24 hours.  Discharge prescriptions can be filled by the hospital pharmacy during regular pharmacy hours.  If you are having surgery late in the day/evening your prescription may be e-prescribed to your pharmacy.  Please verify your pharmacy hours or chose a 24 hour pharmacy to avoid not having access to your prescription because your pharmacy has closed for the  day.    If you are staying overnight following surgery, you will be transported to your hospital room following the recovery period.  [facility] has all private rooms.    If you have any questions please call Pre-Admission Testing at [phone].  Deductibles and co-payments are collected on the day of service. Please be prepared to pay the required co-pay, deductible or deposit on the day of service as defined by your plan.    Patient Education for Self-Quarantine Process    • Following your COVID testing, we strongly recommend that you wear a mask when you are with other people and practice social distancing.   • Limit your activities to only required outings.  • Wash your hands with soap and water frequently for at least 20 seconds.   • Avoid touching your eyes, nose and mouth with unwashed hands.  • Do not share anything - utensils, drinking glasses, food from the same bowl.   • Sanitize household surfaces daily. Include all high touch areas (door handles, light switches, phones, countertops, etc.)    Call your surgeon immediately if you experience any of the following symptoms:  • Sore Throat  • Shortness of Breath or difficulty breathing  • Cough  • Chills  • Body soreness or muscle pain  • Headache  • Fever  • New loss of taste or smell  • Do not arrive for your surgery ill.  Your procedure will need to be rescheduled to another time.  You will need to call your physician before the day of surgery to avoid any unnecessary exposure to hospital staff as well as other patients.

## 2021-12-06 PROBLEM — I50.41 ACUTE COMBINED SYSTOLIC AND DIASTOLIC CONGESTIVE HEART FAILURE (HCC): Status: ACTIVE | Noted: 2021-01-01

## 2021-12-06 NOTE — H&P
Internal medicine history and physical  INTERNAL MEDICINE   [facility]       Patient Identification:  Name: Home Wynn  Age: 89 y.o.  Sex: male  :  1931  MRN: [id number]                   Primary Care Physician: Yodit Mahoney MD                               Date of admission:2021    Chief Complaint: Progressive shortness of breath initially with activity last few days even at rest and could not sleep well last night.    History of Present Illness:   Patient is a 89-year-old male who has complicated past medical history including history of chronic atrial fibrillation, chronic systolic congestive heart failure, diabetes mellitus, hypertension, history of nonischemic cardiomyopathy with last ejection fraction noted to be 32% on echocardiogram performed in 2021 has been feeling out of breath with minimal activity since May of this year.  He has had evaluations and follow-ups with cardiology service with the symptoms and diuretics were added but symptoms gradually getting worse.  He does recall seeing his cardiologist in early November about a month ago and plan was made to assess his pulmonary function and adjusted his diuretics.  Patient did not feel any better and last couple weeks he started noticing swelling of the lower extremities.  Last night he could not sleep because of shortness of breath occurring when he was at rest.  Earlier today his wife called his primary care physician and notified that she is taking him to the emergency room.  Patient also has history of interstitial fibrosis and bilateral hydronephrosis for which he gets cystoscopy and bilateral stent exchanges on a regular basis and has had his last procedure performed uneventfully on 2021.  He is scheduled to have another 1 on 2021 and wanted to know if he can get preop COVID-19 nose test before he leaves the hospital for the procedure can be done.  Patient after receiving IV diuretic in the  emergency room cannot tell if he is doing better or worse.  During my interaction with him he is able to have conversation without interaction and complete sentences and is not getting out of breath.  He is able to lay flat without any respiratory distress.      Past Medical History:  Past Medical History:   Diagnosis Date   • Acute pancreatitis 12/27/2018   • Acute renal failure (HCC)    • Atrial fibrillation (HCC)    • Bilateral hydronephrosis    • Bilateral hydronephrosis    • CHF (congestive heart failure) (HCC) 12/2019   • Chronic kidney disease     renal failure   • Chronic systolic heart failure (HCC)    • Colon polyps    • COVID-19 vaccine series completed     PFIZER   • Generalized weakness    • Hematuria    • History of DVT of lower extremity 1992    BLE   • History of pulmonary embolism 1992   • Hyperlipidemia    • Hypertension    • Inguinal lymphadenopathy    • Insulin dependent diabetes mellitus    • Malaise and fatigue    • Multiple falls 2012   • Myalgia    • NICM (nonischemic cardiomyopathy) ([facility])    • On home oxygen therapy     2L HS   • Osteoarthritis of knee    • PAF (paroxysmal atrial fibrillation) (HCC)    • Pancreatitis 04/2020   • Peripheral neuropathy     GREAT TOE RIGHT FOOT   • Right leg weakness    • Sleep apnea     2L O2 PER NC AT NIGHT   • SOB (shortness of breath)    • Syncope    • Type 2 diabetes mellitus (HCC)    • Vallecular mass    • Vertigo      Past Surgical History:  Past Surgical History:   Procedure Laterality Date   • AXILLARY LYMPH NODE BIOPSY/EXCISION Right 4/1/2020    Procedure: right inguinal lymph node excision;  Surgeon: Barbara Maria MD;  Location: [facility];  Service: General;  Laterality: Right;   • CATARACT EXTRACTION     • CHOLECYSTECTOMY N/A 2010    Dr. Maria   • COLONOSCOPY N/A 04/13/2011    Normal-Dr. Barry Wilson   • COLONOSCOPY N/A 5/1/2020    Procedure: COLONOSCOPY TO CECUM AND TI WITH COLD SNARE POLYPECTOMIES;  Surgeon: Julio Cesar Figueroa MD;   Location: [facility] ENDOSCOPY;  Service: Gastroenterology;  Laterality: N/A;  PRE: GI BLEED  POST: POLYPS AND HEMORRHOIDS   • CYSTOSCOPY Left 12/3/2019    Procedure: CYSTOSCOPY WITH PYELOGRAM URETEROSCOPY WITH LEFT STENT AND DILATION OF URETERAL STRICTURE;  Surgeon: Ceasar Nj MD;  Location: [facility] MAIN OR;  Service: Urology   • CYSTOSCOPY W/ URETERAL STENT PLACEMENT Bilateral 2/27/2020    Procedure: CYSTOSCOPY RIGHT STENT PLACEMENT AND LEFT STENT CHANGE NO STRINGS;  Surgeon: Ceasar Nj MD;  Location: [facility] MAIN OR;  Service: Urology;  Laterality: Bilateral;   • CYSTOSCOPY W/ URETERAL STENT PLACEMENT N/A 7/23/2020    Procedure: CYSTOSCOPY WITH BILATERAL STENT EXCHANGE;  Surgeon: Ceasar Nj MD;  Location: [facility] MAIN OR;  Service: Urology;  Laterality: N/A;   • CYSTOSCOPY W/ URETERAL STENT PLACEMENT Bilateral 10/28/2020    Procedure: CYSTO AND BILATERAL STENT CHANGE;  Surgeon: Ceasar Nj MD;  Location: [facility] MAIN OR;  Service: Urology;  Laterality: Bilateral;   • CYSTOSCOPY W/ URETERAL STENT PLACEMENT Bilateral 1/28/2021    Procedure: CYSTOSCOPY BILATERAL STENT CHANGE;  Surgeon: Ceasar Nj MD;  Location: [facility] MAIN OR;  Service: Urology;  Laterality: Bilateral;   • CYSTOSCOPY W/ URETERAL STENT PLACEMENT N/A 5/6/2021    Procedure: CYSTOSCOPY AND BILATERAL STENT CHANGE;  Surgeon: Ceasar Nj MD;  Location: [facility] MAIN OR;  Service: Urology;  Laterality: N/A;   • CYSTOSCOPY W/ URETERAL STENT PLACEMENT Bilateral 8/19/2021    Procedure: CYSTOSCOPY BILATERAL STENT CHANGE;  Surgeon: Ceasar Nj MD;  Location: [facility] MAIN OR;  Service: Urology;  Laterality: Bilateral;   • ENDOSCOPY N/A 4/1/2020    Procedure: ESOPHAGOGASTRODUODENOSCOPY WITH BIOPSY;  Surgeon: Barbara Maria MD;  Location: [facility] MAIN OR;  Service: General;  Laterality: N/A;   • ENDOSCOPY N/A 4/30/2020    Procedure: ESOPHAGOGASTRODUODENOSCOPY WITH ERCP SCOPE;  Surgeon: Julio Cesar Figueroa MD;  Location:   [facility] ENDOSCOPY;  Service: Gastroenterology;  Laterality: N/A;  PRE GI BLEED  POST HIATAL HERNIA, SCHOTSKE RING   • ERCP N/A 4/24/2020    Procedure: ENDOSCOPIC RETROGRADE CHOLANGIOPANCREATOGRAPHY with sphincerotomy with  balloon exrtraction;  Surgeon: Julio Cesar Figueroa MD;  Location:  [facility] ENDOSCOPY;  Service: Gastroenterology;  Laterality: N/A;  pre: common bile duct stones  post: common bile duct stones   • HERNIA REPAIR N/A 2003    left inguinal   • PROSTATE SURGERY     • TONSILLECTOMY AND ADENOIDECTOMY     • US GUIDED LYMPH NODE BIOPSY  2/24/2020   • UVULECTOMY     • UVULECTOMY        Home Meds:  Medications Prior to Admission   Medication Sig Dispense Refill Last Dose   • aspirin 81 MG EC tablet Take 81 mg by mouth Daily. PT HOLDING FOR SURGERY   12/6/2021 at Unknown time   • furosemide (LASIX) 20 MG tablet Take 1 tablet by mouth Daily. (Patient taking differently: Take 20 mg by mouth Daily. Pt taking 40 mg twice daily) 90 tablet 3 12/6/2021 at Unknown time   • Insulin Glargine (Lantus SoloStar) 100 UNIT/ML injection pen Inject 12 Units under the skin into the appropriate area as directed Daily. (Patient taking differently: Inject 12 Units under the skin into the appropriate area as directed Every Night.) 12 mL 1 12/5/2021 at Unknown time   • Insulin Pen Needle (Pen Needles) 31G X 6 MM misc USE DAILY AS DIRECTED 100 each 2 12/5/2021 at Unknown time   • Melatonin 3 MG capsule Take 1 capsule by mouth Every Night. Take 2 to 3 hours before bedtime 90 capsule 1 12/5/2021 at Unknown time   • metoprolol succinate XL (TOPROL-XL) 25 MG 24 hr tablet Take 0.5 tablets by mouth Every Night. 45 tablet 3 12/5/2021 at Unknown time   • Multiple Vitamins-Minerals (PRESERVISION AREDS) capsule Take 2 capsules by mouth Daily. PT HOLDING FOR SURGERY   12/5/2021 at Unknown time   • O2 (OXYGEN) Inhale 2 L/min Every Night. Wears at night.    12/5/2021 at Unknown time     Current Meds:     Current Facility-Administered Medications:  "  •  sodium chloride 0.9 % flush 10 mL, 10 mL, Intravenous, PRN, Diony Beckford MD  Allergies:  No Known Allergies  Social History:   Social History     Tobacco Use   • Smoking status: Never Smoker   • Smokeless tobacco: Never Used   • Tobacco comment: caffine use 2 cups daily   Substance Use Topics   • Alcohol use: No      Family History:  Family History   Problem Relation Age of Onset   • Cancer Mother    • Heart disease Mother    • Stroke Father    • Bleeding Disorder Father    • Cancer Sister    • Heart disease Sister    • Malig Hyperthermia Neg Hx           Review of Systems  See history of present illness and past medical history.   Constitutional: Remarkable for no fever or chills  Cardiovascular: Remarkable for dyspnea on exertion increasing swelling of the legs last night and shortness of breath upon laying flat.  Could not sleep last night  Respiratory: Remarkable for shortness of breath but no cough or congestion or hemoptysis or pleuritic chest pain  GI: Remarkable for preserved appetite no nausea vomiting or diarrhea  : Remarkable for history of interstitial fibrosis and hydronephrosis has had cystoscopies and stent replacement with another one scheduled for 12/9/2021.  Musculoskeletal: Remarkable for no new joint aches and pain  Neurological: Remarkable for no dizziness or focal weakness.  Remainder of ROS is negative.      Vitals:   /93 (BP Location: Left arm)   Pulse 92   Temp 97.7 °F (36.5 °C) (Axillary)   Resp 16   Ht 180.3 cm (71\")   Wt 82.6 kg (182 lb)   SpO2 95%   BMI 25.38 kg/m²   I/O: No intake or output data in the 24 hours ending 12/06/21 6247  Exam:  Patient is examined using the personal protective equipment as per guidelines from infection control for this particular patient as enacted.  Hand washing was performed before and after patient interaction.  General Appearance:    Alert, cooperative, no distress, appears stated age   Head:    Normocephalic, without " obvious abnormality, atraumatic   Eyes:    PERRL, conjunctiva/corneas clear, EOM's intact, both eyes   Ears:    Normal external ear canals, both ears   Nose:   Nares normal, septum midline, mucosa normal, no drainage    or sinus tenderness   Throat:   Lips, tongue, gums normal; oral mucosa pink and moist   Neck:   Supple, symmetrical, trachea midline, no adenopathy;     thyroid:  no enlargement/tenderness/nodules; no carotid    bruit or JVD   Back:     Symmetric, no curvature, ROM normal, no CVA tenderness   Lungs:    No obvious use of accessory muscles of breathing noted decreased breath sounds at the bases   Chest Wall:    No tenderness or deformity    Heart:   Irregularly irregular   Abdomen:    Soft nontender   Extremities:  Bilateral trace lower extremity edema noted   Pulses:   Pulses palpable in all extremities; symmetric all extremities   Skin:   Skin color normal, Skin is warm and dry,  no rashes or palpable lesions   Neurologic:  Grossly nonfocal examination       Data Review:      I reviewed the patient's new clinical results.  Results from last 7 days   Lab Units 12/06/21  1541 12/02/21  1325   WBC 10*3/mm3 6.15 5.20   HEMOGLOBIN g/dL 14.4 13.9   PLATELETS 10*3/mm3 170 160     Results from last 7 days   Lab Units 12/06/21  1541 12/02/21  1325   SODIUM mmol/L 142 139   POTASSIUM mmol/L 4.4 4.5   CHLORIDE mmol/L 103 102   CO2 mmol/L 28.3 23.2   BUN mg/dL 28* 25*   CREATININE mg/dL 1.46* 1.38*   CALCIUM mg/dL 9.9* 9.4   GLUCOSE mg/dL 166* 193*     Microbiology Results (last 10 days)     Procedure Component Value - Date/Time    COVID PRE-OP / PRE-PROCEDURE SCREENING ORDER (NO ISOLATION) - Swab, Nasopharynx [[id number]]  (Normal) Collected: 12/06/21 1733    Lab Status: Final result Specimen: Swab from Nasopharynx Updated: 12/06/21 1833    Narrative:      The following orders were created for panel order COVID PRE-OP / PRE-PROCEDURE SCREENING ORDER (NO ISOLATION) - Swab, Nasopharynx.  Procedure                                Abnormality         Status                     ---------                               -----------         ------                     COVID-19, [facility] IN-HOUSE...[[id number]]  Normal              Final result                 Please view results for these tests on the individual orders.    COVID-19,[facility] IN-HOUSE CEPHEID/EDU NP SWAB IN TRANSPORT MEDIA 8-12 HR TAT - Swab, Nasopharynx [[id number]]  (Normal) Collected: 12/06/21 1733    Lab Status: Final result Specimen: Swab from Nasopharynx Updated: 12/06/21 1833     COVID19 Not Detected    Narrative:      Fact sheet for providers: https://www.fda.gov/media/569268/download     Fact sheet for patients: https://www.fda.gov/media/613528/download        ECG 12 Lead   Final Result   HEART RATE= 81  bpm   RR Interval= 741  ms   MD Interval=   ms   P Horizontal Axis=   deg   P Front Axis=   deg   QRSD Interval= 146  ms   QT Interval= 425  ms   QRS Axis= -52  deg   T Wave Axis= 117  deg   - ABNORMAL ECG -   Atrial fibrillation   Left bundle branch block   No change from prior tracing   Electronically Signed By: Jim Davis ([facility]) 06-Dec-2021 18:22:38   Date and Time of Study: 2021-12-06 12:11:53          Assessment:  Active Hospital Problems    Diagnosis  POA   • **Acute combined systolic and diastolic congestive heart failure (HCC) [I50.41]  Yes   • On home oxygen therapy [Z99.81]  Not Applicable     2L HS     • Atrial fibrillation with RVR (HCC) [I48.91]  Unknown   • NATHANIEL (acute kidney injury) (HCC) [N17.9]  Yes   • Chronic pancreatitis (HCC) [K86.1]  Yes   • Chronic atrial fibrillation [I48.20]  Yes   • Diabetes mellitus (HCC) [E11.9]  Yes       Medical decision making:  Acute exacerbation of underlying congestive heart failure combined systolic and diastolic type-plan is to admit the patient, continue with IV diuretics monitoring renal function and consult cardiology service.  Patient sees Dr. Tripathi.  History of interstitial fibrosis on chronic oxygen  replacement and chronic hypoxia-continue with oxygen supplementation provided continuous pulse ox monitoring.  Atrial fibrillation-continue his current regimen, continue telemetry and consult cardiology service.  Diabetes mellitus-continue with Accu-Cheks and sliding scale coverage and watch for hypoglycemia and check hemoglobin A1c.  Chronic kidney disease with slight worsening after the recent changes in his diuretics-monitor his kidney function on diuretics and avoid nephrotoxic agents and hypotensive episodes.  History of retroperitoneal fibrosis with bilateral hydronephrosis-has had cystoscopies and stent exchanges and is now scheduled for cystoscopy and stent exchanges on 12/9/2021-monitor renal function and if it shows decline consider ultrasound of his kidneys to see if it needs to be done sooner than later.      Felecia Haque MD   12/6/2021  18:57 EST  Much of this encounter note is an electronic transcription/translation of spoken language to printed text. The electronic translation of spoken language may permit erroneous, or at times, nonsensical words or phrases to be inadvertently transcribed; Although I have reviewed the note for such errors, some may still exist

## 2021-12-06 NOTE — TELEPHONE ENCOUNTER
Called wife back to make sure presented to ED. No answer, left VM. Checked chart and patient was currently in ED.

## 2021-12-06 NOTE — ED NOTES
Nursing report ED to floor  Home Wynn  89 y.o.  male    HPI :   Chief Complaint   Patient presents with   • Shortness of Breath       Admitting doctor:   Felecia Haque MD    Admitting diagnosis:   The primary encounter diagnosis was Acute combined systolic and diastolic congestive heart failure (HCC). Diagnoses of PND (paroxysmal nocturnal dyspnea) and Dyspnea on exertion were also pertinent to this visit.    Code status:   Current Code Status     Date Active Code Status Order ID Comments User Context       Prior    Advance Care Planning Activity          Allergies:   Patient has no known allergies.    Intake and Output  No intake or output data in the 24 hours ending 12/06/21 1727    Weight:       12/06/21  1224   Weight: 82.6 kg (182 lb)       Most recent vitals:   Vitals:    12/06/21 1232 12/06/21 1534 12/06/21 1644 12/06/21 1647   BP: 128/79  125/90 (!) 151/101   Pulse:  100 95 89   Resp:       Temp:       TempSrc:       SpO2:  97%  96%   Weight:       Height:           Active LDAs/IV Access:   Lines, Drains & Airways     Active LDAs     Name Placement date Placement time Site Days    Peripheral IV 12/06/21 1542 Right; Medial; Posterior Forearm 12/06/21  1542  Forearm  less than 1                Labs (abnormal labs have a star):   Labs Reviewed   COMPREHENSIVE METABOLIC PANEL - Abnormal; Notable for the following components:       Result Value    Glucose 166 (*)     BUN 28 (*)     Creatinine 1.46 (*)     Calcium 9.9 (*)     eGFR Non  Amer 45 (*)     All other components within normal limits    Narrative:     GFR Normal >60  Chronic Kidney Disease <60  Kidney Failure <15     BNP (IN-HOUSE) - Abnormal; Notable for the following components:    proBNP 13,314.0 (*)     All other components within normal limits    Narrative:     Among patients with dyspnea, NT-proBNP is highly sensitive for the detection of acute congestive heart failure. In addition NT-proBNP of <300 pg/ml effectively rules out acute  congestive heart failure with 99% negative predictive value.    Results may be falsely decreased if patient taking Biotin.     CBC WITH AUTO DIFFERENTIAL - Abnormal; Notable for the following components:    Lymphocyte % 16.9 (*)     Immature Grans % 1.0 (*)     Immature Grans, Absolute 0.06 (*)     All other components within normal limits   TROPONIN (IN-HOUSE) - Normal    Narrative:     Troponin T Reference Range:  <= 0.03 ng/mL-   Negative for AMI  >0.03 ng/mL-     Abnormal for myocardial necrosis.  Clinicians would have to utilize clinical acumen, EKG, Troponin and serial changes to determine if it is an Acute Myocardial Infarction or myocardial injury due to an underlying chronic condition.       Results may be falsely decreased if patient taking Biotin.     RAINBOW DRAW    Narrative:     The following orders were created for panel order [place] Draw.  Procedure                               Abnormality         Status                     ---------                               -----------         ------                     Green Top (Gel)[[id number]]                                  Final result               Lavender Top[[id number]]                                     Final result               Gold Top - SST[[id number]]                                   Final result               Light Blue Top[[id number]]                                   Final result                 Please view results for these tests on the individual orders.   CBC AND DIFFERENTIAL    Narrative:     The following orders were created for panel order CBC & Differential.  Procedure                               Abnormality         Status                     ---------                               -----------         ------                     CBC Auto Differential[[id number]]        Abnormal            Final result                 Please view results for these tests on the individual orders.   GREEN TOP   LAVENDER TOP   GOLD TOP - SST   LIGHT BLUE  TOP       EKG:   ECG 12 Lead   Preliminary Result   HEART RATE= 81  bpm   RR Interval= 741  ms   CT Interval=   ms   P Horizontal Axis=   deg   P Front Axis=   deg   QRSD Interval= 146  ms   QT Interval= 425  ms   QRS Axis= -52  deg   T Wave Axis= 117  deg   - ABNORMAL ECG -   Atrial fibrillation   Left bundle branch block   Electronically Signed By:    Date and Time of Study: 2021-12-06 12:11:53          Meds given in ED:   Medications   sodium chloride 0.9 % flush 10 mL (has no administration in time range)   furosemide (LASIX) injection 80 mg (80 mg Intravenous Given 12/6/21 6393)       Imaging results:  No radiology results for the last day    Ambulatory status:   - x 1    Social issues:   Social History     Socioeconomic History   • Marital status:      Spouse name: Marisol   • Number of children: 2   • Years of education: High school   Tobacco Use   • Smoking status: Never Smoker   • Smokeless tobacco: Never Used   • Tobacco comment: caffine use 2 cups daily   Vaping Use   • Vaping Use: Never used   Substance and Sexual Activity   • Alcohol use: No   • Drug use: Never   • Sexual activity: Defer       NIH Stroke Scale:        Nursing report ED to floor:       Luciana Varghese, RN  12/06/21 9316

## 2021-12-06 NOTE — ED TRIAGE NOTES
All triage performed with this RN wearing appropriate PPE.  Pt placed in mask upon arrival to ED.  Patient c/o SOA since May but reports it got worse the past 2 weeks.

## 2021-12-06 NOTE — ED NOTES
Pt reports worsening SOA. Pt reports he is having difficulty sleeping at night because of it.   Pt reports hx of a fib.   This RN in appropriate ppe while in pt room. Pt wearing mask.        Marjorie Mcbride, RN  12/06/21 5643

## 2021-12-06 NOTE — TELEPHONE ENCOUNTER
Caller: Marisol Wynn    Relationship to patient: Emergency Contact    Best call back number: [phone]     Chief complaint: PATIENT'S WIFE CALLED SAYING HE IS EXPERIENCING SHORTNESS OF BREATH AT REST AND TROUBLE SLEEPING. SHE SAID THEY WILL BE GOING TO THE ER BUT HE WANTED TO CHECK WITH DR. BARROSO PRIOR TO GOING. PATIENT'S WIFE WAS ADVISE TO TAKE HIM TO THE ER AND THEY BOTH VERBALIZED UNDERSTANDING.     Patient directed to call 911 or go to their nearest emergency room.     Patient verbalized understanding: [x] Yes  [] No  If no, why?

## 2021-12-07 PROBLEM — I50.43 ACUTE ON CHRONIC COMBINED SYSTOLIC AND DIASTOLIC CONGESTIVE HEART FAILURE (HCC): Status: ACTIVE | Noted: 2021-01-01

## 2021-12-07 NOTE — ED PROVIDER NOTES
EMERGENCY DEPARTMENT ENCOUNTER    Room Number:  101/1  Date seen:  12/6/2021  PCP: Yodit Mahoney MD  Historian: Patient      HPI:  Chief Complaint: Shortness of breath  A complete HPI/ROS/PMH/PSH/SH/FH are unobtainable due to: Nothing  Context: Home Wynn is a 89 y.o. male who presents to the ED c/o shortness of breath that has been ongoing for several months but has been worse for the last 2 weeks.  He reports a history of congestive heart failure.  He reports compliance with his diuretics.  He also has a history of sleep apnea but reports that his nasal cannula oxygen at 2 L at night does not seem to alleviate his symptoms.  He tried CPAP years ago but could not tolerate it.  He presents today primarily because he feels too short of breath to sleep.  This is regardless of what position he is sitting in.  He is also experienced dyspnea on exertion but it has not limited him from ambulating around his home.  He denies any chest pain.  He denies fever, cough, chills.  He reports that he did have some peripheral edema and his primary doctor had doubled his home dose of Lasix.  This made the swelling go away but then he developed some abdominal discomfort so he stopped taking the excess Lasix.  His abdominal discomfort subsided, so he does not think that he can tolerate the higher dose of Lasix.            PAST MEDICAL HISTORY  Active Ambulatory Problems     Diagnosis Date Noted   • Diabetes mellitus (HCC) 04/20/2016   • Hyperlipidemia 04/20/2016   • Hypertension 04/20/2016   • Osteoarthritis of knee 04/20/2016   • Chronic atrial fibrillation 04/20/2016   • Peripheral neuropathy 04/20/2016   • Cervical spinal stenosis 07/11/2017   • Swelling 07/11/2017   • Skin infection 07/11/2017   • Cervical spondylosis without myelopathy 10/20/2017   • Nocturnal hypoxia 11/15/2019   • Inguinal lymphadenopathy 02/19/2020   • Thrombocytopenia (HCC) 02/19/2020   • Early satiety 03/30/2020   • Abnormal weight loss 03/30/2020   •  Biliary obstruction 04/23/2020   • Lactic acidosis 04/23/2020   • Chronic pancreatitis (Formerly [facility]) 04/23/2020   • Acute on chronic pancreatitis (Formerly [facility]) 04/23/2020   • NATHANIEL (acute kidney injury) (Formerly [facility]) 04/23/2020   • Hematuria 04/23/2020   • Status post placement of ureteral stent 04/23/2020   • Elevated LFTs 04/23/2020   • Positive blood culture 04/24/2020   • E coli bacteremia 04/25/2020   • Ascending cholangitis 04/26/2020   • Melena 04/23/2020   • Acute blood loss anemia 04/29/2020   • Chronic anticoagulation 04/29/2020   • GI bleed 04/29/2020   • Chronic combined systolic and diastolic congestive heart failure (Formerly [facility]) 08/13/2021     Resolved Ambulatory Problems     Diagnosis Date Noted   • Atrial fibrillation (Former[facility]) 04/20/2016   • Uncontrolled type 2 diabetes mellitus (Formerly [facility]) 04/20/2016   • Neck pain 08/24/2016   • Bronchitis 12/14/2016   • Cellulitis of right lower extremity 07/11/2017   • Flu-like symptoms 11/14/2017   • Cough 12/07/2017   • Body aches 12/07/2017   • Acute non-recurrent maxillary sinusitis 12/07/2017   • Abnormal chest x-ray 12/12/2017   • Pneumonia due to infectious organism 12/19/2017   • Acute pancreatitis 12/27/2018     Past Medical History:   Diagnosis Date   • Acute renal failure (Formerly [facility])    • Bilateral hydronephrosis    • Bilateral hydronephrosis    • CHF (congestive heart failure) (Formerly [facility]) 12/2019   • Chronic kidney disease    • Chronic systolic heart failure ([facility])    • Colon polyps    • COVID-19 vaccine series completed    • Generalized weakness    • History of DVT of lower extremity 1992   • History of pulmonary embolism 1992   • Insulin dependent diabetes mellitus    • Malaise and fatigue    • Multiple falls 2012   • Myalgia    • NICM (nonischemic cardiomyopathy) (Formerly [facility])    • On home oxygen therapy    • PAF (paroxysmal atrial fibrillation) ([facility])    • Pancreatitis 04/2020   • Right leg weakness    • Sleep apnea    • SOB (shortness of breath)    • Syncope    • Type 2 diabetes mellitus ([facility])    • Vallecular  mass    • Vertigo          PAST SURGICAL HISTORY  Past Surgical History:   Procedure Laterality Date   • AXILLARY LYMPH NODE BIOPSY/EXCISION Right 4/1/2020    Procedure: right inguinal lymph node excision;  Surgeon: Barbara Maria MD;  Location: [facility] OR;  Service: General;  Laterality: Right;   • CATARACT EXTRACTION     • CHOLECYSTECTOMY N/A 2010    Dr. Maria   • COLONOSCOPY N/A 04/13/2011    Normal-Dr. Barry Wilson   • COLONOSCOPY N/A 5/1/2020    Procedure: COLONOSCOPY TO CECUM AND TI WITH COLD SNARE POLYPECTOMIES;  Surgeon: Julio Cesar Figueroa MD;  Location: [facility] ENDOSCOPY;  Service: Gastroenterology;  Laterality: N/A;  PRE: GI BLEED  POST: POLYPS AND HEMORRHOIDS   • CYSTOSCOPY Left 12/3/2019    Procedure: CYSTOSCOPY WITH PYELOGRAM URETEROSCOPY WITH LEFT STENT AND DILATION OF URETERAL STRICTURE;  Surgeon: Ceasar Nj MD;  Location: [facility] OR;  Service: Urology   • CYSTOSCOPY W/ URETERAL STENT PLACEMENT Bilateral 2/27/2020    Procedure: CYSTOSCOPY RIGHT STENT PLACEMENT AND LEFT STENT CHANGE NO STRINGS;  Surgeon: Ceasar Nj MD;  Location: [facility] OR;  Service: Urology;  Laterality: Bilateral;   • CYSTOSCOPY W/ URETERAL STENT PLACEMENT N/A 7/23/2020    Procedure: CYSTOSCOPY WITH BILATERAL STENT EXCHANGE;  Surgeon: Ceasar Nj MD;  Location: [facility] OR;  Service: Urology;  Laterality: N/A;   • CYSTOSCOPY W/ URETERAL STENT PLACEMENT Bilateral 10/28/2020    Procedure: CYSTO AND BILATERAL STENT CHANGE;  Surgeon: Ceasar Nj MD;  Location: [facility] OR;  Service: Urology;  Laterality: Bilateral;   • CYSTOSCOPY W/ URETERAL STENT PLACEMENT Bilateral 1/28/2021    Procedure: CYSTOSCOPY BILATERAL STENT CHANGE;  Surgeon: Ceasar Nj MD;  Location: [facility] OR;  Service: Urology;  Laterality: Bilateral;   • CYSTOSCOPY W/ URETERAL STENT PLACEMENT N/A 5/6/2021    Procedure: CYSTOSCOPY AND BILATERAL STENT CHANGE;  Surgeon: Ceasar Nj MD;  Location: [facility]  MAIN OR;  Service: Urology;  Laterality: N/A;   • CYSTOSCOPY W/ URETERAL STENT PLACEMENT Bilateral 8/19/2021    Procedure: CYSTOSCOPY BILATERAL STENT CHANGE;  Surgeon: Ceasar Nj MD;  Location: [facility] MAIN OR;  Service: Urology;  Laterality: Bilateral;   • ENDOSCOPY N/A 4/1/2020    Procedure: ESOPHAGOGASTRODUODENOSCOPY WITH BIOPSY;  Surgeon: Barbara Maria MD;  Location: [facility] MAIN OR;  Service: General;  Laterality: N/A;   • ENDOSCOPY N/A 4/30/2020    Procedure: ESOPHAGOGASTRODUODENOSCOPY WITH ERCP SCOPE;  Surgeon: Julio Cesar Figueroa MD;  Location: [facility] ENDOSCOPY;  Service: Gastroenterology;  Laterality: N/A;  PRE GI BLEED  POST HIATAL HERNIA, SCHOTSKE RING   • ERCP N/A 4/24/2020    Procedure: ENDOSCOPIC RETROGRADE CHOLANGIOPANCREATOGRAPHY with sphincerotomy with  balloon exrtraction;  Surgeon: Julio Cesar Figueroa MD;  Location: [facility] ENDOSCOPY;  Service: Gastroenterology;  Laterality: N/A;  pre: common bile duct stones  post: common bile duct stones   • HERNIA REPAIR N/A 2003    left inguinal   • PROSTATE SURGERY     • TONSILLECTOMY AND ADENOIDECTOMY     • US GUIDED LYMPH NODE BIOPSY  2/24/2020   • UVULECTOMY     • UVULECTOMY           FAMILY HISTORY  Family History   Problem Relation Age of Onset   • Cancer Mother    • Heart disease Mother    • Stroke Father    • Bleeding Disorder Father    • Cancer Sister    • Heart disease Sister    • Malig Hyperthermia Neg Hx          SOCIAL HISTORY  Social History     Socioeconomic History   • Marital status:      Spouse name: Marisol   • Number of children: 2   • Years of education: High school   Tobacco Use   • Smoking status: Never Smoker   • Smokeless tobacco: Never Used   • Tobacco comment: caffine use 2 cups daily   Vaping Use   • Vaping Use: Never used   Substance and Sexual Activity   • Alcohol use: No   • Drug use: Never   • Sexual activity: Defer         ALLERGIES  Patient has no known allergies.        REVIEW OF SYSTEMS  Review of Systems    Review of all 14 systems is negative other than stated in the HPI above.      PHYSICAL EXAM  ED Triage Vitals   Temp Heart Rate Resp BP SpO2   12/06/21 1050 12/06/21 1050 12/06/21 1050 12/06/21 1232 12/06/21 1050   95.9 °F (35.5 °C) 72 20 128/79 98 %      Temp src Heart Rate Source Patient Position BP Location FiO2 (%)   12/06/21 1050 12/06/21 1050 12/06/21 1908 12/06/21 1700 --   Tympanic Monitor Lying Left arm          GENERAL: Awake and alert, no acute distress  HENT: nares patent  EYES: no scleral icterus, EOMI  CV: regular rhythm, normal rate, trace edema bilateral lower extremities  RESPIRATORY: normal effort, lungs clear auscultation bilaterally, mild tachypnea  ABDOMEN: soft, nondistended, nontender throughout  MUSCULOSKELETAL: no deformity  NEURO: alert, moves all extremities, follows commands  PSYCH:  calm, cooperative  SKIN: warm, dry    Vital signs and nursing notes reviewed.          LAB RESULTS  Recent Results (from the past 24 hour(s))   ECG 12 Lead    Collection Time: 12/06/21 12:11 PM   Result Value Ref Range    QT Interval 425 ms   BNP    Collection Time: 12/06/21 12:37 PM    Specimen: Blood   Result Value Ref Range    proBNP 13,314.0 (H) 0.0-1,800.0 pg/mL   Troponin    Collection Time: 12/06/21 12:37 PM    Specimen: Blood   Result Value Ref Range    Troponin T <0.010 0.000 - 0.030 ng/mL   Green Top (Gel)    Collection Time: 12/06/21 12:37 PM   Result Value Ref Range    Extra Tube Hold for add-ons.    Lavender Top    Collection Time: 12/06/21 12:37 PM   Result Value Ref Range    Extra Tube hold for add-on    Light Blue Top    Collection Time: 12/06/21 12:37 PM   Result Value Ref Range    Extra Tube hold for add-on    Comprehensive Metabolic Panel    Collection Time: 12/06/21  3:41 PM    Specimen: Blood   Result Value Ref Range    Glucose 166 (H) 65 - 99 mg/dL    BUN 28 (H) 8 - 23 mg/dL    Creatinine 1.46 (H) 0.76 - 1.27 mg/dL    Sodium 142 136 - 145 mmol/L    Potassium 4.4 3.5 - 5.2 mmol/L     Chloride 103 98 - 107 mmol/L    CO2 28.3 22.0 - 29.0 mmol/L    Calcium 9.9 (H) 8.2 - 9.6 mg/dL    Total Protein 7.8 6.0 - 8.5 g/dL    Albumin 4.30 3.50 - 5.20 g/dL    ALT (SGPT) 11 1 - 41 U/L    AST (SGOT) 17 1 - 40 U/L    Alkaline Phosphatase 87 39 - 117 U/L    Total Bilirubin 0.9 0.0 - 1.2 mg/dL    eGFR Non African Amer 45 (L) >60 mL/min/1.73    Globulin 3.5 gm/dL    A/G Ratio 1.2 g/dL    BUN/Creatinine Ratio 19.2 7.0 - 25.0    Anion Gap 10.7 5.0 - 15.0 mmol/L   Gold Top - SST    Collection Time: 12/06/21  3:41 PM   Result Value Ref Range    Extra Tube Hold for add-ons.    CBC Auto Differential    Collection Time: 12/06/21  3:41 PM    Specimen: Blood   Result Value Ref Range    WBC 6.15 3.40 - 10.80 10*3/mm3    RBC 4.65 4.14 - 5.80 10*6/mm3    Hemoglobin 14.4 13.0 - 17.7 g/dL    Hematocrit 45.0 37.5 - 51.0 %    MCV 96.8 79.0 - 97.0 fL    MCH 31.0 26.6 - 33.0 pg    MCHC 32.0 31.5 - 35.7 g/dL    RDW 14.1 12.3 - 15.4 %    RDW-SD 50.7 37.0 - 54.0 fl    MPV 11.6 6.0 - 12.0 fL    Platelets 170 140 - 450 10*3/mm3    Neutrophil % 72.4 42.7 - 76.0 %    Lymphocyte % 16.9 (L) 19.6 - 45.3 %    Monocyte % 8.5 5.0 - 12.0 %    Eosinophil % 0.7 0.3 - 6.2 %    Basophil % 0.5 0.0 - 1.5 %    Immature Grans % 1.0 (H) 0.0 - 0.5 %    Neutrophils, Absolute 4.46 1.70 - 7.00 10*3/mm3    Lymphocytes, Absolute 1.04 0.70 - 3.10 10*3/mm3    Monocytes, Absolute 0.52 0.10 - 0.90 10*3/mm3    Eosinophils, Absolute 0.04 0.00 - 0.40 10*3/mm3    Basophils, Absolute 0.03 0.00 - 0.20 10*3/mm3    Immature Grans, Absolute 0.06 (H) 0.00 - 0.05 10*3/mm3    nRBC 0.0 0.0 - 0.2 /100 WBC   COVID-19,[facility] COS[facility] IN-HOUSE CEPHEID/EDU NP SWAB IN TRANSPORT MEDIA 8-12 HR TAT - Swab, Nasopharynx    Collection Time: 12/06/21  5:33 PM    Specimen: Nasopharynx; Swab   Result Value Ref Range    COVID19 Not Detected Not Detected - Ref. Range       Ordered the above labs and reviewed the results.        RADIOLOGY  XR Chest 1 View    Result Date: 12/6/2021  XR CHEST 1 VW-   Clinical: Weakness and shortness of breath  COMPARISON CT chest 9/14/2021 and chest radiograph 8/9/2021  FINDINGS: Substantial cardiac enlargement similar to the previous examination. There is blunting of the left costophrenic angle suggesting small pleural effusion. There is prominence of the interstitial pattern which appears asymmetric, greater on the right than the left. Asymmetric pulmonary edema versus atypical pneumonia. No focal consolidation seen. Stable mediastinum and mar. Old right rib deformities. The remainder is unremarkable.  This report was finalized on 12/6/2021 3:43 PM by Dr. Anders Padgett M.D.        Ordered the above noted radiological studies. Reviewed by me in PACS.            PROCEDURES  Procedures            MEDICATIONS GIVEN IN ER  Medications   sodium chloride 0.9 % flush 10 mL (has no administration in time range)   furosemide (LASIX) injection 80 mg (has no administration in time range)   multivitamin with minerals 1 tablet (has no administration in time range)   O2 (OXYGEN) (has no administration in time range)   insulin glargine (LANTUS, SEMGLEE) injection 12 Units (has no administration in time range)   aspirin EC tablet 81 mg (has no administration in time range)   metoprolol succinate XL (TOPROL-XL) 24 hr tablet 12.5 mg (has no administration in time range)   melatonin tablet 3 mg (has no administration in time range)   sodium chloride 0.9 % flush 10 mL (has no administration in time range)   sodium chloride 0.9 % flush 10 mL (has no administration in time range)   dextrose (GLUTOSE) oral gel 15 g (has no administration in time range)   dextrose (D50W) (25 g/50 mL) IV injection 25 g (has no administration in time range)   glucagon (human recombinant) (GLUCAGEN DIAGNOSTIC) injection 1 mg (has no administration in time range)   enoxaparin (LOVENOX) syringe 30 mg (has no administration in time range)   nitroglycerin (NITROSTAT) SL tablet 0.4 mg (has no administration in time range)    insulin lispro (ADMELOG) injection 0-9 Units (has no administration in time range)   acetaminophen (TYLENOL) tablet 650 mg (has no administration in time range)     Or   acetaminophen (TYLENOL) 160 MG/5ML solution 650 mg (has no administration in time range)     Or   acetaminophen (TYLENOL) suppository 650 mg (has no administration in time range)   ondansetron (ZOFRAN) injection 4 mg (has no administration in time range)   furosemide (LASIX) injection 80 mg (80 mg Intravenous Given 12/6/21 1644)                   MEDICAL DECISION MAKING, PROGRESS, and CONSULTS    All labs have been independently reviewed by me.  All radiology studies have been reviewed by me and discussed with radiologist dictating the report.   EKG's independently viewed and interpreted by me.  Discussion below represents my analysis of pertinent findings related to patient's condition, differential diagnosis, treatment plan and final disposition.      Differential diagnosis includes but is not limited to:  Acute congestive heart failure  COPD  Pneumonia  Sleep apnea      ED Course as of 12/06/21 1948   Mon Dec 06, 2021   1531 Chest x-ray independently interpreted PACs.  There are small bilateral pleural effusions, mild interstitial prominence, cardiomegaly. [JR]   1621 Glucose(!): 166 [JR]   1621 BUN(!): 28 [JR]   1621 Creatinine(!): 1.46 [JR]   1621 WBC: 6.15 [JR]   1621 Hemoglobin: 14.4 [JR]   1621 Troponin T: <0.010 [JR]   1621 proBNP(!): 13,314.0 [JR]   1622 EKG interpreted by me:  Time: 1211  Rate and rhythm: A. fib, 81  TN: N/A  QRS: LBBB  ST and T waves: No acute ischemic changes [JR]   1623 Medical record review: I reviewed echo from 9/14/2021.  EF was 22% with grade 2 diastolic dysfunction. [JR]   1704 Discussed with Dr. Haque, who agrees to admit.  [JR]      ED Course User Index  [JR] Diony Beckford MD              I wore an N95 mask, face shield, and gloves during this patient encounter.  Patient also wearing a surgical mask.   Hand hygeine performed before and after seeing the patient.    DIAGNOSIS  Final diagnoses:   Acute combined systolic and diastolic congestive heart failure (HCC)   PND (paroxysmal nocturnal dyspnea)   Dyspnea on exertion         DISPOSITION  ADMIT                  --    Please note that portions of this were completed with a voice recognition program.            Diony Beckford MD  12/06/21 1952

## 2021-12-07 NOTE — CONSULTS
Cardiology History & Physical / Consultation      Patient Name: Home Wynn  Age/Sex: 89 y.o. male  : 1931  MRN: [id number]    Date of Admission: 2021  Date of Encounter Visit: 21  Encounter Provider: Aaron Tripathi MD  Referring Provider: Felecia Haque MD  Place of Service: [facility] CARDIOLOGY  Patient Care Team:  Yodit Mahoney MD as PCP - General (Family Medicine)  Ceasar Nj MD as Consulting Physician (Urology)  Aaron Tripathi MD as Consulting Physician (Cardiology)  Katie Flower APRN as Referring Physician (Nurse Practitioner)  Benjamin James MD as Consulting Physician (Hematology and Oncology)  Cheri Rosales MD as Consulting Physician (Gastroenterology)  Barbara Maria MD as Surgeon (General Surgery)  Hanh Ricardo RN as Ambulatory  ([facility])          Subjective:     Chief Complaint: shortness of breath    Reason for consultation: CHF    History of Present Illness:  Home Wynn is a 89 y.o. male pt of mine with a history of CHF, sleep apnea on 2 L NC, chronic Afib, chronic systolic congestive heart failure, DM, HTN, and nonischemic cardiomyopathy with last EF 32 % on ECHO in .       I saw pt in the office on 21 for follow up. Pt reported he had been very short of breath. Pt reported he had just had his diuretics increased last week because he was having some swelling. Pt reported this helped a lot but he was still short of breath. He reported he has relief when he stands up and walks around. Pt lower extremity edema was better. He had decreased air movement in his lungs. I felt his heart failure had improved and we will wait on his pulmonary function test.       Pt presented to ER with complaints of shortness of breath that has been going on for several months but is worse the last 2 weeks. Pt reported dyspnea on exertion. Pt denied any chest pain, fever, cough, or chills. Pt  reported he had peripheral edema and his PCP doubled his Lasix. Pt reported the swelling went away but then he developed some abdominal discomfort so he stopped the extra lasix and his abdominal discomfort stopped. In ER BNP 13,314, troponin negative, BUN/CR 28/1.46, COVID negative, CXR  Substantial cardiac enlargement similar to the previous examination. There is blunting of the left costophrenic angle suggesting small pleural effusion. There is prominence of the interstitial pattern which appears asymmetric, greater on the right than the left. Asymmetric pulmonary edema versus atypical pneumonia. No focal consolidation seen. Stable mediastinum and mar,       Patient is urinated a lot overnight.  He says he feels 100% better since he has been here at the hospital but he is far back from normal.  Denies any types of chest discomfort.  Currently has no lower extremity edema his abdomen was soft reportedly less distended.          ECHO 9/14/21        · Calculated left ventricular EF = 22.1% Estimated left ventricular EF was in agreement with the calculated left ventricular EF. Left ventricular systolic function is severely decreased. See Bull's eye for regional wall motion.. Left ventricular wall thickness is consistent with mild eccentric hypertrophy.  · Left ventricular diastolic function is consistent with (grade II w/high LAP) pseudonormalization.  · Severe left atrial enlargement. Moderately dilated right atrium.  · The right ventricular cavity is moderately dilated. Normal right ventricular systolic function.  · Calculated right ventricular systolic pressure from tricuspid regurgitation is 55.0 mmHg indicating severe pulmonary hypertension.  · Mild to moderate mitral and tricuspid valves regurgitations noted.  · Probably no significant change since prior study on 12/9/19.           Stress Test 12/13/19    · Myocardial perfusion imaging indicates a normal myocardial perfusion study with no evidence of  ischemia.  · Left ventricular ejection fraction is severely reduced (Calculated EF = 29%).  · There is global hypokinesis and paradoxical septal motion.        Past Medical History:  Past Medical History:   Diagnosis Date   • Acute pancreatitis 12/27/2018   • Acute renal failure (HCC)    • Atrial fibrillation (HCC)    • Bilateral hydronephrosis    • Bilateral hydronephrosis    • CHF (congestive heart failure) (HCC) 12/2019   • Chronic kidney disease     renal failure   • Chronic systolic heart failure (HCC)    • Colon polyps    • COVID-19 vaccine series completed     PFIZER   • Generalized weakness    • Hematuria    • History of DVT of lower extremity 1992    BLE   • History of pulmonary embolism 1992   • Hyperlipidemia    • Hypertension    • Inguinal lymphadenopathy    • Insulin dependent diabetes mellitus    • Malaise and fatigue    • Multiple falls 2012   • Myalgia    • NICM (nonischemic cardiomyopathy) (HCC)    • On home oxygen therapy     2L HS   • Osteoarthritis of knee    • PAF (paroxysmal atrial fibrillation) (HCC)    • Pancreatitis 04/2020   • Peripheral neuropathy     GREAT TOE RIGHT FOOT   • Right leg weakness    • Sleep apnea     2L O2 PER NC AT NIGHT   • SOB (shortness of breath)    • Syncope    • Type 2 diabetes mellitus (HCC)    • Vallecular mass    • Vertigo        Past Surgical History:   Procedure Laterality Date   • AXILLARY LYMPH NODE BIOPSY/EXCISION Right 4/1/2020    Procedure: right inguinal lymph node excision;  Surgeon: Barbara Maria MD;  Location: [facility] MAIN OR;  Service: General;  Laterality: Right;   • CATARACT EXTRACTION     • CHOLECYSTECTOMY N/A 2010    Dr. Maria   • COLONOSCOPY N/A 04/13/2011    Normal-Dr. Barry Wilson   • COLONOSCOPY N/A 5/1/2020    Procedure: COLONOSCOPY TO CECUM AND TI WITH COLD SNARE POLYPECTOMIES;  Surgeon: Julio Cesar Figueroa MD;  Location: [facility] ENDOSCOPY;  Service: Gastroenterology;  Laterality: N/A;  PRE: GI BLEED  POST: POLYPS AND HEMORRHOIDS   •  CYSTOSCOPY Left 12/3/2019    Procedure: CYSTOSCOPY WITH PYELOGRAM URETEROSCOPY WITH LEFT STENT AND DILATION OF URETERAL STRICTURE;  Surgeon: Caesar Nj MD;  Location: [facility] MAIN OR;  Service: Urology   • CYSTOSCOPY W/ URETERAL STENT PLACEMENT Bilateral 2/27/2020    Procedure: CYSTOSCOPY RIGHT STENT PLACEMENT AND LEFT STENT CHANGE NO STRINGS;  Surgeon: Ceasra Nj MD;  Location: [facility] MAIN OR;  Service: Urology;  Laterality: Bilateral;   • CYSTOSCOPY W/ URETERAL STENT PLACEMENT N/A 7/23/2020    Procedure: CYSTOSCOPY WITH BILATERAL STENT EXCHANGE;  Surgeon: Ceasar Nj MD;  Location: [facility] MAIN OR;  Service: Urology;  Laterality: N/A;   • CYSTOSCOPY W/ URETERAL STENT PLACEMENT Bilateral 10/28/2020    Procedure: CYSTO AND BILATERAL STENT CHANGE;  Surgeon: Ceasar Nj MD;  Location: [facility] MAIN OR;  Service: Urology;  Laterality: Bilateral;   • CYSTOSCOPY W/ URETERAL STENT PLACEMENT Bilateral 1/28/2021    Procedure: CYSTOSCOPY BILATERAL STENT CHANGE;  Surgeon: Ceasar Nj MD;  Location: [facility] MAIN OR;  Service: Urology;  Laterality: Bilateral;   • CYSTOSCOPY W/ URETERAL STENT PLACEMENT N/A 5/6/2021    Procedure: CYSTOSCOPY AND BILATERAL STENT CHANGE;  Surgeon: Ceasar Nj MD;  Location: [facility] MAIN OR;  Service: Urology;  Laterality: N/A;   • CYSTOSCOPY W/ URETERAL STENT PLACEMENT Bilateral 8/19/2021    Procedure: CYSTOSCOPY BILATERAL STENT CHANGE;  Surgeon: Ceasar Nj MD;  Location: [facility] MAIN OR;  Service: Urology;  Laterality: Bilateral;   • ENDOSCOPY N/A 4/1/2020    Procedure: ESOPHAGOGASTRODUODENOSCOPY WITH BIOPSY;  Surgeon: Barbara Maria MD;  Location: [facility] MAIN OR;  Service: General;  Laterality: N/A;   • ENDOSCOPY N/A 4/30/2020    Procedure: ESOPHAGOGASTRODUODENOSCOPY WITH ERCP SCOPE;  Surgeon: Julio Cesar Figueroa MD;  Location: [facility] ENDOSCOPY;  Service: Gastroenterology;  Laterality: N/A;  PRE GI BLEED  POST HIATAL HERNIA, SCHOTSKE RING   • ERCP N/A  4/24/2020    Procedure: ENDOSCOPIC RETROGRADE CHOLANGIOPANCREATOGRAPHY with sphincerotomy with  balloon exrtraction;  Surgeon: Julio Cesar Figueroa MD;  Location: [facility] ENDOSCOPY;  Service: Gastroenterology;  Laterality: N/A;  pre: common bile duct stones  post: common bile duct stones   • HERNIA REPAIR N/A 2003    left inguinal   • PROSTATE SURGERY     • TONSILLECTOMY AND ADENOIDECTOMY     • US GUIDED LYMPH NODE BIOPSY  2/24/2020   • UVULECTOMY     • UVULECTOMY         Home Medications:   Medications Prior to Admission   Medication Sig Dispense Refill Last Dose   • aspirin 81 MG EC tablet Take 81 mg by mouth Daily. PT HOLDING FOR SURGERY   12/6/2021 at Unknown time   • furosemide (LASIX) 20 MG tablet Take 1 tablet by mouth Daily. (Patient taking differently: Take 20 mg by mouth Daily. Pt taking 40 mg twice daily) 90 tablet 3 12/6/2021 at Unknown time   • Insulin Glargine (Lantus SoloStar) 100 UNIT/ML injection pen Inject 12 Units under the skin into the appropriate area as directed Daily. (Patient taking differently: Inject 12 Units under the skin into the appropriate area as directed Every Night.) 12 mL 1 12/5/2021 at Unknown time   • Insulin Pen Needle (Pen Needles) 31G X 6 MM misc USE DAILY AS DIRECTED 100 each 2 12/5/2021 at Unknown time   • Melatonin 3 MG capsule Take 1 capsule by mouth Every Night. Take 2 to 3 hours before bedtime 90 capsule 1 12/5/2021 at Unknown time   • metoprolol succinate XL (TOPROL-XL) 25 MG 24 hr tablet Take 0.5 tablets by mouth Every Night. 45 tablet 3 12/5/2021 at Unknown time   • Multiple Vitamins-Minerals (PRESERVISION AREDS) capsule Take 2 capsules by mouth Daily. PT HOLDING FOR SURGERY   12/5/2021 at Unknown time   • O2 (OXYGEN) Inhale 2 L/min Every Night. Wears at night.    12/5/2021 at Unknown time       Allergies:  No Known Allergies    Past Social History:  Social History     Socioeconomic History   • Marital status:      Spouse name: Marisol   • Number of children: 2    • Years of education: High school   Tobacco Use   • Smoking status: Never Smoker   • Smokeless tobacco: Never Used   • Tobacco comment: caffine use 2 cups daily   Vaping Use   • Vaping Use: Never used   Substance and Sexual Activity   • Alcohol use: No   • Drug use: Never   • Sexual activity: Defer       Past Family History: History reviewed. No pertinent family history.   Family History   Problem Relation Age of Onset   • Cancer Mother    • Heart disease Mother    • Stroke Father    • Bleeding Disorder Father    • Cancer Sister    • Heart disease Sister    • Malig Hyperthermia Neg Hx        Review of Systems   All other systems reviewed and are negative.          Objective:     Objective:  Temp:  [97.3 °F (36.3 °C)-97.8 °F (36.6 °C)] 97.8 °F (36.6 °C)  Heart Rate:  [0-100] 77  Resp:  [16-18] 16  BP: (121-151)/() 125/82    Intake/Output Summary (Last 24 hours) at 12/7/2021 1132  Last data filed at 12/7/2021 1056  Gross per 24 hour   Intake 810 ml   Output 3400 ml   Net -2590 ml     Body mass index is 25.38 kg/m².      12/06/21  1224   Weight: 82.6 kg (182 lb)           Physical Exam:   Vitals reviewed.   Constitutional:       Appearance: Well-developed.   Eyes:      Conjunctiva/sclera: Conjunctivae normal.   HENT:      Head: Normocephalic.   Pulmonary:      Breath sounds: Normal breath sounds.   Cardiovascular:      Normal rate. Irregularly irregular rhythm.   Abdominal:      General: Bowel sounds are normal.      Palpations: Abdomen is soft.   Musculoskeletal: Normal range of motion.      Cervical back: Normal range of motion. Skin:     General: Skin is warm and dry.   Neurological:      Mental Status: Alert and oriented to person, place, and time.   Psychiatric:         Behavior: Behavior normal.          Labs:   Lab Review:     Results from last 7 days   Lab Units 12/07/21  0934 12/06/21  1541 12/02/21  1325   SODIUM mmol/L 143 142 139   POTASSIUM mmol/L 4.5 4.4 4.5   CHLORIDE mmol/L 98 103 102   CO2  mmol/L 34.1* 28.3 23.2   BUN mg/dL 30* 28* 25*   CREATININE mg/dL 1.57* 1.46* 1.38*   GLUCOSE mg/dL 258* 166* 193*   CALCIUM mg/dL 9.5 9.9* 9.4   AST (SGOT) U/L 17 17  --    ALT (SGPT) U/L 10 11  --      Results from last 7 days   Lab Units 12/07/21  0934 12/06/21  1237   TROPONIN T ng/mL 0.012 <0.010     Results from last 7 days   Lab Units 12/07/21  0934   WBC 10*3/mm3 6.03   HEMOGLOBIN g/dL 13.9   HEMATOCRIT % 41.3   PLATELETS 10*3/mm3 176                     Results from last 7 days   Lab Units 12/06/21  1237   PROBNP pg/mL 13,314.0*                 PREVIOUS EKG:          EKG:                     Assessment:       Acute combined systolic and diastolic congestive heart failure (HCC)    Diabetes mellitus (HCC)    Chronic atrial fibrillation    Chronic pancreatitis ([facility])    NATHANIEL (acute kidney injury) ([facility])    On home oxygen therapy    Atrial fibrillation with RVR ([facility])        Plan:      1.  Congestive heart failure.  Patient with a known ejection fraction of 32% in September 2021.  Patient with recurrent heart failure.  He was partially responding to p.o. diuretics but consistently gotten worse.  With IV diuretics he is doing better.  His nurse try to wean his oxygen today but was unable to do so his saturations were still dropping.  Creatinine is holding but slightly increased.  He got another dose of Lasix this morning IV we will see how he responds to that.  2.  Chronic atrial fibrillation heart rate controlled  3.  Chronic pancreatitis  4.  Patient supposed to have a stent in his ureter replaced on Thursday.  5.  Continue IV Lasix and supportive care for now we will try to get the patient ambulate watch his oxygenation and go from there.      Thank you for allowing me to participate in the care of Home Wynn. Feel free to contact me directly with any further questions or concerns.    Aaron Tripathi MD  [facility]  12/07/21  11:32 EST

## 2021-12-07 NOTE — PROGRESS NOTES
Name: Home Wynn ADMIT: 2021   : 1931  PCP: Yodit Mahoney MD    MRN: [id number] LOS: 0 days   AGE/SEX: 89 y.o. male  ROOM: 101/1     Subjective   Subjective   Resting in bed in overflow area of obs unit. He didn't sleep well last night d/t frequent urination but reports his breathing is improved. He denies any chest pain, cough, fever or chills. No nausea or vomiting. States he was supposed to be taking 40 mg lasix at home but cut down to 20 mg as it was causing him some abdominal discomfort. Supposed to have a stent exchange with Urology this Thursday.     Objective   Objective   Vital Signs  Temp:  [97.3 °F (36.3 °C)-97.8 °F (36.6 °C)] 97.6 °F (36.4 °C)  Heart Rate:  [0-100] 86  Resp:  [16-18] 18  BP: (121-151)/() 129/84  SpO2:  [91 %-98 %] 98 %  on  Flow (L/min):  [2-3] 2;   Device (Oxygen Therapy): nasal cannula  Body mass index is 25.38 kg/m².     Physical Exam  Vitals and nursing note reviewed.   Constitutional:       Appearance: He is ill-appearing. He is not toxic-appearing.   HENT:      Head: Normocephalic and atraumatic.   Cardiovascular:      Rate and Rhythm: Normal rate. Rhythm irregular.      Pulses: Normal pulses.      Heart sounds: Normal heart sounds.   Pulmonary:      Effort: Pulmonary effort is normal. No respiratory distress.      Comments: Tight at posterior bases. Otherwise fairly clear. On 2l  Abdominal:      General: Bowel sounds are normal. There is no distension.      Palpations: Abdomen is soft.      Tenderness: There is no abdominal tenderness.   Musculoskeletal:         General: Swelling present. Normal range of motion.   Skin:     General: Skin is warm and dry.      Findings: No bruising.   Neurological:      General: No focal deficit present.      Mental Status: He is alert and oriented to person, place, and time.      Sensory: No sensory deficit.      Motor: Weakness present.      Coordination: Coordination normal.   Psychiatric:         Mood and Affect:  Mood normal.         Behavior: Behavior normal.       Results Review:       I reviewed the patient's new clinical results.  Results from last 7 days   Lab Units 12/07/21  0934 12/06/21  1541 12/02/21  1325   WBC 10*3/mm3 6.03 6.15 5.20   HEMOGLOBIN g/dL 13.9 14.4 13.9   PLATELETS 10*3/mm3 176 170 160     Results from last 7 days   Lab Units 12/07/21  0934 12/06/21  1541 12/02/21  1325   SODIUM mmol/L 143 142 139   POTASSIUM mmol/L 4.5 4.4 4.5   CHLORIDE mmol/L 98 103 102   CO2 mmol/L 34.1* 28.3 23.2   BUN mg/dL 30* 28* 25*   CREATININE mg/dL 1.57* 1.46* 1.38*   GLUCOSE mg/dL 258* 166* 193*   Estimated Creatinine Clearance: 36.5 mL/min (A) (by C-G formula based on SCr of 1.57 mg/dL (H)).  Results from last 7 days   Lab Units 12/07/21  0934 12/06/21  1541   ALBUMIN g/dL 4.00 4.30   BILIRUBIN mg/dL 1.1 0.9   ALK PHOS U/L 84 87   AST (SGOT) U/L 17 17   ALT (SGPT) U/L 10 11     Results from last 7 days   Lab Units 12/07/21  0934 12/06/21  1541 12/02/21  1325   CALCIUM mg/dL 9.5 9.9* 9.4   ALBUMIN g/dL 4.00 4.30  --        Hemoglobin A1C   Date/Time Value Ref Range Status   12/07/2021 0934 9.50 (H) 4.80 - 5.60 % Final     Glucose   Date/Time Value Ref Range Status   12/07/2021 1157 173 (H) 70 - 130 mg/dL Final     Comment:     Meter: [id number] : [id number] Anuradha Bibi NA   12/07/2021 0748 178 (H) 70 - 130 mg/dL Final     Comment:     Meter: [id number] : [id number] Anuradha Bibi NA   12/06/2021 2030 303 (H) 70 - 130 mg/dL Final     Comment:     Meter: [id number] : [id number] Taco Lenora RN       aspirin, 81 mg, Oral, Daily  enoxaparin, 30 mg, Subcutaneous, Q24H  furosemide, 80 mg, Intravenous, BID  insulin glargine, 12 Units, Subcutaneous, Nightly  insulin lispro, 0-9 Units, Subcutaneous, TID AC  melatonin, 3 mg, Oral, Nightly  metoprolol succinate XL, 12.5 mg, Oral, Nightly  multivitamin with minerals, 1 tablet, Oral, Daily  O2, 2 L/min, Inhalation, Nightly  sodium chloride, 10 mL, Intravenous,  Q12H       Diet Regular; Cardiac, Consistent Carbohydrate, Renal       Assessment/Plan     Active Hospital Problems    Diagnosis  POA   • **Acute on chronic combined systolic and diastolic congestive heart failure (HCC) [I50.43]  Yes   • Bilateral hydronephrosis [N13.30]  Yes   • On home oxygen therapy [Z99.81]  Not Applicable   • Chronic pancreatitis (HCC) [K86.1]  Yes   • Nocturnal hypoxia [G47.34]  Yes   • Chronic atrial fibrillation [I48.20]  Yes   • Type 2 diabetes mellitus, with long-term current use of insulin (HCC) [E11.9, Z79.4]  Not Applicable      Resolved Hospital Problems   No resolved problems to display.     Mr. Wynn is a 89 year old male who presented to the hospital with complaints of shortness of breath in the setting of known CHF.     · Acute on chronic combined systolic and diastolic congestive heart failure: Cardiology consulted. Has been given IV diuretics. Has had over 3 liters out. Renal function rising but fairly stable. Daily weights and I/O. Monitor response and wean oxygen as able. May need increased dosing of PO diuretic at discharge.  · CAF: Not chronically anticoagulated related to age/risk. On baby ASA. Rate controlled on BB.  · Bilateral hydronephrosis: Planned stent exchange on Thursday. If discharge tomorrow, can likely do this as scheduled outpatient. If not, will plan to ask his urologist to see to have completed here.  · Nocturnal hypoxia: Home oxygen at 2 L.  · DM2: Last A1c 9.5%. Not adequately controlled, but given age will keep regimen the same for now. Monitor ACHS and use SSI as needed.  · Chronic pancreatitis: Stable at this time.     Discussed with patient and nursing staff.    VTE Prophylaxis - SCDs  Code Status - Listed as full code, but has prior DNR- ask nursing to clarify  Disposition - Anticipate discharge possibly tomorrow.       RIVAS Flores  [facility]  12/07/21  13:20 EST

## 2021-12-07 NOTE — PLAN OF CARE
Goal Outcome Evaluation:  Plan of Care Reviewed With: patient        Progress: no change  Outcome Summary: Pt admitted with SOA and CHF exacerbation. VSS on 2LNC when sleeping, RA when awake. AOx4. IV lasix given and pt has had good urine output. No new complaints at this time. WCTM.

## 2021-12-08 NOTE — PLAN OF CARE
Goal Outcome Evaluation:  Plan of Care Reviewed With: patient        Progress: no change  Outcome Summary: patient rested quietly overnight. continue diuresing with good urine output. voiding per urinal. 2L NC placed while patient asleep. patient c/o one short episode of shortness of breath on room air-improved after 2L NC placed. am labs. monitor closely

## 2021-12-08 NOTE — CASE MANAGEMENT/SOCIAL WORK
Discharge Planning Assessment  [facility]     Patient Name: Home Wynn  MRN: [id number]  Today's Date: 12/8/2021    Admit Date: 12/6/2021     Discharge Needs Assessment     Row Name 12/08/21 1243       Living Environment    Lives With spouse    Name(s) of Who Lives With Patient Marisol Wynn    Current Living Arrangements home/apartment/condo    Primary Care Provided by self    Provides Primary Care For no one    Family Caregiver if Needed child(acss), adult; spouse    Family Caregiver Names Marisol Wynn, Gonzalo Wynn    Able to Return to Prior Arrangements yes       Resource/Environmental Concerns    Resource/Environmental Concerns none       Transition Planning    Patient/Family Anticipates Transition to home    Patient/Family Anticipated Services at Transition none    Transportation Anticipated family or friend will provide       Discharge Needs Assessment    Equipment Currently Used at Home oxygen    Concerns to be Addressed discharge planning               Discharge Plan     Row Name 12/08/21 1244       Plan    Plan Prefered plan home, family to transport    Provided Post Acute Provider List? Refused    Provided Post Acute Provider Quality & Resource List? Refused    Patient/Family in Agreement with Plan yes    Plan Comments CCP spoke to patient at bedside; explained role of CCP, verified facesheet, and discussed d/c planning needs. Preferred plan is home, family is able to transport. Patient has possible urological procedure tomorrow morning. Patient is independent for mobility. He uses 2L of oxygen at home at night supplied by [employer]. He lives with his wife in a 1 level home with 1 step to get inside. He has had [facility] in the past. He has been to [facility] in the past. He denies HH or SNF referrals. CCP to follow for any post-op needs. SAMANTHA Graham              Continued Care and Services - Admitted Since 12/6/2021    Coordination has not been started for this encounter.     Selected Continued Care -  Episodes Includes selections from active Coordinated Care Management episodes    High Risk Care Management Episode start date: 8/10/2021   There are no active outsourced providers for this episode.                  Demographic Summary     Row Name 12/08/21 1243       General Information    Admission Type inpatient    Arrived From emergency department    Referral Source admission list    Reason for Consult discharge planning    Preferred Language English     Used During This Interaction no               Functional Status     Row Name 12/08/21 1243       Functional Status    Usual Activity Tolerance moderate    Current Activity Tolerance moderate       Functional Status, IADL    Medications independent    Meal Preparation independent    Housekeeping independent    Laundry independent    Shopping independent       Mental Status    General Appearance WDL WDL               Psychosocial    No documentation.                Abuse/Neglect    No documentation.                Legal    No documentation.                Substance Abuse    No documentation.                Patient Forms    No documentation.                   SAMANTHA VARMA

## 2021-12-08 NOTE — PROGRESS NOTES
"    Patient Name: Home Wynn  :1931  89 y.o.      Patient Care Team:  Yodit Mahoney MD as PCP - General (Family Medicine)  Ceasar Nj MD as Consulting Physician (Urology)  Aaron Tripathi MD as Consulting Physician (Cardiology)  Katie Flower APRN as Referring Physician (Nurse Practitioner)  Benjamin James MD as Consulting Physician (Hematology and Oncology)  Cheri Rosales MD as Consulting Physician (Gastroenterology)  Barbara Maria MD as Surgeon (General Surgery)  Hanh Ricardo RN as Ambulatory  ([facility])    Chief Complaint: follow up heart failure    Interval History: he says he feels a lot better. He is on oxygen. At home - he only uses oxygen at night. Excellent urine output. He got another dose of IV lasix this morning. Creatinine trending up.        Objective   Vital Signs  Temp:  [96.5 °F (35.8 °C)-97.6 °F (36.4 °C)] 97.5 °F (36.4 °C)  Heart Rate:  [69-86] 69  Resp:  [16-20] 16  BP: ()/(60-84) 122/68    Intake/Output Summary (Last 24 hours) at 2021 1110  Last data filed at 2021 1106  Gross per 24 hour   Intake 360 ml   Output 2605 ml   Net -2245 ml     Flowsheet Rows      First Filed Value   Admission Height 180.3 cm (71\") Documented at 2021 1224   Admission Weight 82.6 kg (182 lb) Documented at 2021 1224          Physical Exam:   General Appearance:    Alert, cooperative, in no acute distress   Lungs:     Clear to auscultation.  Normal respiratory effort and rate.      Heart:    Regular rhythm and normal rate, normal S1 and S2, no murmurs, gallops or rubs.     Chest Wall:    No abnormalities observed   Abdomen:     Soft, nontender, positive bowel sounds.     Extremities:   no cyanosis, clubbing or edema.  No marked joint deformities.  Adequate musculoskeletal strength.       Results Review:    Results from last 7 days   Lab Units 21  0848   SODIUM mmol/L 137   POTASSIUM mmol/L 4.6   CHLORIDE mmol/L 100 "   CO2 mmol/L 26.9   BUN mg/dL 28*   CREATININE mg/dL 1.64*   GLUCOSE mg/dL 160*   CALCIUM mg/dL 9.4     Results from last 7 days   Lab Units 12/07/21  0934 12/06/21  1237   TROPONIN T ng/mL 0.012 <0.010     Results from last 7 days   Lab Units 12/08/21  0848   WBC 10*3/mm3 4.65   HEMOGLOBIN g/dL 14.8   HEMATOCRIT % 44.9   PLATELETS 10*3/mm3 164                           Medication Review:   aspirin, 81 mg, Oral, Daily  enoxaparin, 30 mg, Subcutaneous, Q24H  furosemide, 80 mg, Intravenous, BID  insulin glargine, 12 Units, Subcutaneous, Nightly  insulin lispro, 0-9 Units, Subcutaneous, TID AC  melatonin, 3 mg, Oral, Nightly  metoprolol succinate XL, 12.5 mg, Oral, Nightly  multivitamin with minerals, 1 tablet, Oral, Daily  O2, 2 L/min, Inhalation, Nightly  sodium chloride, 10 mL, Intravenous, Q12H              Assessment/Plan   1. Acute on chronic heart failure with reduced LV systolic function. EF ~20%. He has responded well to IV diuretics. He is on metoprolol succinate. Had some hypotension last night. Not on ACE/ARB/ARNI or SGLT2 or spironolactone. His renal function is up today. Will check labs in AM. Consider adjusting GDMT. He may benefit from referral to heart failure clinic.     2. Permanent atrial fibrillation - heart rate well controlled on beta blocker. He is felt not to be an anticoagulation candidate per primary cardiologist.     3. Nonischemic cardiomyopathy, stress 2019 without ischemia    4. PVCs    5. Hypertension - is actually on the lower side on a minimal dose of beta blocker.    6. Diabetes mellitus type II - on insulin.     7. Hydronephrosis - scheduled for urologic procedure tomorrow.     Given his age and frailty. Bump in creatinine. I would watch him one more day. Wean oxygen. Oral lasix to be started tomorrow. He has had some hypotension so that's likely why he isn't on any afterload reduction. His renal function isn't great either. We will see what his kidney functions looks like in the AM.      RIVAS Garibay  [facility]  12/08/21  11:10 EST    Ok for urologic procedure at any time from cards standpoint.

## 2021-12-08 NOTE — PROGRESS NOTES
Name: Home Wynn ADMIT: 2021   : 1931  PCP: Yodit Mahoney MD    MRN: [id number] LOS: 1 days   AGE/SEX: 89 y.o. male  ROOM: 101/1     Subjective   Subjective   Resting in bed. Son at bedside. No acute events overnight. He diuresed well and is now on RA. Nursing reports he wears it intermittently as needed. Tolerating a diet. Wants to shower. Son prefers patient stays overnight for his urological procedure in AM given his age as well as his wife now has Shingles at home and normally would be providing transportation. He needs another COVID19 test prior to procedure. Otherwise denies any chest pain, cough, fever or chills.     Objective   Objective   Vital Signs  Temp:  [96.5 °F (35.8 °C)-97.6 °F (36.4 °C)] 97.5 °F (36.4 °C)  Heart Rate:  [69-86] 69  Resp:  [16-20] 16  BP: ()/(60-84) 122/68  SpO2:  [95 %-100 %] 95 %  on  Flow (L/min):  [2] 2;   Device (Oxygen Therapy): nasal cannula  Body mass index is 25.38 kg/m².     Physical Exam  Vitals and nursing note reviewed.   Constitutional:       Appearance: He is ill-appearing. He is not toxic-appearing.   HENT:      Head: Normocephalic and atraumatic.   Cardiovascular:      Rate and Rhythm: Normal rate. Rhythm irregular.      Pulses: Normal pulses.      Heart sounds: Normal heart sounds.   Pulmonary:      Effort: Pulmonary effort is normal. No respiratory distress.      Comments: diminished at posterior bases. Otherwise fairly clear. On RA  Abdominal:      General: Bowel sounds are normal. There is no distension.      Palpations: Abdomen is soft.      Tenderness: There is no abdominal tenderness.   Musculoskeletal:         General: Swelling- improved from yesterday's exam present. Normal range of motion.   Skin:     General: Skin is warm and dry.      Findings: No bruising.   Neurological:      General: No focal deficit present.      Mental Status: He is alert and oriented to person, place, and time.      Sensory: No sensory deficit.       Motor: Weakness present.      Coordination: Coordination normal.   Psychiatric:         Mood and Affect: Mood normal.         Behavior: Behavior normal.      Results Review:       I reviewed the patient's new clinical results.  Results from last 7 days   Lab Units 12/08/21  0848 12/07/21  0934 12/06/21  1541 12/02/21  1325   WBC 10*3/mm3 4.65 6.03 6.15 5.20   HEMOGLOBIN g/dL 14.8 13.9 14.4 13.9   PLATELETS 10*3/mm3 164 176 170 160     Results from last 7 days   Lab Units 12/08/21  0848 12/07/21  0934 12/06/21  1541 12/02/21  1325   SODIUM mmol/L 137 143 142 139   POTASSIUM mmol/L 4.6 4.5 4.4 4.5   CHLORIDE mmol/L 100 98 103 102   CO2 mmol/L 26.9 34.1* 28.3 23.2   BUN mg/dL 28* 30* 28* 25*   CREATININE mg/dL 1.64* 1.57* 1.46* 1.38*   GLUCOSE mg/dL 160* 258* 166* 193*   Estimated Creatinine Clearance: 35 mL/min (A) (by C-G formula based on SCr of 1.64 mg/dL (H)).  Results from last 7 days   Lab Units 12/07/21  0934 12/06/21  1541   ALBUMIN g/dL 4.00 4.30   BILIRUBIN mg/dL 1.1 0.9   ALK PHOS U/L 84 87   AST (SGOT) U/L 17 17   ALT (SGPT) U/L 10 11     Results from last 7 days   Lab Units 12/08/21  0848 12/07/21  0934 12/06/21  1541 12/02/21  1325   CALCIUM mg/dL 9.4 9.5 9.9* 9.4   ALBUMIN g/dL  --  4.00 4.30  --        Hemoglobin A1C   Date/Time Value Ref Range Status   12/07/2021 0934 9.50 (H) 4.80 - 5.60 % Final     Glucose   Date/Time Value Ref Range Status   12/08/2021 0555 123 70 - 130 mg/dL Final     Comment:     Meter: [id number] : [id number] Reed Wileyyady CASTELLANO   12/07/2021 2004 316 (H) 70 - 130 mg/dL Final     Comment:     Meter: [id number] : [id number] Shaheed JENKINS [facility]   12/07/2021 1631 172 (H) 70 - 130 mg/dL Final     Comment:     Meter: [id number] : [id number] Shaheed JENKINS [facility]   12/07/2021 1157 173 (H) 70 - 130 mg/dL Final     Comment:     Meter: [id number] : [id number] Anuradha CASTELLANO   12/07/2021 0748 178 (H) 70 - 130 mg/dL Final     Comment:     Meter: [id number] : [id number]  Anuradha Mtz GRAY   12/06/2021 2030 303 (H) 70 - 130 mg/dL Final     Comment:     Meter: [id number] : [id number] Taco Cooley RN       aspirin, 81 mg, Oral, Daily  enoxaparin, 30 mg, Subcutaneous, Q24H  furosemide, 80 mg, Intravenous, BID  insulin glargine, 12 Units, Subcutaneous, Nightly  insulin lispro, 0-9 Units, Subcutaneous, TID AC  melatonin, 3 mg, Oral, Nightly  metoprolol succinate XL, 12.5 mg, Oral, Nightly  multivitamin with minerals, 1 tablet, Oral, Daily  O2, 2 L/min, Inhalation, Nightly  sodium chloride, 10 mL, Intravenous, Q12H       Diet Regular; Cardiac, Consistent Carbohydrate, Renal       Assessment/Plan     Active Hospital Problems    Diagnosis  POA   • **Acute on chronic combined systolic and diastolic congestive heart failure (HCC) [I50.43]  Yes   • Bilateral hydronephrosis [N13.30]  Yes   • On home oxygen therapy [Z99.81]  Not Applicable   • Chronic pancreatitis (HCC) [K86.1]  Yes   • Nocturnal hypoxia [G47.34]  Yes   • Chronic atrial fibrillation [I48.20]  Yes   • Type 2 diabetes mellitus, with long-term current use of insulin (HCC) [E11.9, Z79.4]  Not Applicable      Resolved Hospital Problems   No resolved problems to display.     Mr. Wynn is a 89 year old male who presented to the hospital with complaints of shortness of breath in the setting of known CHF.      · Acute on chronic combined systolic and diastolic congestive heart failure: Cardiology following. S/p IV diuretics with mild increase of kidney function- now transitioned to PO diuretics. Daily weights and I/O. Clinically improved/stable. Repeat kidney function in AM.  · CAF: Not chronically anticoagulated related to age/risk. On baby ASA. Rate controlled on BB.  · Bilateral hydronephrosis: Planned stent exchange tomorrow. Will keep overnight and plan discharge after procedure tomorrow if stable. Ask Urology to see and make aware of inpatient status. Repeat COVID19 testing for procedure. NPO after midnight.  · Nocturnal  hypoxia: Home oxygen at 2 L.  · DM2: Last A1c 9.5%. Not adequately controlled, but given age will keep regimen the same for now. Sugars are somewhat labile. Monitor ACHS and use SSI as needed.  · Chronic pancreatitis: Stable at this time.      Discussed with patient, son at bedside, nurse and Dr. Haskins.    Patient may shower with assistance.     VTE Prophylaxis - SCDs  Code Status - Full code- confirmed by nursing 12/7.  Disposition - Anticipate discharge home tomorrow after procedure if okay with all.       RIVAS Flores  [facility]  12/08/21  11:04 EST

## 2021-12-08 NOTE — PLAN OF CARE
Problem: Adult Inpatient Plan of Care  Goal: Plan of Care Review  Outcome: Ongoing, Progressing  Flowsheets (Taken 12/8/2021 5768)  Progress: improving  Plan of Care Reviewed With: patient  Outcome Summary: Pt transferred from the observation unit. PO lasix starting tomorrow. NPO at midnight for ureter stint replacement. NO c/o pain. Will continue to monitor   Goal Outcome Evaluation:  Plan of Care Reviewed With: patient        Progress: improving  Outcome Summary: Pt transfered from the observation unit. PO lasix starting tomorrow. NPO at midnight for ureter stint replacement. NO c/o pain. Will continue to monitor

## 2021-12-09 NOTE — ANESTHESIA POSTPROCEDURE EVALUATION
"Patient: Home Wynn    Procedure Summary     Date: 12/09/21 Room / Location: [facility] OR  / [facility] MAIN OR    Anesthesia Start: 1119 Anesthesia Stop: 1201    Procedure: CYSTOSCOPY BILATERAL STENT CHANGE (Bilateral ) Diagnosis:     Surgeons: Ceasar Nj MD Provider: Cheri Mcclain MD    Anesthesia Type: general ASA Status: 4          Anesthesia Type: general    Vitals  Vitals Value Taken Time   /79 12/09/21 1301   Temp 36.7 °C (98 °F) 12/09/21 1200   Pulse 81 12/09/21 1303   Resp 16 12/09/21 1300   SpO2 91 % 12/09/21 1303   Vitals shown include unvalidated device data.        Post Anesthesia Care and Evaluation    Patient location during evaluation: bedside  Patient participation: complete - patient participated  Level of consciousness: awake and alert  Pain management: adequate  Airway patency: patent  Anesthetic complications: No anesthetic complications    Cardiovascular status: acceptable  Respiratory status: acceptable  Hydration status: acceptable    Comments: /85 (BP Location: Left arm, Patient Position: Lying)   Pulse 88   Temp 36.4 °C (97.5 °F) (Oral)   Resp 18   Ht 180.3 cm (71\")   Wt 82.6 kg (182 lb)   SpO2 91%   BMI 25.38 kg/m² Already out of PACU        "

## 2021-12-09 NOTE — CONSULTS
UROLOGY CONSULTATION    CONSULTING PHYSICIAN: Ceasar Nj MD    DIAGNOSIS: Bilateral hydronephrosis.     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 He is admitted now with congestive heart failure. We were asked to see and evaluate the patient for cystoscopy and stent change. I discussed the pros, cons, risks, and complications; he understands and wishes to proceed.     PAST MEDICAL HISTORY:  1.  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  2.  ED.  3.  Heart disease.  4.  Recent congestive heart failure.  5.  Tonsillectomy.  6.  Adenoidectomy.   7.  Multiple stent changes.  8.  T1a.  9.  Cholecystectomy.  10.  Hernia repair.  11.  Lymph node removal.    PHYSICAL EXAMINATION:   GENERAL: He is a well developed, well nourished white male in no apparent distress, alert and oriented x3.   HEART: Regular rate and rhythm, no S3, S4, murmur or rub.  LUNGS: Clear to auscultation.   ABDOMEN/FLANKS: Benign.     IMPRESSION: Bilateral hydronephrosis.     PLAN: The patient will undergo cystoscopy with bilateral stent change. I have discussed the pros, cons, risks, and complications; he understands and wishes to proceed.

## 2021-12-09 NOTE — DISCHARGE SUMMARY
[facility]    Date of Admission: 12/6/2021  Date of Discharge:  12/9/2021    PCP: Yodit Mahoney MD    Discharge Diagnosis:   Active Hospital Problems    Diagnosis  POA   • **Acute on chronic combined systolic and diastolic congestive heart failure (HCC) [I50.43]  Yes   • Bilateral hydronephrosis [N13.30]  Yes   • On home oxygen therapy [Z99.81]  Not Applicable   • Chronic pancreatitis (HCC) [K86.1]  Yes   • Nocturnal hypoxia [G47.34]  Yes   • Chronic atrial fibrillation [I48.20]  Yes   • Type 2 diabetes mellitus, with long-term current use of insulin (HCC) [E11.9, Z79.4]  Not Applicable      Resolved Hospital Problems   No resolved problems to display.     Procedures Performed  Procedure(s):  CYSTOSCOPY BILATERAL STENT CHANGE     Consults     Date and Time Order Name Status Description    12/8/2021 11:05 AM Inpatient Urology Consult Completed     12/6/2021  7:01 PM Inpatient Cardiology Consult Completed     12/6/2021  4:44 PM [facility] (on-call MD unless specified) Details          Hospital Course  Please see history and physical for details. Patient is a 89 y.o. male that initially presented to the hospital for shortness of breath. He was found to have an acute exacerbation of his chronic CHF and admitted for further care. He was planned for outpatient  Bilateral stent exchange on 12/9 for his bilateral hydronephrosis.   The patient was admitted and given aggressive IV diuresis. Cardiology was consulted and his renal function was monitored. He did have some mild increases in his creatinine but otherwise was stable and oxygen weaned. His symptoms improved and he was transitioned to PO diuretics by the day of discharge.    Given his age and frailty, he was kept inpatient for monitoring and urology was consulted for stent exchange. He underwent his planned outpatient procedure this morning with Dr. Nj and did well. Pending cardiology input, he will be discharged  home this afternoon. He denies any further dyspnea, cough, fever, chills, chest pain, nausea or vomiting. He should follow up with cardiology in 1 week and Urology as advised. He needs to see his PCP in 1-2 weeks as well. Will defer further adjustments of his DM regimen to his PCP, but will likely not tolerate aggressive therapy given his age.    I discussed the patient's findings and my recommendations with patient, family and nursing staff.    Condition on Discharge: Improved.     Temp:  [97.5 °F (36.4 °C)-98.2 °F (36.8 °C)] 97.5 °F (36.4 °C)  Heart Rate:  [68-99] 88  Resp:  [16-20] 18  BP: (104-129)/(62-97) 120/85  Body mass index is 25.38 kg/m².    Physical Exam  Vitals and nursing note reviewed.   Constitutional:       Appearance: He is ill-appearing. He is not toxic-appearing.   HENT:      Head: Normocephalic and atraumatic.   Cardiovascular:      Rate and Rhythm: Normal rate. Rhythm irregular.      Pulses: Normal pulses.      Heart sounds: Normal heart sounds.   Pulmonary:      Effort: Pulmonary effort is normal. No respiratory distress.      Comments: diminished at posterior bases. Otherwise fairly clear. On RA  Abdominal:      General: Bowel sounds are normal. There is no distension.      Palpations: Abdomen is soft.      Tenderness: There is no abdominal tenderness.   Musculoskeletal:         General: Swelling mild BLE present. Normal range of motion.   Skin:     General: Skin is warm and dry.      Findings: No bruising.   Neurological:      General: No focal deficit present.      Mental Status: He is alert and oriented to person, place, and time.      Sensory: No sensory deficit.      Motor: Weakness present.      Coordination: Coordination normal.   Psychiatric:         Mood and Affect: Mood normal.         Behavior: Behavior normal.        Discharge Medications      Changes to Medications      Instructions Start Date   furosemide 40 MG tablet  Commonly known as: LASIX  What changed:   · medication  strength  · how much to take   40 mg, Oral, Daily   Start Date: December 10, 2021     Lantus SoloStar 100 UNIT/ML injection pen  Generic drug: Insulin Glargine  What changed: when to take this   12 Units, Subcutaneous, Daily         Continue These Medications      Instructions Start Date   aspirin 81 MG EC tablet   81 mg, Oral, Daily, PT HOLDING FOR SURGERY       Melatonin 3 MG capsule   3 mg, Oral, Nightly, Take 2 to 3 hours before bedtime      metoprolol succinate XL 25 MG 24 hr tablet  Commonly known as: TOPROL-XL   12.5 mg, Oral, Nightly      O2  Commonly known as: OXYGEN   2 L/min, Inhalation, Nightly, Wears at night.      Pen Needles 31G X 6 MM misc   USE DAILY AS DIRECTED      PreserVision AREDS capsule   2 capsules, Oral, Daily, PT HOLDING FOR SURGERY            Diet Instructions     Diet: Regular, Consistent Carbohydrate, Cardiac      Discharge Diet:  Regular  Consistent Carbohydrate  Cardiac            Activity Instructions     Activity as Tolerated           Additional Instructions for the Follow-ups that You Need to Schedule     Discharge Follow-up with PCP   As directed       Currently Documented PCP:    Yodit Mahoney MD    PCP Phone Number:    [phone]     Follow Up Details: see in 1-2 weeks, need repeat BMP         Discharge Follow-up with Specified Provider: Cardiology; 1 Week   As directed      To: Cardiology    Follow Up: 1 Week         Discharge Follow-up with Specified Provider: Urology   As directed      To: Urology    Follow Up Details: as advised            Follow-up Information     Yodit Mahoney MD .    Specialty: Family Medicine  Why: see in 1-2 weeks, need repeat BMP  Contact information:  [address] [address]  [phone]                       Test Results Pending at Discharge     RIVAS Flores  12/09/21  15:58 EST    Discharge time spent greater than 30 minutes.

## 2021-12-09 NOTE — ANESTHESIA PREPROCEDURE EVALUATION
Anesthesia Evaluation     Patient summary reviewed and Nursing notes reviewed   no history of anesthetic complications:  NPO Solid Status: > 8 hours  NPO Liquid Status: > 2 hours           Airway   Mallampati: II  TM distance: >3 FB  Neck ROM: full  no difficulty expected  Dental - normal exam   (+) upper dentures and lower dentures    Pulmonary - normal exam   (+) home oxygen, shortness of breath, sleep apnea on CPAP,   (-) COPD, asthma, not a smoker, lung cancer  Cardiovascular - normal exam  Exercise tolerance: good (4-7 METS)    ECG reviewed  Patient on routine beta blocker and Beta blocker given within 24 hours of surgery  Rhythm: regular  Rate: normal    (+) hypertension well controlled 2 medications or greater, dysrhythmias Atrial Fib, PVC, CHF Systolic <55%, hyperlipidemia,   (-) valvular problems/murmurs, past MI, CAD, angina, cardiac stents    ROS comment: TTE 12/2019:  ·Calculated EF = 19%.  ·The following left ventricular wall segments are hypokinetic: mid anterior, apical anterior, basal anterolateral, mid anterolateral, apical lateral, basal inferolateral, mid inferolateral, apical inferior, mid inferior, apical septal, basal inferoseptal, mid inferoseptal, apex hypokinetic, mid anteroseptal, basal anterior, basal inferior and basal inferoseptal.  ·The left ventricular cavity is moderate-to-severely dilated.  ·Left ventricular systolic function is severely decreased.  ·Left and right atrial cavity size is severely dilated.  ·Right ventricular cavity is moderately dilated.  ·Moderate mitral valve regurgitation is present  ·Moderate tricuspid valve regurgitation is present.  ·Calculated right ventricular systolic pressure from tricuspid regurgitation is 62 mmHg.  ·Moderate pulmonic valve regurgitation is present.    Neuro/Psych  (+) dizziness/light headedness, syncope, numbness,     (-) seizures, TIA, CVA  GI/Hepatic/Renal/Endo    (+)  GI bleeding resolved, renal disease CRI, diabetes mellitus type 2  well controlled using insulin,   (-) hiatal hernia, GERD, PUD, hepatitis, liver disease, no thyroid disorder    Musculoskeletal     Abdominal  - normal exam   Substance History - negative use     OB/GYN negative ob/gyn ROS         Other   arthritis,      ROS/Med Hx Other: EF 22%.  Severe pulmonary HTN.                     Anesthesia Plan    ASA 4     general     intravenous induction     Anesthetic plan, all risks, benefits, and alternatives have been provided, discussed and informed consent has been obtained with: patient.    Plan discussed with CRNA and attending.

## 2021-12-09 NOTE — PLAN OF CARE
Problem: Adult Inpatient Plan of Care  Goal: Plan of Care Review  Flowsheets (Taken 12/9/2021 2373)  Progress: improving  Outcome Summary: VSS. Pt denies pain. NPO after midnight for ureter stent replacement. Consent obtained. Falls precautions maintained. 2LNC with sleep. AM labs ordered. Possible discharge today post ureter stent replcaement. Will continue to monitor.   Goal Outcome Evaluation:           Progress: improving  Outcome Summary: VSS. Pt denies pain. NPO after midnight for ureter stent replacement. Consent obtained. Falls precautions maintained. 2LNC with sleep. AM labs ordered. Possible discharge today post ureter stent replcaement. Will continue to monitor.

## 2021-12-09 NOTE — OP NOTE
PREOPERATIVE DIAGNOSIS:  Bilateral hydronephrosis.    POSTOPERATIVE DIAGNOSIS:  Bilateral hydronephrosis.     PROCEDURE PERFORMED:    1. Cystoscopy.  2. Bilateral stent removal and replacement.     PERFORMING PHYSICIAN:  Ceasar Nj MD    COMPLICATIONS:  None.     CONSULTATIONS:  None.     ANESTHESIA:  General.     INDICATIONS:  This patient has retroperitoneal fibrosis with bilateral hydronephrosis.  He presents now for cystoscopy and bilateral stent change.  I discussed the pros, cons, risks and complications.  He understands and wishes to proceed.      DESCRIPTION OF PROCEDURE:  The patient was brought into the operating room and placed on the operating room table.  General anesthesia was induced.  Both stents were removed and were replaced with 6-Arabic 26 cm double-J stents.  Their positions were confirmed fluoroscopically and endoscopically.  The patient was then awakened and taken to the recovery room in good and stable condition.  There were no complications.

## 2021-12-09 NOTE — OUTREACH NOTE
Prep Survey      Responses   [facility] facility patient discharged from? [place]   Is LACE score < 7 ? No   Emergency Room discharge w/ pulse ox? 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 CHF   Does the patient have Home health ordered? No   Is there a DME ordered? No   General alerts for this patient On home oxygen therapy    Prep survey completed? Yes          Ainsley Guo RN

## 2021-12-09 NOTE — BRIEF OP NOTE
CYSTOSCOPY URETERAL CATHETER/STENT INSERTION  Progress Note    Home Wynn  12/9/2021    Pre-op Diagnosis:   **same       Post-Op Diagnosis Codes:   hydro    Procedure/CPT® Codes:        Procedure(s):  CYSTOSCOPY BILATERAL STENT CHANGE    Surgeon(s):  Ceasar Nj MD    Anesthesia: General    Staff:   Circulator: Shin Anders RN; Judi Milan RN  Scrub Person: Glenda Banks         Estimated Blood Loss: none    Urine Voided: * No values recorded between 12/9/2021 11:20 AM and 12/9/2021 11:46 AM *    Specimens:                None          Drains:   Ureteral Drain/Stent Left ureter 6 Fr. (Active)       Ureteral Drain/Stent Right ureter 6 Fr. (Active)       [REMOVED] Ureteral Drain/Stent Left ureter 6 Fr. (Removed)       [REMOVED] Ureteral Drain/Stent Right ureter 6 Fr. (Removed)       [REMOVED] Ureteral Drain/Stent Left ureter 6 Fr. (Removed)       [REMOVED] Ureteral Drain/Stent Right ureter 6 Fr. (Removed)       [REMOVED] External Urinary Catheter (Removed)   Site Assessment Clean; Skin intact 12/07/21 0000   Collection Container Wall suction 12/07/21 0000   Wall suction (mmHG) 125 mmHG 12/07/21 0000   Securement Method Securing device 12/07/21 0000   Output (mL) 675 mL 12/07/21 0400       Findings: 0    Complications: **0          Ceasar Nj MD     Date: 12/9/2021  Time: 11:50 EST      
no

## 2021-12-10 PROBLEM — L85.9 HYPERKERATOSIS: Status: ACTIVE | Noted: 2021-01-01

## 2021-12-10 PROBLEM — E11.51 TYPE 2 DIABETES MELLITUS WITH PERIPHERAL ANGIOPATHY (HCC): Status: ACTIVE | Noted: 2021-01-01

## 2021-12-10 PROBLEM — M20.41 ACQUIRED HAMMER TOE OF RIGHT FOOT: Status: ACTIVE | Noted: 2021-01-01

## 2021-12-10 PROBLEM — M20.42 ACQUIRED HAMMER TOE OF LEFT FOOT: Status: ACTIVE | Noted: 2021-01-01

## 2021-12-10 PROBLEM — M20.10 ACQUIRED HALLUX VALGUS: Status: ACTIVE | Noted: 2021-01-01

## 2021-12-10 PROBLEM — Z79.4 LONG TERM CURRENT USE OF INSULIN (HCC): Status: ACTIVE | Noted: 2021-01-01

## 2021-12-10 PROBLEM — E11.9 TYPE 2 DIABETES MELLITUS WITHOUT COMPLICATION (HCC): Status: ACTIVE | Noted: 2021-01-01

## 2021-12-10 PROBLEM — B35.1 ONYCHOMYCOSIS: Status: ACTIVE | Noted: 2021-01-01

## 2021-12-10 NOTE — OUTREACH NOTE
Call Center TCM Note      Responses   [facility] patient discharged from? [place]   Does the patient have one of the following disease processes/diagnoses(primary or secondary)? CHF   TCM attempt successful? No   Unsuccessful attempts Attempt 1           Hayley Mcbride RN    12/10/2021, 12:57 EST

## 2021-12-10 NOTE — CASE MANAGEMENT/SOCIAL WORK
Case Management Discharge Note      Final Note: Home. Family transport    Provided Post Acute Provider List?: Refused  Provided Post Acute Provider Quality & Resource List?: Refused    Selected Continued Care - Discharged on 12/9/2021 Admission date: 12/6/2021 - Discharge disposition: Home or Self Care    Destination    No services have been selected for the patient.              Durable Medical Equipment    No services have been selected for the patient.              Dialysis/Infusion    No services have been selected for the patient.              Home Medical Care    No services have been selected for the patient.              Therapy    No services have been selected for the patient.              Community Resources    No services have been selected for the patient.              Community & DME    No services have been selected for the patient.                Selected Continued Care - Episodes Includes selections from active Coordinated Care Management episodes    High Risk Care Management Episode start date: 8/10/2021 (Paused)   There are no active outsourced providers for this episode.               Transportation Services  Private: Car    Final Discharge Disposition Code: 01 - home or self-care

## 2021-12-10 NOTE — OUTREACH NOTE
Call Center TCM Note      Responses   [facility] patient discharged from? [place]   Does the patient have one of the following disease processes/diagnoses(primary or secondary)? CHF   TCM attempt successful? 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

## 2021-12-16 NOTE — PROGRESS NOTES
"      CARDIOLOGY    Aaron Tripathi MD    ENCOUNTER DATE:  12/16/2021    Home Wynn / 89 y.o. / male        CHIEF COMPLAINT / REASON FOR OFFICE VISIT     Shortness of Breath (11/04/2021 Follow up)  Atrial fibrillation  Acute on chronic systolic and diastolic heart failure.    HISTORY OF PRESENT ILLNESS       HPI  Home Wynn is a 89 y.o. male who presents today for reevaluation.  Patient not doing well.  He says that he feels like he is not going to bathroom like he should.  He used swelling in his legs when he goes into heart failure but this time he has not had that.  He does say his weight is up 5 pounds says he is also not sleeping because he gets short of breath when he lies down.  Patient denies chest pains.  Patient says he is up 5 pounds within a week he is just feeling fatigued and bad.      The following portions of the patient's history were reviewed and updated as appropriate: allergies, current medications, past family history, past medical history, past social history, past surgical history and problem list.      VITAL SIGNS     Visit Vitals  /80 (BP Location: Left arm)   Pulse 84   Ht 180.3 cm (71\")   Wt 79.4 kg (175 lb)   SpO2 95%   BMI 24.41 kg/m²         Wt Readings from Last 3 Encounters:   12/16/21 79.4 kg (175 lb)   12/10/21 78.5 kg (173 lb)   12/06/21 82.6 kg (182 lb)     Body mass index is 24.41 kg/m².      REVIEW OF SYSTEMS   ROS        PHYSICAL EXAMINATION     Vitals reviewed.   Constitutional:       Appearance: Not in distress.   Pulmonary:      Breath sounds: Bibasilar Rales present.   Cardiovascular:      Normal rate. Irregularly irregular rhythm. Normal S1. Normal S2.      Murmurs: There is no murmur.      No gallop. No click. No rub.   Pulses:     Intact distal pulses.   Edema:     Peripheral edema absent.   Abdominal:      General: There is distension.           REVIEWED DATA     Procedures    Cardiac Procedures:  1.     Lipid Panel    Lipid Panel 2/23/21   Total " Cholesterol 189   Triglycerides 73   HDL Cholesterol 49   VLDL Cholesterol 14   LDL Cholesterol  126 (A)   (A) Abnormal value       Comments are available for some flowsheets but are not being displayed.               ASSESSMENT & PLAN      Diagnosis Plan   1. 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 Permanent atrial fibrillation (HCC)           SUMMARY/DISCUSSION  1. Systolic and diastolic congestive heart failure.  Last known ejection fraction was 32%.  Patient now appears to be in heart failure with crackles in his lung fields abdomen is slightly distended.  I told him to double his Lasix for now and see if we can get the fluid off.  If not better by Monday we will bring him in the office given IV Lasix.  2. Atrial fibrillation chronic heart rate still controlled.  3. Blood pressure remains stable we will see if he responds to Lasix.  If he does not we will investigate further with laboratory values and possibly having to give him IV diuretics.        MEDICATIONS         Discharge Medications          Accurate as of December 16, 2021  9:58 AM. If you have any questions, ask your nurse or doctor.            Changes to Medications      Instructions Start Date   Lantus SoloStar 100 UNIT/ML injection pen  Generic drug: Insulin Glargine  What changed: when to take this   12 Units, Subcutaneous, Daily         Continue These Medications      Instructions Start Date   aspirin 81 MG EC tablet   81 mg, Oral, Daily, PT HOLDING FOR SURGERY       furosemide 40 MG tablet  Commonly known as: LASIX   40 mg, Oral, Daily      Melatonin 3 MG capsule   3 mg, Oral, Nightly, Take 2 to 3 hours before bedtime      metoprolol succinate XL 25 MG 24 hr tablet  Commonly known as: TOPROL-XL   12.5 mg, Oral, Nightly      O2  Commonly known as: OXYGEN   2 L/min, Inhalation, Nightly, Wears at night.      Pen Needles 31G X 6 MM misc   USE DAILY AS DIRECTED      PreserVision AREDS capsule   2  capsules, Oral, Daily, PT HOLDING FOR SURGERY                 **Dragon Disclaimer:   Much of this encounter note is an electronic transcription/translation of spoken language to printed text. The electronic translation of spoken language may permit erroneous, or at times, nonsensical words or phrases to be inadvertently transcribed. Although I have reviewed the note for such errors, some may still exist.

## 2021-12-20 NOTE — TELEPHONE ENCOUNTER
Pt called to give you an update.  He said he feels so much better after being on the double dose of Lasix since LOV 12/16/21.  He has lost 4.5 lbs.  He wants to know how long to stay on the double dose?   The regular dose is 40 MG daily.    Thanks,  Sera

## 2021-12-20 NOTE — TELEPHONE ENCOUNTER
Called spoke directly with patient.  Told to go back down on his Lasix to his normal dose if his weight goes up or he gets swelling he should double it again.  That may be every other day every third day once a week depending upon how he reacts.  I reviewed all that with him personally.

## 2021-12-21 NOTE — OUTREACH NOTE
CHF Week 2 Survey      Responses   [facility] patient discharged from? [place]   Does the patient have one of the following disease processes/diagnoses(primary or secondary)? CHF   Week 2 attempt successful? No   Unsuccessful attempts Attempt 1          Milagro Rivera RN

## 2021-12-29 NOTE — PROGRESS NOTES
"Chief Complaint  Exposed to COVID (pt states wife tested pos for COVID yesterday, states they got if from their granddaughter)    Subjective          Home Wynn presents to [facility]  History of Present Illness new patient to me. Here for covid exposure this past Thursday.  He was around spouses granddaughter who later tested positive for Covid.  Patient reports that his wife became sick yesterday and tested positive for Covid.  Patient currently feels fine.  He is concerned that he will be a \"goner\" if he gets Covid due to his age and chronic heart problems.  He is anxious about having Covid.  He has had chronic shortness of breath for about the last 6 months due to A. fib.  He manages his fluid balance very well with Lasix.  He takes extra Lasix if needed per cardiology.    He is vaccinated and boosted.    Objective   Vital Signs:   /80   Pulse 96   Temp 97.5 °F (36.4 °C)   SpO2 97%     Physical Exam  Vitals and nursing note reviewed.   Constitutional:       General: He is not in acute distress.     Appearance: He is well-developed. He is not ill-appearing or diaphoretic.   HENT:      Head: Normocephalic and atraumatic.      Right Ear: Tympanic membrane, ear canal and external ear normal.      Left Ear: Tympanic membrane, ear canal and external ear normal.      Mouth/Throat:      Mouth: Mucous membranes are moist.      Pharynx: No posterior oropharyngeal erythema.   Eyes:      General:         Right eye: No discharge.         Left eye: No discharge.      Conjunctiva/sclera: Conjunctivae normal.   Cardiovascular:      Rate and Rhythm: Normal rate and regular rhythm.   Pulmonary:      Effort: Pulmonary effort is normal.      Breath sounds: Normal breath sounds. No wheezing, rhonchi or rales.   Abdominal:      General: Bowel sounds are normal.      Palpations: Abdomen is soft.      Tenderness: There is no abdominal tenderness.   Musculoskeletal:         General: No " deformity.      Cervical back: Neck supple.      Comments: Gait smooth and steady   Lymphadenopathy:      Cervical: No cervical adenopathy.   Skin:     General: Skin is warm and dry.   Neurological:      Mental Status: He is alert and oriented to person, place, and time.   Psychiatric:         Mood and Affect: Mood normal.         Behavior: Behavior normal.        Result Review :                 Assessment and Plan    Diagnoses and all orders for this visit:    1. Exposure to COVID-19 virus (Primary)  -     COVID-19,[facility] ROUTINE, NP/OP SWAB IN TRANSPORT MEDIA OR ESWAB 72 HR TAT - Swab, Nasopharynx      Patient has been swabbed for Covid.  He is to quarantine until results back.  I think likely with his spouse being sick that he will develop Covid.  We discussed monoclonal antibodies and he would be open to these if able.  While awaiting Covid test I have instructed him to immediately go to emergency room if he begins to feel ill.  He would be someone at high risk for complications from Covid.  We might consider postexposure monoclonal antibodies if able tomorrow.          Follow Up   No follow-ups on file.  Patient was given instructions and counseling regarding his condition or for health maintenance advice. Please see specific information pulled into the AVS if appropriate.

## 2021-12-30 NOTE — PROGRESS NOTES
Discussed Mab infusion with patient due to close exposure with spouse and other FM with covid.  Spouse is ill.  Pt is anxious about getting covid due to underlying health problems.  Risks and benefits, side effects, EAU all discussed with pt.  He wishes to proceed with infusion as soon as possible.      His covid test is pending and should be back later today or tomorrow. He will continue quarantine. He is currently not feeling ill. He is aware if he becomes ill he may need ER prior to infusion.

## 2022-01-01 ENCOUNTER — OFFICE VISIT (OUTPATIENT)
Dept: FAMILY MEDICINE CLINIC | Facility: CLINIC | Age: 87
End: 2022-01-01

## 2022-01-01 ENCOUNTER — PRE-ADMISSION TESTING (OUTPATIENT)
Dept: PREADMISSION TESTING | Facility: HOSPITAL | Age: 87
End: 2022-01-01

## 2022-01-01 ENCOUNTER — TELEPHONE (OUTPATIENT)
Dept: FAMILY MEDICINE CLINIC | Facility: CLINIC | Age: 87
End: 2022-01-01

## 2022-01-01 ENCOUNTER — HOME HEALTH ADMISSION (OUTPATIENT)
Dept: HOME HEALTH SERVICES | Facility: HOME HEALTHCARE | Age: 87
End: 2022-01-01

## 2022-01-01 ENCOUNTER — HOSPITAL ENCOUNTER (OUTPATIENT)
Facility: HOSPITAL | Age: 87
Setting detail: HOSPITAL OUTPATIENT SURGERY
Discharge: HOME | End: 2022-03-24
Attending: UROLOGY | Admitting: UROLOGY

## 2022-01-01 ENCOUNTER — ANESTHESIA (OUTPATIENT)
Dept: PERIOP | Facility: HOSPITAL | Age: 87
End: 2022-01-01

## 2022-01-01 ENCOUNTER — ANESTHESIA EVENT (OUTPATIENT)
Dept: PERIOP | Facility: HOSPITAL | Age: 87
End: 2022-01-01

## 2022-01-01 ENCOUNTER — APPOINTMENT (OUTPATIENT)
Dept: CT IMAGING | Facility: HOSPITAL | Age: 87
End: 2022-01-01

## 2022-01-01 ENCOUNTER — HOSPITAL ENCOUNTER (INPATIENT)
Facility: HOSPITAL | Age: 87
LOS: 3 days | Discharge: HOSPICE | End: 2022-04-04
Attending: EMERGENCY MEDICINE | Admitting: INTERNAL MEDICINE

## 2022-01-01 ENCOUNTER — PATIENT OUTREACH (OUTPATIENT)
Dept: CASE MANAGEMENT | Facility: OTHER | Age: 87
End: 2022-01-01

## 2022-01-01 ENCOUNTER — APPOINTMENT (OUTPATIENT)
Dept: GENERAL RADIOLOGY | Facility: HOSPITAL | Age: 87
End: 2022-01-01

## 2022-01-01 ENCOUNTER — HOSPITAL ENCOUNTER (OUTPATIENT)
Dept: CT IMAGING | Facility: HOSPITAL | Age: 87
Discharge: HOME | End: 2022-02-07
Admitting: FAMILY MEDICINE

## 2022-01-01 ENCOUNTER — HOSPITAL ENCOUNTER (INPATIENT)
Facility: HOSPITAL | Age: 87
LOS: 4 days | Discharge: HOSPICE | End: 2022-03-29
Attending: EMERGENCY MEDICINE | Admitting: INTERNAL MEDICINE

## 2022-01-01 ENCOUNTER — READMISSION MANAGEMENT (OUTPATIENT)
Dept: CALL CENTER | Facility: HOSPITAL | Age: 87
End: 2022-01-01

## 2022-01-01 ENCOUNTER — APPOINTMENT (OUTPATIENT)
Dept: PREADMISSION TESTING | Facility: HOSPITAL | Age: 87
End: 2022-01-01

## 2022-01-01 ENCOUNTER — HOSPITAL ENCOUNTER (OUTPATIENT)
Facility: HOSPITAL | Age: 87
Setting detail: HOSPITAL OUTPATIENT SURGERY
End: 2022-01-01
Attending: UROLOGY | Admitting: UROLOGY

## 2022-01-01 VITALS
RESPIRATION RATE: 26 BRPM | TEMPERATURE: 97 F | HEIGHT: 71 IN | BODY MASS INDEX: 24.36 KG/M2 | HEIGHT: 71 IN | WEIGHT: 174 LBS | OXYGEN SATURATION: 99 % | BODY MASS INDEX: 24.92 KG/M2 | RESPIRATION RATE: 16 BRPM | HEART RATE: 52 BPM | SYSTOLIC BLOOD PRESSURE: 130 MMHG | OXYGEN SATURATION: 98 % | SYSTOLIC BLOOD PRESSURE: 122 MMHG | DIASTOLIC BLOOD PRESSURE: 79 MMHG | TEMPERATURE: 97 F | HEART RATE: 87 BPM | DIASTOLIC BLOOD PRESSURE: 72 MMHG | WEIGHT: 178 LBS

## 2022-01-01 VITALS
HEIGHT: 71 IN | WEIGHT: 173 LBS | SYSTOLIC BLOOD PRESSURE: 110 MMHG | OXYGEN SATURATION: 98 % | RESPIRATION RATE: 17 BRPM | DIASTOLIC BLOOD PRESSURE: 70 MMHG | BODY MASS INDEX: 24.22 KG/M2 | HEART RATE: 70 BPM | TEMPERATURE: 96.6 F

## 2022-01-01 VITALS
OXYGEN SATURATION: 97 % | TEMPERATURE: 96.9 F | RESPIRATION RATE: 22 BRPM | WEIGHT: 172 LBS | BODY MASS INDEX: 24.08 KG/M2 | HEART RATE: 60 BPM | HEIGHT: 71 IN | DIASTOLIC BLOOD PRESSURE: 64 MMHG | SYSTOLIC BLOOD PRESSURE: 102 MMHG

## 2022-01-01 VITALS
WEIGHT: 172 LBS | DIASTOLIC BLOOD PRESSURE: 68 MMHG | HEART RATE: 86 BPM | BODY MASS INDEX: 24.08 KG/M2 | RESPIRATION RATE: 18 BRPM | OXYGEN SATURATION: 94 % | SYSTOLIC BLOOD PRESSURE: 117 MMHG | HEIGHT: 71 IN | TEMPERATURE: 96.7 F

## 2022-01-01 VITALS
DIASTOLIC BLOOD PRESSURE: 84 MMHG | RESPIRATION RATE: 16 BRPM | TEMPERATURE: 97.7 F | HEART RATE: 88 BPM | SYSTOLIC BLOOD PRESSURE: 128 MMHG | OXYGEN SATURATION: 94 %

## 2022-01-01 VITALS
SYSTOLIC BLOOD PRESSURE: 125 MMHG | TEMPERATURE: 97.6 F | OXYGEN SATURATION: 93 % | WEIGHT: 168.2 LBS | DIASTOLIC BLOOD PRESSURE: 70 MMHG | HEART RATE: 77 BPM | BODY MASS INDEX: 23.55 KG/M2 | RESPIRATION RATE: 16 BRPM | HEIGHT: 71 IN

## 2022-01-01 DIAGNOSIS — G63 POLYNEUROPATHY ASSOCIATED WITH UNDERLYING DISEASE: Primary | ICD-10-CM

## 2022-01-01 DIAGNOSIS — R31.0 GROSS HEMATURIA: ICD-10-CM

## 2022-01-01 DIAGNOSIS — R10.33 PERIUMBILICAL ABDOMINAL PAIN: ICD-10-CM

## 2022-01-01 DIAGNOSIS — E11.65 HYPERGLYCEMIA DUE TO DIABETES MELLITUS: ICD-10-CM

## 2022-01-01 DIAGNOSIS — R31.9 HEMATURIA, UNSPECIFIED TYPE: Primary | ICD-10-CM

## 2022-01-01 DIAGNOSIS — G47.00 INSOMNIA, UNSPECIFIED TYPE: ICD-10-CM

## 2022-01-01 DIAGNOSIS — I50.42 CHRONIC COMBINED SYSTOLIC AND DIASTOLIC CONGESTIVE HEART FAILURE: Primary | ICD-10-CM

## 2022-01-01 DIAGNOSIS — N13.39 OTHER HYDRONEPHROSIS: ICD-10-CM

## 2022-01-01 DIAGNOSIS — L84 CALLUS OF FOOT: ICD-10-CM

## 2022-01-01 DIAGNOSIS — R33.9 URINARY RETENTION: ICD-10-CM

## 2022-01-01 DIAGNOSIS — Z09 HOSPITAL DISCHARGE FOLLOW-UP: ICD-10-CM

## 2022-01-01 DIAGNOSIS — B07.0 PLANTAR WART OF LEFT FOOT: ICD-10-CM

## 2022-01-01 DIAGNOSIS — Z79.4 TYPE 2 DIABETES MELLITUS WITH DIABETIC NEUROPATHY, WITH LONG-TERM CURRENT USE OF INSULIN: ICD-10-CM

## 2022-01-01 DIAGNOSIS — E11.40 TYPE 2 DIABETES MELLITUS WITH DIABETIC NEUROPATHY, WITH LONG-TERM CURRENT USE OF INSULIN: ICD-10-CM

## 2022-01-01 DIAGNOSIS — L03.116 CELLULITIS OF LEFT LOWER EXTREMITY: ICD-10-CM

## 2022-01-01 DIAGNOSIS — N13.9 ACUTE URINARY OBSTRUCTION: Primary | ICD-10-CM

## 2022-01-01 DIAGNOSIS — I48.21 PERMANENT ATRIAL FIBRILLATION: ICD-10-CM

## 2022-01-01 DIAGNOSIS — M79.2 NEUROPATHIC PAIN: ICD-10-CM

## 2022-01-01 DIAGNOSIS — R09.89 THROAT CLEARING: ICD-10-CM

## 2022-01-01 DIAGNOSIS — R06.02 SOB (SHORTNESS OF BREATH): ICD-10-CM

## 2022-01-01 DIAGNOSIS — N17.9 AKI (ACUTE KIDNEY INJURY): ICD-10-CM

## 2022-01-01 DIAGNOSIS — R05.9 COUGH: ICD-10-CM

## 2022-01-01 LAB
ALBUMIN SERPL-MCNC: 3.5 G/DL (ref 3.5–5.2)
ALBUMIN SERPL-MCNC: 4.2 G/DL (ref 3.5–5.2)
ALBUMIN SERPL-MCNC: 4.3 G/DL (ref 3.5–5.2)
ALBUMIN/GLOB SERPL: 1.3 G/DL
ALBUMIN/GLOB SERPL: 1.3 G/DL
ALP SERPL-CCNC: 63 U/L (ref 39–117)
ALP SERPL-CCNC: 69 U/L (ref 39–117)
ALT SERPL W P-5'-P-CCNC: 8 U/L (ref 1–41)
ALT SERPL W P-5'-P-CCNC: 9 U/L (ref 1–41)
ANION GAP SERPL CALCULATED.3IONS-SCNC: 10 MMOL/L (ref 5–15)
ANION GAP SERPL CALCULATED.3IONS-SCNC: 12 MMOL/L (ref 5–15)
ANION GAP SERPL CALCULATED.3IONS-SCNC: 12.7 MMOL/L (ref 5–15)
ANION GAP SERPL CALCULATED.3IONS-SCNC: 13 MMOL/L (ref 5–15)
ANION GAP SERPL CALCULATED.3IONS-SCNC: 8 MMOL/L (ref 5–15)
ANION GAP SERPL CALCULATED.3IONS-SCNC: 9 MMOL/L (ref 5–15)
ANION GAP SERPL CALCULATED.3IONS-SCNC: 9.4 MMOL/L (ref 5–15)
APTT PPP: 26.3 SECONDS (ref 22.7–35.4)
AST SERPL-CCNC: 15 U/L (ref 1–40)
AST SERPL-CCNC: 17 U/L (ref 1–40)
BACTERIA SPEC AEROBE CULT: NO GROWTH
BACTERIA SPEC AEROBE CULT: NO GROWTH
BACTERIA UR QL AUTO: ABNORMAL /HPF
BACTERIA UR QL AUTO: ABNORMAL /HPF
BASOPHILS # BLD AUTO: 0.02 10*3/MM3 (ref 0–0.2)
BASOPHILS # BLD AUTO: 0.03 10*3/MM3 (ref 0–0.2)
BASOPHILS NFR BLD AUTO: 0.3 % (ref 0–1.5)
BASOPHILS NFR BLD AUTO: 0.3 % (ref 0–1.5)
BASOPHILS NFR BLD AUTO: 0.4 % (ref 0–1.5)
BASOPHILS NFR BLD AUTO: 0.5 % (ref 0–1.5)
BASOPHILS NFR BLD AUTO: 0.6 % (ref 0–1.5)
BASOPHILS NFR BLD AUTO: 0.7 % (ref 0–1.5)
BILIRUB SERPL-MCNC: 0.9 MG/DL (ref 0–1.2)
BILIRUB SERPL-MCNC: 0.9 MG/DL (ref 0–1.2)
BILIRUB UR QL STRIP: NEGATIVE
BILIRUB UR QL STRIP: NEGATIVE
BUN SERPL-MCNC: 23 MG/DL (ref 8–23)
BUN SERPL-MCNC: 23 MG/DL (ref 8–23)
BUN SERPL-MCNC: 24 MG/DL (ref 8–23)
BUN SERPL-MCNC: 26 MG/DL (ref 8–23)
BUN SERPL-MCNC: 26 MG/DL (ref 8–23)
BUN SERPL-MCNC: 28 MG/DL (ref 8–23)
BUN SERPL-MCNC: 29 MG/DL (ref 8–23)
BUN SERPL-MCNC: 31 MG/DL (ref 8–23)
BUN SERPL-MCNC: 31 MG/DL (ref 8–23)
BUN/CREAT SERPL: 15.5 (ref 7–25)
BUN/CREAT SERPL: 16.6 (ref 7–25)
BUN/CREAT SERPL: 17.7 (ref 7–25)
BUN/CREAT SERPL: 17.9 (ref 7–25)
BUN/CREAT SERPL: 18.4 (ref 7–25)
BUN/CREAT SERPL: 19.3 (ref 7–25)
BUN/CREAT SERPL: 19.4 (ref 7–25)
BUN/CREAT SERPL: 20.5 (ref 7–25)
BUN/CREAT SERPL: 22.2 (ref 7–25)
CALCIUM SPEC-SCNC: 9.3 MG/DL (ref 8.2–9.6)
CALCIUM SPEC-SCNC: 9.4 MG/DL (ref 8.2–9.6)
CALCIUM SPEC-SCNC: 9.4 MG/DL (ref 8.2–9.6)
CALCIUM SPEC-SCNC: 9.5 MG/DL (ref 8.2–9.6)
CALCIUM SPEC-SCNC: 9.6 MG/DL (ref 8.2–9.6)
CALCIUM SPEC-SCNC: 9.6 MG/DL (ref 8.2–9.6)
CALCIUM SPEC-SCNC: 9.7 MG/DL (ref 8.2–9.6)
CALCIUM SPEC-SCNC: 9.7 MG/DL (ref 8.2–9.6)
CALCIUM SPEC-SCNC: 9.8 MG/DL (ref 8.2–9.6)
CHLORIDE SERPL-SCNC: 100 MMOL/L (ref 98–107)
CHLORIDE SERPL-SCNC: 101 MMOL/L (ref 98–107)
CHLORIDE SERPL-SCNC: 103 MMOL/L (ref 98–107)
CHLORIDE SERPL-SCNC: 103 MMOL/L (ref 98–107)
CHLORIDE SERPL-SCNC: 98 MMOL/L (ref 98–107)
CHLORIDE SERPL-SCNC: 98 MMOL/L (ref 98–107)
CHLORIDE SERPL-SCNC: 99 MMOL/L (ref 98–107)
CLARITY UR: ABNORMAL
CLARITY UR: ABNORMAL
CO2 SERPL-SCNC: 27 MMOL/L (ref 22–29)
CO2 SERPL-SCNC: 27 MMOL/L (ref 22–29)
CO2 SERPL-SCNC: 28 MMOL/L (ref 22–29)
CO2 SERPL-SCNC: 29 MMOL/L (ref 22–29)
CO2 SERPL-SCNC: 29 MMOL/L (ref 22–29)
CO2 SERPL-SCNC: 29.3 MMOL/L (ref 22–29)
CO2 SERPL-SCNC: 30.6 MMOL/L (ref 22–29)
CO2 SERPL-SCNC: 32 MMOL/L (ref 22–29)
CO2 SERPL-SCNC: 32 MMOL/L (ref 22–29)
COLOR UR: ABNORMAL
COLOR UR: ABNORMAL
CREAT SERPL-MCNC: 1.25 MG/DL (ref 0.76–1.27)
CREAT SERPL-MCNC: 1.26 MG/DL (ref 0.76–1.27)
CREAT SERPL-MCNC: 1.27 MG/DL (ref 0.76–1.27)
CREAT SERPL-MCNC: 1.34 MG/DL (ref 0.76–1.27)
CREAT SERPL-MCNC: 1.45 MG/DL (ref 0.76–1.27)
CREAT SERPL-MCNC: 1.48 MG/DL (ref 0.76–1.27)
CREAT SERPL-MCNC: 1.61 MG/DL (ref 0.76–1.27)
CREAT SERPL-MCNC: 1.64 MG/DL (ref 0.76–1.27)
CREAT SERPL-MCNC: 1.73 MG/DL (ref 0.76–1.27)
DEPRECATED RDW RBC AUTO: 47.4 FL (ref 37–54)
DEPRECATED RDW RBC AUTO: 47.4 FL (ref 37–54)
DEPRECATED RDW RBC AUTO: 48 FL (ref 37–54)
DEPRECATED RDW RBC AUTO: 48 FL (ref 37–54)
DEPRECATED RDW RBC AUTO: 49 FL (ref 37–54)
DEPRECATED RDW RBC AUTO: 49.6 FL (ref 37–54)
DEPRECATED RDW RBC AUTO: 50.2 FL (ref 37–54)
DEPRECATED RDW RBC AUTO: 50.4 FL (ref 37–54)
DEPRECATED RDW RBC AUTO: 50.9 FL (ref 37–54)
EGFRCR SERPLBLD CKD-EPI 2021: 37 ML/MIN/1.73
EGFRCR SERPLBLD CKD-EPI 2021: 39.5 ML/MIN/1.73
EGFRCR SERPLBLD CKD-EPI 2021: 40.4 ML/MIN/1.73
EGFRCR SERPLBLD CKD-EPI 2021: 44.7 ML/MIN/1.73
EGFRCR SERPLBLD CKD-EPI 2021: 45.8 ML/MIN/1.73
EGFRCR SERPLBLD CKD-EPI 2021: 50.3 ML/MIN/1.73
EGFRCR SERPLBLD CKD-EPI 2021: 53.7 ML/MIN/1.73
EGFRCR SERPLBLD CKD-EPI 2021: 54.2 ML/MIN/1.73
EGFRCR SERPLBLD CKD-EPI 2021: 54.7 ML/MIN/1.73
EOSINOPHIL # BLD AUTO: 0.01 10*3/MM3 (ref 0–0.4)
EOSINOPHIL # BLD AUTO: 0.04 10*3/MM3 (ref 0–0.4)
EOSINOPHIL # BLD AUTO: 0.05 10*3/MM3 (ref 0–0.4)
EOSINOPHIL # BLD AUTO: 0.07 10*3/MM3 (ref 0–0.4)
EOSINOPHIL # BLD AUTO: 0.08 10*3/MM3 (ref 0–0.4)
EOSINOPHIL # BLD AUTO: 0.09 10*3/MM3 (ref 0–0.4)
EOSINOPHIL NFR BLD AUTO: 0.2 % (ref 0.3–6.2)
EOSINOPHIL NFR BLD AUTO: 0.9 % (ref 0.3–6.2)
EOSINOPHIL NFR BLD AUTO: 0.9 % (ref 0.3–6.2)
EOSINOPHIL NFR BLD AUTO: 1 % (ref 0.3–6.2)
EOSINOPHIL NFR BLD AUTO: 1.6 % (ref 0.3–6.2)
EOSINOPHIL NFR BLD AUTO: 1.9 % (ref 0.3–6.2)
ERYTHROCYTE [DISTWIDTH] IN BLOOD BY AUTOMATED COUNT: 14 % (ref 12.3–15.4)
ERYTHROCYTE [DISTWIDTH] IN BLOOD BY AUTOMATED COUNT: 14.1 % (ref 12.3–15.4)
ERYTHROCYTE [DISTWIDTH] IN BLOOD BY AUTOMATED COUNT: 14.1 % (ref 12.3–15.4)
ERYTHROCYTE [DISTWIDTH] IN BLOOD BY AUTOMATED COUNT: 14.2 % (ref 12.3–15.4)
ERYTHROCYTE [DISTWIDTH] IN BLOOD BY AUTOMATED COUNT: 14.2 % (ref 12.3–15.4)
ERYTHROCYTE [DISTWIDTH] IN BLOOD BY AUTOMATED COUNT: 14.3 % (ref 12.3–15.4)
ERYTHROCYTE [DISTWIDTH] IN BLOOD BY AUTOMATED COUNT: 14.4 % (ref 12.3–15.4)
ERYTHROCYTE [DISTWIDTH] IN BLOOD BY AUTOMATED COUNT: 14.4 % (ref 12.3–15.4)
ERYTHROCYTE [DISTWIDTH] IN BLOOD BY AUTOMATED COUNT: 14.5 % (ref 12.3–15.4)
GLOBULIN UR ELPH-MCNC: 3.2 GM/DL
GLOBULIN UR ELPH-MCNC: 3.2 GM/DL
GLUCOSE BLDC GLUCOMTR-MCNC: 108 MG/DL (ref 70–130)
GLUCOSE BLDC GLUCOMTR-MCNC: 118 MG/DL (ref 70–130)
GLUCOSE BLDC GLUCOMTR-MCNC: 138 MG/DL (ref 70–130)
GLUCOSE BLDC GLUCOMTR-MCNC: 139 MG/DL (ref 70–130)
GLUCOSE BLDC GLUCOMTR-MCNC: 171 MG/DL (ref 70–130)
GLUCOSE BLDC GLUCOMTR-MCNC: 185 MG/DL (ref 70–130)
GLUCOSE BLDC GLUCOMTR-MCNC: 191 MG/DL (ref 70–130)
GLUCOSE BLDC GLUCOMTR-MCNC: 204 MG/DL (ref 70–130)
GLUCOSE BLDC GLUCOMTR-MCNC: 207 MG/DL (ref 70–130)
GLUCOSE BLDC GLUCOMTR-MCNC: 209 MG/DL (ref 70–130)
GLUCOSE BLDC GLUCOMTR-MCNC: 209 MG/DL (ref 70–130)
GLUCOSE BLDC GLUCOMTR-MCNC: 213 MG/DL (ref 70–130)
GLUCOSE BLDC GLUCOMTR-MCNC: 218 MG/DL (ref 70–130)
GLUCOSE BLDC GLUCOMTR-MCNC: 220 MG/DL (ref 70–130)
GLUCOSE BLDC GLUCOMTR-MCNC: 221 MG/DL (ref 70–130)
GLUCOSE BLDC GLUCOMTR-MCNC: 224 MG/DL (ref 70–130)
GLUCOSE BLDC GLUCOMTR-MCNC: 225 MG/DL (ref 70–130)
GLUCOSE BLDC GLUCOMTR-MCNC: 228 MG/DL (ref 70–130)
GLUCOSE BLDC GLUCOMTR-MCNC: 229 MG/DL (ref 70–130)
GLUCOSE BLDC GLUCOMTR-MCNC: 233 MG/DL (ref 70–130)
GLUCOSE BLDC GLUCOMTR-MCNC: 243 MG/DL (ref 70–130)
GLUCOSE BLDC GLUCOMTR-MCNC: 249 MG/DL (ref 70–130)
GLUCOSE BLDC GLUCOMTR-MCNC: 251 MG/DL (ref 70–130)
GLUCOSE BLDC GLUCOMTR-MCNC: 261 MG/DL (ref 70–130)
GLUCOSE BLDC GLUCOMTR-MCNC: 262 MG/DL (ref 70–130)
GLUCOSE BLDC GLUCOMTR-MCNC: 276 MG/DL (ref 70–130)
GLUCOSE BLDC GLUCOMTR-MCNC: 299 MG/DL (ref 70–130)
GLUCOSE BLDC GLUCOMTR-MCNC: 305 MG/DL (ref 70–130)
GLUCOSE BLDC GLUCOMTR-MCNC: 334 MG/DL (ref 70–130)
GLUCOSE BLDC GLUCOMTR-MCNC: 349 MG/DL (ref 70–130)
GLUCOSE BLDC GLUCOMTR-MCNC: 372 MG/DL (ref 70–130)
GLUCOSE BLDC GLUCOMTR-MCNC: 92 MG/DL (ref 70–130)
GLUCOSE BLDC GLUCOMTR-MCNC: 97 MG/DL (ref 70–130)
GLUCOSE SERPL-MCNC: 110 MG/DL (ref 65–99)
GLUCOSE SERPL-MCNC: 193 MG/DL (ref 65–99)
GLUCOSE SERPL-MCNC: 208 MG/DL (ref 65–99)
GLUCOSE SERPL-MCNC: 214 MG/DL (ref 65–99)
GLUCOSE SERPL-MCNC: 241 MG/DL (ref 65–99)
GLUCOSE SERPL-MCNC: 244 MG/DL (ref 65–99)
GLUCOSE SERPL-MCNC: 250 MG/DL (ref 65–99)
GLUCOSE SERPL-MCNC: 257 MG/DL (ref 65–99)
GLUCOSE SERPL-MCNC: 308 MG/DL (ref 65–99)
GLUCOSE UR STRIP-MCNC: ABNORMAL MG/DL
GLUCOSE UR STRIP-MCNC: ABNORMAL MG/DL
HBA1C MFR BLD: 9.2 % (ref 4.8–5.6)
HCT VFR BLD AUTO: 35.1 % (ref 37.5–51)
HCT VFR BLD AUTO: 35.7 % (ref 37.5–51)
HCT VFR BLD AUTO: 36 % (ref 37.5–51)
HCT VFR BLD AUTO: 37.7 % (ref 37.5–51)
HCT VFR BLD AUTO: 38.4 % (ref 37.5–51)
HCT VFR BLD AUTO: 38.5 % (ref 37.5–51)
HCT VFR BLD AUTO: 39.2 % (ref 37.5–51)
HCT VFR BLD AUTO: 42 % (ref 37.5–51)
HCT VFR BLD AUTO: 43.9 % (ref 37.5–51)
HGB BLD-MCNC: 11.7 G/DL (ref 13–17.7)
HGB BLD-MCNC: 12 G/DL (ref 13–17.7)
HGB BLD-MCNC: 12 G/DL (ref 13–17.7)
HGB BLD-MCNC: 12.5 G/DL (ref 13–17.7)
HGB BLD-MCNC: 12.7 G/DL (ref 13–17.7)
HGB BLD-MCNC: 12.8 G/DL (ref 13–17.7)
HGB BLD-MCNC: 12.8 G/DL (ref 13–17.7)
HGB BLD-MCNC: 14.1 G/DL (ref 13–17.7)
HGB BLD-MCNC: 14.3 G/DL (ref 13–17.7)
HGB UR QL STRIP.AUTO: ABNORMAL
HGB UR QL STRIP.AUTO: ABNORMAL
HYALINE CASTS UR QL AUTO: ABNORMAL /LPF
HYALINE CASTS UR QL AUTO: ABNORMAL /LPF
IMM GRANULOCYTES # BLD AUTO: 0.02 10*3/MM3 (ref 0–0.05)
IMM GRANULOCYTES # BLD AUTO: 0.03 10*3/MM3 (ref 0–0.05)
IMM GRANULOCYTES # BLD AUTO: 0.04 10*3/MM3 (ref 0–0.05)
IMM GRANULOCYTES # BLD AUTO: 0.04 10*3/MM3 (ref 0–0.05)
IMM GRANULOCYTES # BLD AUTO: 0.05 10*3/MM3 (ref 0–0.05)
IMM GRANULOCYTES # BLD AUTO: 0.06 10*3/MM3 (ref 0–0.05)
IMM GRANULOCYTES NFR BLD AUTO: 0.5 % (ref 0–0.5)
IMM GRANULOCYTES NFR BLD AUTO: 0.6 % (ref 0–0.5)
IMM GRANULOCYTES NFR BLD AUTO: 0.9 % (ref 0–0.5)
IMM GRANULOCYTES NFR BLD AUTO: 1.1 % (ref 0–0.5)
INR PPP: 1.12 (ref 0.9–1.1)
KETONES UR QL STRIP: NEGATIVE
KETONES UR QL STRIP: NEGATIVE
LEUKOCYTE ESTERASE UR QL STRIP.AUTO: ABNORMAL
LEUKOCYTE ESTERASE UR QL STRIP.AUTO: NEGATIVE
LYMPHOCYTES # BLD AUTO: 0.47 10*3/MM3 (ref 0.7–3.1)
LYMPHOCYTES # BLD AUTO: 0.69 10*3/MM3 (ref 0.7–3.1)
LYMPHOCYTES # BLD AUTO: 0.71 10*3/MM3 (ref 0.7–3.1)
LYMPHOCYTES # BLD AUTO: 0.87 10*3/MM3 (ref 0.7–3.1)
LYMPHOCYTES # BLD AUTO: 0.88 10*3/MM3 (ref 0.7–3.1)
LYMPHOCYTES # BLD AUTO: 0.9 10*3/MM3 (ref 0.7–3.1)
LYMPHOCYTES NFR BLD AUTO: 12.4 % (ref 19.6–45.3)
LYMPHOCYTES NFR BLD AUTO: 12.4 % (ref 19.6–45.3)
LYMPHOCYTES NFR BLD AUTO: 13.1 % (ref 19.6–45.3)
LYMPHOCYTES NFR BLD AUTO: 18.8 % (ref 19.6–45.3)
LYMPHOCYTES NFR BLD AUTO: 20.8 % (ref 19.6–45.3)
LYMPHOCYTES NFR BLD AUTO: 7.1 % (ref 19.6–45.3)
MAGNESIUM SERPL-MCNC: 2.2 MG/DL (ref 1.6–2.4)
MCH RBC QN AUTO: 30.8 PG (ref 26.6–33)
MCH RBC QN AUTO: 31 PG (ref 26.6–33)
MCH RBC QN AUTO: 31.1 PG (ref 26.6–33)
MCH RBC QN AUTO: 31.1 PG (ref 26.6–33)
MCH RBC QN AUTO: 31.3 PG (ref 26.6–33)
MCH RBC QN AUTO: 31.5 PG (ref 26.6–33)
MCH RBC QN AUTO: 31.7 PG (ref 26.6–33)
MCHC RBC AUTO-ENTMCNC: 32.4 G/DL (ref 31.5–35.7)
MCHC RBC AUTO-ENTMCNC: 32.5 G/DL (ref 31.5–35.7)
MCHC RBC AUTO-ENTMCNC: 32.6 G/DL (ref 31.5–35.7)
MCHC RBC AUTO-ENTMCNC: 32.6 G/DL (ref 31.5–35.7)
MCHC RBC AUTO-ENTMCNC: 33.2 G/DL (ref 31.5–35.7)
MCHC RBC AUTO-ENTMCNC: 33.6 G/DL (ref 31.5–35.7)
MCHC RBC AUTO-ENTMCNC: 33.6 G/DL (ref 31.5–35.7)
MCHC RBC AUTO-ENTMCNC: 34 G/DL (ref 31.5–35.7)
MCHC RBC AUTO-ENTMCNC: 34.2 G/DL (ref 31.5–35.7)
MCV RBC AUTO: 92.6 FL (ref 79–97)
MCV RBC AUTO: 92.9 FL (ref 79–97)
MCV RBC AUTO: 93.2 FL (ref 79–97)
MCV RBC AUTO: 93.3 FL (ref 79–97)
MCV RBC AUTO: 93.7 FL (ref 79–97)
MCV RBC AUTO: 94.9 FL (ref 79–97)
MCV RBC AUTO: 95.4 FL (ref 79–97)
MCV RBC AUTO: 95.5 FL (ref 79–97)
MCV RBC AUTO: 96 FL (ref 79–97)
MONOCYTES # BLD AUTO: 0.54 10*3/MM3 (ref 0.1–0.9)
MONOCYTES # BLD AUTO: 0.56 10*3/MM3 (ref 0.1–0.9)
MONOCYTES # BLD AUTO: 0.58 10*3/MM3 (ref 0.1–0.9)
MONOCYTES # BLD AUTO: 0.69 10*3/MM3 (ref 0.1–0.9)
MONOCYTES # BLD AUTO: 0.79 10*3/MM3 (ref 0.1–0.9)
MONOCYTES # BLD AUTO: 0.89 10*3/MM3 (ref 0.1–0.9)
MONOCYTES NFR BLD AUTO: 11 % (ref 5–12)
MONOCYTES NFR BLD AUTO: 11.7 % (ref 5–12)
MONOCYTES NFR BLD AUTO: 15.6 % (ref 5–12)
MONOCYTES NFR BLD AUTO: 18.6 % (ref 5–12)
MONOCYTES NFR BLD AUTO: 8.4 % (ref 5–12)
MONOCYTES NFR BLD AUTO: 9.5 % (ref 5–12)
NEUTROPHILS NFR BLD AUTO: 2.44 10*3/MM3 (ref 1.7–7)
NEUTROPHILS NFR BLD AUTO: 3.11 10*3/MM3 (ref 1.7–7)
NEUTROPHILS NFR BLD AUTO: 3.89 10*3/MM3 (ref 1.7–7)
NEUTROPHILS NFR BLD AUTO: 3.93 10*3/MM3 (ref 1.7–7)
NEUTROPHILS NFR BLD AUTO: 5.52 10*3/MM3 (ref 1.7–7)
NEUTROPHILS NFR BLD AUTO: 5.55 10*3/MM3 (ref 1.7–7)
NEUTROPHILS NFR BLD AUTO: 57.5 % (ref 42.7–76)
NEUTROPHILS NFR BLD AUTO: 67.4 % (ref 42.7–76)
NEUTROPHILS NFR BLD AUTO: 68.8 % (ref 42.7–76)
NEUTROPHILS NFR BLD AUTO: 73.7 % (ref 42.7–76)
NEUTROPHILS NFR BLD AUTO: 76.2 % (ref 42.7–76)
NEUTROPHILS NFR BLD AUTO: 83.4 % (ref 42.7–76)
NITRITE UR QL STRIP: NEGATIVE
NITRITE UR QL STRIP: POSITIVE
NRBC BLD AUTO-RTO: 0 /100 WBC (ref 0–0.2)
PH UR STRIP.AUTO: 7 [PH] (ref 5–8)
PH UR STRIP.AUTO: 7.5 [PH] (ref 5–8)
PHOSPHATE SERPL-MCNC: 3.3 MG/DL (ref 2.5–4.5)
PLATELET # BLD AUTO: 133 10*3/MM3 (ref 140–450)
PLATELET # BLD AUTO: 138 10*3/MM3 (ref 140–450)
PLATELET # BLD AUTO: 143 10*3/MM3 (ref 140–450)
PLATELET # BLD AUTO: 147 10*3/MM3 (ref 140–450)
PLATELET # BLD AUTO: 156 10*3/MM3 (ref 140–450)
PLATELET # BLD AUTO: 160 10*3/MM3 (ref 140–450)
PLATELET # BLD AUTO: 170 10*3/MM3 (ref 140–450)
PLATELET # BLD AUTO: 185 10*3/MM3 (ref 140–450)
PLATELET # BLD AUTO: 185 10*3/MM3 (ref 140–450)
PMV BLD AUTO: 11.1 FL (ref 6–12)
PMV BLD AUTO: 11.2 FL (ref 6–12)
PMV BLD AUTO: 11.3 FL (ref 6–12)
PMV BLD AUTO: 11.4 FL (ref 6–12)
PMV BLD AUTO: 11.4 FL (ref 6–12)
PMV BLD AUTO: 11.6 FL (ref 6–12)
PMV BLD AUTO: 11.8 FL (ref 6–12)
PMV BLD AUTO: 11.9 FL (ref 6–12)
PMV BLD AUTO: 12.1 FL (ref 6–12)
POTASSIUM SERPL-SCNC: 4.3 MMOL/L (ref 3.5–5.2)
POTASSIUM SERPL-SCNC: 4.4 MMOL/L (ref 3.5–5.2)
POTASSIUM SERPL-SCNC: 4.4 MMOL/L (ref 3.5–5.2)
POTASSIUM SERPL-SCNC: 4.5 MMOL/L (ref 3.5–5.2)
POTASSIUM SERPL-SCNC: 4.5 MMOL/L (ref 3.5–5.2)
POTASSIUM SERPL-SCNC: 4.7 MMOL/L (ref 3.5–5.2)
POTASSIUM SERPL-SCNC: 4.8 MMOL/L (ref 3.5–5.2)
POTASSIUM SERPL-SCNC: 5 MMOL/L (ref 3.5–5.2)
POTASSIUM SERPL-SCNC: 5 MMOL/L (ref 3.5–5.2)
PROT SERPL-MCNC: 7.4 G/DL (ref 6–8.5)
PROT SERPL-MCNC: 7.5 G/DL (ref 6–8.5)
PROT UR QL STRIP: ABNORMAL
PROT UR QL STRIP: ABNORMAL
PROTHROMBIN TIME: 14.3 SECONDS (ref 11.7–14.2)
RBC # BLD AUTO: 3.77 10*6/MM3 (ref 4.14–5.8)
RBC # BLD AUTO: 3.79 10*6/MM3 (ref 4.14–5.8)
RBC # BLD AUTO: 3.83 10*6/MM3 (ref 4.14–5.8)
RBC # BLD AUTO: 4 10*6/MM3 (ref 4.14–5.8)
RBC # BLD AUTO: 4.06 10*6/MM3 (ref 4.14–5.8)
RBC # BLD AUTO: 4.11 10*6/MM3 (ref 4.14–5.8)
RBC # BLD AUTO: 4.13 10*6/MM3 (ref 4.14–5.8)
RBC # BLD AUTO: 4.5 10*6/MM3 (ref 4.14–5.8)
RBC # BLD AUTO: 4.6 10*6/MM3 (ref 4.14–5.8)
RBC # UR STRIP: ABNORMAL /HPF
RBC # UR STRIP: ABNORMAL /HPF
REF LAB TEST METHOD: ABNORMAL
REF LAB TEST METHOD: ABNORMAL
SARS-COV-2 ORF1AB RESP QL NAA+PROBE: NOT DETECTED
SARS-COV-2 ORF1AB RESP QL NAA+PROBE: NOT DETECTED
SARS-COV-2 RNA PNL SPEC NAA+PROBE: NOT DETECTED
SODIUM SERPL-SCNC: 138 MMOL/L (ref 136–145)
SODIUM SERPL-SCNC: 139 MMOL/L (ref 136–145)
SODIUM SERPL-SCNC: 140 MMOL/L (ref 136–145)
SODIUM SERPL-SCNC: 140 MMOL/L (ref 136–145)
SODIUM SERPL-SCNC: 143 MMOL/L (ref 136–145)
SODIUM SERPL-SCNC: 143 MMOL/L (ref 136–145)
SP GR UR STRIP: 1.02 (ref 1–1.03)
SP GR UR STRIP: 1.02 (ref 1–1.03)
SQUAMOUS #/AREA URNS HPF: ABNORMAL /HPF
SQUAMOUS #/AREA URNS HPF: ABNORMAL /HPF
URATE SERPL-MCNC: 9.5 MG/DL (ref 3.4–7)
UROBILINOGEN UR QL STRIP: ABNORMAL
UROBILINOGEN UR QL STRIP: ABNORMAL
WBC # UR STRIP: ABNORMAL /HPF
WBC # UR STRIP: ABNORMAL /HPF
WBC NRBC COR # BLD: 4.13 10*3/MM3 (ref 3.4–10.8)
WBC NRBC COR # BLD: 4.24 10*3/MM3 (ref 3.4–10.8)
WBC NRBC COR # BLD: 4.62 10*3/MM3 (ref 3.4–10.8)
WBC NRBC COR # BLD: 4.68 10*3/MM3 (ref 3.4–10.8)
WBC NRBC COR # BLD: 5.18 10*3/MM3 (ref 3.4–10.8)
WBC NRBC COR # BLD: 5.28 10*3/MM3 (ref 3.4–10.8)
WBC NRBC COR # BLD: 5.71 10*3/MM3 (ref 3.4–10.8)
WBC NRBC COR # BLD: 6.65 10*3/MM3 (ref 3.4–10.8)
WBC NRBC COR # BLD: 7.24 10*3/MM3 (ref 3.4–10.8)

## 2022-01-01 PROCEDURE — 25010000002 CEFTRIAXONE PER 250 MG: Performed by: NURSE PRACTITIONER

## 2022-01-01 PROCEDURE — 0TCB8ZZ EXTIRPATION OF MATTER FROM BLADDER, VIA NATURAL OR ARTIFICIAL OPENING ENDOSCOPIC: ICD-10-PCS | Performed by: UROLOGY

## 2022-01-01 PROCEDURE — 80048 BASIC METABOLIC PNL TOTAL CA: CPT | Performed by: INTERNAL MEDICINE

## 2022-01-01 PROCEDURE — 63710000001 INSULIN LISPRO (HUMAN) PER 5 UNITS: Performed by: STUDENT IN AN ORGANIZED HEALTH CARE EDUCATION/TRAINING PROGRAM

## 2022-01-01 PROCEDURE — 25010000002 ONDANSETRON PER 1 MG: Performed by: NURSE PRACTITIONER

## 2022-01-01 PROCEDURE — 81001 URINALYSIS AUTO W/SCOPE: CPT | Performed by: EMERGENCY MEDICINE

## 2022-01-01 PROCEDURE — 82962 GLUCOSE BLOOD TEST: CPT

## 2022-01-01 PROCEDURE — 80069 RENAL FUNCTION PANEL: CPT | Performed by: HOSPITALIST

## 2022-01-01 PROCEDURE — 80053 COMPREHEN METABOLIC PANEL: CPT | Performed by: NURSE PRACTITIONER

## 2022-01-01 PROCEDURE — 25010000002 PHENYLEPHRINE 10 MG/ML SOLUTION: Performed by: ANESTHESIOLOGY

## 2022-01-01 PROCEDURE — U0004 COV-19 TEST NON-CDC HGH THRU: HCPCS | Performed by: NURSE PRACTITIONER

## 2022-01-01 PROCEDURE — 99284 EMERGENCY DEPT VISIT MOD MDM: CPT

## 2022-01-01 PROCEDURE — C1769 GUIDE WIRE: HCPCS | Performed by: UROLOGY

## 2022-01-01 PROCEDURE — 85610 PROTHROMBIN TIME: CPT | Performed by: EMERGENCY MEDICINE

## 2022-01-01 PROCEDURE — 0W3R8ZZ CONTROL BLEEDING IN GENITOURINARY TRACT, VIA NATURAL OR ARTIFICIAL OPENING ENDOSCOPIC: ICD-10-PCS | Performed by: UROLOGY

## 2022-01-01 PROCEDURE — G0378 HOSPITAL OBSERVATION PER HR: HCPCS

## 2022-01-01 PROCEDURE — 85025 COMPLETE CBC W/AUTO DIFF WBC: CPT | Performed by: STUDENT IN AN ORGANIZED HEALTH CARE EDUCATION/TRAINING PROGRAM

## 2022-01-01 PROCEDURE — C1758 CATHETER, URETERAL: HCPCS | Performed by: UROLOGY

## 2022-01-01 PROCEDURE — 85025 COMPLETE CBC W/AUTO DIFF WBC: CPT | Performed by: HOSPITALIST

## 2022-01-01 PROCEDURE — U0005 INFEC AGEN DETEC AMPLI PROBE: HCPCS

## 2022-01-01 PROCEDURE — 63710000001 INSULIN LISPRO (HUMAN) PER 5 UNITS

## 2022-01-01 PROCEDURE — 83036 HEMOGLOBIN GLYCOSYLATED A1C: CPT

## 2022-01-01 PROCEDURE — 0TP98DZ REMOVAL OF INTRALUMINAL DEVICE FROM URETER, VIA NATURAL OR ARTIFICIAL OPENING ENDOSCOPIC: ICD-10-PCS | Performed by: UROLOGY

## 2022-01-01 PROCEDURE — 80053 COMPREHEN METABOLIC PANEL: CPT | Performed by: EMERGENCY MEDICINE

## 2022-01-01 PROCEDURE — 1111F DSCHRG MED/CURRENT MED MERGE: CPT | Performed by: FAMILY MEDICINE

## 2022-01-01 PROCEDURE — 99214 OFFICE O/P EST MOD 30 MIN: CPT | Performed by: FAMILY MEDICINE

## 2022-01-01 PROCEDURE — 87086 URINE CULTURE/COLONY COUNT: CPT | Performed by: NURSE PRACTITIONER

## 2022-01-01 PROCEDURE — 76000 FLUOROSCOPY <1 HR PHYS/QHP: CPT | Performed by: UROLOGY

## 2022-01-01 PROCEDURE — 85027 COMPLETE CBC AUTOMATED: CPT | Performed by: INTERNAL MEDICINE

## 2022-01-01 PROCEDURE — 97161 PT EVAL LOW COMPLEX 20 MIN: CPT

## 2022-01-01 PROCEDURE — 25010000002 ONDANSETRON PER 1 MG: Performed by: NURSE ANESTHETIST, CERTIFIED REGISTERED

## 2022-01-01 PROCEDURE — 74176 CT ABD & PELVIS W/O CONTRAST: CPT

## 2022-01-01 PROCEDURE — 80048 BASIC METABOLIC PNL TOTAL CA: CPT | Performed by: STUDENT IN AN ORGANIZED HEALTH CARE EDUCATION/TRAINING PROGRAM

## 2022-01-01 PROCEDURE — 85025 COMPLETE CBC W/AUTO DIFF WBC: CPT | Performed by: NURSE PRACTITIONER

## 2022-01-01 PROCEDURE — 80048 BASIC METABOLIC PNL TOTAL CA: CPT | Performed by: HOSPITALIST

## 2022-01-01 PROCEDURE — 25010000002 MORPHINE PER 10 MG: Performed by: NURSE PRACTITIONER

## 2022-01-01 PROCEDURE — 99495 TRANSJ CARE MGMT MOD F2F 14D: CPT | Performed by: FAMILY MEDICINE

## 2022-01-01 PROCEDURE — 85730 THROMBOPLASTIN TIME PARTIAL: CPT | Performed by: EMERGENCY MEDICINE

## 2022-01-01 PROCEDURE — 84550 ASSAY OF BLOOD/URIC ACID: CPT | Performed by: HOSPITALIST

## 2022-01-01 PROCEDURE — 74018 RADEX ABDOMEN 1 VIEW: CPT | Performed by: UROLOGY

## 2022-01-01 PROCEDURE — 87635 SARS-COV-2 COVID-19 AMP PRB: CPT | Performed by: EMERGENCY MEDICINE

## 2022-01-01 PROCEDURE — 0T788DZ DILATION OF BILATERAL URETERS WITH INTRALUMINAL DEVICE, VIA NATURAL OR ARTIFICIAL OPENING ENDOSCOPIC: ICD-10-PCS | Performed by: UROLOGY

## 2022-01-01 PROCEDURE — 85027 COMPLETE CBC AUTOMATED: CPT

## 2022-01-01 PROCEDURE — U0004 COV-19 TEST NON-CDC HGH THRU: HCPCS

## 2022-01-01 PROCEDURE — 25010000002 CEFTRIAXONE PER 250 MG: Performed by: UROLOGY

## 2022-01-01 PROCEDURE — 97110 THERAPEUTIC EXERCISES: CPT

## 2022-01-01 PROCEDURE — U0005 INFEC AGEN DETEC AMPLI PROBE: HCPCS | Performed by: NURSE PRACTITIONER

## 2022-01-01 PROCEDURE — 81001 URINALYSIS AUTO W/SCOPE: CPT | Performed by: NURSE PRACTITIONER

## 2022-01-01 PROCEDURE — 25010000002 PROPOFOL 10 MG/ML EMULSION: Performed by: ANESTHESIOLOGY

## 2022-01-01 PROCEDURE — 85025 COMPLETE CBC W/AUTO DIFF WBC: CPT | Performed by: EMERGENCY MEDICINE

## 2022-01-01 PROCEDURE — 80048 BASIC METABOLIC PNL TOTAL CA: CPT

## 2022-01-01 PROCEDURE — 36415 COLL VENOUS BLD VENIPUNCTURE: CPT

## 2022-01-01 PROCEDURE — 83735 ASSAY OF MAGNESIUM: CPT | Performed by: HOSPITALIST

## 2022-01-01 PROCEDURE — 87086 URINE CULTURE/COLONY COUNT: CPT | Performed by: EMERGENCY MEDICINE

## 2022-01-01 PROCEDURE — 25010000002 CEFAZOLIN IN DEXTROSE 2-4 GM/100ML-% SOLUTION: Performed by: UROLOGY

## 2022-01-01 PROCEDURE — C2617 STENT, NON-COR, TEM W/O DEL: HCPCS | Performed by: UROLOGY

## 2022-01-01 PROCEDURE — 25010000002 CEFAZOLIN IN DEXTROSE 2-4 GM/100ML-% SOLUTION: Performed by: ANESTHESIOLOGY

## 2022-01-01 PROCEDURE — C9803 HOPD COVID-19 SPEC COLLECT: HCPCS

## 2022-01-01 DEVICE — URETERAL STENT
Type: IMPLANTABLE DEVICE | Site: URETER | Status: FUNCTIONAL
Brand: CONTOUR™

## 2022-01-01 RX ORDER — IBUPROFEN 600 MG/1
600 TABLET ORAL ONCE AS NEEDED
Status: DISCONTINUED | OUTPATIENT
Start: 2022-01-01 | End: 2022-01-01 | Stop reason: HOSPADM

## 2022-01-01 RX ORDER — OXYCODONE AND ACETAMINOPHEN 7.5; 325 MG/1; MG/1
1 TABLET ORAL EVERY 4 HOURS PRN
Status: DISCONTINUED | OUTPATIENT
Start: 2022-01-01 | End: 2022-01-01 | Stop reason: HOSPADM

## 2022-01-01 RX ORDER — ASPIRIN 81 MG/1
81 TABLET ORAL DAILY
Status: ON HOLD
Start: 2022-01-01 | End: 2022-01-01 | Stop reason: SDUPTHER

## 2022-01-01 RX ORDER — INSULIN LISPRO 100 [IU]/ML
0-7 INJECTION, SOLUTION INTRAVENOUS; SUBCUTANEOUS EVERY 6 HOURS
Status: DISCONTINUED | OUTPATIENT
Start: 2022-01-01 | End: 2022-01-01 | Stop reason: HOSPADM

## 2022-01-01 RX ORDER — FAMOTIDINE 10 MG/ML
20 INJECTION, SOLUTION INTRAVENOUS ONCE
Status: COMPLETED | OUTPATIENT
Start: 2022-01-01 | End: 2022-01-01

## 2022-01-01 RX ORDER — FUROSEMIDE 40 MG/1
40 TABLET ORAL 2 TIMES DAILY
Qty: 40 TABLET | Refills: 0 | Status: ON HOLD | OUTPATIENT
Start: 2022-01-01 | End: 2022-01-01 | Stop reason: SDUPTHER

## 2022-01-01 RX ORDER — ASPIRIN 81 MG/1
81 TABLET ORAL DAILY
Start: 2022-01-01 | End: 2022-01-01

## 2022-01-01 RX ORDER — HYDROMORPHONE HYDROCHLORIDE 1 MG/ML
0.5 INJECTION, SOLUTION INTRAMUSCULAR; INTRAVENOUS; SUBCUTANEOUS
Status: DISCONTINUED | OUTPATIENT
Start: 2022-01-01 | End: 2022-01-01 | Stop reason: HOSPADM

## 2022-01-01 RX ORDER — SODIUM CHLORIDE 0.9 % (FLUSH) 0.9 %
3-10 SYRINGE (ML) INJECTION AS NEEDED
Status: DISCONTINUED | OUTPATIENT
Start: 2022-01-01 | End: 2022-01-01 | Stop reason: HOSPADM

## 2022-01-01 RX ORDER — METOPROLOL SUCCINATE 25 MG/1
12.5 TABLET, EXTENDED RELEASE ORAL NIGHTLY
Status: DISCONTINUED | OUTPATIENT
Start: 2022-01-01 | End: 2022-01-01 | Stop reason: HOSPADM

## 2022-01-01 RX ORDER — PHENYLEPHRINE HYDROCHLORIDE 10 MG/ML
INJECTION INTRAVENOUS AS NEEDED
Status: DISCONTINUED | OUTPATIENT
Start: 2022-01-01 | End: 2022-01-01 | Stop reason: SURG

## 2022-01-01 RX ORDER — MIDAZOLAM HYDROCHLORIDE 1 MG/ML
0.5 INJECTION INTRAMUSCULAR; INTRAVENOUS
Status: DISCONTINUED | OUTPATIENT
Start: 2022-01-01 | End: 2022-01-01 | Stop reason: HOSPADM

## 2022-01-01 RX ORDER — FUROSEMIDE 40 MG/1
40 TABLET ORAL EVERY MORNING
Qty: 90 TABLET | Refills: 1
Start: 2022-01-01

## 2022-01-01 RX ORDER — MAGNESIUM HYDROXIDE 1200 MG/15ML
LIQUID ORAL AS NEEDED
Status: DISCONTINUED | OUTPATIENT
Start: 2022-01-01 | End: 2022-01-01 | Stop reason: HOSPADM

## 2022-01-01 RX ORDER — BISACODYL 10 MG
10 SUPPOSITORY, RECTAL RECTAL DAILY PRN
Status: DISCONTINUED | OUTPATIENT
Start: 2022-01-01 | End: 2022-01-01 | Stop reason: HOSPADM

## 2022-01-01 RX ORDER — FAMOTIDINE 20 MG/1
20 TABLET, FILM COATED ORAL DAILY
Status: DISCONTINUED | OUTPATIENT
Start: 2022-01-01 | End: 2022-01-01 | Stop reason: HOSPADM

## 2022-01-01 RX ORDER — TAMSULOSIN HYDROCHLORIDE 0.4 MG/1
0.4 CAPSULE ORAL DAILY
Qty: 30 CAPSULE | Refills: 0 | Status: SHIPPED | OUTPATIENT
Start: 2022-01-01

## 2022-01-01 RX ORDER — LABETALOL HYDROCHLORIDE 5 MG/ML
5 INJECTION, SOLUTION INTRAVENOUS
Status: DISCONTINUED | OUTPATIENT
Start: 2022-01-01 | End: 2022-01-01 | Stop reason: HOSPADM

## 2022-01-01 RX ORDER — FENTANYL CITRATE 50 UG/ML
50 INJECTION, SOLUTION INTRAMUSCULAR; INTRAVENOUS
Status: DISCONTINUED | OUTPATIENT
Start: 2022-01-01 | End: 2022-01-01 | Stop reason: HOSPADM

## 2022-01-01 RX ORDER — CIPROFLOXACIN 500 MG/1
500 TABLET, FILM COATED ORAL ONCE
Status: CANCELLED | OUTPATIENT
Start: 2022-01-01

## 2022-01-01 RX ORDER — FUROSEMIDE 40 MG/1
40 TABLET ORAL EVERY MORNING
Status: DISCONTINUED | OUTPATIENT
Start: 2022-01-01 | End: 2022-01-01 | Stop reason: HOSPADM

## 2022-01-01 RX ORDER — INSULIN GLARGINE 100 [IU]/ML
12 INJECTION, SOLUTION SUBCUTANEOUS NIGHTLY
Start: 2022-01-01 | End: 2022-01-01 | Stop reason: SDUPTHER

## 2022-01-01 RX ORDER — INSULIN LISPRO 100 [IU]/ML
0-7 INJECTION, SOLUTION INTRAVENOUS; SUBCUTANEOUS
Status: DISCONTINUED | OUTPATIENT
Start: 2022-01-01 | End: 2022-01-01

## 2022-01-01 RX ORDER — INSULIN GLARGINE 100 [IU]/ML
12 INJECTION, SOLUTION SUBCUTANEOUS NIGHTLY
Qty: 6 ML | Refills: 1
Start: 2022-01-01 | End: 2022-01-01 | Stop reason: SDUPTHER

## 2022-01-01 RX ORDER — ONDANSETRON 2 MG/ML
4 INJECTION INTRAMUSCULAR; INTRAVENOUS EVERY 6 HOURS PRN
Status: DISCONTINUED | OUTPATIENT
Start: 2022-01-01 | End: 2022-01-01 | Stop reason: HOSPADM

## 2022-01-01 RX ORDER — PROMETHAZINE HYDROCHLORIDE 25 MG/1
25 TABLET ORAL ONCE AS NEEDED
Status: DISCONTINUED | OUTPATIENT
Start: 2022-01-01 | End: 2022-01-01 | Stop reason: HOSPADM

## 2022-01-01 RX ORDER — DEXTROSE MONOHYDRATE 25 G/50ML
25 INJECTION, SOLUTION INTRAVENOUS
Status: DISCONTINUED | OUTPATIENT
Start: 2022-01-01 | End: 2022-01-01 | Stop reason: HOSPADM

## 2022-01-01 RX ORDER — SODIUM CHLORIDE 0.9 % (FLUSH) 0.9 %
10 SYRINGE (ML) INJECTION AS NEEDED
Status: DISCONTINUED | OUTPATIENT
Start: 2022-01-01 | End: 2022-01-01 | Stop reason: HOSPADM

## 2022-01-01 RX ORDER — NALOXONE HCL 0.4 MG/ML
0.2 VIAL (ML) INJECTION AS NEEDED
Status: DISCONTINUED | OUTPATIENT
Start: 2022-01-01 | End: 2022-01-01 | Stop reason: HOSPADM

## 2022-01-01 RX ORDER — HYDROCODONE BITARTRATE AND ACETAMINOPHEN 7.5; 325 MG/1; MG/1
1 TABLET ORAL ONCE AS NEEDED
Status: DISCONTINUED | OUTPATIENT
Start: 2022-01-01 | End: 2022-01-01 | Stop reason: HOSPADM

## 2022-01-01 RX ORDER — FLUMAZENIL 0.1 MG/ML
0.2 INJECTION INTRAVENOUS AS NEEDED
Status: DISCONTINUED | OUTPATIENT
Start: 2022-01-01 | End: 2022-01-01 | Stop reason: HOSPADM

## 2022-01-01 RX ORDER — FAMOTIDINE 20 MG/1
20 TABLET, FILM COATED ORAL 2 TIMES DAILY
COMMUNITY
End: 2022-01-01 | Stop reason: SDUPTHER

## 2022-01-01 RX ORDER — MORPHINE SULFATE 2 MG/ML
4 INJECTION, SOLUTION INTRAMUSCULAR; INTRAVENOUS EVERY 4 HOURS PRN
Status: DISCONTINUED | OUTPATIENT
Start: 2022-01-01 | End: 2022-01-01 | Stop reason: HOSPADM

## 2022-01-01 RX ORDER — BISACODYL 10 MG
10 SUPPOSITORY, RECTAL RECTAL ONCE
Status: COMPLETED | OUTPATIENT
Start: 2022-01-01 | End: 2022-01-01

## 2022-01-01 RX ORDER — UREA 10 %
3 LOTION (ML) TOPICAL NIGHTLY PRN
Status: DISCONTINUED | OUTPATIENT
Start: 2022-01-01 | End: 2022-01-01 | Stop reason: HOSPADM

## 2022-01-01 RX ORDER — NICOTINE POLACRILEX 4 MG
15 LOZENGE BUCCAL
Status: DISCONTINUED | OUTPATIENT
Start: 2022-01-01 | End: 2022-01-01 | Stop reason: HOSPADM

## 2022-01-01 RX ORDER — TAMSULOSIN HYDROCHLORIDE 0.4 MG/1
0.4 CAPSULE ORAL DAILY
Status: DISCONTINUED | OUTPATIENT
Start: 2022-01-01 | End: 2022-01-01 | Stop reason: HOSPADM

## 2022-01-01 RX ORDER — ONDANSETRON 2 MG/ML
4 INJECTION INTRAMUSCULAR; INTRAVENOUS ONCE AS NEEDED
Status: DISCONTINUED | OUTPATIENT
Start: 2022-01-01 | End: 2022-01-01 | Stop reason: HOSPADM

## 2022-01-01 RX ORDER — POLYETHYLENE GLYCOL 3350 17 G/17G
17 POWDER, FOR SOLUTION ORAL DAILY
Status: DISCONTINUED | OUTPATIENT
Start: 2022-01-01 | End: 2022-01-01 | Stop reason: HOSPADM

## 2022-01-01 RX ORDER — FUROSEMIDE 40 MG/1
40 TABLET ORAL EVERY MORNING
Start: 2022-01-01 | End: 2022-01-01 | Stop reason: SDUPTHER

## 2022-01-01 RX ORDER — DIPHENHYDRAMINE HYDROCHLORIDE 50 MG/ML
12.5 INJECTION INTRAMUSCULAR; INTRAVENOUS
Status: DISCONTINUED | OUTPATIENT
Start: 2022-01-01 | End: 2022-01-01 | Stop reason: HOSPADM

## 2022-01-01 RX ORDER — FAMOTIDINE 20 MG/1
20 TABLET, FILM COATED ORAL 2 TIMES DAILY
Status: DISCONTINUED | OUTPATIENT
Start: 2022-01-01 | End: 2022-01-01 | Stop reason: HOSPADM

## 2022-01-01 RX ORDER — LIDOCAINE HYDROCHLORIDE 10 MG/ML
0.5 INJECTION, SOLUTION EPIDURAL; INFILTRATION; INTRACAUDAL; PERINEURAL ONCE AS NEEDED
Status: DISCONTINUED | OUTPATIENT
Start: 2022-01-01 | End: 2022-01-01 | Stop reason: HOSPADM

## 2022-01-01 RX ORDER — ACETAMINOPHEN 325 MG/1
650 TABLET ORAL EVERY 4 HOURS PRN
Status: DISCONTINUED | OUTPATIENT
Start: 2022-01-01 | End: 2022-01-01 | Stop reason: HOSPADM

## 2022-01-01 RX ORDER — CEFAZOLIN SODIUM 2 G/100ML
INJECTION, SOLUTION INTRAVENOUS AS NEEDED
Status: DISCONTINUED | OUTPATIENT
Start: 2022-01-01 | End: 2022-01-01 | Stop reason: SURG

## 2022-01-01 RX ORDER — GABAPENTIN 100 MG/1
100 CAPSULE ORAL NIGHTLY
Status: DISCONTINUED | OUTPATIENT
Start: 2022-01-01 | End: 2022-01-01 | Stop reason: HOSPADM

## 2022-01-01 RX ORDER — NITROGLYCERIN 0.4 MG/1
0.4 TABLET SUBLINGUAL
Status: DISCONTINUED | OUTPATIENT
Start: 2022-01-01 | End: 2022-01-01 | Stop reason: HOSPADM

## 2022-01-01 RX ORDER — LIDOCAINE HYDROCHLORIDE 20 MG/ML
INJECTION, SOLUTION INFILTRATION; PERINEURAL AS NEEDED
Status: DISCONTINUED | OUTPATIENT
Start: 2022-01-01 | End: 2022-01-01 | Stop reason: SURG

## 2022-01-01 RX ORDER — ETOMIDATE 2 MG/ML
INJECTION INTRAVENOUS AS NEEDED
Status: DISCONTINUED | OUTPATIENT
Start: 2022-01-01 | End: 2022-01-01 | Stop reason: SURG

## 2022-01-01 RX ORDER — EPHEDRINE SULFATE 50 MG/ML
5 INJECTION, SOLUTION INTRAVENOUS ONCE AS NEEDED
Status: DISCONTINUED | OUTPATIENT
Start: 2022-01-01 | End: 2022-01-01 | Stop reason: HOSPADM

## 2022-01-01 RX ORDER — ONDANSETRON 4 MG/1
4 TABLET, FILM COATED ORAL EVERY 6 HOURS PRN
Status: DISCONTINUED | OUTPATIENT
Start: 2022-01-01 | End: 2022-01-01 | Stop reason: HOSPADM

## 2022-01-01 RX ORDER — METOPROLOL SUCCINATE 25 MG/1
12.5 TABLET, EXTENDED RELEASE ORAL NIGHTLY
Start: 2022-01-01

## 2022-01-01 RX ORDER — PROMETHAZINE HYDROCHLORIDE 25 MG/1
25 SUPPOSITORY RECTAL ONCE AS NEEDED
Status: DISCONTINUED | OUTPATIENT
Start: 2022-01-01 | End: 2022-01-01 | Stop reason: HOSPADM

## 2022-01-01 RX ORDER — AMOXICILLIN 250 MG
1 CAPSULE ORAL 2 TIMES DAILY
Status: DISCONTINUED | OUTPATIENT
Start: 2022-01-01 | End: 2022-01-01 | Stop reason: HOSPADM

## 2022-01-01 RX ORDER — CEPHALEXIN 500 MG/1
500 CAPSULE ORAL 2 TIMES DAILY
Qty: 14 CAPSULE | Refills: 0 | Status: SHIPPED | OUTPATIENT
Start: 2022-01-01 | End: 2022-01-01

## 2022-01-01 RX ORDER — HYDRALAZINE HYDROCHLORIDE 20 MG/ML
5 INJECTION INTRAMUSCULAR; INTRAVENOUS
Status: DISCONTINUED | OUTPATIENT
Start: 2022-01-01 | End: 2022-01-01 | Stop reason: HOSPADM

## 2022-01-01 RX ORDER — SODIUM CHLORIDE, SODIUM LACTATE, POTASSIUM CHLORIDE, CALCIUM CHLORIDE 600; 310; 30; 20 MG/100ML; MG/100ML; MG/100ML; MG/100ML
INJECTION, SOLUTION INTRAVENOUS CONTINUOUS PRN
Status: DISCONTINUED | OUTPATIENT
Start: 2022-01-01 | End: 2022-01-01 | Stop reason: SURG

## 2022-01-01 RX ORDER — DIPHENHYDRAMINE HCL 25 MG
25 CAPSULE ORAL
Status: DISCONTINUED | OUTPATIENT
Start: 2022-01-01 | End: 2022-01-01 | Stop reason: HOSPADM

## 2022-01-01 RX ORDER — MORPHINE SULFATE 2 MG/ML
4 INJECTION, SOLUTION INTRAMUSCULAR; INTRAVENOUS ONCE
Status: COMPLETED | OUTPATIENT
Start: 2022-01-01 | End: 2022-01-01

## 2022-01-01 RX ORDER — SODIUM CHLORIDE, SODIUM LACTATE, POTASSIUM CHLORIDE, CALCIUM CHLORIDE 600; 310; 30; 20 MG/100ML; MG/100ML; MG/100ML; MG/100ML
9 INJECTION, SOLUTION INTRAVENOUS CONTINUOUS
Status: DISCONTINUED | OUTPATIENT
Start: 2022-01-01 | End: 2022-01-01 | Stop reason: HOSPADM

## 2022-01-01 RX ORDER — AMOXICILLIN 250 MG
2 CAPSULE ORAL 2 TIMES DAILY
Status: DISCONTINUED | OUTPATIENT
Start: 2022-01-01 | End: 2022-01-01 | Stop reason: HOSPADM

## 2022-01-01 RX ORDER — FAMOTIDINE 20 MG/1
20 TABLET, FILM COATED ORAL 2 TIMES DAILY
Qty: 60 TABLET | Refills: 1 | Status: SHIPPED | OUTPATIENT
Start: 2022-01-01

## 2022-01-01 RX ORDER — SODIUM CHLORIDE 0.9 % (FLUSH) 0.9 %
3 SYRINGE (ML) INJECTION EVERY 12 HOURS SCHEDULED
Status: DISCONTINUED | OUTPATIENT
Start: 2022-01-01 | End: 2022-01-01 | Stop reason: HOSPADM

## 2022-01-01 RX ORDER — ONDANSETRON 2 MG/ML
4 INJECTION INTRAMUSCULAR; INTRAVENOUS ONCE
Status: COMPLETED | OUTPATIENT
Start: 2022-01-01 | End: 2022-01-01

## 2022-01-01 RX ORDER — FAMOTIDINE 20 MG/1
20 TABLET, FILM COATED ORAL 2 TIMES DAILY
Status: DISCONTINUED | OUTPATIENT
Start: 2022-01-01 | End: 2022-01-01

## 2022-01-01 RX ORDER — GABAPENTIN 100 MG/1
100 CAPSULE ORAL NIGHTLY
Qty: 60 CAPSULE | Refills: 0 | Status: SHIPPED | OUTPATIENT
Start: 2022-01-01

## 2022-01-01 RX ORDER — CEFAZOLIN SODIUM 2 G/100ML
2 INJECTION, SOLUTION INTRAVENOUS ONCE
Status: COMPLETED | OUTPATIENT
Start: 2022-01-01 | End: 2022-01-01

## 2022-01-01 RX ORDER — PEN NEEDLE, DIABETIC 31 G X1/4"
NEEDLE, DISPOSABLE MISCELLANEOUS
Qty: 100 EACH | Refills: 2 | Status: SHIPPED | OUTPATIENT
Start: 2022-01-01

## 2022-01-01 RX ORDER — HYDROCODONE BITARTRATE AND ACETAMINOPHEN 5; 325 MG/1; MG/1
1 TABLET ORAL EVERY 6 HOURS PRN
Status: DISCONTINUED | OUTPATIENT
Start: 2022-01-01 | End: 2022-01-01 | Stop reason: HOSPADM

## 2022-01-01 RX ORDER — SODIUM CHLORIDE, SODIUM LACTATE, POTASSIUM CHLORIDE, CALCIUM CHLORIDE 600; 310; 30; 20 MG/100ML; MG/100ML; MG/100ML; MG/100ML
75 INJECTION, SOLUTION INTRAVENOUS CONTINUOUS
Status: DISCONTINUED | OUTPATIENT
Start: 2022-01-01 | End: 2022-01-01

## 2022-01-01 RX ORDER — INSULIN LISPRO 100 [IU]/ML
0-9 INJECTION, SOLUTION INTRAVENOUS; SUBCUTANEOUS
Status: DISCONTINUED | OUTPATIENT
Start: 2022-01-01 | End: 2022-01-01 | Stop reason: HOSPADM

## 2022-01-01 RX ORDER — PROPOFOL 10 MG/ML
VIAL (ML) INTRAVENOUS AS NEEDED
Status: DISCONTINUED | OUTPATIENT
Start: 2022-01-01 | End: 2022-01-01 | Stop reason: SURG

## 2022-01-01 RX ORDER — MULTIPLE VITAMINS W/ MINERALS TAB 9MG-400MCG
1 TAB ORAL DAILY
Status: DISCONTINUED | OUTPATIENT
Start: 2022-01-01 | End: 2022-01-01 | Stop reason: HOSPADM

## 2022-01-01 RX ORDER — INSULIN GLARGINE 100 [IU]/ML
12 INJECTION, SOLUTION SUBCUTANEOUS NIGHTLY
Qty: 6 ML | Refills: 1 | Status: SHIPPED | OUTPATIENT
Start: 2022-01-01

## 2022-01-01 RX ORDER — ONDANSETRON 2 MG/ML
INJECTION INTRAMUSCULAR; INTRAVENOUS AS NEEDED
Status: DISCONTINUED | OUTPATIENT
Start: 2022-01-01 | End: 2022-01-01 | Stop reason: SURG

## 2022-01-01 RX ADMIN — GABAPENTIN 100 MG: 100 CAPSULE ORAL at 20:47

## 2022-01-01 RX ADMIN — BISACODYL 10 MG: 10 SUPPOSITORY RECTAL at 13:00

## 2022-01-01 RX ADMIN — INSULIN LISPRO 5 UNITS: 100 INJECTION, SOLUTION INTRAVENOUS; SUBCUTANEOUS at 21:32

## 2022-01-01 RX ADMIN — FAMOTIDINE 20 MG: 10 INJECTION INTRAVENOUS at 07:45

## 2022-01-01 RX ADMIN — INSULIN GLARGINE-YFGN 12 UNITS: 100 INJECTION, SOLUTION SUBCUTANEOUS at 23:17

## 2022-01-01 RX ADMIN — FUROSEMIDE 40 MG: 40 TABLET ORAL at 11:12

## 2022-01-01 RX ADMIN — INSULIN LISPRO 7 UNITS: 100 INJECTION, SOLUTION INTRAVENOUS; SUBCUTANEOUS at 17:27

## 2022-01-01 RX ADMIN — METOPROLOL SUCCINATE 12.5 MG: 25 TABLET, EXTENDED RELEASE ORAL at 23:15

## 2022-01-01 RX ADMIN — PROPOFOL 150 MG: 10 INJECTION, EMULSION INTRAVENOUS at 10:27

## 2022-01-01 RX ADMIN — FAMOTIDINE 20 MG: 20 TABLET ORAL at 08:25

## 2022-01-01 RX ADMIN — MULTIPLE VITAMINS W/ MINERALS TAB 1 TABLET: TAB at 08:50

## 2022-01-01 RX ADMIN — FAMOTIDINE 20 MG: 20 TABLET ORAL at 20:19

## 2022-01-01 RX ADMIN — INSULIN GLARGINE-YFGN 12 UNITS: 100 INJECTION, SOLUTION SUBCUTANEOUS at 21:33

## 2022-01-01 RX ADMIN — INSULIN LISPRO 3 UNITS: 100 INJECTION, SOLUTION INTRAVENOUS; SUBCUTANEOUS at 14:22

## 2022-01-01 RX ADMIN — ETOMIDATE 16 MG: 2 INJECTION, SOLUTION INTRAVENOUS at 08:46

## 2022-01-01 RX ADMIN — FAMOTIDINE 20 MG: 20 TABLET ORAL at 11:06

## 2022-01-01 RX ADMIN — LIDOCAINE HYDROCHLORIDE 80 MG: 20 INJECTION, SOLUTION INFILTRATION; PERINEURAL at 08:46

## 2022-01-01 RX ADMIN — SODIUM CHLORIDE, POTASSIUM CHLORIDE, SODIUM LACTATE AND CALCIUM CHLORIDE: 600; 310; 30; 20 INJECTION, SOLUTION INTRAVENOUS at 08:15

## 2022-01-01 RX ADMIN — METOPROLOL SUCCINATE 12.5 MG: 25 TABLET, EXTENDED RELEASE ORAL at 20:32

## 2022-01-01 RX ADMIN — INSULIN GLARGINE-YFGN 12 UNITS: 100 INJECTION, SOLUTION SUBCUTANEOUS at 20:33

## 2022-01-01 RX ADMIN — INSULIN LISPRO 4 UNITS: 100 INJECTION, SOLUTION INTRAVENOUS; SUBCUTANEOUS at 13:05

## 2022-01-01 RX ADMIN — SODIUM CHLORIDE, POTASSIUM CHLORIDE, SODIUM LACTATE AND CALCIUM CHLORIDE 9 ML/HR: 600; 310; 30; 20 INJECTION, SOLUTION INTRAVENOUS at 07:44

## 2022-01-01 RX ADMIN — GABAPENTIN 100 MG: 100 CAPSULE ORAL at 20:20

## 2022-01-01 RX ADMIN — METOPROLOL SUCCINATE 12.5 MG: 25 TABLET, EXTENDED RELEASE ORAL at 20:06

## 2022-01-01 RX ADMIN — FUROSEMIDE 40 MG: 40 TABLET ORAL at 06:21

## 2022-01-01 RX ADMIN — INSULIN GLARGINE-YFGN 12 UNITS: 100 INJECTION, SOLUTION SUBCUTANEOUS at 21:18

## 2022-01-01 RX ADMIN — TAMSULOSIN HYDROCHLORIDE 0.4 MG: 0.4 CAPSULE ORAL at 08:50

## 2022-01-01 RX ADMIN — LIDOCAINE HYDROCHLORIDE 60 MG: 20 INJECTION, SOLUTION INFILTRATION; PERINEURAL at 10:27

## 2022-01-01 RX ADMIN — INSULIN LISPRO 3 UNITS: 100 INJECTION, SOLUTION INTRAVENOUS; SUBCUTANEOUS at 21:32

## 2022-01-01 RX ADMIN — Medication 3 MG: at 20:20

## 2022-01-01 RX ADMIN — POLYETHYLENE GLYCOL 3350 17 G: 17 POWDER, FOR SOLUTION ORAL at 08:43

## 2022-01-01 RX ADMIN — TAMSULOSIN HYDROCHLORIDE 0.4 MG: 0.4 CAPSULE ORAL at 11:07

## 2022-01-01 RX ADMIN — GABAPENTIN 100 MG: 100 CAPSULE ORAL at 23:15

## 2022-01-01 RX ADMIN — FAMOTIDINE 20 MG: 20 TABLET ORAL at 08:43

## 2022-01-01 RX ADMIN — CEFAZOLIN SODIUM 2 G: 2 INJECTION, SOLUTION INTRAVENOUS at 10:19

## 2022-01-01 RX ADMIN — HYDROCODONE BITARTRATE AND ACETAMINOPHEN 1 TABLET: 5; 325 TABLET ORAL at 09:14

## 2022-01-01 RX ADMIN — FAMOTIDINE 20 MG: 20 TABLET ORAL at 21:12

## 2022-01-01 RX ADMIN — FAMOTIDINE 20 MG: 20 TABLET ORAL at 08:49

## 2022-01-01 RX ADMIN — INSULIN LISPRO 4 UNITS: 100 INJECTION, SOLUTION INTRAVENOUS; SUBCUTANEOUS at 11:54

## 2022-01-01 RX ADMIN — TAMSULOSIN HYDROCHLORIDE 0.4 MG: 0.4 CAPSULE ORAL at 09:21

## 2022-01-01 RX ADMIN — INSULIN GLARGINE-YFGN 12 UNITS: 100 INJECTION, SOLUTION SUBCUTANEOUS at 20:49

## 2022-01-01 RX ADMIN — INSULIN LISPRO 4 UNITS: 100 INJECTION, SOLUTION INTRAVENOUS; SUBCUTANEOUS at 17:27

## 2022-01-01 RX ADMIN — TAMSULOSIN HYDROCHLORIDE 0.4 MG: 0.4 CAPSULE ORAL at 08:26

## 2022-01-01 RX ADMIN — ONDANSETRON 4 MG: 2 INJECTION INTRAMUSCULAR; INTRAVENOUS at 16:03

## 2022-01-01 RX ADMIN — MULTIPLE VITAMINS W/ MINERALS TAB 1 TABLET: TAB at 11:07

## 2022-01-01 RX ADMIN — INSULIN LISPRO 3 UNITS: 100 INJECTION, SOLUTION INTRAVENOUS; SUBCUTANEOUS at 09:02

## 2022-01-01 RX ADMIN — FAMOTIDINE 20 MG: 10 INJECTION INTRAVENOUS at 09:47

## 2022-01-01 RX ADMIN — FAMOTIDINE 20 MG: 20 TABLET ORAL at 21:32

## 2022-01-01 RX ADMIN — INSULIN LISPRO 4 UNITS: 100 INJECTION, SOLUTION INTRAVENOUS; SUBCUTANEOUS at 20:20

## 2022-01-01 RX ADMIN — DOCUSATE SODIUM 50MG AND SENNOSIDES 8.6MG 2 TABLET: 8.6; 5 TABLET, FILM COATED ORAL at 08:43

## 2022-01-01 RX ADMIN — DOCUSATE SODIUM 50MG AND SENNOSIDES 8.6MG 2 TABLET: 8.6; 5 TABLET, FILM COATED ORAL at 20:47

## 2022-01-01 RX ADMIN — TAMSULOSIN HYDROCHLORIDE 0.4 MG: 0.4 CAPSULE ORAL at 15:40

## 2022-01-01 RX ADMIN — GABAPENTIN 100 MG: 100 CAPSULE ORAL at 21:32

## 2022-01-01 RX ADMIN — HYDROCODONE BITARTRATE AND ACETAMINOPHEN 1 TABLET: 5; 325 TABLET ORAL at 06:21

## 2022-01-01 RX ADMIN — FUROSEMIDE 40 MG: 40 TABLET ORAL at 09:01

## 2022-01-01 RX ADMIN — SODIUM CHLORIDE, POTASSIUM CHLORIDE, SODIUM LACTATE AND CALCIUM CHLORIDE 9 ML/HR: 600; 310; 30; 20 INJECTION, SOLUTION INTRAVENOUS at 09:47

## 2022-01-01 RX ADMIN — INSULIN LISPRO 4 UNITS: 100 INJECTION, SOLUTION INTRAVENOUS; SUBCUTANEOUS at 12:46

## 2022-01-01 RX ADMIN — DOCUSATE SODIUM 50MG AND SENNOSIDES 8.6MG 1 TABLET: 8.6; 5 TABLET, FILM COATED ORAL at 10:20

## 2022-01-01 RX ADMIN — FAMOTIDINE 20 MG: 20 TABLET ORAL at 09:21

## 2022-01-01 RX ADMIN — FAMOTIDINE 20 MG: 20 TABLET ORAL at 20:07

## 2022-01-01 RX ADMIN — DOCUSATE SODIUM 50MG AND SENNOSIDES 8.6MG 2 TABLET: 8.6; 5 TABLET, FILM COATED ORAL at 13:00

## 2022-01-01 RX ADMIN — METOPROLOL SUCCINATE 12.5 MG: 25 TABLET, EXTENDED RELEASE ORAL at 20:47

## 2022-01-01 RX ADMIN — CEFTRIAXONE SODIUM 1 G: 10 INJECTION, POWDER, FOR SOLUTION INTRAVENOUS at 20:04

## 2022-01-01 RX ADMIN — TAMSULOSIN HYDROCHLORIDE 0.4 MG: 0.4 CAPSULE ORAL at 08:43

## 2022-01-01 RX ADMIN — GABAPENTIN 100 MG: 100 CAPSULE ORAL at 21:17

## 2022-01-01 RX ADMIN — INSULIN GLARGINE-YFGN 12 UNITS: 100 INJECTION, SOLUTION SUBCUTANEOUS at 20:07

## 2022-01-01 RX ADMIN — MULTIPLE VITAMINS W/ MINERALS TAB 1 TABLET: TAB at 08:43

## 2022-01-01 RX ADMIN — METOPROLOL SUCCINATE 12.5 MG: 25 TABLET, EXTENDED RELEASE ORAL at 21:32

## 2022-01-01 RX ADMIN — BISACODYL 10 MG: 10 SUPPOSITORY RECTAL at 14:45

## 2022-01-01 RX ADMIN — INSULIN LISPRO 3 UNITS: 100 INJECTION, SOLUTION INTRAVENOUS; SUBCUTANEOUS at 17:31

## 2022-01-01 RX ADMIN — INSULIN LISPRO 6 UNITS: 100 INJECTION, SOLUTION INTRAVENOUS; SUBCUTANEOUS at 21:18

## 2022-01-01 RX ADMIN — GABAPENTIN 100 MG: 100 CAPSULE ORAL at 20:32

## 2022-01-01 RX ADMIN — INSULIN GLARGINE-YFGN 12 UNITS: 100 INJECTION, SOLUTION SUBCUTANEOUS at 20:21

## 2022-01-01 RX ADMIN — PHENYLEPHRINE HYDROCHLORIDE 150 MCG: 10 INJECTION, SOLUTION INTRAVENOUS at 10:33

## 2022-01-01 RX ADMIN — MORPHINE SULFATE 4 MG: 2 INJECTION, SOLUTION INTRAMUSCULAR; INTRAVENOUS at 17:27

## 2022-01-01 RX ADMIN — POLYETHYLENE GLYCOL 3350 17 G: 17 POWDER, FOR SOLUTION ORAL at 09:02

## 2022-01-01 RX ADMIN — DOCUSATE SODIUM 50MG AND SENNOSIDES 8.6MG 1 TABLET: 8.6; 5 TABLET, FILM COATED ORAL at 20:20

## 2022-01-01 RX ADMIN — TAMSULOSIN HYDROCHLORIDE 0.4 MG: 0.4 CAPSULE ORAL at 09:01

## 2022-01-01 RX ADMIN — TAMSULOSIN HYDROCHLORIDE 0.4 MG: 0.4 CAPSULE ORAL at 08:38

## 2022-01-01 RX ADMIN — FAMOTIDINE 20 MG: 20 TABLET ORAL at 23:15

## 2022-01-01 RX ADMIN — INSULIN LISPRO 2 UNITS: 100 INJECTION, SOLUTION INTRAVENOUS; SUBCUTANEOUS at 08:43

## 2022-01-01 RX ADMIN — FAMOTIDINE 20 MG: 20 TABLET ORAL at 08:38

## 2022-01-01 RX ADMIN — INSULIN LISPRO 3 UNITS: 100 INJECTION, SOLUTION INTRAVENOUS; SUBCUTANEOUS at 21:56

## 2022-01-01 RX ADMIN — Medication 3 MG: at 20:07

## 2022-01-01 RX ADMIN — POLYETHYLENE GLYCOL 3350 17 G: 17 POWDER, FOR SOLUTION ORAL at 10:20

## 2022-01-01 RX ADMIN — FUROSEMIDE 40 MG: 40 TABLET ORAL at 06:20

## 2022-01-01 RX ADMIN — METOPROLOL SUCCINATE 12.5 MG: 25 TABLET, EXTENDED RELEASE ORAL at 21:12

## 2022-01-01 RX ADMIN — Medication 10 ML: at 09:21

## 2022-01-01 RX ADMIN — HYDROCODONE BITARTRATE AND ACETAMINOPHEN 1 TABLET: 5; 325 TABLET ORAL at 23:56

## 2022-01-01 RX ADMIN — INSULIN LISPRO 2 UNITS: 100 INJECTION, SOLUTION INTRAVENOUS; SUBCUTANEOUS at 08:25

## 2022-01-01 RX ADMIN — CEFTRIAXONE SODIUM 1 G: 10 INJECTION, POWDER, FOR SOLUTION INTRAVENOUS at 20:32

## 2022-01-01 RX ADMIN — FUROSEMIDE 40 MG: 40 TABLET ORAL at 06:58

## 2022-01-01 RX ADMIN — GABAPENTIN 100 MG: 100 CAPSULE ORAL at 20:07

## 2022-01-01 RX ADMIN — METOPROLOL SUCCINATE 12.5 MG: 25 TABLET, EXTENDED RELEASE ORAL at 20:19

## 2022-01-01 RX ADMIN — CEFTRIAXONE SODIUM 1 G: 10 INJECTION, POWDER, FOR SOLUTION INTRAVENOUS at 20:47

## 2022-01-01 RX ADMIN — DOCUSATE SODIUM 50MG AND SENNOSIDES 8.6MG 1 TABLET: 8.6; 5 TABLET, FILM COATED ORAL at 09:02

## 2022-01-01 RX ADMIN — Medication 3 MG: at 21:17

## 2022-01-01 RX ADMIN — MORPHINE SULFATE 4 MG: 2 INJECTION, SOLUTION INTRAMUSCULAR; INTRAVENOUS at 16:02

## 2022-01-01 RX ADMIN — POLYETHYLENE GLYCOL 3350 17 G: 17 POWDER, FOR SOLUTION ORAL at 13:00

## 2022-01-01 RX ADMIN — FAMOTIDINE 20 MG: 20 TABLET ORAL at 09:02

## 2022-01-01 RX ADMIN — CEFAZOLIN SODIUM 2 G: 2 INJECTION, SOLUTION INTRAVENOUS at 09:49

## 2022-01-01 RX ADMIN — INSULIN LISPRO 4 UNITS: 100 INJECTION, SOLUTION INTRAVENOUS; SUBCUTANEOUS at 17:48

## 2022-01-01 RX ADMIN — FUROSEMIDE 40 MG: 40 TABLET ORAL at 08:38

## 2022-01-01 RX ADMIN — PHENYLEPHRINE HYDROCHLORIDE 150 MCG: 10 INJECTION, SOLUTION INTRAVENOUS at 10:35

## 2022-01-01 RX ADMIN — ONDANSETRON 4 MG: 2 INJECTION INTRAMUSCULAR; INTRAVENOUS at 09:02

## 2022-01-01 RX ADMIN — FUROSEMIDE 40 MG: 40 TABLET ORAL at 06:34

## 2022-01-03 NOTE — TELEPHONE ENCOUNTER
Caller: Home Wynn    Relationship: Self    Best call back number: [phone]    Caller requesting test results: PATIENT    What test was performed: COVID    When was the test performed: 12/30/21    Where was the test performed: AT OFFICE    Additional notes: PATIENT REQUESTING A CALLBACK WITH COVID TEST RESULTS.

## 2022-01-07 NOTE — OUTREACH NOTE
Ambulatory Case Management Note    Patient Outreach    Spoke with patient regarding health and wellness after hospital stay. Patient states he is taking his diuretic as prescribed. He has kept his weight stable for 3 weeks. No concerns with adjusting sodium in his diet at this time. Patient states he weighs every other day. Follow up scheduled in 2 weeks for continued heart failure care plan review with patient.     Care Plan: Heart Failure   Updates made since 1/7/2022 12:00 AM      Problem: FLUID RETENTION/OVERLOAD Resolved 1/7/2022   Note:    Patient states he weighs every other day and has maintained  his weight for 3 weeks.      Goal: Patient will be able to identify signs and symptoms of fluid retention Completed 1/7/2022   Note:    Patient states he knows to weigh regularly and he has kept a consistent weight for 3 weeks.            Hanh Ricardo RN  Ambulatory Case Management    1/7/2022, 17:57 EST

## 2022-01-21 NOTE — OUTREACH NOTE
Ambulatory Case Management Note    Patient Outreach    Introduced self, explained ACM RN role and provided contact information. Spoke with patient regarding health and concerns. Patient states he has no problems getting up and moving each day. However, he has difficulty sleeping. Patient also states he does not like taking the Lasix daily due to it causing stomach pain. Patient mentioned he would rather not take it. [facility] recommended patient review alternate medications with Dr. Mahoney and to discuss possible [facility] Connection program. PCP updated.    There are no recently modified care plans to display for this patient.      Hanh Ricardo RN  Ambulatory Case Management    1/21/2022, 15:13 EST

## 2022-01-25 PROBLEM — E11.9 TYPE 2 DIABETES MELLITUS WITHOUT COMPLICATION (HCC): Status: RESOLVED | Noted: 2021-01-01 | Resolved: 2022-01-01

## 2022-01-25 NOTE — PATIENT INSTRUCTIONS
Start gabapentin 100 mg.  We will try to use this to help with the burning and tingling in the feet as well as making you sleepy.  Start with just the 100 mg 30 to 60 minutes prior to when you when you go to bed.  Go to bed as soon as you start getting sleepy.  If you are taking 100 mg for few nights and do not notice a difference it is okay to take 200 mg.  Same thing there if you take 200 mg for a few nights and you do not notice a difference you can take 300 mg.  Do not take more than 300 mg at bedtime without talking to me first.    Hospitalist team will be calling you.  Their program is called heart connection program and they can work with you in your home.  Please come up with questions and ask them anything you think of about your biggest concerns and how they will address them.  They have a lot of things they can do because they have access to you where you live and access to different kinds of resources and medications.    Address the wart on the foot for pain relief as well with your podiatrist.    We will get an abdominal scan ordered and they will call you to get that scheduled.  In the meantime start taking Pepcid 20 mg which is also called famotidine in the morning when you start your day.  You can also take it the same dose at bedtime if you are having some belly discomfort as well.

## 2022-01-25 NOTE — PROGRESS NOTES
Chief Complaint  Shortness of Breath (states SOA all the times), Abdominal Pain (stomach pain, states can eat only a little bit, states something is wrong with his belly ), Insomnia (cant sleep sick all the time), and Breathing Difficulty (breath is labored while in chair and with talking )    Subjective          Home Wynn presents to [facility]  History of Present Illness  Patient reports that he is incredibly short of breath and so uncomfortable. He cannot sleep. He does not tolerate moving around very well either.  He is also complaining of periumbilical abdominal pain. Says initially when I started seeing him he had upper abdominal pain. He had an episode of pancreatitis and a flare. He has had an ERCP with a stone removed and not seems to have been a lot better. He says that this current pain that is lower and around his bellybutton has been since he had his colonoscopy back in May 2020. He says had some belly pain with this ureter stents but he knows what that feels like and those have not been painful for some time. He can feel those and that feels different.  This pain is a feeling like he has eaten too much. Says he does not have much of an appetite and eats small meals so he does not feel like he is eating too much.  The other night he had some upper abdominal pain and some pain right across the chest when he was seated. He has been on Pepcid previously but has not been taking it recently.  He says his bowel movements are not daily but they are regular and easy to pass.  His other concern today is that he cannot sleep. He is to be able to sleep so easily through the night and even take a couple of naps during the day. He cannot sleep at night and he cannot sleep during the day. He says he lies down and just stares at the ceiling. He has to get up and move around. He gets significantly short of breath with moving around but says not necessarily with lying in bed. He is  "having more shortness of breath recently and more swelling in his lower extremities.  Within the past week he is having increased redness and pain on the side of his left leg. He pretty much has point tenderness in this area.  He feels so tired.  He feels like this is no way to live not being able to breathe and not being able to sleep. He just wants to be more comfortable. His weight is up a little bit but his breathing is significantly difficult.  He has burning in tingling in his feet. He also has pain in his feet on the left plantar surface at the first MTP joint and on the right foot at the fifth MTP joint.  Objective   Vital Signs:   /72 (BP Location: Right arm, Patient Position: Sitting, Cuff Size: Adult)   Pulse 52   Temp 97 °F (36.1 °C) (Temporal)   Resp 26   Ht 180.3 cm (71\")   Wt 80.7 kg (178 lb)   SpO2 98%   BMI 24.83 kg/m²     Physical Exam  Vitals reviewed.   Constitutional:       General: He is not in acute distress.     Appearance: He is ill-appearing.   Eyes:      General: No scleral icterus.        Right eye: No discharge.         Left eye: No discharge.      Conjunctiva/sclera: Conjunctivae normal.   Cardiovascular:      Rate and Rhythm: Normal rate. Rhythm irregular.      Heart sounds: Normal heart sounds. No murmur heard.  No friction rub. No gallop.       Comments: Irregularly irregular rhythm  Pulmonary:      Effort: Respiratory distress present.      Breath sounds: Normal breath sounds. No wheezing.      Comments: He does have mildly increased effort with breathing. He can speak with normal volume and clearly but does catch his breath between phrases. Reduced breath sounds in lateral bases. I do not appreciate any crackles. He is moving good air otherwise.  Musculoskeletal:      Right lower leg: Edema present.      Left lower leg: Edema present.        Feet:       Comments: He does not have any posterior lower extremity tenderness, cord palpable, or pitting edema. Anteriorly he " has about 1+ pitting edema over the lower half of the pretibial surface bilaterally.   Feet:      Left foot:      Skin integrity: Callus and dry skin present.      Toenail Condition: Left toenails are abnormally thick and long.      Comments: Left foot plantar surface plantar wart with surrounding callus.  Skin:     Findings: Erythema present.      Comments: Area at the lateral left lower leg just above the ankle with mild erythema and increased warmth, also TTP.    Neurological:      Mental Status: He is alert and oriented to person, place, and time.   Psychiatric:         Behavior: Behavior normal.        Result Review :     CMP    CMP 12/7/21 12/8/21 12/9/21   Glucose 258 (A) 160 (A) 182 (A)   BUN 30 (A) 28 (A) 30 (A)   Creatinine 1.57 (A) 1.64 (A) 1.31 (A)   eGFR Non  Am 42 (A) 40 (A) 51 (A)   Sodium 143 137 136   Potassium 4.5 4.6 3.6   Chloride 98 100 98   Calcium 9.5 9.4 9.1   Albumin 4.00  3.10 (A)   Total Bilirubin 1.1  0.9   Alkaline Phosphatase 84  70   AST (SGOT) 17  15   ALT (SGPT) 10  9   (A) Abnormal value       Comments are available for some flowsheets but are not being displayed.           CBC    CBC 12/7/21 12/8/21 12/9/21   WBC 6.03 4.65 4.55   RBC 4.47 4.79 4.29   Hemoglobin 13.9 14.8 13.3   Hematocrit 41.3 44.9 39.1   MCV 92.4 93.7 91.1   MCH 31.1 30.9 31.0   MCHC 33.7 33.0 34.0   RDW 13.6 13.8 13.8   Platelets 176 164 170           A1C Last 3 Results    HGBA1C Last 3 Results 2/23/21 6/24/21 12/7/21   Hemoglobin A1C 8.90 (A) 9.80 (A) 9.50 (A)   (A) Abnormal value       Comments are available for some flowsheets but are not being displayed.           Data reviewed: Radiologic studies CT abd pelvis from 4/2020 and CXR from 12/2021          Assessment and Plan {CC Problem List  Visit Diagnosis   ROS  Review (Popup)  Health Maintenance  Quality  BestPractice  Medications  SmartSets  SnapShot Encounters  Media :23}   Diagnoses and all orders for this visit:    1. Chronic combined  systolic and diastolic congestive heart failure (HCC) (Primary)  -     Ambulatory Referral to Home Hospice    2. SOB (shortness of breath)  -     Ambulatory Referral to Home Hospice    3. Neuropathic pain  -     gabapentin (NEURONTIN) 100 MG capsule; Take 1 capsule by mouth Every Night.  Dispense: 60 capsule; Refill: 0    4. Permanent atrial fibrillation (HCC)  -     Ambulatory Referral to Home Hospice    5. Plantar wart of left foot    6. Callus of foot    7. Cellulitis of left lower extremity    8. Periumbilical abdominal pain  -     CT Abdomen Pelvis Without Contrast; Future    Other orders  -     furosemide (LASIX) 40 MG tablet; Take 1 tablet by mouth 2 (Two) Times a Day.  Dispense: 40 tablet; Refill: 0  -     cephalexin (Keflex) 500 MG capsule; Take 1 capsule by mouth 2 (Two) Times a Day.  Dispense: 14 capsule; Refill: 0        Follow Up   No follow-ups on file.  Patient was given instructions and counseling regarding his condition or for health maintenance advice. Please see specific information pulled into the AVS if appropriate.     Has upcoming appointment with podiatry. I recommended that he have them work on his callus and eliminate this plantar wart which will probably take several treatments. I think this will improve his walking and pain to some degree. Also can get him started on gabapentin for the neuropathy and hopefully this will help sleep.  Insomnia is one of his biggest complaints today.  I talked to him about her options for sleeping medication.  Unfortunately, a lot of the medications that are used for sleep can affect his heart rhythm and he is already in permanent A. fib. My concern is that any medication that can potentially irritate the heart could be problematic for this patient. I would normally consider tricyclic antidepressant or trazodone or an antipsychotic. However, concern for QT prolongation and arrhythmia.  I can discuss this with cardiology to have them weigh in on their opinion  for alternative sleep aid.  The other unfortunate side effect of medications used for sleep or the respiratory depression. He already has some mild to moderate increase work of breathing.  We talked about these things in great extent.  Were going to address his shortness of breath and increased work of breathing by increasing his furosemide at this time. We understand that this medication could potentially be leading to his abdominal pain, however, when it comes to the degree of pain he is feeling related to his abdomen and the degree of discomfort that the shortness of breath is causing we are going to do a short-term increase in the dose of furosemide to improve the breathing. Encouraged him to limit himself to 2 g of salt and encouraged him to have about 2 L of water.  We are also putting in a referral at the recommendation of the care manager who has been calling him at home to \[facility]\"" for their heart connection program. After talking about more what this entails with the patient and his wife they are very interested in being able to have care and improved symptom management within their home.  To hopefully help with his insomnia and neuropathy though I explained we are unlikely to eliminate his neuropathic pain I am going to start the very lowest dose of gabapentin to be taken once a day, at bedtime. Hopefully the side effect of being little sedating will help with his sleep. I stressed that it can cause decrease in respirations and they should pay attention to this. He does have oxygen for nighttime use, and this is a shorter acting medication as it is typically dosed 3 times a day but I certainly only want him to start it by taking it at night. They voiced understanding.  He has some breaks in the skin on the lower extremities. He typically has fluctuation and swelling of the lower extremities. The swelling is pretty equal bilaterally and though he has permanent A. fib and is not on a blood thinner I think  this is more likely related to some exacerbation of his heart failure and not DVT. I also think on the left lateral aspect of the ankle/lower leg might be a start of a little bit of cellulitis. He is tender right in the spot and there is erythema and increased warmth. Going to start him on a antibiotic. He is not allergic to any antibiotics.  For his abdominal pain can go ahead and get him started back on famotidine. With the change in belly pain, happening after mealtime, and a little bit of chest pain also when he was seated and leaning forward this could be some reflux. I am encouraging him to take Pepcid at the start of the day and if he has symptoms later in the day he can take another dose. He voiced understanding of this and is going to get some over-the-counter and get started back on it.    He was uncomfortable today. He was communicating very well but had some mild distress and breathing. Oxygen saturation was certainly good.  He became upset toward the end of the visit just because he wants to feel better. He was a little tearful. His wife was in the office today and is a little bit tearful as well, talking about how strong he is always been and how much work he has been able to tolerate and now to have some much trouble breathing just walking around the house has been hard. I am very hopeful that hospitals will be able to see them soon and set up something to get him more comfortable at home. In the meantime we are working on the above changes.

## 2022-01-28 NOTE — OUTREACH NOTE
Ambulatory Case Management Note    Patient Outreach    Introduced self, explained ACM RN role and provided contact information. Spoke with patient's wife. He is very pleased with the start of the [facility] program. Patient is already feeling much better. He was seen by the [facility] nurse Angelic today. He has also chose to remain with his PCP, Dr. Mahoney. Wife reports patient has a follow up appointment with Dr. Mahoney and with podiatry scheduled. Patient transitioned to monitoring status with addition of [facility] program.     There are no recently modified care plans to display for this patient.      Hanh Ricardo RN  Ambulatory Case Management    1/28/2022, 16:24 EST

## 2022-02-07 NOTE — TELEPHONE ENCOUNTER
[phone] Ainsley from Hospice called and asked if we do labs or any kind of testing with pt if we could let them know a head of time for billing purposes

## 2022-02-07 NOTE — TELEPHONE ENCOUNTER
I called and the message goes to prior authorization of services voice mail so I left a message for Ainsley with my information and call back number so I can discuss pt's care with Ainsley.

## 2022-02-09 NOTE — TELEPHONE ENCOUNTER
I spoke with Ainsley who works with [employer]).  We were talking about orders related to his hospice referral and orders related to other ailments requiring Medicare coverage.  On the day I referred him to hospice I also ordered a CT scan of the abdomen related to some severe abdominal pain that was affecting patient day today.  This was a recurrence of abdominal pain and he previously has had pancreatitis.  Any orders regarding patient whether it be laboratory or imaging on related to [facility] referral should be reviewed with hospice prior to ordering.  Those were the recommendations.

## 2022-02-11 PROBLEM — Z79.4 TYPE 2 DIABETES MELLITUS WITH DIABETIC NEUROPATHY, WITH LONG-TERM CURRENT USE OF INSULIN (HCC): Status: ACTIVE | Noted: 2022-01-01

## 2022-02-11 PROBLEM — E11.40 TYPE 2 DIABETES MELLITUS WITH DIABETIC NEUROPATHY, WITH LONG-TERM CURRENT USE OF INSULIN: Status: ACTIVE | Noted: 2022-01-01

## 2022-02-11 NOTE — PROGRESS NOTES
"Chief Complaint  Abdominal Pain (2 MFU)    Subjective          Home Wynn presents to [facility]  History of Present Illness  At home he weight 163.   He said his weight was up and he was feeling it more in his breathing and he went back on 80 mg.     He is having to clear his throat a lot. This is brothering him more than anything else.   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   Feels like there is always something there.   Thinks this is more than before.     The gabapentin is helping  He said if effected his balance  Sleeping and his feet feel better.   Still getting up to go to the bathroom he can get back to the sleep.   He can't take a nap during the day now.     Lost weight and had to change his wedding rings so they would fit.    Objective   Vital Signs:   /64 (BP Location: Right arm, Patient Position: Sitting, Cuff Size: Adult)   Pulse 60   Temp 96.9 °F (36.1 °C) (Temporal)   Resp 22   Ht 180.3 cm (71\")   Wt 78 kg (172 lb)   SpO2 97%   BMI 23.99 kg/m²     Physical Exam  Vitals reviewed.   Constitutional:       General: He is not in acute distress.     Appearance: He is normal weight. He is not ill-appearing or toxic-appearing.   Eyes:      General: No scleral icterus.        Right eye: No discharge.         Left eye: No discharge.      Conjunctiva/sclera: Conjunctivae normal.   Cardiovascular:      Rate and Rhythm: Normal rate.      Heart sounds: Normal heart sounds. No murmur heard.  No friction rub. No gallop.       Comments: Irregularly irregular.  Pulmonary:      Effort: Pulmonary effort is normal.      Breath sounds: Normal breath sounds. No wheezing.      Comments: He gets a little short of breath at different times with conversation when he is talking more.  He looks much more comfortable than the last time I saw him.  He is clearing his throat a lot.  Sounds wet but nothing is coming up.  Musculoskeletal:      Right lower leg: No edema.      Left " lower leg: No edema.   Neurological:      Mental Status: He is alert and oriented to person, place, and time.   Psychiatric:         Behavior: Behavior normal.        Result Review :                 Assessment and Plan    Diagnoses and all orders for this visit:    1. Chronic combined systolic and diastolic congestive heart failure (HCC) (Primary)    2. Type 2 diabetes mellitus with diabetic neuropathy, with long-term current use of insulin (HCC)    3. Insomnia, unspecified type    4. Cough    5. Throat clearing    Other orders  -     famotidine (PEPCID) 20 MG tablet; Take 1 tablet by mouth 2 (Two) Times a Day.  Dispense: 60 tablet; Refill: 1        Follow Up   No follow-ups on file.  Patient was given instructions and counseling regarding his condition or for health maintenance advice. Please see specific information pulled into the AVS if appropriate.     Patient looks much better today than he did on 1/25/2022.  He is working with hospice and they come in weekly to assess him.  He is weighing every other day and increasing his Lasix when his weight is up 3 to 4 pounds.  He is doing well with this management.  He feels he is breathing better and he uses 4 L of oxygen at home at night instead of 2.  He tried the gabapentin 100 mg nightly.  He says this is helped a lot with getting to sleep and getting back to sleep after up with frequent trips to the bathroom for urination.  He had a couple of mornings where he was off balance but he did not fall.  He has adjusted to this medication and is tolerating it well and no longer having any trouble with balance in the mornings.  He would like to continue on this medication since it helped his neuropathic pain in his bilateral feet and his sleep.  Because of his throat clearing his history of someone tell me is a small esophagus.  Him having trouble lying back and having more throat clearing so that he can take a nap and that he is also having belching after meals and feels  like he can get it all up we are going to do the Pepcid and not the antihistamine.  Concern for using an antihistamine related to his poor urine flow at times and weak stream.  We went 1 to make this worse for urinary obstruction.  He is going to let me know how he is doing next week on his throat clearing and if the Pepcid helps.  He is taking 20 mg twice a day.  Gone off of this medication and I think it will be a good choice for him I think this is his GERD.

## 2022-02-17 NOTE — TELEPHONE ENCOUNTER
Rx Refill Note  Requested Prescriptions     Pending Prescriptions Disp Refills   • [employer] Pen [place] 31G X 6 MM misc [Pharmacy Med Name: KRO PEN NEEDLE 6MM X 31G] 100 each 2     Sig: USE DAILY AS DIRECTED      Last office visit with prescribing clinician: 2/11/2022      Next office visit with prescribing clinician: 4/8/2022            Lyudmila Ramírez LPN  02/17/22, 11:05 EST

## 2022-03-23 NOTE — H&P
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 no renal or ureteral stones. may be seconarty to a ureteral stricture or a nonradiopaque stone.   - CT 2020 () - Multiple enlarged lymph nodes. NEw right hydro. Worrisome for Ca.  - Right Stent Placement (2020)   - Left Stent Exchange and Right Stent Placement (2020)   - CT 2020 - No adenopathy. Bilateral ureteral stents.   - Cystoscopy, bilateral retrograde pyelography, bilateral stent removal and replacement. 2020  - CT 2020 - piedad stents in good posiition, periaotic spoft densiity decreased since feb abn lymph nodes decresed since feb. cardiomagly  - Cystoscopy, bilateral retrograde pyelography, bilateral stent removal and replacement.10/26/2020  - Cysto, Piedad Stent Change 2021  - Cysto, Piedad Stent removed and replaced 2021  - Cysto, piedad stent removal and replacement 2021  - Cysto, Piedad Stent removal and replacement 2021    Paitent states that after surgery he had some scrotal pain when he took at shower  No pain otherwise  BPH: ALEM is a 89 year old, White, Male who presents today with symptoms consistent with BPH. Nocturia x 1-2. variable urg, freg, UI  Symptoms are mild, long-standing, and stable. No p or p.     Patient is now on a dieurtic, states he is going more often  States he is voiding small amounts   Elevated PSA: No further PSA testing due to age.  History  Past Medical History: Erection Problems;  Heart Disease Flu vaccine  Pneumococcal vaccine COVID-19 vaccine   Surgical History: tonsil and adnoids (194); blood clots (&); TUNA (); gallbladder (); A-FIB (); throat and nose (); hernia (); colonoscopy (); UTI (); lymphnode removed; COLONOSCOPY  stent    Hospitalization History: pancreatitis (); PANCREATITIS (2020); Water on lung/heart (); SAME AS SURGERIES   Social History: Current smoking status: Never smoker. Alcohol consumption status: never. ALEM denies the use of recreational drugs. The patient is sexually active. Does patient have an advanced care plan? Yes - Plan not documented. Does patient have power of  or surrogate decision maker? Yes Son- Gonzalo Wynn.   Family Medical History:   Brother has diabetes.   Brother is alive (DIABETES).   Complete PFSH reviewed on 2022 no changes except meds, sx, hospital stay.  Other Treating Clinicians:   Primary Care Physician: Dr. Yodit Mahoney  Allergies   No Known Drug Allergies  Current Medications   aspirin 325 mg capsule - CAPSULE ORAL   furosemide oral - ORAL   hydrochlorothiazide 12.5 mg capsule - CAPSULE ORAL   Lantus subcutaneous solution - SOLUTION SUBCUTANEOUS   metoprolol succinate oral - ORAL   PreserVision AREDS oral - ORAL   Toprol XL oral - ORAL    Objective    Vitals - 11:12 AM   Height: 5 ft 11 in   Weight: 171 lbs   BMI: 23.85   BSA: 1.97   Blood Pressure:   Sittin/79 mmHg - Right Upper Extremity; 84 Beats/Min  Urinalysis Results   Procedure: Automated urinalysis with microscopy   Urinalysis:   Color: Yellow   Clarity: Clear   Glucose: 250 mg/100mL   Bilirubin: Negative   Ketone: Negative   Specific Gravity: 1.015   Blood: moderate   pH: 5.5   Protein: 30 +   Urobilinogens: 4 mg/100mL   Nitrates: Negative   Leukocytes: Trace  Physical Exam   Constitutional: General appearance is normal. Vitals: Reviewed. Ht-5 ft 11 in Wt-171 lbs BP-135/79.   Psychiatric: Orientation normal.  Mood and affect normal.   Eyes: Pupils/irises normal. Exterior inspection conjunctivae and lids normal.   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   The risks and complications of therapy were discussed with the patient.   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     Elevated PSA - No further PSA testing.       Orders   Cystoscopy w/ Stent Insertion/Change for (Hydronephrosis, unspecified, BPH with LUTS (obstruction))  Patient Education   Patient Education - BMI

## 2022-03-24 PROBLEM — N18.31 STAGE 3A CHRONIC KIDNEY DISEASE: Status: ACTIVE | Noted: 2022-01-01

## 2022-03-24 PROBLEM — R33.8 ACUTE URINARY RETENTION: Status: ACTIVE | Noted: 2022-01-01

## 2022-03-24 NOTE — ANESTHESIA PREPROCEDURE EVALUATION
Anesthesia Evaluation     Patient summary reviewed and Nursing notes reviewed   NPO Solid Status: > 8 hours  NPO Liquid Status: > 2 hours           Airway   Mallampati: II  TM distance: >3 FB  Neck ROM: full  Dental - normal exam     Pulmonary - normal exam   (+) home oxygen, shortness of breath, sleep apnea,   Cardiovascular - normal exam    ECG reviewed    (+) hypertension, valvular problems/murmurs MR and TI, dysrhythmias Atrial Fib, CHF , PVD, hyperlipidemia,     ROS comment: EF 22%    Neuro/Psych  (+) dizziness/light headedness, syncope, numbness,    GI/Hepatic/Renal/Endo    (+)  GI bleeding , renal disease CRI and ARF, diabetes mellitus type 2 using insulin,     ROS Comment: Chronic pancreatitis (HCC)    Musculoskeletal     (+) back pain, neck pain,   Abdominal    Substance History - negative use     OB/GYN negative ob/gyn ROS         Other   arthritis, blood dyscrasia thrombocytopenia,                     Anesthesia Plan    ASA 4     general       Anesthetic plan, all risks, benefits, and alternatives have been provided, discussed and informed consent has been obtained with: patient.        CODE STATUS:

## 2022-03-24 NOTE — BRIEF OP NOTE
CYSTOSCOPY URETERAL CATHETER/STENT INSERTION  Progress Note    Home Wynn  3/24/2022    Pre-op Diagnosis:   **b hydro  Post-Op Diagnosis Codes:   same    Procedure/CPT® Codes:        Procedure(s):  CYSTOSCOPY BILATERAL STENT CHANGE    Surgeon(s):  Ceasar Nj MD    Anesthesia: General    Staff:   Circulator: Aviva Lugo RN; Cornelia Lambert RN  Scrub Person: Velma Carrera         Estimated Blood Loss: none    Urine Voided: * No values recorded between 3/24/2022 10:22 AM and 3/24/2022 10:39 AM *    Specimens:                None          Drains:   [REMOVED] Ureteral Drain/Stent Right ureter 6 Fr. (Removed)       [REMOVED] Ureteral Drain/Stent Left ureter 6 Fr. (Removed)       Findings: 0    Complications: **0        Ceasar Nj MD     Date: 3/24/2022  Time: 10:43 EDT

## 2022-03-24 NOTE — OP NOTE
PREOPERATIVE DIAGNOSIS: Bilateral hydronephrosis.    POSTOPERATIVE DIAGNOSIS: Bilateral hydronephrosis.    PROCEDURE PERFORMED: Cystoscopy with bilateral stent removal and replacement.    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 He presents now for cystoscopy and scheduled bilateral stent change. I discussed the pros, cons, risks, and complications. He understands and wishes to proceed.    DESCRIPTION OF PROCEDURE: The patient was brought in the operating room and placed on the operating table. General anesthesia was induced. Cystoscopy revealed no abnormalities. Both stents were removed and replaced with  6-Czech 26 cm double J stents. Positions were confirmed fluoroscopically and endoscopically. The patient was then awakened and taken to the recovery room in good and stable condition. There were no complications.

## 2022-03-24 NOTE — ANESTHESIA PROCEDURE NOTES
Airway  Urgency: elective    Date/Time: 3/24/2022 10:27 AM  End Time:3/24/2022 10:29 AM  Airway not difficult    General Information and Staff    Patient location during procedure: OR  Anesthesiologist: Sreekanth Roche MD    Indications and Patient Condition  Indications for airway management: airway protection    Preoxygenated: yes  MILS not maintained throughout  Mask difficulty assessment: 0 - not attempted    Final Airway Details  Final airway type: supraglottic airway      Successful airway: classic  Airway Seal Pressure (cm H2O): 20    Number of attempts at approach: 1  Assessment: lips, teeth, and gum same as pre-op and atraumatic intubation    Additional Comments  EZ LMA placement, no leak < 20cm H20.  ETCO2 > 20x6

## 2022-03-24 NOTE — ANESTHESIA POSTPROCEDURE EVALUATION
Patient: Home Wynn    Procedure Summary     Date: 03/24/22 Room / Location: [facility] OR 01 / [facility] MAIN OR    Anesthesia Start: 1022 Anesthesia Stop: 1055    Procedure: CYSTOSCOPY BILATERAL STENT CHANGE (Bilateral ) Diagnosis:     Surgeons: Ceasar Nj MD Provider: Sreekanth Roche MD    Anesthesia Type: general ASA Status: 4          Anesthesia Type: general    Vitals  Vitals Value Taken Time   /84 03/24/22 1131   Temp 36.5 °C (97.7 °F) 03/24/22 1130   Pulse 104 03/24/22 1136   Resp 16 03/24/22 1130   SpO2 87 % 03/24/22 1136   Vitals shown include unvalidated device data.        Post Anesthesia Care and Evaluation    Patient location during evaluation: PACU  Patient participation: complete - patient participated  Level of consciousness: awake and alert  Pain management: adequate  Airway patency: patent  Anesthetic complications: No anesthetic complications    Cardiovascular status: acceptable  Respiratory status: acceptable  Hydration status: acceptable    Comments: ---------------------------               03/24/22                      1130         ---------------------------   BP:          120/84         Pulse:         83           Resp:          16           Temp:   36.5 °C (97.7 °F)   SpO2:          96%         ---------------------------

## 2022-03-24 NOTE — ANESTHESIA POSTPROCEDURE EVALUATION
Patient: Home Wynn    Procedure Summary     Date: 03/24/22 Room / Location: [facility] OR 01 / [facility] MAIN OR    Anesthesia Start: 1022 Anesthesia Stop: 1055    Procedure: CYSTOSCOPY BILATERAL STENT CHANGE (Bilateral ) Diagnosis:     Surgeons: Ceasar Nj MD Provider: Sreekanth Roche MD    Anesthesia Type: general ASA Status: 4          Anesthesia Type: general    Vitals  Vitals Value Taken Time   /84 03/24/22 1131   Temp 36.5 °C (97.7 °F) 03/24/22 1130   Pulse 104 03/24/22 1136   Resp 16 03/24/22 1130   SpO2 87 % 03/24/22 1136   Vitals shown include unvalidated device data.        Post Anesthesia Care and Evaluation    Patient location during evaluation: PACU  Patient participation: complete - patient participated  Level of consciousness: awake and alert  Pain management: adequate  Airway patency: patent  Anesthetic complications: No anesthetic complications    Cardiovascular status: acceptable  Respiratory status: acceptable  Hydration status: acceptable    Comments: ---------------------------               03/24/22                      1130         ---------------------------   BP:          120/84         Pulse:         83           Resp:          16           Temp:   36.5 °C (97.7 °F)   SpO2:          96%         ---------------------------

## 2022-03-25 PROBLEM — N13.9 ACUTE URINARY OBSTRUCTION: Status: ACTIVE | Noted: 2022-01-01

## 2022-03-29 NOTE — OUTREACH NOTE
Prep Survey    Flowsheet Row Responses   [employer] facility patient discharged from? [place]   Is LACE score < 7 ? No   Emergency Room discharge w/ pulse ox? No   Eligibility Not Eligible   What are the reasons patient is not eligible? Hospice/Pallative Care   Does the patient have one of the following disease processes/diagnoses(primary or secondary)? Other   Prep survey completed? Yes          LIDA Bobby Registered Nurse

## 2022-04-02 PROBLEM — I50.22 CHRONIC SYSTOLIC CHF (CONGESTIVE HEART FAILURE) (HCC): Status: ACTIVE | Noted: 2021-01-01

## 2022-04-02 NOTE — ANESTHESIA PREPROCEDURE EVALUATION
Anesthesia Evaluation     Patient summary reviewed and Nursing notes reviewed   NPO Solid Status: > 8 hours  NPO Liquid Status: > 8 hours           Airway   Mallampati: II  Neck ROM: full  No difficulty expected  Dental - normal exam     Pulmonary     breath sounds clear to auscultation  (+) home oxygen, shortness of breath, sleep apnea,   Cardiovascular     Rhythm: regular    (+) hypertension, dysrhythmias Atrial Fib, CHF (EF 22%) , PVD, hyperlipidemia,       Neuro/Psych  (+) dizziness/light headedness, syncope, numbness,    GI/Hepatic/Renal/Endo    (+)  GI bleeding , renal disease, diabetes mellitus,     Musculoskeletal     Abdominal    Substance History      OB/GYN          Other   arthritis,                      Anesthesia Plan    ASA 4     general     intravenous induction     Anesthetic plan, all risks, benefits, and alternatives have been provided, discussed and informed consent has been obtained with: patient.        CODE STATUS:    Code Status (Patient has no pulse and is not breathing): CPR (Attempt to Resuscitate)  Medical Interventions (Patient has pulse or is breathing): Full

## 2022-04-02 NOTE — ANESTHESIA POSTPROCEDURE EVALUATION
"Patient: Home Wynn    Procedure Summary     Date: 04/02/22 Room / Location: [facility] OR 01 / [facility] MAIN OR    Anesthesia Start: 0836 Anesthesia Stop: 0925    Procedure: CYSTOSCOPY WITH CLOT EVACUATION FULGERATION (N/A Urethra) Diagnosis:     Surgeons: Ceasar Nj MD Provider: Jim Choudhary MD    Anesthesia Type: general ASA Status: 4          Anesthesia Type: general    Vitals  Vitals Value Taken Time   /70 04/02/22 1036   Temp 36.3 °C (97.4 °F) 04/02/22 1033   Pulse 78 04/02/22 1039   Resp 20 04/02/22 1035   SpO2 98 % 04/02/22 1039   Vitals shown include unvalidated device data.        Post Anesthesia Care and Evaluation    Patient location during evaluation: bedside  Patient participation: complete - patient participated  Level of consciousness: sleepy but conscious  Pain score: 0  Pain management: adequate  Airway patency: patent  Anesthetic complications: No anesthetic complications    Cardiovascular status: acceptable  Respiratory status: acceptable  Hydration status: acceptable    Comments: /73 (BP Location: Left arm, Patient Position: Lying)   Pulse 76   Temp 36.6 °C (97.9 °F) (Oral)   Resp 20   Ht 180.3 cm (71\")   Wt 76.6 kg (168 lb 14 oz)   SpO2 99%   BMI 23.55 kg/m²         "

## 2022-04-02 NOTE — ANESTHESIA PROCEDURE NOTES
Airway  Urgency: elective    Date/Time: 4/2/2022 8:48 AM  Airway not difficult    General Information and Staff    Patient location during procedure: OR  Anesthesiologist: Jim Choudhary MD  CRNA: Dwight Collazo CRNA    Indications and Patient Condition  Indications for airway management: airway protection    Preoxygenated: yes  MILS not maintained throughout  Mask difficulty assessment: 1 - vent by mask    Final Airway Details  Final airway type: supraglottic airway      Successful airway: unique and LMA  Size 5    Number of attempts at approach: 2  Assessment: lips, teeth, and gum same as pre-op    Additional Comments  Pre O2, SIAI

## 2022-04-04 NOTE — OUTREACH NOTE
Prep Survey    Flowsheet Row Responses   [employer] facility patient discharged from? [place]   Is LACE score < 7 ? No   Emergency Room discharge w/ pulse ox? No   Eligibility Not Eligible   What are the reasons patient is not eligible? Hospice/Pallative Care   Does the patient have one of the following disease processes/diagnoses(primary or secondary)? Other   Prep survey completed? Yes          HELEN DINH - Registered Nurse

## 2022-04-08 NOTE — TELEPHONE ENCOUNTER
Rx Refill Note  Requested Prescriptions     Pending Prescriptions Disp Refills   • Insulin Glargine (Lantus SoloStar) 100 UNIT/ML injection pen       Sig: Inject 12 Units under the skin into the appropriate area as directed Every Night.   • furosemide (LASIX) 40 MG tablet       Sig: Take 1 tablet by mouth Every Morning.      Last office visit with prescribing clinician: 2/11/2022      Next office visit with prescribing clinician: 4/12/2022       {TIP  Please add Last Relevant Lab Date if appropriate:    Shea Wiggins MA  04/08/22, 11:37 EDT

## 2022-04-12 NOTE — PROGRESS NOTES
"Chief Complaint  Diabetes and Peripheral Neuropathy    Subjective          Home Wynn presents to [facility]  History of Present Illness  Hospital   Stent replacement and seemed to go well.  Home and he had blood in his bladder. Then after bloody urine he had urinary retention.  Catheter was placed and blood evacuated/irrigated the bladder. Had to be monitored for resolution.   Had to go back again for this.   He has been home and urine is good. And no blood.    The neuropathy is well controlled with the nightly gabapentin.   Has not slept well with being in the hospital for the last few weeks off and on.   Objective   Vital Signs:   /70 (BP Location: Right arm, Patient Position: Sitting, Cuff Size: Adult)   Pulse 70   Temp 96.6 °F (35.9 °C) (Temporal)   Resp 17   Ht 180.3 cm (71\")   Wt 78.5 kg (173 lb)   SpO2 98%   BMI 24.13 kg/m²     BMI is within normal parameters. No follow-up required.      Physical Exam  Vitals reviewed.   Constitutional:       General: He is not in acute distress.  Eyes:      General: No scleral icterus.        Right eye: No discharge.         Left eye: No discharge.      Conjunctiva/sclera: Conjunctivae normal.   Cardiovascular:      Rate and Rhythm: Normal rate.      Heart sounds: Normal heart sounds. No murmur heard.    No friction rub. No gallop.      Comments: Irregularly irregular.  Pulmonary:      Effort: Pulmonary effort is normal. No respiratory distress.      Breath sounds: Normal breath sounds. No wheezing.   Neurological:      Mental Status: He is alert and oriented to person, place, and time.   Psychiatric:         Behavior: Behavior normal.        Result Review :     CBC w/diff    CBC w/Diff 4/2/22 4/3/22 4/4/22   WBC 4.13 7.24 5.71   RBC 3.79 (A) 4.00 (A) 3.83 (A)   Hemoglobin 12.0 (A) 12.5 (A) 12.0 (A)   Hematocrit 35.1 (A) 38.4 35.7 (A)   MCV 92.6 96.0 93.2   MCH 31.7 31.3 31.3   MCHC 34.2 32.6 33.6   RDW 14.4 14.1 14.2 "   Platelets 160 185 185   Neutrophil Rel %  76.2 (A) 68.8   Immature Granulocyte Rel %  0.6 (A) 1.1 (A)   Lymphocyte Rel %  12.4 (A) 12.4 (A)   Monocyte Rel %  9.5 15.6 (A)   Eosinophil Rel %  1.0 1.6   Basophil Rel %  0.3 0.5   (A) Abnormal value            BMP    BMP 4/2/22 4/3/22 4/4/22   BUN 28 (A) 29 (A) 24 (A)   Creatinine 1.26 1.64 (A) 1.45 (A)   Sodium 139 139 140   Potassium 4.3 4.4 4.5   Chloride 101 98 100   CO2 29.0 32.0 (A) 32.0 (A)   Calcium 9.4 9.5 9.3   (A) Abnormal value            Most Recent A1C    HGBA1C Most Recent 3/25/22   Hemoglobin A1C 9.20 (A)   (A) Abnormal value                      Assessment and Plan    Diagnoses and all orders for this visit:    1. Polyneuropathy associated with underlying disease (HCC) (Primary)    2. Hospital discharge follow-up        Follow Up   No follow-ups on file.  Patient was given instructions and counseling regarding his condition or for health maintenance advice. Please see specific information pulled into the AVS if appropriate.     Admitted for hematuria related to recent procedure with stent replacement bilaterally. He had the blood evacuated and then bladder irrigation. He is doing well.   Watching his weight. His swelling is controlled right now and breathing is stable. He wears his oxygen at night.   Sugars are stable. Not too high. Discussed appropriate level down 200 or better.   He is on stable dose of insulin since his admissions.   I reviewed the procedure notes, notes, reports, labs, and imaging studies from pt's recent admission.   Current outpatient and discharge medications have been reconciled for the patient.  Reviewed by: Yodit Mahoney MD    We reviewed the controlled substance contract together. He is taking low dose of gabapentin nightly and it is helping significantly with is pain from the peripheral neuropathy. Going to keep at this dosing and monitor routinely.

## 2022-05-09 NOTE — PROGRESS NOTES
"Enter Query Response Below      Query Response: trauma from stent             If applicable, please update the problem list.         Patient: Home Wynn        : 1931  Account: [id number]           Admit Date: 2022        Options to Respond to Query:    1. Access the Encounter     a. From the To-Do Side bar, click Respond With Note.     b. Click New Note     c. Answer query within the yellow box.                d. Update the Problem List if applicable.     Dr. Nj  Thank you for your response. This query is sent back to you so you can add your electronic signature. Thank you.    ----- Message -----  From: Ceasar Nj MD  Sent: 2022   2:41 PM EDT  To: Ursula Holder  Subject: RE: CDI Query                                         Trauma from stnet exchange      ----- Message -----  From: Ursula Holder  Sent: 2022   9:59 AM EDT  To: Ceasar Nj MD  Subject: CDI Query                                             Patient: Home Wynn        : 1931  Account: [id number]           Admit Date: 2022                                                    Dr. Nj,     Patient presented from urology office with hematuria and urinary retention.  Patient underwent cystoscopy on .  Operative note includes, \"In the dorsal lithotomy position and sterilely prepped and draped, numerous clots were noted within the bladder. They were evacuated.  Careful cystoscopy revealed stents in good position and no other abnormalities. There was some mild trauma of the posterior bladder neck with no resectable bleeding and this was thoroughly  cauterized with the Bovie electrode.\"     Can this be further clarified as:     - hematuria due to trauma to posterior bladder neck due to ureteral stent exchange  - hematuria due to trauma to posterior bladder neck due to (please specify) _______________________  - other ________________________  - unable to clinically determine     By " submitting this query, we are merely seeking further clarification of documentation to accurately reflect all conditions that you are monitoring, evaluating, treating or that extend the hospitalization or utilize additional resources of care. Please utilize your independent clinical judgment when addressing the question(s) above.      This query and your response, once completed, will be entered into the legal medical record.     Sincerely,  Ursula Holder RN CCDS [facility]  Clinical Documentation Integrity Program   [email or web address]    Esig query sent 4/27 by:  Deepthi DINH RN, CCDS  Clinical Documentation Integrity Program   [email or web address]

## 2022-06-29 NOTE — H&P
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 no renal or ureteral stones. may be seconarty to a ureteral stricture or a nonradiopaque stone.  - CT 2020 () - Multiple enlarged lymph nodes. NEw right hydro. Worrisome for Ca.  - Right Stent Placement (2020)  - Left Stent Exchange and Right Stent Placement (2020)  - CT 2020 - No adenopathy. Bilateral ureteral stents.  - Cystoscopy, bilateral retrograde pyelography, bilateral stent removal and replacement. 2020  - CT 2020 - piedad stents in good posiition, periaotic spoft densiity decreased since feb abn lymph nodes decresed since feb. cardiomagly  - Cystoscopy, bilateral retrograde pyelography, bilateral stent removal and replacement.10/26/2020  - Cysto, Piedad Stent Change 2021  - Cysto, Piedad Stent removed and replaced 2021  - Cysto, piedad stent removal and replacement 2021  - Cysto, Piedad Stent removal and replacement 2021  - Cysto with piedad stent removal and replacement 2022    Patient is toltering stent  BPH: ALEM is a 89 year old, White, Male who presents today with symptoms consistent with BPH. - On Flomax    Since  out - patient is back to baseline voiding - Nocturia x 1-2. variable urg, freg, UI    Patient is now on a dieurtic, states he is going more often  Elevated PSA: No further PSA testing due to age.  Gross Hematuria: - Cysto, clot evac and fulg 2022      removed at last visit  Patient has been voiding well since last visit.  History    Past Medical History: Erection Problems; Heart Disease Flu vaccine  Pneumococcal vaccine COVID-19 vaccine  Surgical History: tonsil and adnoids (); blood clots (&); TUNA (); gallbladder (); A-FIB (); throat and nose (); hernia (); colonoscopy (); UTI (); lymphnode removed; COLONOSCOPY  stent   CYSTO IRRIGATION CLOT EVACUATION   Hospitalization History: pancreatitis (); PANCREATITIS (2020); [place] on lung/heart (); SAME AS SURGERIES  Social History: Current smoking status: Never smoker. Alcohol consumption status: never. ALEM denies the use of recreational drugs. The patient is sexually active. Does patient have an advanced care plan? Yes - Plan not documented. Does patient have power of  or surrogate decision maker? Yes Son- Gonzalo Wynn.  Family Medical History:  Brother has diabetes.    Brother is alive (DIABETES).    Complete PFSH reviewed from 2022, and there are no changes.    Other Treating Clinicians:  Primary Care Physician: Dr. Yodit Mahoney    Allergies    No Known Drug Allergies  Current Medications  famotidine  furosemide oral - ORAL  Lantus subcutaneous solution - SOLUTION SUBCUTANEOUS  metoprolol succinate oral - ORAL  PreserVision AREDS oral - ORAL  tamsulosin oral - ORAL    Objective    Vitals - 8:02 AM  Height: 5 ft 11 in  Weight: 171 lbs  BMI: 23.85  BSA: 1.97  Blood Pressure:  Sittin/78 mmHg - Right Upper Extremity; 85 Beats/Min  Urinalysis Results  Procedure: Automated urinalysis with microscopy  Urinalysis:  Color: Yellow  Clarity: Clear  Glucose: Negative  Bilirubin: Negative  Ketone: Negative  Specific Gravity: 1.025  Blood: large +++  pH: 5.5  Protein: 100 ++  Urobilinogens: 0.2 mg/100mL  Nitrates: Negative  Leukocytes: Trace  Physical Exam    Constitutional: General appearance is normal. Vitals: Reviewed. Ht-5 ft 11 in Wt-171 lbs  BP-120/78.  Psychiatric: Orientation normal. Mood and affect normal.  Eyes: Pupils/irises normal. Exterior inspection conjunctivae and lids normal.  Urine Micro:  RBC: 6-50/ HPF  Bladder Scan Results    Post Void Residual: 19 mL  N40.1 BPH with LUTS    Assessment    Diagnosis    N401: BPH with LUTS (obstruction)  : Hydronephrosis, unspecified  R310: Hematuria, gross  Procedures  51251: Bladder Scan  85915: Urinalysis, by dip stick or tablet reagent for bilirubin, glucose, hemoglobin, ketones, leukocytes, nitrite, pH, protein, specific gravity, urobilinogen, any number of these constituents; automated, with microscopy  72095: Established Patient Office visit Expanded    Plan    Counseling  Today I discussed the following with ALEM:  General Issues:  The patient was asked to call if any new voiding symptoms or  issues arise.  The risks and complications of therapy were discussed with the patient.  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    Elevated PSA - No further PSA testing.    Gross Hematuria - Chronic Stable Illness  - s/p cysto clot evac  - call prn gross hem          Orders    Cystoscopy w/ Stent Insertion/Change for (Hydronephrosis, unspecified)  SOAP note to be sent to PCP and referring provider  Patient Education    Patient Education - BMI

## 2022-07-15 NOTE — TELEPHONE ENCOUNTER
"“Please be informed that patient has passed. Patient has been marked  in the system. The date of death is: 07/15/22\".    Caller: RONI     Relationship: HOSPICE NURSE      Best call back number: [phone]    "

## 2022-12-04 NOTE — TELEPHONE ENCOUNTER
Regarding: [place], knee and foot pain  ----- Message from Gina Velasco sent at 12/3/2022 12:49 PM CST -----     Patient Name: Sally Johnston     Specialist or PCP Name: Kenneth Castro MD     Symptoms: stepped down of foot yesterday and it gave out, knee and foot pain -     Pregnant (females aged 13-60. If Yes, how long?) : no     Call Back # : [phone]     Which State are you currently located in?: [place]     Name of Clinic Site / Acct# : [facility] - [address] [place] [address]     Use following scripting for patients waiting for a callback:   \"Nurse callback times vary based on call volumes; please be aware the return phone call may come from an unidentified phone number. If your symptoms worsen or become life threatening while waiting, you should seek immediate assistance by calling 911 or going to the ER for evaluation.\"          Spoke with the pt relaying the message that Dr. Jacobs can do a second epidural injection. He is scheduling to have it done on August 2nd.

## 2023-07-27 NOTE — TELEPHONE ENCOUNTER
07/31/23        Phill Taylor  [address] [facility] [address]      Dear Phill,    Please review the enclosed prep instructions for your procedure with Maria Isabel Pace MD     Date of Procedure: Wednesday, December 13, 2023  Arrival Time: 7:00 AM  Time of Procedure:  9:00 AM  Location:    [address]  [address]  [phone]  Please enter through the main doors near the Physician's [place] and check in at Day Surgery Registration.       Important:   You will need someone to drive you home and remain with you or check on you up to 24 hours after you go home.  If you take blood thinners (i.e. Warfarin, Plavix, Xarelto, Eliquis, Aggrenox, Effient, Pradaxa) please call your Primary Care Provider/Cardiologist at least 10 days before your procedure for instructions on how many days before to hold your blood thinner.   If you are diabetic and take insulin, please call your Primary Care Provider for instructions. Usually, it is recommended that you take half of your insulin dose the evening before and half your insulin dose the morning of your procedure.   If you are taking the diet drug Phentermine, stop taking it 14 days before your procedure.   The Pre-admission nurse will tell you which medications to take the morning of your procedure with a small sip of water. Do not take any other medications until after your procedure.    If you have any questions regarding these instructions or need to reschedule, please contact me at the telephone number and extension listed below.     Thank you,    Rebekah BOWERS [phone]   Surgery Scheduler for Maria Isabel Pace MD  [facility] Pre-Admit Department                Colonoscopy with Nulytely Split Dose and Dulcolax  Pre-Procedure Instructions     Please pick-up a Nulytely Prep Kit and a small box of 5 mg Dulcolax tablets within 1 week at the pharmacy your physician sent the prescription for the Nulytely Kit to. You do not need a  Pt advised of results        prescription for Dulcolax. Purchase a small box as you will only need 2 tablets. Bring these instructions with you when you go to the pharmacy. Do not mix the Nulytely solution until the day before the procedure.    Transportation:  Make arrangements for someone (over 18 years of age) to drive you home after the procedure because you will be very drowsy. Please make these arrangements in advance. A taxi is not acceptable transportation. If you do not have transportation arranged, we will not be able to do the colonoscopy.  Make arrangements for someone to stay with you or check in on you frequently the rest of the day/evening until the drowsiness has completely worn off.     Cancellation or Rescheduling:  Due to everyone's busy schedules, it is important that you keep your appointment. If you must reschedule or cancel your appointment, please notify your physician's office at least 48 hours in advance.  After your Colonoscopy:  You will receive after-procedure information and instructions. In addition:  Do not plan on going to work or doing anything for the rest of the day.  You may resume eating and drinking with no limitations following the procedure, unless otherwise stated.  10 days Before Your Procedure:  If you take blood thinners (i.e. Warfarin, Plavix, Xarelto, Eliquis, Aggrenox, Effient, Pradaxa) please call your Primary Care Provider/Cardiologist at least 10 days before your procedure for instructions on how many days before to hold your blood thinner.    1 Week/ 7 days Before Your Procedure:    Please do not take Fish Oil, for 7 days  prior to your procedure    Do not eat popcorn, seeds, nuts or whole kernel corn.   Do not take Carafate, iron supplements, Fish Oil, or Pepto-Bismol.   Have clear liquids available to drink at home. See “Clear Liquid Diet Suggestion” sheet.   the prep from pharmacy. Do not mix it until the day you need to start the colon prep.     5 Days Before Your Procedure:   Do  not eat products with [place] (a fat substitute found in [employer] and [employer]ring[employer] Fat-Free chips) for 5 days before the procedure.    Colon Preparation:  To complete a successful colonoscopy, the bowel must be clean so that the physician can clearly view the colon. It is very important that you read all the instructions well in advance of the procedure. Without proper preparation, the colonoscopy will not be successful, and the test may have to be repeated.    1 Day Before Your Procedure:  You may have only clear liquids to eat or drink all day long. You may not have any solid foods or dairy products, and you may not eat or drink anything that is red or purple. The more clear liquids you drink, the better off you will be. See attached “Clear Liquid Diet Suggestion” sheet.  Do not drink any alcoholic beverages for at least 24 hours before the procedure.  Do not take any other laxatives such as: Lomotil, Imodium, Effer-syllium, Metamucil, Citrucel, Konsyl, Hydrocil, or FiberCon.  In the morning, mix the Nulytely prep with the flavor packet (if applicable) and one gallon of water, shake well to mix, and refrigerate to chill. If preferred, you may add some Gatorade, Powerade, or crystal light (no red, orange, or purple) with the water to add flavor, without adding more than one gallon of fluid to the prep. Do not further dilute the prep in any way.  At 4:00 pm, start drinking half of the Nulytely prep. Drink one large (8-10 oz.) glass every 10-15 minutes until ½ of the gallon is gone. Using a straw to finish the drink may be helpful.  In most cases, loose, liquidy bowel movements will begin within 30 minutes to one hour. You should remain within easy access of a bathroom. Continue to drink the prep regardless of stool frequency. It is normal for this to cause chills.   You will drink half of the Nulytely prep now.  The remaining 1/2 gallon will be used tomorrow.  Put the remaining prep solution in the  refrigerator.   Continue to drink clear liquids throughout the evening (see “Clear Liquid Diet Suggestion” sheet).  At 7:00 pm, take 2 Dulcolax laxative tablets. It is normal for this to cause some abdominal cramping.    Should you experience rectal irritation due to frequent stooling, you may apply Desitin, Balmex, Vitamin A&D ointment or other similar product to the irritated area.    Day of Your Procedure:  Starting 4 hours before your procedure drink the remainder of Nulytely prep over 1 hour, drinking one large (8-10 oz.) glass every 10-15 minutes until it is gone.  Do not eat anything after you finish the prep.  You must not have anything to drink 2 hours before your arrival.   You will be informed by the Pre-admission Testing nurse which medications to take the morning of your procedure with a small sip of water only. Do not take any other medications until after your procedure.   In the morning, your stools should have a clear to see-through cloudy yellow appearance with no formed substance. If your stools are darker or have formed substance, please call the location where your procedure is scheduled to be done and ask for the pre-op nurse, who will get further instructions from your physician.                  Clear Liquid Diet Suggestion Sheet    Preparation:  You have received instructions that explain what you need to do to prepare for your colonoscopy. Your colon must be empty for the colonoscopy to be thorough and safe. To prepare for the procedure, you will have to follow a liquid diet for 1 to 3 days beforehand. The longer you do the clear liquid diet, the better off you will be. The liquid diet should be clear and not contain food colorings (Red, Orange, or Purple) and may include:  Bouillon or broth  Strained/ Pulp free fruit juices (lemonade, apple, or white grape)  Water  Plain Coffee (no dairy products, but you can add sugar)  Plain Tea (no milk or creamers or dairy products)   White  Soda  Gelatin/ Jell-O (no red, orange, purple)  Popsicles  Gatorade, Powerade, etc.  Clear Hard Candy    No alcoholic beverages for at least 24 hours before your procedure.   An example of a typical menu on the Clear Liquid Diet may look like this:  Breakfast:  Snack:    1 glass of pulp-free fruit juice and/or water 1 popsicle   1 bowl gelatin/ Jell-O 1 cup of coffee or tea, no dairy products and/or water/ white soda   1 cup of coffee or tea, no dairy products  Sugar or honey is acceptable   Sugar or honey is acceptable    Snack: Dinner:    1 glass of pulp-free fruit juice and/or water 1 glass of pulp-free fruit juice and/or water   1 bowl gelatin/ Jell-O 1 cup broth    1 bowl gelatin/ Jell-O   Lunch:     1 glass of pulp-free fruit juice and/or water    1 cup broth    1 bowl gelatin/ Jell-O      Thank you for choosing [facility]      Fiber-Restricted Diet    This guide has been prepared for your use by registered dietitians.  If you have questions or concerns, please call the nearest [facility] facility to contact a dietitian. Diet counseling is available to discuss your speci?c needs.    Why follow a fiber-restricted diet?  Decrease the amount of fiber in your diet temporarily if you have diarrhea and abdominal cramping. Gradually add foods back once symptoms disappear or follow your doctor’s advice.    This diet can be used during acute phases of ulcerative colitis, Crohn’s disease and diverticulitis.  It also can be used before surgery to reduce stool quantity and after surgery before you progress to a general diet.    Important points   Avoid any food made with seeds, nuts and raw or dried fruit    When symptoms disappear, gradually add higher-fiber foods back into your diet   Ask your doctor if a multivitamin and mineral supplements are necessary   Eat foods containing prebiotics and probiotics, such as yogurt with active cultures and kefir   Drink at least 8 cups of liquids each day to prevent  dehydration      Food groups Foods Recommended  May not be tolerated Tips   Breads, cereals, rice and pasta      5 to 7 servings per day    1 serving =    1 slice of bread  1 cup ready-to-eat cereal  1/2 cup cooked cereal, rice   or pasta  1/2 bagel, bun or    English muffin Refined breads, rolls,   biscuits, muffins,   crackers, pancakes,    waffles and plain pastries  Refined cooked cereals,   including quick-cooking   oatmeal, grits and farina  Refined cold cereals,   including puffed rice,   puffed wheat and corn   Flakes  Cooked white and sweet   potatoes without skin  White rice and refined pasta Any bread product made   with whole-grain flour,   bran, seeds, nuts, coconut,   or raw dried fruit    Cornbread, pj   Crackers    Regular oatmeal; any   whole-grain, bran or   granola cereal    Any cereal with seeds,   nuts, coconut or dried   Fruit    Brown or wild rice,   whole grain pasta Buy breads and cereals   made from refined wheat   and rice  Avoid whole-grain   products with added bran   Food groups Foods Recommended  May not be tolerated Tips   Vegetables      2 to 3 cups per day Most well-cooked and   canned vegetables   without seeds, lettuce if tolerated  Strained vegetable juice Sauerkraut, winter   squash, peas and corn,   most raw vegetables and   vegetables with seeds Remove skin from   vegetables and fruits   before cooking  Strong-flavored   vegetables, such as   cabbage, broccoli and   onions, may cause   discomfort   Fruits      1 1/2 to 2 cups per day Most canned or cooked   fruits, including   applesauce, fruit cocktail,   ripe bananas, melons,   peeled apples, and orange   and grapefruit sections   with membranes removed  Strained fruit juice/fruit   drinks Raw or dried fruit    All berries    Prune juice, prunes    Milk, yogurt and cheese      3 cups per day  1 cup =  1 cup milk or yogurt  1 1/2 oz. natural cheese  2 oz. processed cheese Milk (as tolerated), kefir,   lactaid milk,  cheese,   cottage cheese, pudding   and yogurt (plain or   flavored) Cheese with seeds or   nuts; pudding with nuts;   yogurt containing nuts or   fruit peels You may need to limit   milk and milk products if   you have discomfort  Choose yogurt with   active cultures   Meats, fish, poultry, dry beans and peas, eggs and nuts        5 to 7 ounces per day Ground or well-cooked   tender beef, lamb, ham,   veal, pork, poultry, fish   and organ meats    Eggs    Creamy peanut butter Any meat prepared with   whole grain ingredients,   seeds or nuts    Dry beans, peas and   lentils, legumes, baked   Beans    Rescue peanut butter           Marinate meats in juice   or wine for added flavor   Food groups Foods   Recommended May not be tolerated Tips   Fats, snacks, sweets, condiments and beverages        Use sparingly Margarine, butter,   vegetable oils, salad   dressings, mayonnaise,   saha, plain gravies    Bouillon, broth or cream   soups made with allowed   vegetables, noodles, rice   or flour    Plain cakes and cookies;   pie made with allowed   Fruits    Plain sherbet, fruit ice,   frozen fruit pops, gelatin   and custard; ice cream as   tolerated     Jelly, seedless jam,   plain hard candy and   marshmallows, molasses,   sugar and syrup    Salt, pepper, vinegar,   ketchup and mustard    Coffee/tea as tolerated,   carbonated beverages       All other soups        Any desserts and sweets   made with whole grain   flour, bran, seeds, nuts,   coconut or dried fruit        Jams and marmalades  with seeds          Nuts, seeds, popcorn  and pickles       Season with spices and   herbs for added flavor   and variety          Consult your doctor   about the use of alcoholic   beverages     A registered dietitian can help  Diet counseling with a registered dietitian may include information about:   Label reading, shopping, preparing food, dining out and adjusting recipes   Combining other diet restrictions if needed    For a  list of [facility] with a dietitian, please call [facility] toll free at [phone].

## (undated) DEVICE — GW PTFE FIX/CORE FLXTIP .035 3X150CM

## (undated) DEVICE — CATH URETRL PA CONE TP 8F

## (undated) DEVICE — SYR LUERLOK 20CC BX/50

## (undated) DEVICE — SPHINCTEROTOME: Brand: HYDRATOME RX 44

## (undated) DEVICE — THE TORRENT IRRIGATION SCOPE CONNECTOR IS USED WITH THE TORRENT IRRIGATION TUBING TO PROVIDE IRRIGATION FLUIDS SUCH AS STERILE WATER DURING GASTROINTESTINAL ENDOSCOPIC PROCEDURES WHEN USED IN CONJUNCTION WITH AN IRRIGATION PUMP (OR ELECTROSURGICAL UNIT).: Brand: TORRENT

## (undated) DEVICE — CATH FOL BARDEX HEMATURIA 3WY 22F 30CC

## (undated) DEVICE — SYRINGE,TOOMEY,IRRIGATION,70CC,STERILE: Brand: MEDLINE

## (undated) DEVICE — GLV SURG BIOGEL LTX PF 7 1/2

## (undated) DEVICE — PATIENT RETURN ELECTRODE, SINGLE-USE, CONTACT QUALITY MONITORING, ADULT, WITH 9FT CORD, FOR PATIENTS WEIGING OVER 33LBS. (15KG): Brand: MEGADYNE

## (undated) DEVICE — KT ORCA ORCAPOD DISP STRL

## (undated) DEVICE — 3M™ STERI-STRIP™ COMPOUND BENZOIN TINCTURE 40 BAGS/CARTON 4 CARTONS/CASE C1544: Brand: 3M™ STERI-STRIP™

## (undated) DEVICE — ERBE NESSY®PLATE 170 SPLIT; 168CM²; CABLE 3M: Brand: ERBE

## (undated) DEVICE — NITINOL WIRE WITH HYDROPHILIC TIP: Brand: SENSOR

## (undated) DEVICE — DEV LK WIREGUIDE FUSN OLYMP SCP

## (undated) DEVICE — LOU CYSTO: Brand: MEDLINE INDUSTRIES, INC.

## (undated) DEVICE — ADHS SKIN DERMABOND TOP ADVANCED

## (undated) DEVICE — TIDISHIELD UROLOGY DRAIN BAGS FROSTY VINYL STERILE FITS SIEMENS UROSKOP ACCESS 20 PER CASE: Brand: TIDISHIELD

## (undated) DEVICE — BITEBLOCK OMNI BLOC

## (undated) DEVICE — FRCP BX RADJAW4 NDL 2.8 240CM LG OG BX40

## (undated) DEVICE — GLV SURG BIOGEL LTX PF 6 1/2

## (undated) DEVICE — TUBING, SUCTION, 1/4" X 10', STRAIGHT: Brand: MEDLINE

## (undated) DEVICE — THE SINGLE USE ETRAP – POLYP TRAP IS USED FOR SUCTION RETRIEVAL OF ENDOSCOPICALLY REMOVED POLYPS.: Brand: ETRAP

## (undated) DEVICE — SNAR POLYP SENSATION STDOVL 27 240 BX40

## (undated) DEVICE — TRIPLE LUMEN NEEDLE KNIFE: Brand: RX NEEDLE KNIFE XL

## (undated) DEVICE — ELECTRD BLD EDGE/INSUL1P 2.4X5.1MM STRL

## (undated) DEVICE — Device

## (undated) DEVICE — APPL CHLORAPREP HI/LITE 26ML ORNG

## (undated) DEVICE — ADAPT CLN BIOGUARD AIR/H2O DISP

## (undated) DEVICE — NDL HYPO ECLPS SFTY 22G 1 1/2IN

## (undated) DEVICE — CANN O2 ETCO2 FITS ALL CONN CO2 SMPL A/ 7IN DISP LF

## (undated) DEVICE — LN SMPL CO2 SHTRM SD STREAM W/M LUER

## (undated) DEVICE — SENSR O2 OXIMAX FNGR A/ 18IN NONSTR

## (undated) DEVICE — GOWN,NON-REINFORCED,SIRUS,SET IN SLV,XL: Brand: MEDLINE

## (undated) DEVICE — 3M™ STERI-STRIP™ REINFORCED ADHESIVE SKIN CLOSURES, R1547, 1/2 IN X 4 IN (12 MM X 100 MM), 6 STRIPS/ENVELOPE: Brand: 3M™ STERI-STRIP™

## (undated) DEVICE — TUR/ENDOSCOPIC CABLE, 10' (3.05 M): Brand: CONMED

## (undated) DEVICE — SYS IRR PUMP SGL ACTN VAC SYR 10CC

## (undated) DEVICE — DRSNG SURESITE WNDW 4X4.5

## (undated) DEVICE — LOU MINOR PROCEDURE: Brand: MEDLINE INDUSTRIES, INC.

## (undated) DEVICE — URETERAL DILATATION SYSTEM